# Patient Record
Sex: FEMALE | Race: WHITE | Employment: UNEMPLOYED | URBAN - METROPOLITAN AREA
[De-identification: names, ages, dates, MRNs, and addresses within clinical notes are randomized per-mention and may not be internally consistent; named-entity substitution may affect disease eponyms.]

---

## 2017-05-04 ENCOUNTER — OFFICE VISIT (OUTPATIENT)
Dept: OBGYN CLINIC | Age: 32
End: 2017-05-04

## 2017-05-04 VITALS
BODY MASS INDEX: 18.61 KG/M2 | HEIGHT: 63 IN | WEIGHT: 105 LBS | SYSTOLIC BLOOD PRESSURE: 118 MMHG | DIASTOLIC BLOOD PRESSURE: 60 MMHG

## 2017-05-04 DIAGNOSIS — N92.6 IRREGULAR PERIODS: Primary | ICD-10-CM

## 2017-05-04 DIAGNOSIS — N94.6 DYSMENORRHEA: ICD-10-CM

## 2017-05-04 RX ORDER — NITROFURANTOIN 25; 75 MG/1; MG/1
CAPSULE ORAL
Refills: 0 | COMMUNITY
Start: 2017-04-18 | End: 2017-06-22
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
## 2017-05-04 NOTE — MR AVS SNAPSHOT
Visit Information Date & Time Provider Department Dept. Phone Encounter #  
 5/4/2017  8:40 AM Monica Veras MD [facility] Da[facility] [phone] [id number] Upcoming Health Maintenance Date Due INFLUENZA AGE 9 TO ADULT 8/1/2017 PAP AKA CERVICAL CYTOLOGY 11/1/2019 Allergies as of 5/4/2017  Review Complete On: 5/4/2017 By: Марина Aguirre Severity Noted Reaction Type Reactions Latex  07/25/2013    Rash Wheezing Beef Containing Products High 07/25/2013    Anaphylaxis Codeine  07/25/2013    Other (comments) Hyperactivity Milk  07/25/2013    Hives Nuts [Tree Nut]  01/16/2014    Swelling Pt stated that her mouth and throat feels funny when she eats nuts Pepto-bismol [Bismuth Subsalicylate]  56/80/3039    Hives Phenergan [Promethazine]  07/25/2013    Other (comments) Increased sedation Reglan [Metoclopramide]  07/25/2013    Anxiety Soy  08/09/2014    Hives Current Immunizations  Never Reviewed Name Date RHOGAM INJECTION 7/25/2013  3:23 PM  
 Rho(D) 10/25/2013 Rho(D) Immune Globulin - IM 1/18/2014 10:22 AM  
  
 Not reviewed this visit Vitals BP Height(growth percentile) Weight(growth percentile) LMP Breastfeeding? BMI  
 118/60 5' 2.5\" (1.588 m) 105 lb (47.6 kg) 04/03/2017 No 18.9 kg/m2 OB Status Smoking Status Having regular periods Never Smoker BMI and BSA Data Body Mass Index Body Surface Area 18.9 kg/m 2 1.45 m 2 Preferred Pharmacy Pharmacy Name Phone [id number] [phone] Your Updated Medication List  
  
   
This list is accurate as of: 5/4/17  9:03 AM.  Always use your most recent med list.  
  
  
  
  
 albuterol 90 mcg/actuation inhaler Commonly known as:  PROVENTIL HFA, VENTOLIN HFA, PROAIR HFA Take 1 Puff by inhalation every four (4) hours as needed for Wheezing. multivitamin, stress formula tablet Commonly known as:  STRESS TAB Take 1 Tab by mouth daily. nitrofurantoin (macrocrystal-monohydrate) 100 mg capsule Commonly known as:  MACROBID  
1 CAPSULE WITH FOOD TWICE A DAY ORALLY 05 DAYS Patient Instructions Pelvic Exam: Care Instructions Your Care Instructions When your doctor examines all of your pelvic organs, it's called a pelvic exam. Two good reasons to have this kind of exam are to check for sexually transmitted infections (STIs) and to get a Pap test. A Pap test is also called a Pap smear. It checks for early changes that can lead to cancer of the cervix. Sometimes a pelvic exam is part of a regular checkup. In this case, you can do some things to make your test results as accurate as possible. · Try to schedule the exam when you don't have your period. · Don't use douches, tampons, or vaginal medicines, sprays, or powders for 24 hours before your exam. 
· Don't have sex for 24 hours before your exam. 
Other times, women have this kind of exam at any time of the month. This is because they have pelvic pain, bleeding, or discharge. Or they may have another pelvic problem. Before your exam, it's important to share some information with your doctor. For example, if you are a survivor of rape or sexual abuse, you can talk about any concerns you may have. Your doctor will also want to know if you are pregnant or use birth control. And he or she will want to hear about any problems, surgeries, or procedures you have had in your pelvic area. You will also need to tell your doctor when your last period was. Follow-up care is a key part of your treatment and safety. Be sure to make and go to all appointments, and call your doctor if you are having problems. It's also a good idea to know your test results and keep a list of the medicines you take. How is a pelvic exam done? · During a pelvic exam, you will: ¨ Take off your clothes below the waist. You will get a paper or cloth cover to put over the lower half of your body. Israel Lipschutz on your back on an exam table. Your feet will be raised above you. Stirrups will support your feet. · The doctor will: ¨ Ask you to relax your knees. Your knees need to lean out, toward the walls. ¨ Check the opening of your vagina for sores or swelling. ¨ Gently put a tool called a speculum into your vagina. It opens the vagina a little bit. You will feel some pressure. But if you are relaxed, it will not hurt. It lets your doctor see inside the vagina. ¨ Use a small brush, spatula, or swab to get a sample of cells, if you are having a Pap test or culture. The doctor then removes the speculum. ¨ Put on gloves and put one or two fingers of one hand into your vagina. The other hand goes on your lower belly. This lets your doctor feel your pelvic organs. You will probably feel some pressure. Try to stay relaxed. ¨ Put one gloved finger into your rectum and one into your vagina, if needed. This can also help check your pelvic organs. This exam takes about 10 minutes. At the end, you will get a washcloth or tissue to clean your vaginal area. It's normal to have some discharge after this exam. You can then get dressed. Some test results may be ready right away. But results from a culture or a Pap test may take several days or a few weeks. Why should you have a pelvic exam? 
· You want to have recommended screening tests. This includes a Pap test. 
· You think you have a vaginal infection. Signs include itching, burning, or unusual discharge. · You might have been exposed to a sexually transmitted infection (STI), such as chlamydia or herpes. · You have vaginal bleeding that is not part of your normal menstrual period. · You have pain in your belly or pelvis. · You have been sexually assaulted. A pelvic exam lets your doctor collect evidence and check for STIs. · You are pregnant. · You are having trouble getting pregnant. What are the risks of a pelvic exam? 
There are no risks from a pelvic exam. 
When should you call for help? Watch closely for changes in your health, and be sure to contact your doctor if: 
· You have heavy bleeding or discharge from your vagina after the exam. 
Where can you learn more? Go to http://ailyn-fadi.info/. Enter U511 in the search box to learn more about \"Pelvic Exam: Care Instructions. \" Current as of: October 13, 2016 Content Version: 11.2 © 0336-2535 "[employer]. Care instructions adapted under license by "[employer] (which disclaims liability or warranty for this information). If you have questions about a medical condition or this instruction, always ask your healthcare professional. [address] any warranty or liability for your use of this information. Please provide this summary of care documentation to your next provider. Your primary care clinician is listed as [place]. If you have any questions after today's visit, please call [phone].

## 2017-06-13 DIAGNOSIS — N64.59 INVERTED NIPPLE: Primary | ICD-10-CM

## 2017-06-22 ENCOUNTER — HOSPITAL ENCOUNTER (OUTPATIENT)
Dept: MAMMOGRAPHY | Age: 32
Discharge: HOME | End: 2017-06-22
Attending: SURGERY
Payer: MEDICAID

## 2017-06-22 ENCOUNTER — OFFICE VISIT (OUTPATIENT)
Dept: SURGERY | Age: 32
End: 2017-06-22

## 2017-06-22 VITALS
HEIGHT: 63 IN | BODY MASS INDEX: 18.61 KG/M2 | DIASTOLIC BLOOD PRESSURE: 59 MMHG | WEIGHT: 105 LBS | HEART RATE: 74 BPM | SYSTOLIC BLOOD PRESSURE: 100 MMHG

## 2017-06-22 DIAGNOSIS — N64.59 INVERTED NIPPLE: ICD-10-CM

## 2017-06-22 DIAGNOSIS — N63.10 BREAST MASS, RIGHT: Primary | ICD-10-CM

## 2017-06-22 PROCEDURE — 76642 ULTRASOUND BREAST LIMITED: CPT

## 2017-06-22 PROCEDURE — 77066 DX MAMMO INCL CAD BI: CPT

## 2017-06-22 NOTE — MR AVS SNAPSHOT
Visit Information Date & Time Provider Department Dept. Phone Encounter #  
 6/22/2017  3:30 PM VIKTORIA Landin [address] [facility] [phone] [id number] Upcoming Health Maintenance Date Due DTaP/Tdap/Td series (1 - Tdap) 3/12/2008 INFLUENZA AGE 9 TO ADULT 8/1/2017 PAP AKA CERVICAL CYTOLOGY 11/1/2019 Allergies as of 6/22/2017  Review Complete On: 5/4/2017 By: Ebenezer Dacosta MD  
  
 Severity Noted Reaction Type Reactions Latex  07/25/2013    Rash Wheezing Beef Containing Products High 07/25/2013    Anaphylaxis Codeine  07/25/2013    Other (comments) Hyperactivity Milk  07/25/2013    Hives Nuts [Tree Nut]  01/16/2014    Swelling Pt stated that her mouth and throat feels funny when she eats nuts Pepto-bismol [Bismuth Subsalicylate]  62/27/6239    Hives Phenergan [Promethazine]  07/25/2013    Other (comments) Increased sedation Reglan [Metoclopramide]  07/25/2013    Anxiety Soy  08/09/2014    Hives Current Immunizations  Never Reviewed Name Date RHOGAM INJECTION 7/25/2013  3:23 PM  
 Rho(D) 10/25/2013 Rho(D) Immune Globulin - IM 1/18/2014 10:22 AM  
  
 Not reviewed this visit Vitals OB Status Smoking Status Having regular periods Never Smoker Preferred Pharmacy Pharmacy Name Phone [id number] [address] [place] [phone] Your Updated Medication List  
  
   
This list is accurate as of: 6/22/17  3:57 PM.  Always use your most recent med list.  
  
  
  
  
 nitrofurantoin (macrocrystal-monohydrate) 100 mg capsule Commonly known as:  MACROBID  
1 CAPSULE WITH FOOD TWICE A DAY ORALLY 05 DAYS Introducing \[facility] [place] [facility]\"" & HEALTH SERVICES! Dear Giuliano Boyle: 
Thank you for requesting a [employer] account. Our records indicate that you already have an active [employer] account.   You can access your account anytime at [email or web address] Did you know that you can access your hospital and ER discharge instructions at any time in onlinetours? You can also review all of your test results from your hospital stay or ER visit. Additional Information If you have questions, please visit the Frequently Asked Questions section of the [employer]etours website at [email or web address] Remember, onlinetours is NOT to be used for urgent needs. For medical emergencies, dial 911. Now available from your iPhone and Android! Please provide this summary of care documentation to your next provider. Your primary care clinician is listed as Tavo Phillips. If you have any questions after today's visit, please call [phone].

## 2017-06-22 NOTE — COMMUNICATION BODY
HISTORY OF PRESENT ILLNESS  Remi Cortés is a 29 y.o. female. HPI ESTABLISHED patient here today for follow up for RIGHT breast cysts and LEFT chronic inverted nipple. She had a bilateral diagnostic mammogram and LEFT ultrasound today. The patient is able to palpate several lumps in her RIGHT breast that she states has known cysts. Family history - Maternal Great grandmother had breast cancer diagnosed in her 63's  Great Aunt Maternal- brain tumor  First screening mammogram - 6/22/2017- Bilateral diagnostic mammogram BI RADS 2. LEFT breast ultrasound BI RADS 2    Last visit 11/2015  BREAST ULTRASOUND  Indication: right breast mass 7:00 1/3. Tender/dense upper outer quadrant. 2 small lumps lateral to the breast  Technique: The area was scanned using a high-frequency linear-array near-field transducer  Findings: 7:00 1/3 1.7 cm fibroadenoma. UOQ 1/3 dense breast tissue with 2 very small fibroadenomas (3 mm each). 2 lymph nodes along the mid-axillary line 7 mm and 9 mm  Impression: 1. 7:00 fibrodenoma. 2.  UOQ normal dense breast tissue. 3. Normal lymph nodes  Disposition: no intervention  ROS    Physical Exam   Pulmonary/Chest: Right breast exhibits mass (1.5 cm oval mobile mass 7:00 1/3). Right breast exhibits no inverted nipple, no nipple discharge, no skin change and no tenderness. Left breast exhibits inverted nipple (partially inverted LEFT nipple (chronic)). Left breast exhibits no mass, no nipple discharge, no skin change and no tenderness. Breasts are symmetrical.       Lymphadenopathy:     She has no cervical adenopathy. She has no axillary adenopathy. Right: No supraclavicular adenopathy present. Left: No supraclavicular adenopathy present. BREAST ULTRASOUND  Indication: Right  breast palpable mass 7:00 1/3. Technique:   The area was scanned using a high-frequency linear-array near-field transducer  Findings: RIGHT 7:00 1/3 1.7 cm and RIGHT 2:00 2/3 7 mm and 6:00 1/3 7 mm oval masses. Through transmission  Impression: fibroadenomas   Disposition: No worrisome finding on ultrasound  ASSESSMENT and PLAN    ICD-10-CM ICD-9-CM    1. Breast mass, right N63 611.72      1. RIGHT breast mass x 3 c/w fibroadenoma. The largest mass is at 7:00 and has not changed since 2014. 2. Pt had a mammogram today.   BIRADS 2    Plan  Continue SBE  Follow up if she feels a new mass  Start yearly mammograms age 38

## 2017-06-22 NOTE — PROGRESS NOTES
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 HPI ESTABLISHED patient here today for follow up for RIGHT breast cysts and LEFT chronic inverted nipple. She had a bilateral diagnostic mammogram and LEFT ultrasound today. The patient is able to palpate several lumps in her RIGHT breast that she states has known cysts. Family history - Maternal Great grandmother had breast cancer diagnosed in her 63's  Great Aunt Maternal- brain tumor  First screening mammogram - 6/22/2017- Bilateral diagnostic mammogram BI RADS 2. LEFT breast ultrasound BI RADS 2    Last visit 11/2015  BREAST ULTRASOUND  Indication: right breast mass 7:00 1/3. Tender/dense upper outer quadrant. 2 small lumps lateral to the breast  Technique: The area was scanned using a high-frequency linear-array near-field transducer  Findings: 7:00 1/3 1.7 cm fibroadenoma. UOQ 1/3 dense breast tissue with 2 very small fibroadenomas (3 mm each). 2 lymph nodes along the mid-axillary line 7 mm and 9 mm  Impression: 1. 7:00 fibrodenoma. 2.  UOQ normal dense breast tissue. 3. Normal lymph nodes  Disposition: no intervention  ROS    Physical Exam   Pulmonary/Chest: Right breast exhibits mass (1.5 cm oval mobile mass 7:00 1/3). Right breast exhibits no inverted nipple, no nipple discharge, no skin change and no tenderness. Left breast exhibits inverted nipple (partially inverted LEFT nipple (chronic)). Left breast exhibits no mass, no nipple discharge, no skin change and no tenderness. Breasts are symmetrical.       Lymphadenopathy:     She has no cervical adenopathy. She has no axillary adenopathy. Right: No supraclavicular adenopathy present. Left: No supraclavicular adenopathy present. BREAST ULTRASOUND  Indication: Right  breast palpable mass 7:00 1/3. Technique:   The area was scanned using a high-frequency linear-array near-field transducer  Findings: RIGHT 7:00 1/3 1.7 cm and RIGHT 2:00 2/3 7 mm and 6:00 1/3 7 mm oval masses. Through transmission  Impression: fibroadenomas   Disposition: No worrisome finding on ultrasound  ASSESSMENT and PLAN    ICD-10-CM ICD-9-CM    1. Breast mass, right N63 611.72      1. RIGHT breast mass x 3 c/w fibroadenoma. The largest mass is at 7:00 and has not changed since 2014. 2. Pt had a mammogram today.   BIRADS 2    Plan  Continue SBE  Follow up if she feels a new mass  Start yearly mammograms age 38

## 2017-09-20 ENCOUNTER — TELEPHONE (OUTPATIENT)
Dept: CARDIOLOGY CLINIC | Age: 32
End: 2017-09-20

## 2017-09-20 ENCOUNTER — OFFICE VISIT (OUTPATIENT)
Dept: CARDIOLOGY CLINIC | Age: 32
End: 2017-09-20

## 2017-09-20 VITALS
OXYGEN SATURATION: 98 % | RESPIRATION RATE: 16 BRPM | DIASTOLIC BLOOD PRESSURE: 60 MMHG | HEIGHT: 63 IN | SYSTOLIC BLOOD PRESSURE: 110 MMHG | HEART RATE: 80 BPM | BODY MASS INDEX: 18.78 KG/M2 | WEIGHT: 106 LBS

## 2017-09-20 DIAGNOSIS — I49.9 IRREGULAR HEART BEAT: ICD-10-CM

## 2017-09-20 DIAGNOSIS — R07.89 CHEST TIGHTNESS: ICD-10-CM

## 2017-09-20 DIAGNOSIS — R53.83 FATIGUE, UNSPECIFIED TYPE: ICD-10-CM

## 2017-09-20 DIAGNOSIS — R00.2 HEART PALPITATIONS: Primary | ICD-10-CM

## 2017-09-20 NOTE — TELEPHONE ENCOUNTER
Called [facility] for MR inquiry from PCP Dr. Jag Espana. Requested recent labs, last office note, and any cardiac testing be sent to [facility] at [phone] Attn: Dianna/Dr. Mo Knott. Informed that will be sending requested records ASAP.
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
## 2017-09-20 NOTE — PATIENT INSTRUCTIONS
Dr. Zena Gupta would like to schedule the following cardiac testing:       Echocardiogram to be done in [facility] office. 30-Day event monitor will be sent to your home by [facility].

## 2017-09-20 NOTE — TELEPHONE ENCOUNTER
Please schedule for Loop per VO of Dr. Paulino Hinojosa. Dx: Irregular HB and CP. Thanks for all you do!

## 2017-09-20 NOTE — MR AVS SNAPSHOT
Visit Information Date & Time Provider Department Dept. Phone Encounter #  
 9/20/2017 10:00 AM Beba Soria MD [facility] [phone] [id number] Upcoming Health Maintenance Date Due DTaP/Tdap/Td series (1 - Tdap) 3/12/2008 INFLUENZA AGE 9 TO ADULT 8/1/2017 PAP AKA CERVICAL CYTOLOGY 11/1/2019 Allergies as of 9/20/2017  Review Complete On: 9/20/2017 By: aLyo Lima Severity Noted Reaction Type Reactions Latex  07/25/2013    Rash Wheezing Beef Containing Products High 07/25/2013    Anaphylaxis Codeine  07/25/2013    Other (comments) Hyperactivity Milk  07/25/2013    Hives Nuts [Tree Nut]  01/16/2014    Swelling Pt stated that her mouth and throat feels funny when she eats nuts Pepto-bismol [Bismuth Subsalicylate]  30/78/1318    Hives Phenergan [Promethazine]  07/25/2013    Other (comments) Increased sedation Reglan [Metoclopramide]  07/25/2013    Anxiety Soy  08/09/2014    Hives Current Immunizations  Never Reviewed Name Date RHOGAM INJECTION 7/25/2013  3:23 PM  
 Rho(D) 10/25/2013 Rho(D) Immune Globulin - IM 1/18/2014 10:22 AM  
  
 Not reviewed this visit You Were Diagnosed With   
  
 Codes Comments Chest pain, unspecified type    -  Primary ICD-10-CM: R07.9 ICD-9-CM: 786.50 Irregular heart beat     ICD-10-CM: I49.9 ICD-9-CM: 427.9 Vitals BP Pulse Resp Height(growth percentile) Weight(growth percentile) SpO2  
 110/60 (BP 1 Location: Left arm, BP Patient Position: Sitting) 80 16 5' 2.5\" (1.588 m) 106 lb (48.1 kg) 98% BMI OB Status Smoking Status 19.08 kg/m2 Having regular periods Never Smoker BMI and BSA Data Body Mass Index Body Surface Area 19.08 kg/m 2 1.46 m 2 Preferred Pharmacy Pharmacy Name Phone [id number], [address] [place] [phone] Your Updated Medication List  
  
 Notice  As of 9/20/2017 10:56 AM  
 You have not been prescribed any medications. To-Do List   
 09/20/2017 ECHO:  2D ECHO COMPLETE ADULT (TTE) W OR WO CONTR   
  
 09/20/2017 Cardiac Services:  LOOP MONITOR Patient Instructions Dr. Manuela Page would like to schedule the following cardiac testing:  
 
 
Echocardiogram to be done in [facility] office. 30-Day event monitor will be sent to your home by [employer]. Introducing [facility]! Dear Ellyn Medina: 
Thank you for requesting a [employer] account. Our records indicate that you already have an active [employer] account. You can access your account anytime at [email or web address]. [email or web address] Did you know that you can access your hospital and ER discharge instructions at any time in [employer]? You can also review all of your test results from your hospital stay or ER visit. Additional Information If you have questions, please visit the Frequently Asked Questions section of the [employer] website at [email or web address] Remember, [employer] is NOT to be used for urgent needs. For medical emergencies, dial 911. Now available from your iPhone and Android! Please provide this summary of care documentation to your next provider. Your primary care clinician is listed as Mounika Amaral. If you have any questions after today's visit, please call [phone].

## 2017-10-05 ENCOUNTER — CLINICAL SUPPORT (OUTPATIENT)
Dept: CARDIOLOGY CLINIC | Age: 32
End: 2017-10-05

## 2017-10-05 DIAGNOSIS — I49.9 IRREGULAR HEART BEAT: ICD-10-CM

## 2017-10-05 DIAGNOSIS — Z34.90 PREGNANCY, UNSPECIFIED GESTATIONAL AGE: ICD-10-CM

## 2017-10-05 DIAGNOSIS — R07.89 CHEST TIGHTNESS: ICD-10-CM

## 2017-10-31 ENCOUNTER — DOCUMENTATION ONLY (OUTPATIENT)
Dept: CARDIOLOGY CLINIC | Age: 32
End: 2017-10-31

## 2017-10-31 NOTE — PROGRESS NOTES
Loop is WNL  Unremarkable  Let her know  If symptoms gone then no further work up  If still VASQUEZ then get planned echo

## 2017-11-01 NOTE — PROGRESS NOTES
Verified patient with two types of identifiers. Notified patient of results. Patient states that she has not experienced any more chest tightness of SOB just the occasional fluttering in her chest. Notified patient to call back if symptoms return. Patient verbalized understanding and will call with any other questions.

## 2018-02-22 ENCOUNTER — HOSPITAL ENCOUNTER (EMERGENCY)
Age: 33
Discharge: HOME | End: 2018-02-23
Attending: EMERGENCY MEDICINE
Payer: MEDICAID

## 2018-02-22 DIAGNOSIS — R07.9 ACUTE CHEST PAIN: ICD-10-CM

## 2018-02-22 DIAGNOSIS — R00.2 PALPITATIONS: Primary | ICD-10-CM

## 2018-02-22 LAB
ALBUMIN SERPL-MCNC: 4 G/DL (ref 3.5–5)
ALBUMIN/GLOB SERPL: 1.1 {RATIO} (ref 1.1–2.2)
ALP SERPL-CCNC: 48 U/L (ref 45–117)
ALT SERPL-CCNC: 24 U/L (ref 12–78)
ANION GAP SERPL CALC-SCNC: 9 MMOL/L (ref 5–15)
AST SERPL-CCNC: 16 U/L (ref 15–37)
BASOPHILS # BLD: 0.1 K/UL (ref 0–0.1)
BASOPHILS NFR BLD: 1 % (ref 0–1)
BILIRUB SERPL-MCNC: 0.4 MG/DL (ref 0.2–1)
BUN SERPL-MCNC: 15 MG/DL (ref 6–20)
BUN/CREAT SERPL: 21 (ref 12–20)
CALCIUM SERPL-MCNC: 9.3 MG/DL (ref 8.5–10.1)
CHLORIDE SERPL-SCNC: 105 MMOL/L (ref 97–108)
CO2 SERPL-SCNC: 26 MMOL/L (ref 21–32)
CREAT SERPL-MCNC: 0.72 MG/DL (ref 0.55–1.02)
D DIMER PPP FEU-MCNC: 0.18 MG/L FEU (ref 0–0.65)
DIFFERENTIAL METHOD BLD: NORMAL
EOSINOPHIL # BLD: 0.2 K/UL (ref 0–0.4)
EOSINOPHIL NFR BLD: 3 % (ref 0–7)
ERYTHROCYTE [DISTWIDTH] IN BLOOD BY AUTOMATED COUNT: 12.6 % (ref 11.5–14.5)
GLOBULIN SER CALC-MCNC: 3.6 G/DL (ref 2–4)
GLUCOSE SERPL-MCNC: 102 MG/DL (ref 65–100)
HCG SERPL QL: NEGATIVE
HCT VFR BLD AUTO: 41.7 % (ref 35–47)
HGB BLD-MCNC: 14.5 G/DL (ref 11.5–16)
LYMPHOCYTES # BLD: 3.1 K/UL (ref 0.8–3.5)
LYMPHOCYTES NFR BLD: 42 % (ref 12–49)
MAGNESIUM SERPL-MCNC: 1.8 MG/DL (ref 1.6–2.4)
MCH RBC QN AUTO: 30.6 PG (ref 26–34)
MCHC RBC AUTO-ENTMCNC: 34.8 G/DL (ref 30–36.5)
MCV RBC AUTO: 88 FL (ref 80–99)
MONOCYTES # BLD: 0.9 K/UL (ref 0–1)
MONOCYTES NFR BLD: 12 % (ref 5–13)
NEUTS SEG # BLD: 3.2 K/UL (ref 1.8–8)
NEUTS SEG NFR BLD: 42 % (ref 32–75)
PLATELET # BLD AUTO: 259 K/UL (ref 150–400)
PMV BLD AUTO: 10.9 FL (ref 8.9–12.9)
POTASSIUM SERPL-SCNC: 3.7 MMOL/L (ref 3.5–5.1)
PROT SERPL-MCNC: 7.6 G/DL (ref 6.4–8.2)
RBC # BLD AUTO: 4.74 M/UL (ref 3.8–5.2)
SODIUM SERPL-SCNC: 140 MMOL/L (ref 136–145)
TROPONIN I BLD-MCNC: <0.04 NG/ML (ref 0–0.08)
WBC # BLD AUTO: 7.5 K/UL (ref 3.6–11)
XXWBCSUS: 0

## 2018-02-22 PROCEDURE — 83735 ASSAY OF MAGNESIUM: CPT | Performed by: EMERGENCY MEDICINE

## 2018-02-22 PROCEDURE — 96360 HYDRATION IV INFUSION INIT: CPT

## 2018-02-22 PROCEDURE — 99285 EMERGENCY DEPT VISIT HI MDM: CPT

## 2018-02-22 PROCEDURE — 85379 FIBRIN DEGRADATION QUANT: CPT | Performed by: EMERGENCY MEDICINE

## 2018-02-22 PROCEDURE — 85025 COMPLETE CBC W/AUTO DIFF WBC: CPT | Performed by: EMERGENCY MEDICINE

## 2018-02-22 PROCEDURE — 36415 COLL VENOUS BLD VENIPUNCTURE: CPT | Performed by: EMERGENCY MEDICINE

## 2018-02-22 PROCEDURE — 80053 COMPREHEN METABOLIC PANEL: CPT | Performed by: EMERGENCY MEDICINE

## 2018-02-22 PROCEDURE — 93005 ELECTROCARDIOGRAM TRACING: CPT

## 2018-02-22 PROCEDURE — 84703 CHORIONIC GONADOTROPIN ASSAY: CPT | Performed by: EMERGENCY MEDICINE

## 2018-02-22 PROCEDURE — 84484 ASSAY OF TROPONIN QUANT: CPT

## 2018-02-22 PROCEDURE — 74011250636 HC RX REV CODE- 250/636: Performed by: EMERGENCY MEDICINE

## 2018-02-22 RX ADMIN — SODIUM CHLORIDE 500 ML: 900 INJECTION, SOLUTION INTRAVENOUS at 20:00

## 2018-02-23 VITALS
TEMPERATURE: 99 F | HEART RATE: 76 BPM | WEIGHT: 106 LBS | SYSTOLIC BLOOD PRESSURE: 106 MMHG | BODY MASS INDEX: 19.51 KG/M2 | DIASTOLIC BLOOD PRESSURE: 65 MMHG | RESPIRATION RATE: 17 BRPM | OXYGEN SATURATION: 98 % | HEIGHT: 62 IN

## 2018-02-23 LAB
ATRIAL RATE: 79 BPM
CALCULATED P AXIS, ECG09: 31 DEGREES
CALCULATED R AXIS, ECG10: 45 DEGREES
CALCULATED T AXIS, ECG11: 57 DEGREES
DIAGNOSIS, 93000: NORMAL
P-R INTERVAL, ECG05: 126 MS
Q-T INTERVAL, ECG07: 334 MS
QRS DURATION, ECG06: 68 MS
QTC CALCULATION (BEZET), ECG08: 382 MS
TROPONIN I BLD-MCNC: <0.04 NG/ML (ref 0–0.08)
VENTRICULAR RATE, ECG03: 79 BPM

## 2018-02-23 PROCEDURE — 84484 ASSAY OF TROPONIN QUANT: CPT

## 2018-02-23 NOTE — ED NOTES
Pt continues to rest on stretcher and monitor. Pt denies any needs at this time. Call bell within reach.

## 2018-02-23 NOTE — ED NOTES
The patient was discharged home by Dr Tawny De La Paz  in stable condition. The patient is alert and oriented, in no respiratory distress and discharge vital signs obtained. The patient's diagnosis, condition and treatment were explained. The patient expressed understanding. No prescriptions given. No work/school note given. A discharge plan has been developed. A  was not involved in the process. Aftercare instructions were given. Pt ambulatory out of the ED.
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
## 2018-02-23 NOTE — ED TRIAGE NOTES
Pt reports chest pain that started around 1900. Pt was en route to ED via family/friend, but when reports that when her arm started hurting she wanted to call the rescue squad. Pt reports nausea with no vomiting.

## 2018-02-23 NOTE — DISCHARGE INSTRUCTIONS
Chest Pain: Care Instructions  Your Care Instructions    There are many things that can cause chest pain. Some are not serious and will get better on their own in a few days. But some kinds of chest pain need more testing and treatment. Your doctor may have recommended a follow-up visit in the next 8 to 12 hours. If you are not getting better, you may need more tests or treatment. Even though your doctor has released you, you still need to watch for any problems. The doctor carefully checked you, but sometimes problems can develop later. If you have new symptoms or if your symptoms do not get better, get medical care right away. If you have worse or different chest pain or pressure that lasts more than 5 minutes or you passed out (lost consciousness), call 911 or seek other emergency help right away. A medical visit is only one step in your treatment. Even if you feel better, you still need to do what your doctor recommends, such as going to all suggested follow-up appointments and taking medicines exactly as directed. This will help you recover and help prevent future problems. How can you care for yourself at home? · Rest until you feel better. · Take your medicine exactly as prescribed. Call your doctor if you think you are having a problem with your medicine. · Do not drive after taking a prescription pain medicine. When should you call for help? Call 911 if:  ? · You passed out (lost consciousness). ? · You have severe difficulty breathing. ? · You have symptoms of a heart attack. These may include:  ¨ Chest pain or pressure, or a strange feeling in your chest.  ¨ Sweating. ¨ Shortness of breath. ¨ Nausea or vomiting. ¨ Pain, pressure, or a strange feeling in your back, neck, jaw, or upper belly or in one or both shoulders or arms. ¨ Lightheadedness or sudden weakness. ¨ A fast or irregular heartbeat.   After you call 911, the  may tell you to chew 1 adult-strength or 2 to 4 low-dose aspirin. Wait for an ambulance. Do not try to drive yourself. ?Call your doctor today if:  ? · You have any trouble breathing. ? · Your chest pain gets worse. ? · You are dizzy or lightheaded, or you feel like you may faint. ? · You are not getting better as expected. ? · You are having new or different chest pain. Where can you learn more? Go to http://ailyn-fadi.info/. Enter A120 in the search box to learn more about \"Chest Pain: Care Instructions. \"  Current as of: March 20, 2017  Content Version: 11.4  © 5595-6310 [employer]. Care instructions adapted under license by [employer] (which disclaims liability or warranty for this information). If you have questions about a medical condition or this instruction, always ask your healthcare professional. [place] [address] any warranty or liability for your use of this information. Palpitations: Care Instructions  Your Care Instructions    Heart palpitations are the uncomfortable sensation that your heart is beating fast or irregularly. You might feel pounding or fluttering in your chest. It might feel like your heart is skipping a beat. Although palpitations may be caused by a heart problem, they also occur because of stress, fatigue, or use of alcohol, caffeine, or nicotine. Many medicines, including diet pills, antihistamines, decongestants, and some herbal products, can cause heart palpitations. Nearly everyone has palpitations from time to time. Depending on your symptoms, your doctor may need to do more tests to try to find the cause of your palpitations. Follow-up care is a key part of your treatment and safety. Be sure to make and go to all appointments, and call your doctor if you are having problems. It's also a good idea to know your test results and keep a list of the medicines you take. How can you care for yourself at home?   · Avoid caffeine, nicotine, and excess alcohol. · Do not take illegal drugs, such as methamphetamines and cocaine. · Do not take weight loss or diet medicines unless you talk with your doctor first.  · Get plenty of sleep. · Do not overeat. · If you have palpitations again, take deep breaths and try to relax. This may slow a racing heart. · If you start to feel lightheaded, lie down to avoid injuries that might result if you pass out and fall down. · Keep a record of your palpitations and bring it to your next doctor's appointment. Write down:  ¨ The date and time. ¨ Your pulse. (If your heart is beating fast, it may be hard to count your pulse.)  ¨ What you were doing when the palpitations started. ¨ How long the palpitations lasted. ¨ Any other symptoms. · If an activity causes palpitations, slow down or stop. Talk to your doctor before you do that activity again. · Take your medicines exactly as prescribed. Call your doctor if you think you are having a problem with your medicine. When should you call for help? Call 911 anytime you think you may need emergency care. For example, call if:  ? · You passed out (lost consciousness). ? · You have symptoms of a heart attack. These may include:  ¨ Chest pain or pressure, or a strange feeling in the chest.  ¨ Sweating. ¨ Shortness of breath. ¨ Pain, pressure, or a strange feeling in the back, neck, jaw, or upper belly or in one or both shoulders or arms. ¨ Lightheadedness or sudden weakness. ¨ A fast or irregular heartbeat. After you call 911, the  may tell you to chew 1 adult-strength or 2 to 4 low-dose aspirin. Wait for an ambulance. Do not try to drive yourself. ? · You have symptoms of a stroke. These may include:  ¨ Sudden numbness, tingling, weakness, or loss of movement in your face, arm, or leg, especially on only one side of your body. ¨ Sudden vision changes. ¨ Sudden trouble speaking. ¨ Sudden confusion or trouble understanding simple statements.   ¨ Sudden problems with walking or balance. ¨ A sudden, severe headache that is different from past headaches. ?Call your doctor now or seek immediate medical care if:  ? · You have heart palpitations and:  ¨ Are dizzy or lightheaded, or you feel like you may faint. ¨ Have new or increased shortness of breath. ? Watch closely for changes in your health, and be sure to contact your doctor if:  ? · You continue to have heart palpitations. Where can you learn more? Go to http://ailyn-fadi.info/. Enter R508 in the search box to learn more about \"Palpitations: Care Instructions. \"  Current as of: September 21, 2016  Content Version: 11.4  © 5231-2702 [employer]. Care instructions adapted under license by [employer] (which disclaims liability or warranty for this information). If you have questions about a medical condition or this instruction, always ask your healthcare professional. [place] [address] any warranty or liability for your use of this information.

## 2018-02-23 NOTE — ED NOTES
Pt resting comfortably on stretcher. Unable to obtain urine specimen at this time d/t not having any latex free self-caths; will obtain urine specimen when  comes with the pt's self-cath. Fluids infusing via gravity. Ongoing plan of care and time constraints w/lab results discussed w/pt. Call bell within reach; will continue to monitor.

## 2018-05-02 ENCOUNTER — HOSPITAL ENCOUNTER (OUTPATIENT)
Dept: ULTRASOUND IMAGING | Age: 33
Discharge: HOME | End: 2018-05-02
Attending: INTERNAL MEDICINE
Payer: MEDICAID

## 2018-05-02 DIAGNOSIS — R10.9 ABDOMINAL PAIN: ICD-10-CM

## 2018-05-02 DIAGNOSIS — R19.8 ALTERED BOWEL FUNCTION: ICD-10-CM

## 2018-05-02 PROCEDURE — 76700 US EXAM ABDOM COMPLETE: CPT

## 2018-12-21 ENCOUNTER — HOSPITAL ENCOUNTER (EMERGENCY)
Age: 33
Discharge: HOME | End: 2018-12-21
Attending: EMERGENCY MEDICINE
Payer: MEDICAID

## 2018-12-21 ENCOUNTER — APPOINTMENT (OUTPATIENT)
Dept: GENERAL RADIOLOGY | Age: 33
End: 2018-12-21
Attending: PHYSICIAN ASSISTANT
Payer: MEDICAID

## 2018-12-21 VITALS
RESPIRATION RATE: 16 BRPM | SYSTOLIC BLOOD PRESSURE: 102 MMHG | DIASTOLIC BLOOD PRESSURE: 55 MMHG | TEMPERATURE: 100.7 F | BODY MASS INDEX: 16.88 KG/M2 | HEIGHT: 63 IN | HEART RATE: 109 BPM | WEIGHT: 95.24 LBS | OXYGEN SATURATION: 98 %

## 2018-12-21 DIAGNOSIS — E87.6 HYPOKALEMIA: Primary | ICD-10-CM

## 2018-12-21 DIAGNOSIS — A08.4 VIRAL GASTROENTERITIS: ICD-10-CM

## 2018-12-21 DIAGNOSIS — R19.7 DIARRHEA, UNSPECIFIED TYPE: ICD-10-CM

## 2018-12-21 LAB
ALBUMIN SERPL-MCNC: 3.8 G/DL (ref 3.5–5)
ALBUMIN/GLOB SERPL: 1.2 {RATIO} (ref 1.1–2.2)
ALP SERPL-CCNC: 44 U/L (ref 45–117)
ALT SERPL-CCNC: 19 U/L (ref 12–78)
ANION GAP SERPL CALC-SCNC: 11 MMOL/L (ref 5–15)
APPEARANCE UR: ABNORMAL
AST SERPL-CCNC: 12 U/L (ref 15–37)
BACTERIA URNS QL MICRO: ABNORMAL /HPF
BASOPHILS # BLD: 0 K/UL (ref 0–0.1)
BASOPHILS NFR BLD: 0 % (ref 0–1)
BILIRUB SERPL-MCNC: 0.8 MG/DL (ref 0.2–1)
BILIRUB UR QL CFM: NEGATIVE
BUN SERPL-MCNC: 9 MG/DL (ref 6–20)
BUN/CREAT SERPL: 13 (ref 12–20)
CALCIUM SERPL-MCNC: 8.9 MG/DL (ref 8.5–10.1)
CHLORIDE SERPL-SCNC: 103 MMOL/L (ref 97–108)
CO2 SERPL-SCNC: 27 MMOL/L (ref 21–32)
COLOR UR: ABNORMAL
COMMENT, HOLDF: NORMAL
CREAT SERPL-MCNC: 0.71 MG/DL (ref 0.55–1.02)
DIFFERENTIAL METHOD BLD: ABNORMAL
EOSINOPHIL # BLD: 0 K/UL (ref 0–0.4)
EOSINOPHIL NFR BLD: 0 % (ref 0–7)
EPITH CASTS URNS QL MICRO: ABNORMAL /LPF
ERYTHROCYTE [DISTWIDTH] IN BLOOD BY AUTOMATED COUNT: 12.4 % (ref 11.5–14.5)
GLOBULIN SER CALC-MCNC: 3.1 G/DL (ref 2–4)
GLUCOSE SERPL-MCNC: 93 MG/DL (ref 65–100)
GLUCOSE UR STRIP.AUTO-MCNC: NEGATIVE MG/DL
HCG UR QL: NEGATIVE
HCT VFR BLD AUTO: 45.8 % (ref 35–47)
HGB BLD-MCNC: 16.3 G/DL (ref 11.5–16)
HGB UR QL STRIP: ABNORMAL
KETONES UR QL STRIP.AUTO: 40 MG/DL
LEUKOCYTE ESTERASE UR QL STRIP.AUTO: NEGATIVE
LIPASE SERPL-CCNC: 144 U/L (ref 73–393)
LYMPHOCYTES # BLD: 1 K/UL (ref 0.8–3.5)
LYMPHOCYTES NFR BLD: 11 % (ref 12–49)
MCH RBC QN AUTO: 31.1 PG (ref 26–34)
MCHC RBC AUTO-ENTMCNC: 35.6 G/DL (ref 30–36.5)
MCV RBC AUTO: 87.4 FL (ref 80–99)
MONOCYTES # BLD: 0.5 K/UL (ref 0–1)
MONOCYTES NFR BLD: 5 % (ref 5–13)
MUCOUS THREADS URNS QL MICRO: ABNORMAL /LPF
NEUTS SEG # BLD: 7.2 K/UL (ref 1.8–8)
NEUTS SEG NFR BLD: 84 % (ref 32–75)
NITRITE UR QL STRIP.AUTO: NEGATIVE
PH UR STRIP: 6.5 [PH] (ref 5–8)
PLATELET # BLD AUTO: 228 K/UL (ref 150–400)
PMV BLD AUTO: 11.4 FL (ref 8.9–12.9)
POTASSIUM SERPL-SCNC: 2.8 MMOL/L (ref 3.5–5.1)
PROT SERPL-MCNC: 6.9 G/DL (ref 6.4–8.2)
PROT UR STRIP-MCNC: NEGATIVE MG/DL
RBC # BLD AUTO: 5.24 M/UL (ref 3.8–5.2)
RBC #/AREA URNS HPF: ABNORMAL /HPF (ref 0–5)
SAMPLES BEING HELD,HOLD: NORMAL
SODIUM SERPL-SCNC: 141 MMOL/L (ref 136–145)
SP GR UR REFRACTOMETRY: 1.02 (ref 1–1.03)
UR CULT HOLD, URHOLD: NORMAL
UROBILINOGEN UR QL STRIP.AUTO: 4 EU/DL (ref 0.2–1)
WBC # BLD AUTO: 8.7 K/UL (ref 3.6–11)
WBC URNS QL MICRO: ABNORMAL /HPF (ref 0–4)
XXWBCSUS: 0

## 2018-12-21 PROCEDURE — 85025 COMPLETE CBC W/AUTO DIFF WBC: CPT

## 2018-12-21 PROCEDURE — 74011250637 HC RX REV CODE- 250/637: Performed by: PHYSICIAN ASSISTANT

## 2018-12-21 PROCEDURE — 36415 COLL VENOUS BLD VENIPUNCTURE: CPT

## 2018-12-21 PROCEDURE — 80053 COMPREHEN METABOLIC PANEL: CPT

## 2018-12-21 PROCEDURE — 96365 THER/PROPH/DIAG IV INF INIT: CPT

## 2018-12-21 PROCEDURE — 96361 HYDRATE IV INFUSION ADD-ON: CPT

## 2018-12-21 PROCEDURE — 74011000250 HC RX REV CODE- 250: Performed by: PHYSICIAN ASSISTANT

## 2018-12-21 PROCEDURE — 99285 EMERGENCY DEPT VISIT HI MDM: CPT

## 2018-12-21 PROCEDURE — 83690 ASSAY OF LIPASE: CPT

## 2018-12-21 PROCEDURE — 71046 X-RAY EXAM CHEST 2 VIEWS: CPT

## 2018-12-21 PROCEDURE — 81025 URINE PREGNANCY TEST: CPT

## 2018-12-21 PROCEDURE — 74011250636 HC RX REV CODE- 250/636: Performed by: PHYSICIAN ASSISTANT

## 2018-12-21 PROCEDURE — 81001 URINALYSIS AUTO W/SCOPE: CPT

## 2018-12-21 PROCEDURE — 96375 TX/PRO/DX INJ NEW DRUG ADDON: CPT

## 2018-12-21 RX ORDER — LOPERAMIDE HCL 2 MG
2 TABLET ORAL
COMMUNITY
End: 2019-09-25

## 2018-12-21 RX ORDER — ONDANSETRON 4 MG/1
4 TABLET, ORALLY DISINTEGRATING ORAL
Qty: 12 TAB | Refills: 0 | Status: SHIPPED | OUTPATIENT
Start: 2018-12-21 | End: 2019-03-26

## 2018-12-21 RX ORDER — ONDANSETRON 4 MG/1
4 TABLET, ORALLY DISINTEGRATING ORAL
COMMUNITY
End: 2018-12-21

## 2018-12-21 RX ORDER — ACETAMINOPHEN 500 MG
1000 TABLET ORAL
Status: COMPLETED | OUTPATIENT
Start: 2018-12-21 | End: 2018-12-21

## 2018-12-21 RX ORDER — POTASSIUM CHLORIDE 14.9 MG/ML
10 INJECTION INTRAVENOUS ONCE
Status: COMPLETED | OUTPATIENT
Start: 2018-12-21 | End: 2018-12-21

## 2018-12-21 RX ORDER — POTASSIUM CHLORIDE 20 MEQ/1
20 TABLET, EXTENDED RELEASE ORAL DAILY
Qty: 3 TAB | Refills: 0 | Status: SHIPPED | OUTPATIENT
Start: 2018-12-22 | End: 2018-12-25

## 2018-12-21 RX ORDER — DICYCLOMINE HYDROCHLORIDE 10 MG/1
10 CAPSULE ORAL
Qty: 20 CAP | Refills: 0 | Status: SHIPPED | OUTPATIENT
Start: 2018-12-21 | End: 2018-12-26

## 2018-12-21 RX ORDER — ONDANSETRON 2 MG/ML
4 INJECTION INTRAMUSCULAR; INTRAVENOUS
Status: COMPLETED | OUTPATIENT
Start: 2018-12-21 | End: 2018-12-21

## 2018-12-21 RX ORDER — POTASSIUM CHLORIDE 750 MG/1
40 TABLET, FILM COATED, EXTENDED RELEASE ORAL
Status: COMPLETED | OUTPATIENT
Start: 2018-12-21 | End: 2018-12-21

## 2018-12-21 RX ADMIN — FAMOTIDINE 20 MG: 10 INJECTION, SOLUTION INTRAVENOUS at 18:23

## 2018-12-21 RX ADMIN — ACETAMINOPHEN 1000 MG: 500 TABLET ORAL at 20:46

## 2018-12-21 RX ADMIN — SODIUM CHLORIDE 1000 ML: 900 INJECTION, SOLUTION INTRAVENOUS at 19:20

## 2018-12-21 RX ADMIN — POTASSIUM CHLORIDE 40 MEQ: 750 TABLET, EXTENDED RELEASE ORAL at 19:17

## 2018-12-21 RX ADMIN — ONDANSETRON 4 MG: 2 INJECTION INTRAMUSCULAR; INTRAVENOUS at 18:23

## 2018-12-21 RX ADMIN — LIDOCAINE HYDROCHLORIDE 40 ML: 20 SOLUTION ORAL; TOPICAL at 19:17

## 2018-12-21 RX ADMIN — POTASSIUM CHLORIDE 10 MEQ: 200 INJECTION, SOLUTION INTRAVENOUS at 19:25

## 2018-12-21 RX ADMIN — SODIUM CHLORIDE 1000 ML: 900 INJECTION, SOLUTION INTRAVENOUS at 18:23
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
## 2018-12-21 NOTE — ED TRIAGE NOTES
Pt ambulates to treatment area she states that on Sunday she took a morning after pill and since then she has had nausea and watery diarrhea. She also has had generalized body aches with chills. She was seen by her PCP on Wednesday and given Zofran 4 mg and is taking OTC Imodium for the diarrhea. In the past 24 hrs she has developed a dry cough and her abdomen feels bloated. She notes that in the past 24 hrs she has had 3 episodes of diarrhea. She also was told that her right ovarian cyst may have ruptured by the [facility].

## 2018-12-22 NOTE — ED NOTES
Plan of care and all test/meds ordered explained to pt. Good understanding and agreement with plan was verbalized. Pt on monitor x 2. Call bell within reach.

## 2018-12-22 NOTE — DISCHARGE INSTRUCTIONS
We hope that we have addressed all of your medical concerns. The examination and treatment you received in the Emergency Department were for an emergent problem and were not intended as complete care. It is important that you follow up with your healthcare provider(s) for ongoing care. If your symptoms worsen or do not improve as expected, and you are unable to reach your usual health care provider(s), you should return to the Emergency Department. Today's healthcare is undergoing tremendous change, and patient satisfaction surveys are one of the many tools to assess the quality of medical care. You may receive a survey from the [employer] regarding your experience in the Emergency Department. I hope that your experience has been completely positive, particularly the medical care that I provided. As such, please participate in the survey; anything less than excellent does not meet my expectations or intentions. [facility] and 8 [facility] participate in nationally recognized quality of care measures. If your blood pressure is greater than 120/80, as reported below, we urge that you seek medical care to address the potential of high blood pressure, commonly known as hypertension. Hypertension can be hereditary or can be caused by certain medical conditions, pain, stress, or \"white coat syndrome. \"       Please make an appointment with your health care provider(s) for follow up of your Emergency Department visit. VITALS:   Patient Vitals for the past 8 hrs:   Temp Pulse Resp BP SpO2   12/21/18 1945 -- -- -- 105/63 99 %   12/21/18 1915 -- -- -- 113/61 97 %   12/21/18 1802 99.2 °F (37.3 °C) (!) 111 16 105/59 100 %          Thank you for allowing us to provide you with medical care today. We realize that you have many choices for your emergency care needs. Please choose us in the future for any continued health care needs.       Regards, Oriana Spears, [address]   Office: [phone]            Recent Results (from the past 24 hour(s))   SAMPLES BEING HELD    Collection Time: 12/21/18  6:20 PM   Result Value Ref Range    SAMPLES BEING HELD 1GOLD 1BLUE     COMMENT        Add-on orders for these samples will be processed based on acceptable specimen integrity and analyte stability, which may vary by analyte. CBC WITH AUTOMATED DIFF    Collection Time: 12/21/18  6:20 PM   Result Value Ref Range    WBC 8.7 3.6 - 11.0 K/uL    RBC 5.24 (H) 3.80 - 5.20 M/uL    HGB 16.3 (H) 11.5 - 16.0 g/dL    HCT 45.8 35.0 - 47.0 %    MCV 87.4 80.0 - 99.0 FL    MCH 31.1 26.0 - 34.0 PG    MCHC 35.6 30.0 - 36.5 g/dL    RDW 12.4 11.5 - 14.5 %    PLATELET 337 890 - 095 K/uL    MPV 11.4 8.9 - 12.9 FL    NEUTROPHILS 84 (H) 32 - 75 %    LYMPHOCYTES 11 (L) 12 - 49 %    MONOCYTES 5 5 - 13 %    EOSINOPHILS 0 0 - 7 %    BASOPHILS 0 0 - 1 %    ABS. NEUTROPHILS 7.2 1.8 - 8.0 K/UL    ABS. LYMPHOCYTES 1.0 0.8 - 3.5 K/UL    ABS. MONOCYTES 0.5 0.0 - 1.0 K/UL    ABS. EOSINOPHILS 0.0 0.0 - 0.4 K/UL    ABS. BASOPHILS 0.0 0.0 - 0.1 K/UL    DF AUTOMATED      XXWBCSUS 0     METABOLIC PANEL, COMPREHENSIVE    Collection Time: 12/21/18  6:20 PM   Result Value Ref Range    Sodium 141 136 - 145 mmol/L    Potassium 2.8 (L) 3.5 - 5.1 mmol/L    Chloride 103 97 - 108 mmol/L    CO2 27 21 - 32 mmol/L    Anion gap 11 5 - 15 mmol/L    Glucose 93 65 - 100 mg/dL    BUN 9 6 - 20 MG/DL    Creatinine 0.71 0.55 - 1.02 MG/DL    BUN/Creatinine ratio 13 12 - 20      GFR est AA >60 >60 ml/min/1.73m2    GFR est non-AA >60 >60 ml/min/1.73m2    Calcium 8.9 8.5 - 10.1 MG/DL    Bilirubin, total 0.8 0.2 - 1.0 MG/DL    ALT (SGPT) 19 12 - 78 U/L    AST (SGOT) 12 (L) 15 - 37 U/L    Alk.  phosphatase 44 (L) 45 - 117 U/L    Protein, total 6.9 6.4 - 8.2 g/dL    Albumin 3.8 3.5 - 5.0 g/dL    Globulin 3.1 2.0 - 4.0 g/dL    A-G Ratio 1.2 1.1 - 2.2     LIPASE    Collection Time: 12/21/18  6:20 PM   Result Value Ref Range    Lipase 144 73 - 393 U/L   URINALYSIS W/ RFLX MICROSCOPIC    Collection Time: 12/21/18  6:20 PM   Result Value Ref Range    Color YELLOW/STRAW      Appearance HAZY (A) CLEAR      Specific gravity 1.025 1.003 - 1.030      pH (UA) 6.5 5.0 - 8.0      Protein NEGATIVE  NEG mg/dL    Glucose NEGATIVE  NEG mg/dL    Ketone 40 (A) NEG mg/dL    Blood SMALL (A) NEG      Urobilinogen 4.0 (H) 0.2 - 1.0 EU/dL    Nitrites NEGATIVE  NEG      Leukocyte Esterase NEGATIVE  NEG     URINE CULTURE HOLD SAMPLE    Collection Time: 12/21/18  6:20 PM   Result Value Ref Range    Urine culture hold        URINE ON HOLD IN MICROBIOLOGY DEPT FOR 3 DAYS. IF UNPRESERVED URINE IS SUBMITTED, IT CANNOT BE USED FOR ADDITIONAL TESTING AFTER 24 HRS, RECOLLECTION WILL BE REQUIRED. BILIRUBIN, CONFIRM    Collection Time: 12/21/18  6:20 PM   Result Value Ref Range    Bilirubin UA, confirm NEGATIVE  NEG     URINE MICROSCOPIC ONLY    Collection Time: 12/21/18  6:20 PM   Result Value Ref Range    WBC 0-4 0 - 4 /hpf    RBC 5-10 0 - 5 /hpf    Epithelial cells MODERATE (A) FEW /lpf    Bacteria 1+ (A) NEG /hpf    Mucus 2+ (A) NEG /lpf   HCG URINE, QL. - POC    Collection Time: 12/21/18  6:30 PM   Result Value Ref Range    Pregnancy test,urine (POC) NEGATIVE  NEG         Xr Chest Pa Lat    Result Date: 12/21/2018  EXAM: XR CHEST PA LAT INDICATION: cough COMPARISON: 4/28/2016. FINDINGS: PA and lateral radiographs of the chest demonstrate clear lungs and pleural margins. Cardiac, mediastinal and hilar contours are normal. A moderate S-shaped thoracolumbar curve is again shown. IMPRESSION: Scoliosis. No acute pulmonary findings. Hypokalemia: Care Instructions  Your Care Instructions    Hypokalemia (say \"av-vw-ojd-SHERRY-lexii-uh\") is a low level of potassium. The heart, muscles, kidneys, and nervous system all need potassium to work well. This problem has many different causes.  Kidney problems, diet, and medicines like diuretics and laxatives can cause it. So can vomiting or diarrhea. In some cases, cancer is the cause. Your doctor may do tests to find the cause of your low potassium levels. You may need medicines to bring your potassium levels back to normal. You may also need regular blood tests to check your potassium. If you have very low potassium, you may need intravenous (IV) medicines. You also may need tests to check the electrical activity of your heart. Heart problems caused by low potassium levels can be very serious. Follow-up care is a key part of your treatment and safety. Be sure to make and go to all appointments, and call your doctor if you are having problems. It's also a good idea to know your test results and keep a list of the medicines you take. How can you care for yourself at home? · If your doctor recommends it, eat foods that have a lot of potassium. These include fresh fruits, juices, and vegetables. They also include nuts, beans, and milk. · Be safe with medicines. If your doctor prescribes medicines or potassium supplements, take them exactly as directed. Call your doctor if you have any problems with your medicines. · Get your potassium levels tested as often as your doctor tells you. When should you call for help? Call 911 anytime you think you may need emergency care. For example, call if:    · You feel like your heart is missing beats.  Heart problems caused by low potassium can cause death.     · You passed out (lost consciousness).     · You have a seizure.    Call your doctor now or seek immediate medical care if:    · You feel weak or unusually tired.     · You have severe arm or leg cramps.     · You have tingling or numbness.     · You feel sick to your stomach, or you vomit.     · You have belly cramps.     · You feel bloated or constipated.     · You have to urinate a lot.     · You feel very thirsty most of the time.     · You are dizzy or lightheaded, or you feel like you may faint.     · You feel depressed, or you lose touch with reality.    Watch closely for changes in your health, and be sure to contact your doctor if:    · You do not get better as expected. Where can you learn more? Go to http://ailyn-fadi.info/. Enter G358 in the search box to learn more about \"Hypokalemia: Care Instructions. \"  Current as of: March 15, 2018  Content Version: 11.8  © 0927-7495 [employer]. Care instructions adapted under license by [employer] (which disclaims liability or warranty for this information). If you have questions about a medical condition or this instruction, always ask your healthcare professional. [place] [address] any warranty or liability for your use of this information. Gastroenteritis: Care Instructions  Your Care Instructions    Gastroenteritis is an illness that may cause nausea, vomiting, and diarrhea. It is sometimes called \"stomach flu. \" It can be caused by bacteria or a virus. You will probably begin to feel better in 1 to 2 days. In the meantime, get plenty of rest and make sure you do not become dehydrated. Dehydration occurs when your body loses too much fluid. Follow-up care is a key part of your treatment and safety. Be sure to make and go to all appointments, and call your doctor if you are having problems. It's also a good idea to know your test results and keep a list of the medicines you take. How can you care for yourself at home? · If your doctor prescribed antibiotics, take them as directed. Do not stop taking them just because you feel better. You need to take the full course of antibiotics. · Drink plenty of fluids to prevent dehydration, enough so that your urine is light yellow or clear like water. Choose water and other caffeine-free clear liquids until you feel better.  If you have kidney, heart, or liver disease and have to limit fluids, talk with your doctor before you increase your fluid intake. · Drink fluids slowly, in frequent, small amounts, because drinking too much too fast can cause vomiting. · Begin eating mild foods, such as dry toast, yogurt, applesauce, bananas, and rice. Avoid spicy, hot, or high-fat foods, and do not drink alcohol or caffeine for a day or two. Do not drink milk or eat ice cream until you are feeling better. How to prevent gastroenteritis  · Keep hot foods hot and cold foods cold. · Do not eat meats, dressings, salads, or other foods that have been kept at room temperature for more than 2 hours. · Use a thermometer to check your refrigerator. It should be between 34°F and 40°F.  · Defrost meats in the refrigerator or microwave, not on the kitchen counter. · Keep your hands and your kitchen clean. Wash your hands, cutting boards, and countertops with hot soapy water frequently. · Cook meat until it is well done. · Do not eat raw eggs or uncooked sauces made with raw eggs. · Do not take chances. If food looks or tastes spoiled, throw it out. When should you call for help? Call 911 anytime you think you may need emergency care. For example, call if:    · You vomit blood or what looks like coffee grounds.     · You passed out (lost consciousness).     · You pass maroon or very bloody stools.    Call your doctor now or seek immediate medical care if:    · You have severe belly pain.     · You have signs of needing more fluids.  You have sunken eyes, a dry mouth, and pass only a little dark urine.     · You feel like you are going to faint.     · You have increased belly pain that does not go away in 1 to 2 days.     · You have new or increased nausea, or you are vomiting.     · You have a new or higher fever.     · Your stools are black and tarlike or have streaks of blood.    Watch closely for changes in your health, and be sure to contact your doctor if:    · You are dizzy or lightheaded.     · You urinate less than usual, or your urine is dark yellow or brown.     · You do not feel better with each day that goes by. Where can you learn more? Go to http://ailyn-fadi.info/. Enter N142 in the search box to learn more about \"Gastroenteritis: Care Instructions. \"  Current as of: November 18, 2017  Content Version: 11.8  © 4222-1402 [employer]. Care instructions adapted under license by [email or web address] (which disclaims liability or warranty for this information). If you have questions about a medical condition or this instruction, always ask your healthcare professional. [place] [address] any warranty or liability for your use of this information.

## 2018-12-22 NOTE — ED NOTES
Pt called out, reports her arm is hurting. Attempted to slow potassium infusion, pt reports arm continues to hurt. Infusion stopped. Awaiting imaging results. Call bell in reach.

## 2018-12-22 NOTE — ED NOTES
The patient was discharged home by  Abbe Boyer  in stable condition. The patient is alert and oriented, in no respiratory distress and discharge vital signs obtained. The patient's diagnosis, condition and treatment were explained. The patient expressed understanding. Two prescriptions given. No work/school note given. A discharge plan has been developed. A  was not involved in the process. Aftercare instructions were given. Pt ambulatory out of the ED.

## 2019-02-01 ENCOUNTER — HOSPITAL ENCOUNTER (EMERGENCY)
Age: 34
Discharge: HOME | End: 2019-02-01
Attending: EMERGENCY MEDICINE
Payer: MEDICAID

## 2019-02-01 ENCOUNTER — APPOINTMENT (OUTPATIENT)
Dept: ULTRASOUND IMAGING | Age: 34
End: 2019-02-01
Attending: EMERGENCY MEDICINE
Payer: MEDICAID

## 2019-02-01 VITALS
BODY MASS INDEX: 17.85 KG/M2 | HEIGHT: 62 IN | RESPIRATION RATE: 14 BRPM | HEART RATE: 84 BPM | WEIGHT: 97 LBS | SYSTOLIC BLOOD PRESSURE: 105 MMHG | DIASTOLIC BLOOD PRESSURE: 64 MMHG | TEMPERATURE: 98.3 F | OXYGEN SATURATION: 100 %

## 2019-02-01 DIAGNOSIS — Z3A.01 4 WEEKS GESTATION OF PREGNANCY: ICD-10-CM

## 2019-02-01 DIAGNOSIS — R10.31 RLQ ABDOMINAL PAIN: Primary | ICD-10-CM

## 2019-02-01 DIAGNOSIS — N39.0 URINARY TRACT INFECTION WITHOUT HEMATURIA, SITE UNSPECIFIED: ICD-10-CM

## 2019-02-01 LAB
ANION GAP SERPL CALC-SCNC: 8 MMOL/L (ref 5–15)
APPEARANCE UR: CLEAR
BACTERIA URNS QL MICRO: ABNORMAL /HPF
BASOPHILS # BLD: 0.1 K/UL (ref 0–0.1)
BASOPHILS NFR BLD: 1 % (ref 0–1)
BILIRUB UR QL: NEGATIVE
BUN SERPL-MCNC: 18 MG/DL (ref 6–20)
BUN/CREAT SERPL: 27 (ref 12–20)
CALCIUM SERPL-MCNC: 9 MG/DL (ref 8.5–10.1)
CHLORIDE SERPL-SCNC: 105 MMOL/L (ref 97–108)
CO2 SERPL-SCNC: 29 MMOL/L (ref 21–32)
COLOR UR: ABNORMAL
COMMENT, HOLDF: NORMAL
CREAT SERPL-MCNC: 0.66 MG/DL (ref 0.55–1.02)
DIFFERENTIAL METHOD BLD: ABNORMAL
EOSINOPHIL # BLD: 0.1 K/UL (ref 0–0.4)
EOSINOPHIL NFR BLD: 1 % (ref 0–7)
EPITH CASTS URNS QL MICRO: ABNORMAL /LPF
ERYTHROCYTE [DISTWIDTH] IN BLOOD BY AUTOMATED COUNT: 12.5 % (ref 11.5–14.5)
GLUCOSE SERPL-MCNC: 89 MG/DL (ref 65–100)
GLUCOSE UR STRIP.AUTO-MCNC: NEGATIVE MG/DL
HCG SERPL-ACNC: 90 MIU/ML (ref 0–6)
HCG UR QL: POSITIVE
HCT VFR BLD AUTO: 42.2 % (ref 35–47)
HGB BLD-MCNC: 14.4 G/DL (ref 11.5–16)
HGB UR QL STRIP: NEGATIVE
IMM GRANULOCYTES # BLD AUTO: 0 K/UL (ref 0–0.04)
IMM GRANULOCYTES NFR BLD AUTO: 0 % (ref 0–0.5)
KETONES UR QL STRIP.AUTO: NEGATIVE MG/DL
LEUKOCYTE ESTERASE UR QL STRIP.AUTO: ABNORMAL
LYMPHOCYTES # BLD: 3.5 K/UL (ref 0.8–3.5)
LYMPHOCYTES NFR BLD: 28 % (ref 12–49)
MCH RBC QN AUTO: 30.6 PG (ref 26–34)
MCHC RBC AUTO-ENTMCNC: 34.1 G/DL (ref 30–36.5)
MCV RBC AUTO: 89.8 FL (ref 80–99)
MONOCYTES # BLD: 0.8 K/UL (ref 0–1)
MONOCYTES NFR BLD: 7 % (ref 5–13)
NEUTS SEG # BLD: 7.9 K/UL (ref 1.8–8)
NEUTS SEG NFR BLD: 63 % (ref 32–75)
NITRITE UR QL STRIP.AUTO: NEGATIVE
NRBC # BLD: 0 K/UL (ref 0–0.01)
NRBC BLD-RTO: 0 PER 100 WBC
PH UR STRIP: 7 [PH] (ref 5–8)
PLATELET # BLD AUTO: 323 K/UL (ref 150–400)
PMV BLD AUTO: 10.4 FL (ref 8.9–12.9)
POTASSIUM SERPL-SCNC: 3.8 MMOL/L (ref 3.5–5.1)
PROT UR STRIP-MCNC: NEGATIVE MG/DL
RBC # BLD AUTO: 4.7 M/UL (ref 3.8–5.2)
RBC #/AREA URNS HPF: ABNORMAL /HPF (ref 0–5)
SAMPLES BEING HELD,HOLD: NORMAL
SODIUM SERPL-SCNC: 142 MMOL/L (ref 136–145)
SP GR UR REFRACTOMETRY: 1.03 (ref 1–1.03)
UR CULT HOLD, URHOLD: NORMAL
UROBILINOGEN UR QL STRIP.AUTO: 1 EU/DL (ref 0.2–1)
WBC # BLD AUTO: 12.4 K/UL (ref 3.6–11)
WBC URNS QL MICRO: ABNORMAL /HPF (ref 0–4)

## 2019-02-01 PROCEDURE — 87186 SC STD MICRODIL/AGAR DIL: CPT

## 2019-02-01 PROCEDURE — 87086 URINE CULTURE/COLONY COUNT: CPT

## 2019-02-01 PROCEDURE — 87077 CULTURE AEROBIC IDENTIFY: CPT

## 2019-02-01 PROCEDURE — 99283 EMERGENCY DEPT VISIT LOW MDM: CPT

## 2019-02-01 PROCEDURE — 74011250637 HC RX REV CODE- 250/637

## 2019-02-01 PROCEDURE — 80048 BASIC METABOLIC PNL TOTAL CA: CPT

## 2019-02-01 PROCEDURE — 76856 US EXAM PELVIC COMPLETE: CPT

## 2019-02-01 PROCEDURE — 85025 COMPLETE CBC W/AUTO DIFF WBC: CPT

## 2019-02-01 PROCEDURE — 84702 CHORIONIC GONADOTROPIN TEST: CPT

## 2019-02-01 PROCEDURE — 36415 COLL VENOUS BLD VENIPUNCTURE: CPT

## 2019-02-01 PROCEDURE — 81025 URINE PREGNANCY TEST: CPT

## 2019-02-01 PROCEDURE — 81001 URINALYSIS AUTO W/SCOPE: CPT

## 2019-02-01 RX ORDER — ACETAMINOPHEN 325 MG/1
TABLET ORAL
Status: COMPLETED
Start: 2019-02-01 | End: 2019-02-01

## 2019-02-01 RX ORDER — SODIUM CHLORIDE 0.9 % (FLUSH) 0.9 %
5-40 SYRINGE (ML) INJECTION EVERY 8 HOURS
Status: DISCONTINUED | OUTPATIENT
Start: 2019-02-01 | End: 2019-02-02 | Stop reason: HOSPADM

## 2019-02-01 RX ORDER — SODIUM CHLORIDE 0.9 % (FLUSH) 0.9 %
5-40 SYRINGE (ML) INJECTION AS NEEDED
Status: DISCONTINUED | OUTPATIENT
Start: 2019-02-01 | End: 2019-02-02 | Stop reason: HOSPADM

## 2019-02-01 RX ORDER — ACETAMINOPHEN 325 MG/1
650 TABLET ORAL
Status: COMPLETED | OUTPATIENT
Start: 2019-02-01 | End: 2019-02-01

## 2019-02-01 RX ORDER — NITROFURANTOIN 25; 75 MG/1; MG/1
100 CAPSULE ORAL 2 TIMES DAILY
Qty: 10 CAP | Refills: 0 | Status: SHIPPED | OUTPATIENT
Start: 2019-02-01 | End: 2019-02-06

## 2019-02-01 RX ADMIN — ACETAMINOPHEN 650 MG: 325 TABLET, FILM COATED ORAL at 22:45

## 2019-02-01 RX ADMIN — ACETAMINOPHEN 650 MG: 325 TABLET ORAL at 22:45
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
## 2019-02-02 NOTE — DISCHARGE INSTRUCTIONS
Patient Education     Belly Pain in Pregnancy: Care Instructions  Your Care Instructions  When you're pregnant, any belly pain can be a worry. You may not want to call your doctor about every pain you have. But you don't want to miss something that is dangerous for you or your baby. Even if it feels familiar, belly pain can mean something new when you're pregnant. It's important to know when to call your doctor. It will also help to know how to care for yourself at home when your pain is not caused by anything harmful. · When belly pain is more severe or constant, see a doctor right away. · If you're sure your belly pain is a sign of labor, call your doctor. · When belly pain is brief, it's usually a normal part of pregnancy. It might be related to changes in the growing uterus. Or it could be the stretching of ligaments called round ligaments. These ligaments help support the uterus. Round ligament pain can be on either side of your belly. It can also be felt in your hips or groin. Follow-up care is a key part of your treatment and safety. Be sure to make and go to all appointments, and call your doctor if you are having problems. It's also a good idea to know your test results and keep a list of the medicines you take. How can you tell if belly pain is a sign of labor? When belly pain is caused by labor, it can feel like mild or menstrual-like cramps in your lower belly. These cramps are probably contractions. They can happen in your second or third trimester. You may also have:  · A steady, dull ache in your lower back, pelvis, or thighs. · A feeling of pressure in your pelvis or lower belly. · Changes in your vaginal discharge or a sudden release of fluid from the vagina. If you think you are in labor, call your doctor. How can you care for yourself at home? When belly pain is mild and is not a symptom of labor:  · Rest until you feel better. · Take a warm bath.   · Think about what you drink and eat:  ¨ Drink plenty of fluids. Choose water and other caffeine-free clear liquids until you feel better. ¨ Try eating small, frequent meals. If your stomach is upset, try bland, low-fat foods like plain rice, broiled chicken, toast, and yogurt. · Think about how you move if you are having brief pains from stretching of the round ligaments. ¨ Try gentle stretching. ¨ Move a little more slowly when turning in bed or getting up from a chair, so those ligaments don't stretch quickly. ¨ Lean forward a bit if you think you are going to cough or sneeze. When should you call for help? Call 911 anytime you think you may need emergency care. For example, call if:  · You have sudden, severe pain in your belly. · You have severe vaginal bleeding. Call your doctor now or seek immediate medical care if:  · You have new or worse belly pain or cramping. · You have any vaginal bleeding. · You have a fever. · You have symptoms of preeclampsia, such as:  ¨ Sudden swelling of your face, hands, or feet. ¨ New vision problems (such as dimness or blurring). ¨ A severe headache. · You think that you may be in labor. This means that you've had at least 8 contractions within 1 hour or at least 4 contractions within 20 minutes, even after you change your position and drink fluids. · You have symptoms of a urinary tract infection. These may include:  ¨ Pain or burning when you urinate. ¨ A frequent need to urinate without being able to pass much urine. ¨ Pain in the flank, which is just below the rib cage and above the waist on either side of the back. ¨ Blood in your urine. Watch closely for changes in your health, and be sure to contact your doctor if you are worried about your or your baby's health. Where can you learn more? Go to Synker.be  Enter B275 in the search box to learn more about \"Belly Pain in Pregnancy: Care Instructions. \"   © 6734-9102 Healthwise, Incorporated.  Care instructions adapted under license by [employer] (which disclaims liability or warranty for this information). This care instruction is for use with your licensed healthcare professional. If you have questions about a medical condition or this instruction, always ask your healthcare professional. [address]  any warranty or liability for your use of this information. Content Version: 64.2.762437; Current as of: November 12, 2015           Patient Education        Urinary Tract Infection in Women: Care Instructions  Your Care Instructions    A urinary tract infection, or UTI, is a general term for an infection anywhere between the kidneys and the urethra (where urine comes out). Most UTIs are bladder infections. They often cause pain or burning when you urinate. UTIs are caused by bacteria and can be cured with antibiotics. Be sure to complete your treatment so that the infection goes away. Follow-up care is a key part of your treatment and safety. Be sure to make and go to all appointments, and call your doctor if you are having problems. It's also a good idea to know your test results and keep a list of the medicines you take. How can you care for yourself at home? · Take your antibiotics as directed. Do not stop taking them just because you feel better. You need to take the full course of antibiotics. · Drink extra water and other fluids for the next day or two. This may help wash out the bacteria that are causing the infection. (If you have kidney, heart, or liver disease and have to limit fluids, talk with your doctor before you increase your fluid intake.)  · Avoid drinks that are carbonated or have caffeine. They can irritate the bladder. · Urinate often. Try to empty your bladder each time. · To relieve pain, take a hot bath or lay a heating pad set on low over your lower belly or genital area. Never go to sleep with a heating pad in place.   To prevent UTIs  · Drink plenty of water each day. This helps you urinate often, which clears bacteria from your system. (If you have kidney, heart, or liver disease and have to limit fluids, talk with your doctor before you increase your fluid intake.)  · Urinate when you need to. · Urinate right after you have sex. · Change sanitary pads often. · Avoid douches, bubble baths, feminine hygiene sprays, and other feminine hygiene products that have deodorants. · After going to the bathroom, wipe from front to back. When should you call for help? Call your doctor now or seek immediate medical care if:    · Symptoms such as fever, chills, nausea, or vomiting get worse or appear for the first time.     · You have new pain in your back just below your rib cage. This is called flank pain.     · There is new blood or pus in your urine.     · You have any problems with your antibiotic medicine.    Watch closely for changes in your health, and be sure to contact your doctor if:    · You are not getting better after taking an antibiotic for 2 days.     · Your symptoms go away but then come back. Where can you learn more? Go to http://ailyn-fadi.info/. Enter K832 in the search box to learn more about \"Urinary Tract Infection in Women: Care Instructions. \"  Current as of: March 20, 2018  Content Version: 11.9  © 6807-7640 Healthwise, Incorporated. Care instructions adapted under license by [employer] (which disclaims liability or warranty for this information). If you have questions about a medical condition or this instruction, always ask your healthcare professional. [place] [address] any warranty or liability for your use of this information.

## 2019-02-02 NOTE — ED TRIAGE NOTES
Pt c/o of RLQ pain starting this a.m. Pt approx 4 weeks pregnant. Denies any vaginal bleeding or discharge.

## 2019-02-04 ENCOUNTER — TELEPHONE (OUTPATIENT)
Dept: OBGYN CLINIC | Age: 34
End: 2019-02-04

## 2019-02-04 LAB
BACTERIA SPEC CULT: ABNORMAL
CC UR VC: ABNORMAL
SERVICE CMNT-IMP: ABNORMAL

## 2019-02-04 NOTE — TELEPHONE ENCOUNTER
Patient advised of MD recommendations and is going to her PCP and will ask to have the labs hcg and progesterone done at her PCP office and then a repeat HCG on Wednesday. Patient verbalized understanding and will call back if she needs to have the orders faxed to [facility].    Patient advised of need to sign release for the labs to be sent to our office. Patient verbalized understanding.

## 2019-02-04 NOTE — TELEPHONE ENCOUNTER
Check HCG/progesterone level today and HCG on Wednesday. Since pain is better she is likely just fine.    Take macrobid

## 2019-02-04 NOTE — TELEPHONE ENCOUNTER
Call received at 9:14am        34year old patient last seen in the office on 5/4/17. Patient calling to say that her LMP she thinks was 1/1/2019( 4w6d) pregnant and has first ultrasound and ob visit on 2/26/19. Patient has done 3 positive upt tests    Patient calling to say that over the weekend she had intense lower right side abdominal pain. Patient reports the pain was a 10 out of 10 on the pain scale. Patient denies vaginal bleeding. Patient reports dull pain at this time. Patient reports she has a UTI and was given Macrobid from her PCP. Patient denies burning or urgency. Patient advised to start the [address] and keep follow up at PCP for UTI. ? Beta hcg levels? Ultrasound      Please advise    Patient reports she is high risk.

## 2019-02-12 NOTE — TELEPHONE ENCOUNTER
02/12/19- patient is aware that you are off at 3 pm today and we may not get response from you until your return.

## 2019-02-12 NOTE — TELEPHONE ENCOUNTER
Patient of [address] Approximately 6 weeks gestation. She is calling because she lives in [place], [place] and sees a PCP out there. She felt that she had a UTI so she went to them for evaluation. She said that she was put on Macrobid for the UTI and had negative side effects. She is pregnant and started having back abdominal cramping with the first dose of Macrobid. She continued the Macrobid until she woke in the middle of the night with spot bleeding. She stopped the Macrobid on Monday of last week. She was waiting for the urine culture results. The Macrobid would not treat her UTI. They changed her to Augmentin (she does not recall the mg strength) but she said she has to take it BID x 3 days. Is Augmentin safe during pregnancy? She wanted [address] opinion. She said this weekend she called the doctor on call but was \"brushed off the phone too quick\" and did not feel comfortable with his response. Patient said she can come in at your request to give a urine, need be. [facility] [place]

## 2019-02-13 NOTE — TELEPHONE ENCOUNTER
02/13/19  8:41 am-  LM on identifiable vm per Dr. Jem Agustin, she is okay for her to take rx, call prn

## 2019-02-13 NOTE — TELEPHONE ENCOUNTER
MD Justyn Johnson LPN   Caller: Unspecified Marny Donna,  3:38 PM)             Yes.  Take Augmentin    Previous Messages

## 2019-02-26 ENCOUNTER — OFFICE VISIT (OUTPATIENT)
Dept: OBGYN CLINIC | Age: 34
End: 2019-02-26

## 2019-02-26 VITALS — BODY MASS INDEX: 18.88 KG/M2 | WEIGHT: 102.6 LBS | HEIGHT: 62 IN

## 2019-02-26 DIAGNOSIS — I49.9 IRREGULAR HEART BEAT: ICD-10-CM

## 2019-02-26 DIAGNOSIS — O09.90 SUPERVISION OF HIGH RISK PREGNANCY, ANTEPARTUM: Primary | ICD-10-CM

## 2019-02-26 LAB
ANTIBODY SCREEN, EXTERNAL: NORMAL
BILIRUB UR QL STRIP: NEGATIVE
CHLAMYDIA, EXTERNAL: NORMAL
GLUCOSE UR-MCNC: NEGATIVE MG/DL
HBSAG, EXTERNAL: NORMAL
HCT, EXTERNAL: 41
HGB, EXTERNAL: 14.2
HIV, EXTERNAL: NORMAL
KETONES P FAST UR STRIP-MCNC: NEGATIVE MG/DL
N. GONORRHEA, EXTERNAL: NORMAL
PAP SMEAR, EXTERNAL: NORMAL
PH UR STRIP: NORMAL [PH] (ref 4.6–8)
PLATELET CNT,   EXTERNAL: 313
PROT UR QL STRIP: NEGATIVE
RUBELLA, EXTERNAL: NORMAL
SP GR UR STRIP: NORMAL (ref 1–1.03)
T. PALLIDUM, EXTERNAL: NORMAL
TYPE, ABO & RH, EXTERNAL: NORMAL
UA UROBILINOGEN AMB POC: NORMAL (ref 0.2–1)
URINALYSIS CLARITY POC: CLEAR
URINALYSIS COLOR POC: YELLOW
URINALYSIS, EXTERNAL: NORMAL
URINE BLOOD POC: NEGATIVE
URINE LEUKOCYTES POC: NORMAL
URINE NITRITES POC: NEGATIVE

## 2019-02-26 RX ORDER — ONDANSETRON 8 MG/1
8 TABLET, ORALLY DISINTEGRATING ORAL
Qty: 30 TAB | Refills: 10 | Status: SHIPPED | OUTPATIENT
Start: 2019-02-26 | End: 2019-04-12
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
## 2019-02-28 LAB
ABO GROUP BLD: NORMAL
B19V IGG SER IA-ACNC: 4.7 INDEX (ref 0–0.8)
B19V IGM SER IA-ACNC: 0.3 INDEX (ref 0–0.8)
BACTERIA UR CULT: NO GROWTH
BLD GP AB SCN SERPL QL: NEGATIVE
ERYTHROCYTE [DISTWIDTH] IN BLOOD BY AUTOMATED COUNT: 12.7 % (ref 12.3–15.4)
HBV SURFACE AG SERPL QL IA: NEGATIVE
HCT VFR BLD AUTO: 41 % (ref 34–46.6)
HGB BLD-MCNC: 14.2 G/DL (ref 11.1–15.9)
HIV 1+2 AB+HIV1 P24 AG SERPL QL IA: NON REACTIVE
MCH RBC QN AUTO: 29.9 PG (ref 26.6–33)
MCHC RBC AUTO-ENTMCNC: 34.6 G/DL (ref 31.5–35.7)
MCV RBC AUTO: 86 FL (ref 79–97)
PLATELET # BLD AUTO: 313 X10E3/UL (ref 150–379)
RBC # BLD AUTO: 4.75 X10E6/UL (ref 3.77–5.28)
RH BLD: NEGATIVE
RUBV IGG SERPL IA-ACNC: 5.75 INDEX
T PALLIDUM AB SER QL IA: NEGATIVE
WBC # BLD AUTO: 12.3 X10E3/UL (ref 3.4–10.8)

## 2019-03-01 DIAGNOSIS — Z34.80 SUPERVISION OF OTHER NORMAL PREGNANCY, ANTEPARTUM: ICD-10-CM

## 2019-03-01 LAB
C TRACH RRNA CVX QL NAA+PROBE: NEGATIVE
CYTOLOGIST CVX/VAG CYTO: NORMAL
CYTOLOGY CVX/VAG DOC THIN PREP: NORMAL
CYTOLOGY HISTORY:: NORMAL
DX ICD CODE: NORMAL
HPV I/H RISK 1 DNA CVX QL PROBE+SIG AMP: NEGATIVE
Lab: NORMAL
N GONORRHOEA RRNA CVX QL NAA+PROBE: NEGATIVE
OTHER STN SPEC: NORMAL
PATH REPORT.FINAL DX SPEC: NORMAL
STAT OF ADQ CVX/VAG CYTO-IMP: NORMAL
T VAGINALIS RRNA SPEC QL NAA+PROBE: NEGATIVE

## 2019-03-08 ENCOUNTER — TELEPHONE (OUTPATIENT)
Dept: OBGYN CLINIC | Age: 34
End: 2019-03-08

## 2019-03-08 ENCOUNTER — HOSPITAL ENCOUNTER (EMERGENCY)
Age: 34
Discharge: HOME | End: 2019-03-09
Attending: EMERGENCY MEDICINE
Payer: MEDICAID

## 2019-03-08 ENCOUNTER — APPOINTMENT (OUTPATIENT)
Dept: ULTRASOUND IMAGING | Age: 34
End: 2019-03-08
Attending: EMERGENCY MEDICINE
Payer: MEDICAID

## 2019-03-08 DIAGNOSIS — R07.89 ATYPICAL CHEST PAIN: ICD-10-CM

## 2019-03-08 DIAGNOSIS — O20.0 THREATENED MISCARRIAGE: ICD-10-CM

## 2019-03-08 DIAGNOSIS — R00.2 PALPITATIONS: Primary | ICD-10-CM

## 2019-03-08 LAB
ALBUMIN SERPL-MCNC: 3.7 G/DL (ref 3.5–5)
ALBUMIN/GLOB SERPL: 1 {RATIO} (ref 1.1–2.2)
ALP SERPL-CCNC: 48 U/L (ref 45–117)
ALT SERPL-CCNC: 22 U/L (ref 12–78)
ANION GAP SERPL CALC-SCNC: 9 MMOL/L (ref 5–15)
APPEARANCE UR: ABNORMAL
AST SERPL-CCNC: 14 U/L (ref 15–37)
BACTERIA URNS QL MICRO: NEGATIVE /HPF
BASOPHILS # BLD: 0 K/UL (ref 0–0.1)
BASOPHILS NFR BLD: 0 % (ref 0–1)
BILIRUB SERPL-MCNC: 0.3 MG/DL (ref 0.2–1)
BILIRUB UR QL: NEGATIVE
BUN SERPL-MCNC: 7 MG/DL (ref 6–20)
BUN/CREAT SERPL: 15 (ref 12–20)
CALCIUM SERPL-MCNC: 8.9 MG/DL (ref 8.5–10.1)
CHLORIDE SERPL-SCNC: 101 MMOL/L (ref 97–108)
CO2 SERPL-SCNC: 27 MMOL/L (ref 21–32)
COLOR UR: ABNORMAL
COMMENT, HOLDF: NORMAL
CREAT SERPL-MCNC: 0.48 MG/DL (ref 0.55–1.02)
D DIMER PPP FEU-MCNC: 0.37 MG/L FEU (ref 0–0.65)
DIFFERENTIAL METHOD BLD: ABNORMAL
EOSINOPHIL # BLD: 0.1 K/UL (ref 0–0.4)
EOSINOPHIL NFR BLD: 1 % (ref 0–7)
EPITH CASTS URNS QL MICRO: ABNORMAL /LPF
ERYTHROCYTE [DISTWIDTH] IN BLOOD BY AUTOMATED COUNT: 12.3 % (ref 11.5–14.5)
GLOBULIN SER CALC-MCNC: 3.7 G/DL (ref 2–4)
GLUCOSE SERPL-MCNC: 98 MG/DL (ref 65–100)
GLUCOSE UR STRIP.AUTO-MCNC: NEGATIVE MG/DL
HCG SERPL-ACNC: ABNORMAL MIU/ML (ref 0–6)
HCT VFR BLD AUTO: 42 % (ref 35–47)
HGB BLD-MCNC: 14.5 G/DL (ref 11.5–16)
HGB UR QL STRIP: NEGATIVE
KETONES UR QL STRIP.AUTO: NEGATIVE MG/DL
LEUKOCYTE ESTERASE UR QL STRIP.AUTO: NEGATIVE
LYMPHOCYTES # BLD: 0.9 K/UL (ref 0.8–3.5)
LYMPHOCYTES NFR BLD: 6 % (ref 12–49)
MAGNESIUM SERPL-MCNC: 1.7 MG/DL (ref 1.6–2.4)
MCH RBC QN AUTO: 30.5 PG (ref 26–34)
MCHC RBC AUTO-ENTMCNC: 34.5 G/DL (ref 30–36.5)
MCV RBC AUTO: 88.2 FL (ref 80–99)
MONOCYTES # BLD: 0.9 K/UL (ref 0–1)
MONOCYTES NFR BLD: 7 % (ref 5–13)
NEUTS SEG # BLD: 11.5 K/UL (ref 1.8–8)
NEUTS SEG NFR BLD: 86 % (ref 32–75)
NITRITE UR QL STRIP.AUTO: NEGATIVE
PH UR STRIP: 7 [PH] (ref 5–8)
PLATELET # BLD AUTO: 277 K/UL (ref 150–400)
PMV BLD AUTO: 10.9 FL (ref 8.9–12.9)
POTASSIUM SERPL-SCNC: 3.8 MMOL/L (ref 3.5–5.1)
PROT SERPL-MCNC: 7.4 G/DL (ref 6.4–8.2)
PROT UR STRIP-MCNC: NEGATIVE MG/DL
RBC # BLD AUTO: 4.76 M/UL (ref 3.8–5.2)
RBC #/AREA URNS HPF: ABNORMAL /HPF (ref 0–5)
SAMPLES BEING HELD,HOLD: NORMAL
SODIUM SERPL-SCNC: 137 MMOL/L (ref 136–145)
SP GR UR REFRACTOMETRY: 1.01 (ref 1–1.03)
TROPONIN I SERPL-MCNC: <0.05 NG/ML
TSH SERPL DL<=0.05 MIU/L-ACNC: 1.07 UIU/ML (ref 0.36–3.74)
UR CULT HOLD, URHOLD: NORMAL
UROBILINOGEN UR QL STRIP.AUTO: 0.2 EU/DL (ref 0.2–1)
WBC # BLD AUTO: 13.3 K/UL (ref 3.6–11)
WBC URNS QL MICRO: ABNORMAL /HPF (ref 0–4)
YEAST URNS QL MICRO: PRESENT

## 2019-03-08 PROCEDURE — 74011250637 HC RX REV CODE- 250/637: Performed by: EMERGENCY MEDICINE

## 2019-03-08 PROCEDURE — 76801 OB US < 14 WKS SINGLE FETUS: CPT

## 2019-03-08 PROCEDURE — 93005 ELECTROCARDIOGRAM TRACING: CPT

## 2019-03-08 PROCEDURE — 81001 URINALYSIS AUTO W/SCOPE: CPT

## 2019-03-08 PROCEDURE — 96360 HYDRATION IV INFUSION INIT: CPT

## 2019-03-08 PROCEDURE — 99285 EMERGENCY DEPT VISIT HI MDM: CPT

## 2019-03-08 PROCEDURE — 74011250636 HC RX REV CODE- 250/636: Performed by: EMERGENCY MEDICINE

## 2019-03-08 PROCEDURE — 80053 COMPREHEN METABOLIC PANEL: CPT

## 2019-03-08 PROCEDURE — 96372 THER/PROPH/DIAG INJ SC/IM: CPT

## 2019-03-08 PROCEDURE — 84484 ASSAY OF TROPONIN QUANT: CPT

## 2019-03-08 PROCEDURE — 83605 ASSAY OF LACTIC ACID: CPT

## 2019-03-08 PROCEDURE — 85379 FIBRIN DEGRADATION QUANT: CPT

## 2019-03-08 PROCEDURE — 83735 ASSAY OF MAGNESIUM: CPT

## 2019-03-08 PROCEDURE — 84702 CHORIONIC GONADOTROPIN TEST: CPT

## 2019-03-08 PROCEDURE — 85025 COMPLETE CBC W/AUTO DIFF WBC: CPT

## 2019-03-08 PROCEDURE — 96361 HYDRATE IV INFUSION ADD-ON: CPT

## 2019-03-08 PROCEDURE — 84443 ASSAY THYROID STIM HORMONE: CPT

## 2019-03-08 PROCEDURE — 36415 COLL VENOUS BLD VENIPUNCTURE: CPT

## 2019-03-08 RX ORDER — ACETAMINOPHEN 500 MG
1000 TABLET ORAL
Status: COMPLETED | OUTPATIENT
Start: 2019-03-08 | End: 2019-03-08

## 2019-03-08 RX ADMIN — SODIUM CHLORIDE 1000 ML: 900 INJECTION, SOLUTION INTRAVENOUS at 20:55

## 2019-03-08 RX ADMIN — ACETAMINOPHEN 1000 MG: 500 TABLET ORAL at 21:26

## 2019-03-08 RX ADMIN — HUMAN RHO(D) IMMUNE GLOBULIN 0.3 MG: 300 INJECTION, SOLUTION INTRAMUSCULAR at 22:30

## 2019-03-08 NOTE — TELEPHONE ENCOUNTER
TH patient   Call received at 4:00apm    34year old  8w3d with identical twins. Patient last seen in the office on 19. Patient calling to say that she is going to go the ER in [place] since it is the closest.    Patient states she has really bad coughing and due to the coughing she has some chest discomfort. Patient reports that her heart is fluttering and she does not feel well.  Patient denies vaginal bleeding      ENMANUEL

## 2019-03-09 VITALS
RESPIRATION RATE: 18 BRPM | HEART RATE: 98 BPM | SYSTOLIC BLOOD PRESSURE: 118 MMHG | TEMPERATURE: 98.6 F | DIASTOLIC BLOOD PRESSURE: 75 MMHG | OXYGEN SATURATION: 99 %

## 2019-03-09 LAB — LACTATE BLD-SCNC: 1.04 MMOL/L (ref 0.4–2)

## 2019-03-09 NOTE — ED TRIAGE NOTES
Pt arrived via walk in for complaint of \"vibration\" in chest. Onset 11 this morning. Was scene at [facility] in [place] for same but left due to long wait time. Diagnosed with flu about 4 weeks ago but continues to have a dry non productive cough. Has been seen by pcp and OB for same. Pt is 8 weeks pregnant at this time. Alert and oriented. Airway patent. Breathing normally. Skin warm and dry and appropriate for ethnicity.

## 2019-03-09 NOTE — ED NOTES
Pt back from ultrasound. Placed in position of comfort. Labs drawn and sent. Significant other at bedside.

## 2019-03-09 NOTE — ED NOTES
The patient was discharged home by  in stable condition. The patient is alert and oriented, in no respiratory distress and discharge vital signs obtained. The patient's diagnosis, condition and treatment were explained. The patient expressed understanding. No prescriptions given. No work/school note given. A discharge plan has been developed. A  was not involved in the process. Aftercare instructions were given. Pt ambulatory out of the ED. All discharge papers and personal belongings in hand upon departure from ED. Significant other with patient and will be driving home.
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
## 2019-03-09 NOTE — DISCHARGE INSTRUCTIONS
Patient Education        Palpitations: Care Instructions  Your Care Instructions    Heart palpitations are the uncomfortable sensation that your heart is beating fast or irregularly. You might feel pounding or fluttering in your chest. It might feel like your heart is skipping a beat. Although palpitations may be caused by a heart problem, they also occur because of stress, fatigue, or use of alcohol, caffeine, or nicotine. Many medicines, including diet pills, antihistamines, decongestants, and some herbal products, can cause heart palpitations. Nearly everyone has palpitations from time to time. Depending on your symptoms, your doctor may need to do more tests to try to find the cause of your palpitations. Follow-up care is a key part of your treatment and safety. Be sure to make and go to all appointments, and call your doctor if you are having problems. It's also a good idea to know your test results and keep a list of the medicines you take. How can you care for yourself at home? · Avoid caffeine, nicotine, and excess alcohol. · Do not take illegal drugs, such as methamphetamines and cocaine. · Do not take weight loss or diet medicines unless you talk with your doctor first.  · Get plenty of sleep. · Do not overeat. · If you have palpitations again, take deep breaths and try to relax. This may slow a racing heart. · If you start to feel lightheaded, lie down to avoid injuries that might result if you pass out and fall down. · Keep a record of your palpitations and bring it to your next doctor's appointment. Write down:  ? The date and time. ? Your pulse. (If your heart is beating fast, it may be hard to count your pulse.)  ? What you were doing when the palpitations started. ? How long the palpitations lasted. ? Any other symptoms. · If an activity causes palpitations, slow down or stop. Talk to your doctor before you do that activity again. · Take your medicines exactly as prescribed.  Call your doctor if you think you are having a problem with your medicine. When should you call for help? Call 911 anytime you think you may need emergency care. For example, call if:    · You passed out (lost consciousness).     · You have symptoms of a heart attack. These may include:  ? Chest pain or pressure, or a strange feeling in the chest.  ? Sweating. ? Shortness of breath. ? Pain, pressure, or a strange feeling in the back, neck, jaw, or upper belly or in one or both shoulders or arms. ? Lightheadedness or sudden weakness. ? A fast or irregular heartbeat. After you call 911, the  may tell you to chew 1 adult-strength or 2 to 4 low-dose aspirin. Wait for an ambulance. Do not try to drive yourself.     · You have symptoms of a stroke. These may include:  ? Sudden numbness, tingling, weakness, or loss of movement in your face, arm, or leg, especially on only one side of your body. ? Sudden vision changes. ? Sudden trouble speaking. ? Sudden confusion or trouble understanding simple statements. ? Sudden problems with walking or balance. ? A sudden, severe headache that is different from past headaches.    Call your doctor now or seek immediate medical care if:    · You have heart palpitations and:  ? Are dizzy or lightheaded, or you feel like you may faint. ? Have new or increased shortness of breath.    Watch closely for changes in your health, and be sure to contact your doctor if:    · You continue to have heart palpitations. Where can you learn more? Go to http://ailyn-afdi.info/. Enter R508 in the search box to learn more about \"Palpitations: Care Instructions. \"  Current as of: July 22, 2018  Content Version: 11.9  © 7076-1658 [employer]. Care instructions adapted under license by [employer] (which disclaims liability or warranty for this information).  If you have questions about a medical condition or this instruction, always ask your healthcare professional. [place] [address] any warranty or liability for your use of this information. Patient Education        Threatened Miscarriage: Care Instructions  Your Care Instructions    Some women have light spotting or bleeding during the first 12 weeks of pregnancy. In some cases this is normal. Light spotting or bleeding can also be a sign of a possible loss of the pregnancy. This is called a threatened miscarriage. At this point, the doctor may not be able to tell if your vaginal bleeding is normal or is a sign of a miscarriage. In early pregnancy, things such as stress, exercise, and sex do not cause miscarriage. You may be worried or upset about the possibility of losing your pregnancy. But do not blame yourself. There is no treatment to stop a threatened miscarriage. If you do have a miscarriage, there was nothing you could have done to prevent it. A miscarriage usually means that the pregnancy is not developing normally. The doctor has checked you carefully, but problems can develop later. If you notice any problems or new symptoms, get medical treatment right away. Follow-up care is a key part of your treatment and safety. Be sure to make and go to all appointments, and call your doctor if you are having problems. It's also a good idea to know your test results and keep a list of the medicines you take. How can you care for yourself at home? · If you do have a miscarriage, you will probably have some vaginal bleeding for 1 to 2 weeks. Use pads instead of tampons. · Take acetaminophen (Tylenol) for cramps. Read and follow all instructions on the label. · Do not take two or more pain medicines at the same time unless the doctor told you to. Many pain medicines have acetaminophen, which is Tylenol. Too much acetaminophen (Tylenol) can be harmful. · Do not have sex until your doctor says it is okay. · Get lots of rest over the next several days.   · You may do your normal activities if you feel well enough to do them. But do not do any heavy exercise until your doctor says it is okay. · Eat a balanced diet that is high in iron and vitamin C. Foods rich in iron include red meat, shellfish, eggs, beans, and leafy green vegetables. Foods high in vitamin C include citrus fruits, tomatoes, and broccoli. Talk to your doctor about whether you need to take iron pills or a multivitamin. · Do not drink alcohol or use tobacco or illegal drugs. · Do not smoke. If you need help quitting, talk to your doctor about stop-smoking programs and medicines. These can increase your chances of quitting for good. When should you call for help? Call 911 anytime you think you may need emergency care. For example, call if:    · You passed out (lost consciousness).    Call your doctor now or seek immediate medical care if:    · You have severe vaginal bleeding.     · You are dizzy or lightheaded, or you feel like you may faint.     · You have new or worse pain in your belly or pelvis.     · You have a fever.     · You have vaginal discharge that smells bad.    Watch closely for changes in your health, and be sure to contact your doctor if:    · You do not get better as expected. Where can you learn more? Go to http://ailyn-fadi.info/. Enter K169 in the search box to learn more about \"Threatened Miscarriage: Care Instructions. \"  Current as of: September 5, 2018  Content Version: 11.9  © 4817-8239 [employer]. Care instructions adapted under license by [employer] (which disclaims liability or warranty for this information). If you have questions about a medical condition or this instruction, always ask your healthcare professional. [place] [address] any warranty or liability for your use of this information.

## 2019-03-10 LAB
ATRIAL RATE: 102 BPM
BLD PROD TYP BPU: NORMAL
BPU ID: NORMAL
CALCULATED P AXIS, ECG09: 56 DEGREES
CALCULATED R AXIS, ECG10: 34 DEGREES
CALCULATED T AXIS, ECG11: 6 DEGREES
DIAGNOSIS, 93000: NORMAL
GA (WEEKS): <26 WK
P-R INTERVAL, ECG05: 124 MS
Q-T INTERVAL, ECG07: 308 MS
QRS DURATION, ECG06: 62 MS
QTC CALCULATION (BEZET), ECG08: 401 MS
STATUS OF UNIT,%ST: NORMAL
UNIT DIVISION, %UDIV: 0
VENTRICULAR RATE, ECG03: 102 BPM

## 2019-03-19 ENCOUNTER — TELEPHONE (OUTPATIENT)
Dept: CARDIOLOGY CLINIC | Age: 34
End: 2019-03-19

## 2019-03-19 ENCOUNTER — OFFICE VISIT (OUTPATIENT)
Dept: CARDIOLOGY CLINIC | Age: 34
End: 2019-03-19

## 2019-03-19 VITALS
SYSTOLIC BLOOD PRESSURE: 102 MMHG | DIASTOLIC BLOOD PRESSURE: 64 MMHG | WEIGHT: 99.8 LBS | BODY MASS INDEX: 18.37 KG/M2 | RESPIRATION RATE: 16 BRPM | HEART RATE: 92 BPM | HEIGHT: 62 IN | OXYGEN SATURATION: 97 %

## 2019-03-19 DIAGNOSIS — I48.91 ATRIAL FIBRILLATION, UNSPECIFIED TYPE (HCC): Primary | ICD-10-CM

## 2019-03-19 DIAGNOSIS — I49.9 IRREGULAR HEART BEAT: ICD-10-CM

## 2019-03-19 NOTE — PROGRESS NOTES
Katherine Devine is a 30 y.o. female    Chief Complaint   Patient presents with    Irregular Heart Beat       1. Have you been to the ER, urgent care clinic since your last visit? Hospitalized since your last visit? YES, [place] to [facility] for irregular heart beat on 3/8/19.    2. Have you seen or consulted any other health care providers outside of the [address] [place] [address] since your last visit? Include any pap smears or colon screening.   NO    Visit Vitals  BP 98/62 (BP 1 Location: Left arm, BP Patient Position: Sitting)   Pulse 92   Resp 16   Ht 5' 2\" (1.575 m)   Wt 99 lb 12.8 oz (45.3 kg)   LMP 02/01/2019 (Within Weeks) Comment: 1/1/19   SpO2 97%   BMI 18.25 kg/m²
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
## 2019-03-19 NOTE — TELEPHONE ENCOUNTER
Patient needs a E Cardio Event Monitor per Dr Neva Godfrey office notes on 3/19/19 for Palpitations.

## 2019-03-26 ENCOUNTER — ROUTINE PRENATAL (OUTPATIENT)
Dept: OBGYN CLINIC | Age: 34
End: 2019-03-26

## 2019-03-26 VITALS — SYSTOLIC BLOOD PRESSURE: 100 MMHG | DIASTOLIC BLOOD PRESSURE: 60 MMHG | WEIGHT: 100 LBS | BODY MASS INDEX: 18.29 KG/M2

## 2019-03-26 DIAGNOSIS — Z34.80 SUPERVISION OF OTHER NORMAL PREGNANCY, ANTEPARTUM: ICD-10-CM

## 2019-03-26 DIAGNOSIS — O09.90 SUPERVISION OF HIGH RISK PREGNANCY, ANTEPARTUM: Primary | ICD-10-CM

## 2019-03-26 DIAGNOSIS — O30.031 MONOCHORIONIC DIAMNIOTIC TWIN GESTATION IN FIRST TRIMESTER: ICD-10-CM

## 2019-03-26 DIAGNOSIS — R10.2 SUPRAPUBIC CRAMPING: ICD-10-CM

## 2019-03-26 DIAGNOSIS — Z3A.11 11 WEEKS GESTATION OF PREGNANCY: ICD-10-CM

## 2019-03-26 RX ORDER — NITROFURANTOIN 25; 75 MG/1; MG/1
100 CAPSULE ORAL 2 TIMES DAILY
Qty: 14 CAP | Refills: 0 | Status: SHIPPED | OUTPATIENT
Start: 2019-03-26 | End: 2019-04-12

## 2019-03-26 NOTE — PROGRESS NOTES
Complains of cramping  Nitrates pos - pt does self cath - cannot void on her own at all  Urine cx  NIPTS today  Keflex x 7d    Had flu last week  Saw cardiology - normal echo, has event monitor

## 2019-03-26 NOTE — PATIENT INSTRUCTIONS
Weeks 10 to 14 of Your Pregnancy: Care Instructions  Your Care Instructions    By weeks 10 to 14 of your pregnancy, the placenta has formed inside your uterus. It is possible to hear your baby's heartbeat with a special ultrasound device. Your baby's eyes can and do move. The arms and legs can bend. This is a good time to think about testing for birth defects. There are two types of tests: screening and diagnostic. Screening tests show the chance that a baby has a certain birth defect. They can't tell you for sure that your baby has a problem. Diagnostic tests show if a baby has a certain birth defect. It's your choice whether to have these tests. You and your partner can talk to your doctor or midwife about birth defects tests. Follow-up care is a key part of your treatment and safety. Be sure to make and go to all appointments, and call your doctor if you are having problems. It's also a good idea to know your test results and keep a list of the medicines you take. How can you care for yourself at home? Decide about tests  · You can have screening tests and diagnostic tests to check for birth defects. The decision to have a test for birth defects is personal. Think about your age, your chance of passing on a family disease, your need to know about any problems, and what you might do after you have the test results. ? Triple or quadruple (quad) blood tests. These screening tests can be done between 15 and 20 weeks of pregnancy. They check the amounts of three or four substances in your blood. The doctor looks at these test results, along with your age and other factors, to find out the chance that your baby may have certain problems. ? Amniocentesis. This diagnostic test is used to look for chromosomal problems in the baby's cells.  It can be done between 15 and 20 weeks of pregnancy, usually around week 16.  ? Nuchal translucency test. This test uses ultrasound to measure the thickness of the area at the back of the baby's neck. An increase in the thickness can be an early sign of Down syndrome. ? Chorionic villus sampling (CVS). This is a test that looks for certain genetic problems with your baby. The same genes that are in your baby are in the placenta. A small piece of the placenta is taken out and tested. This test is done when you are 10 to 13 weeks pregnant. Ease discomfort  · Slow down and take naps when you feel tired. · If your emotions swing, talk to someone. Crying, anxiety, and concentration problems are common. · If your gums bleed, try a softer toothbrush. If your gums are puffy and bleed a lot, see your dentist.  · If you feel dizzy:  ? Get up slowly after sitting or lying down. ? Drink plenty of fluids. ? Eat small snacks to keep your blood sugar stable. ? Put your head between your legs as though you were tying your shoelaces. ? Lie down with your legs higher than your head. Use pillows to prop up your feet. · If you have a headache:  ? Lie down. ? Ask your partner or a good friend for a neck massage. ? Try cool cloths over your forehead or across the back of your neck. ? Use acetaminophen (Tylenol) for pain relief. Do not use nonsteroidal anti-inflammatory drugs (NSAIDs), such as ibuprofen (Advil, Motrin) or naproxen (Aleve), unless your doctor says it is okay. · If you have a nosebleed, pinch your nose gently, and hold it for a short while. To prevent nosebleeds, try massaging a small dab of petroleum jelly, such as Vaseline, in your nostrils. · If your nose is stuffed up, try saline (saltwater) nose sprays. Do not use decongestant sprays. Care for your breasts  · Wear a bra that gives you good support. · Know that changes in your breasts are normal.  ? Your breasts may get larger and more tender. Tenderness usually gets better by 12 weeks. ? Your nipples may get darker and larger, and small bumps around your nipples may show more. ?  The veins in your chest and breasts may show more. · Don't worry about \"toughening'\" your nipples. Breastfeeding will naturally do this. Where can you learn more? Go to http://ailyn-fadi.info/. Enter N246 in the search box to learn more about \"Weeks 10 to 14 of Your Pregnancy: Care Instructions. \"  Current as of: September 5, 2018  Content Version: 11.9  © 5126-8405 [employer]. Care instructions adapted under license by [employer] (which disclaims liability or warranty for this information). If you have questions about a medical condition or this instruction, always ask your healthcare professional. [address] any warranty or liability for your use of this information.

## 2019-03-26 NOTE — PROGRESS NOTES
Problem List  Date Reviewed: 2017          Codes Class Noted    Arrhythmia ICD-10-CM: I49.9  ICD-9-CM: 427.9  Unknown    Overview Signed 3/19/2019  1:29 PM by Camella Bosworth, MD     Afib at times             Supervision of other normal pregnancy, antepartum ICD-10-CM: Z34.80  ICD-9-CM: V22.1  3/1/2019    Overview Signed 3/1/2019  1:55 PM by Timothy April     Intrauterine pregnancy with the following problems identified:   [facility] 10/14/2019 by [address]  [place]/di twin gestation  Hx of tethered cord, multiple spine surgeries - not candidate for regional anesthesia  Needs anesthesia consult  Declines flu vaccine  Baby ASA at 12 weeks  Irregular heart beat - cardiology consult  MFM/genetics - plan Horizon/NIPTS next visit  Rh neg  Parvo immune             Migraine ICD-10-CM: O37.168  ICD-9-CM: 346.90  Unknown        Strain ICD-10-CM: [id number]  ICD-9-CM: Prasanth Zapata  2014         contractions ICD-10-CM: O47.9  ICD-9-CM: 644.00  10/25/2013

## 2019-03-27 LAB
CYSTIC FIBROSIS, EXTERNAL: NORMAL
NIPT, EXTERNAL: NORMAL

## 2019-03-29 LAB
BACTERIA UR CULT: ABNORMAL
BACTERIA UR CULT: ABNORMAL

## 2019-04-03 NOTE — PROGRESS NOTES
Pt states she will not take the Macrobid. She is specifically asking for Keflex. Pt states she does not have an allergy to it.

## 2019-04-07 RX ORDER — CEPHALEXIN 500 MG/1
500 CAPSULE ORAL 3 TIMES DAILY
Qty: 21 CAP | Refills: 0 | Status: SHIPPED | OUTPATIENT
Start: 2019-04-07 | End: 2019-04-12

## 2019-04-08 NOTE — PROGRESS NOTES
Advise patient Panorama identical girls - add to problem list  UTI, repeat culture next visit  Horizon normal

## 2019-04-09 ENCOUNTER — ROUTINE PRENATAL (OUTPATIENT)
Dept: OBGYN CLINIC | Age: 34
End: 2019-04-09

## 2019-04-09 VITALS — BODY MASS INDEX: 19.06 KG/M2 | SYSTOLIC BLOOD PRESSURE: 98 MMHG | WEIGHT: 104.2 LBS | DIASTOLIC BLOOD PRESSURE: 60 MMHG

## 2019-04-09 DIAGNOSIS — O09.90 SUPERVISION OF HIGH RISK PREGNANCY, ANTEPARTUM: Primary | ICD-10-CM

## 2019-04-09 DIAGNOSIS — Z87.440 HISTORY OF UTI: ICD-10-CM

## 2019-04-09 DIAGNOSIS — Z3A.13 13 WEEKS GESTATION OF PREGNANCY: ICD-10-CM

## 2019-04-09 DIAGNOSIS — O30.009 MONOZYGOTIC TWINS, ANTEPARTUM: ICD-10-CM

## 2019-04-09 NOTE — PROGRESS NOTES
Problem List  Date Reviewed: 3/26/2019          Codes Class Noted    Arrhythmia ICD-10-CM: I49.9  ICD-9-CM: 427.9  Unknown    Overview Signed 3/19/2019  1:29 PM by Mack Jimenes MD     Afib at times             Supervision of other normal pregnancy, antepartum ICD-10-CM: Z34.80  ICD-9-CM: V22.1  3/1/2019    Overview Addendum 3/26/2019 10:15 AM by Florence Russell MD     Intrauterine pregnancy with the following problems identified:   [address]14/2019 [address]  [place]/di twin gestation  Hx of tethered cord, multiple spine surgeries - not candidate for regional anesthesia  Self caths  Needs anesthesia consult  Declines flu vaccine  Baby ASA at 12 weeks  Irregular heart beat - cardiology consult - event monitor, nl echo  MFM/genetics - plan Horizon/NIPTS next visit  Rh neg - received Rhogam in ER 3/9/2019  Parvo immune             Migraine ICD-10-CM: N83.232  ICD-9-CM: 346.90  Unknown        Strain ICD-10-CM: James Sanchez  ICD-9-CM: James Sanchez  2014         contractions ICD-10-CM: O47.9  ICD-9-CM: 644.00  10/25/2013

## 2019-04-10 ENCOUNTER — HOSPITAL ENCOUNTER (OUTPATIENT)
Dept: PERINATAL CARE | Age: 34
Discharge: HOME | End: 2019-04-10
Attending: OBSTETRICS & GYNECOLOGY
Payer: MEDICAID

## 2019-04-10 PROCEDURE — 76801 OB US < 14 WKS SINGLE FETUS: CPT | Performed by: OBSTETRICS & GYNECOLOGY

## 2019-04-10 PROCEDURE — 99204 OFFICE O/P NEW MOD 45 MIN: CPT | Performed by: OBSTETRICS & GYNECOLOGY

## 2019-04-10 PROCEDURE — 76802 OB US < 14 WKS ADDL FETUS: CPT | Performed by: OBSTETRICS & GYNECOLOGY

## 2019-04-11 DIAGNOSIS — Z34.80 SUPERVISION OF OTHER NORMAL PREGNANCY, ANTEPARTUM: ICD-10-CM

## 2019-04-12 ENCOUNTER — APPOINTMENT (OUTPATIENT)
Dept: ULTRASOUND IMAGING | Age: 34
End: 2019-04-12
Attending: EMERGENCY MEDICINE
Payer: MEDICAID

## 2019-04-12 ENCOUNTER — HOSPITAL ENCOUNTER (EMERGENCY)
Age: 34
Discharge: HOME | End: 2019-04-12
Attending: EMERGENCY MEDICINE
Payer: MEDICAID

## 2019-04-12 VITALS
WEIGHT: 105 LBS | TEMPERATURE: 98.3 F | OXYGEN SATURATION: 99 % | RESPIRATION RATE: 16 BRPM | SYSTOLIC BLOOD PRESSURE: 96 MMHG | BODY MASS INDEX: 19.2 KG/M2 | HEART RATE: 88 BPM | DIASTOLIC BLOOD PRESSURE: 53 MMHG

## 2019-04-12 DIAGNOSIS — R10.9 ABDOMINAL PAIN AFFECTING PREGNANCY: Primary | ICD-10-CM

## 2019-04-12 DIAGNOSIS — O26.899 ABDOMINAL PAIN AFFECTING PREGNANCY: Primary | ICD-10-CM

## 2019-04-12 PROCEDURE — 76801 OB US < 14 WKS SINGLE FETUS: CPT

## 2019-04-12 PROCEDURE — 99283 EMERGENCY DEPT VISIT LOW MDM: CPT

## 2019-04-13 LAB — BACTERIA UR CULT: ABNORMAL
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
## 2019-04-13 NOTE — ED TRIAGE NOTES
Pt pregnant with twins reports that at approx. 4pm today she was agitated and strained. Pt states she felt pop in her vaginal area and states that she has not felt her babies move since the incident. Pt has no spotting or bleeding.

## 2019-04-13 NOTE — ED NOTES
The patient was discharged home by Dr. Eron Jimenez  in stable condition. The patient is alert and oriented, in no respiratory distress and discharge vital signs obtained. The patient's diagnosis, condition and treatment were explained. The patient expressed understanding. A discharge plan has been developed. Aftercare instructions were given. Pt ambulatory out of the ED.

## 2019-04-13 NOTE — DISCHARGE INSTRUCTIONS
Patient Education        Belly Pain in Pregnancy: Care Instructions  Your Care Instructions    When you're pregnant, any belly pain can be a worry. You may not want to call your doctor about every pain you have. But you don't want to miss something that is dangerous for you or your baby. Even if it feels familiar, belly pain can mean something new when you're pregnant. It's important to know when to call your doctor. It will also help to know how to care for yourself at home when your pain is not caused by anything harmful. · When belly pain is more severe or constant, see a doctor right away. · If you're sure your belly pain is a sign of labor, call your doctor. · When belly pain is brief, it's usually a normal part of pregnancy. It might be related to changes in the growing uterus. Or it could be the stretching of ligaments called round ligaments. These ligaments help support the uterus. Round ligament pain can be on either side of your belly. It can also be felt in your hips or groin. Follow-up care is a key part of your treatment and safety. Be sure to make and go to all appointments, and call your doctor if you are having problems. It's also a good idea to know your test results and keep a list of the medicines you take. How can you tell if belly pain is a sign of labor? When belly pain is caused by labor, it can feel like mild or menstrual-like cramps in your lower belly. These cramps are probably contractions. They can happen in your second or third trimester. You may also have:  · A steady, dull ache in your lower back, pelvis, or thighs. · A feeling of pressure in your pelvis or lower belly. · Changes in your vaginal discharge or a sudden release of fluid from the vagina. If you think you are in labor, call your doctor. How can you care for yourself at home? When belly pain is mild and is not a symptom of labor:  · Rest until you feel better. · Take a warm bath.   · Think about what you drink and eat:  ? Drink plenty of fluids. Choose water and other caffeine-free clear liquids until you feel better. ? Try eating small, frequent meals. If your stomach is upset, try bland, low-fat foods like plain rice, broiled chicken, toast, and yogurt. · Think about how you move if you are having brief pains from stretching of the round ligaments. ? Try gentle stretching. ? Move a little more slowly when turning in bed or getting up from a chair, so those ligaments don't stretch quickly. ? Lean forward a bit if you think you are going to cough or sneeze. When should you call for help? Call 911 anytime you think you may need emergency care. For example, call if:    · You have sudden, severe pain in your belly.     · You have severe vaginal bleeding.    Call your doctor now or seek immediate medical care if:    · You have new or worse belly pain or cramping.     · You have any vaginal bleeding.     · You have a fever.     · You have symptoms of preeclampsia, such as:  ? Sudden swelling of your face, hands, or feet. ? New vision problems (such as dimness or blurring). ? A severe headache.     · You think that you may be in labor. This means that you've had at least 8 contractions within 1 hour or at least 4 contractions within 20 minutes, even after you change your position and drink fluids.     · You have symptoms of a urinary tract infection. These may include:  ? Pain or burning when you urinate. ? A frequent need to urinate without being able to pass much urine. ? Pain in the flank, which is just below the rib cage and above the waist on either side of the back. ? Blood in your urine.    Watch closely for changes in your health, and be sure to contact your doctor if you are worried about your or your baby's health. Where can you learn more? Go to http://ailyn-fadi.info/. Enter 325 [id number] 641 in the search box to learn more about \"Belly Pain in Pregnancy: Care Instructions. \"  Current as of: September 5, 2018  Content Version: 11.9  © 3897-8737 [employer]. Care instructions adapted under license by [employer] (which disclaims liability or warranty for this information). If you have questions about a medical condition or this instruction, always ask your healthcare professional. [address] any warranty or liability for your use of this information.

## 2019-04-13 NOTE — ED NOTES
Patient reports being pregnant with twins, 14 weeks gestation. Patient reports being angry earlier and bared down while squatting which resulted in a popping sensation in her lower abdomen. Since this event the patient reports decreased fetal movement. Patient complains of lower abdominal discomfort. No vaginal discharge or bleeding.

## 2019-04-18 ENCOUNTER — TELEPHONE (OUTPATIENT)
Dept: OBGYN CLINIC | Age: 34
End: 2019-04-18

## 2019-04-18 NOTE — TELEPHONE ENCOUNTER
Patient of [address] [place] [address] Rd. 14 week 3 day twin gestation. She said that she has been on 3 different meds for a UTI and still feels terrible. She had stopped the Macrobid and did not take as directed. She started herself back on the Macrobid 2-3 days ago. Unknown fever. Sore throat, urinary burning with bladder emptying, inner ear pain, run down. Self caths since age 13 yrs.

## 2019-04-18 NOTE — TELEPHONE ENCOUNTER
Per Dr. Candido Acevedo patient had a lapse of time of not taking her antibiotics with this UTI. She needs to continue to take the [address] and go to PCP to be checked on for the other symptoms. Patient states that her PCP is not available, she will go to ER. She is undecided as to what ER she will go.

## 2019-04-23 ENCOUNTER — TELEPHONE (OUTPATIENT)
Dept: OBGYN CLINIC | Age: 34
End: 2019-04-23

## 2019-04-23 ENCOUNTER — ROUTINE PRENATAL (OUTPATIENT)
Dept: OBGYN CLINIC | Age: 34
End: 2019-04-23

## 2019-04-23 VITALS — WEIGHT: 108.4 LBS | BODY MASS INDEX: 19.83 KG/M2 | DIASTOLIC BLOOD PRESSURE: 60 MMHG | SYSTOLIC BLOOD PRESSURE: 100 MMHG

## 2019-04-23 DIAGNOSIS — Z3A.15 15 WEEKS GESTATION OF PREGNANCY: ICD-10-CM

## 2019-04-23 DIAGNOSIS — O09.90 SUPERVISION OF HIGH RISK PREGNANCY, ANTEPARTUM: Primary | ICD-10-CM

## 2019-04-23 LAB — AFP, MATERNAL, EXTERNAL: NORMAL

## 2019-04-23 RX ORDER — SULFAMETHOXAZOLE AND TRIMETHOPRIM 800; 160 MG/1; MG/1
1 TABLET ORAL 2 TIMES DAILY
Qty: 14 TAB | Refills: 0 | Status: SHIPPED | OUTPATIENT
Start: 2019-04-23 | End: 2019-04-30
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
## 2019-04-23 NOTE — PATIENT INSTRUCTIONS
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
## 2019-04-23 NOTE — PROGRESS NOTES
Grabbed her daughter and pulled a muscle today  Cervix L/Cl/ firm  Still did not take appropriate abx for UTI - will give Bactrim

## 2019-04-25 DIAGNOSIS — Z34.80 SUPERVISION OF OTHER NORMAL PREGNANCY, ANTEPARTUM: ICD-10-CM

## 2019-04-25 LAB
AFP ADJ MOM SERPL: 1.59
AFP INTERP SERPL-IMP: NORMAL
AFP INTERP SERPL-IMP: NORMAL
AFP SERPL-MCNC: 51.4 NG/ML
AGE AT DELIVERY: 32.5 YR
COMMENT, 018013: NORMAL
GA METHOD: NORMAL
GA: 15 WEEKS
IDDM PATIENT QL: NO
MULTIPLE PREGNANCY: NORMAL
NEURAL TUBE DEFECT RISK FETUS: 4288 %
RESULTS, 017004: NORMAL

## 2019-05-08 ENCOUNTER — HOSPITAL ENCOUNTER (OUTPATIENT)
Dept: PERINATAL CARE | Age: 34
Discharge: HOME | End: 2019-05-08
Attending: OBSTETRICS & GYNECOLOGY
Payer: MEDICAID

## 2019-05-08 PROCEDURE — 76816 OB US FOLLOW-UP PER FETUS: CPT | Performed by: OBSTETRICS & GYNECOLOGY

## 2019-05-17 ENCOUNTER — TELEPHONE (OUTPATIENT)
Dept: OBGYN CLINIC | Age: 34
End: 2019-05-17

## 2019-05-17 NOTE — TELEPHONE ENCOUNTER
Call received at 137PM    30year old  23w3d pregnant with twins last seen in the office on 19./    Patient calling to say that she had a fair amount of spotting last night and pain in her right lower abdomen. Patient rates the discomfort at a 5-6 on the pain scale of 1-10. Patient was not able to describe in further detail the bleeding since she was in the car with her children. Patient states that she is not spotting now but is still feeling uncomfortable. Patient states she has been resting and drinking fluids. Patient advised of MD recommendations to rest, increase po fluids and was provided with pain and bleeding precautions. Scheduled to be seen on 19 at 8:40am and then this nurse left a detailed message that the appointment needs to be changed to 2:00PM due to Dr. Stefano Dye is in surgery in the am.    This nurse advised the patient of need to call the office to confirm the appointment time change.

## 2019-05-20 ENCOUNTER — ROUTINE PRENATAL (OUTPATIENT)
Dept: OBGYN CLINIC | Age: 34
End: 2019-05-20

## 2019-05-20 VITALS — SYSTOLIC BLOOD PRESSURE: 100 MMHG | WEIGHT: 109.8 LBS | BODY MASS INDEX: 20.08 KG/M2 | DIASTOLIC BLOOD PRESSURE: 62 MMHG

## 2019-05-20 DIAGNOSIS — Z87.440 HISTORY OF UTI: ICD-10-CM

## 2019-05-20 DIAGNOSIS — Z3A.19 19 WEEKS GESTATION OF PREGNANCY: ICD-10-CM

## 2019-05-20 DIAGNOSIS — O30.009 MONOZYGOTIC TWINS, ANTEPARTUM: ICD-10-CM

## 2019-05-20 DIAGNOSIS — O09.90 SUPERVISION OF HIGH RISK PREGNANCY, ANTEPARTUM: Primary | ICD-10-CM

## 2019-05-20 NOTE — TELEPHONE ENCOUNTER
This nurse attempted to reach the patient and left a message for the patient to call the office back to confirm the appointment now scheduled for 2:00PM

## 2019-05-20 NOTE — PROGRESS NOTES
Had spotting post intercourse. Never took Bactrim for UTI. Saw PCP who sent a culture but we cannot get result today. Babies moving - disc importance of weight gain - she has several sig food allergies  Having severe low back pain - [place] refer to [place]  Was supposed to fu with cards tomorrow but can't because school event - results of holter monitor pending  Disc importance of taking meds for UTI - has macrobid at pharmacy and will start today.  Culture sent

## 2019-05-20 NOTE — PROGRESS NOTES
Problem List  Date Reviewed: 2019          Codes Class Noted    Arrhythmia ICD-10-CM: I49.9  ICD-9-CM: 427.9  Unknown    Overview Signed 3/19/2019  1:29 PM by Christi Wilks MD     Afib at times             Supervision of other normal pregnancy, antepartum ICD-10-CM: Z34.80  ICD-9-CM: V22.1  3/1/2019    Overview Addendum 2019  9:46 AM by Ankita Flynn, April     Intrauterine pregnancy with the following problems identified:   [facility] 10/14/2019 by [address]  [place]/di twin gestation  Hx of tethered cord, multiple spine surgeries - not candidate for regional anesthesia  Self caths  Needs anesthesia consult  Declines flu vaccine  Baby ASA at 12 weeks  Irregular heart beat - cardiology consult - event monitor, nl echo  MFM/genetics - plan Horizon/NIPTS next visit;  Pano and Horizon WNL 3/29/19  Rh neg - received Rhogam in ER 3/9/2019  Parvo immune             Migraine ICD-10-CM: L09.195  ICD-9-CM: 346.90  Unknown        Strain ICD-10-CM: [id number]  ICD-9-CM: XBL7855  2014         contractions ICD-10-CM: O47.9  ICD-9-CM: 644.00  10/25/2013

## 2019-05-22 ENCOUNTER — HOSPITAL ENCOUNTER (OUTPATIENT)
Dept: PERINATAL CARE | Age: 34
Discharge: HOME | End: 2019-05-22
Attending: OBSTETRICS & GYNECOLOGY
Payer: MEDICAID

## 2019-05-22 PROCEDURE — 76815 OB US LIMITED FETUS(S): CPT | Performed by: OBSTETRICS & GYNECOLOGY

## 2019-05-23 ENCOUNTER — DOCUMENTATION ONLY (OUTPATIENT)
Dept: CARDIOLOGY CLINIC | Age: 34
End: 2019-05-23

## 2019-05-23 ENCOUNTER — TELEPHONE (OUTPATIENT)
Dept: CARDIOLOGY CLINIC | Age: 34
End: 2019-05-23

## 2019-05-23 NOTE — TELEPHONE ENCOUNTER
Called patient reviewed below results per Dr Giuliano Townsend. Patient verbalized understanding.      ----- Message from Puma Haile MD sent at 5/23/2019  5:00 PM EDT -----  Event monitor-no significant arrhythmias, occasional extra beats. Will discuss further on follow-up appointment.

## 2019-05-23 NOTE — PROGRESS NOTES
E cardio event monitor 4/4/2019 to 5/3/2019-sinus rhythm, 0 critical, 0 serious, for stable events. Sinus rhythm/sinus tachycardia. /PAC associated with flutter or skipped beats/fatigue    Has appointment to see me in 6/70/19

## 2019-05-24 LAB — BACTERIA UR CULT: ABNORMAL

## 2019-05-29 ENCOUNTER — HOSPITAL ENCOUNTER (OUTPATIENT)
Dept: NUTRITION | Age: 34
Discharge: HOME | End: 2019-05-29
Attending: OBSTETRICS & GYNECOLOGY

## 2019-05-29 DIAGNOSIS — Z76.89 ENCOUNTER FOR WEIGHT MANAGEMENT: ICD-10-CM

## 2019-05-29 NOTE — PROGRESS NOTES
Nutrition [facility] Center Assessment - Initial Nutrition Evaluation   DATE: 2019      REFERRING PHYSICIAN: Kelvin Shelley  NAME: Kenny Hassan : 1985 AGE: 30 y.o. GENDER: female  REASON FOR VISIT: Weight gain    ASSESSMENT:  Past Medical Hx: Heart condition, unable to be sedated, spinal surgery. Food allergies: tree nuts, beef, soy, milk (but does tolerate fair amount of yogurt). Pt pregnant with twins, currently ~20 weeks. Pt states she is trying to eat but it limited by GI symptoms. Pt has gained ~12 lbs. Pt started pregnancy underweight and therefore ideally would gain more weight, especially with twins. Pt states she eats brownies, [place], home made soups, [employer], greens with pork, green beans, sunbutter sandwiches. Pt states she eats every 1.5-2 hrs. She feels hungry but sometimes will get GI symptoms (bloating, vacilates between constipation and \"need to go right now\" BMs) Eats large bowls of pasta with chicken/turkey in meat sauce. Eats oat meal with sugar for breakfast. Gave other ideas to include healthy high kcal diet but not limiting fat. Pt states she can't eat any fish per MD. Is eating deli meat without heating. Education provided on importance of healthy kcal while staying in limitations of allergies and preferences. Pt doesn't like avocado. Pt doesn't have  or crockpot/pressure cooker. Has  to blend foods. Doesn't tolerate white potatoes. LABS:   No results found for: HBA1C, HGBE8, HNZ9GTCJ, EQS3HGZW    MEDICATIONS/SUPPLEMENTS:   [unfilled]  Prior to Admission medications    Medication Sig Start Date End Date Taking? Authorizing Provider   acetaminophen (TYLENOL 8 HOUR PO) Take 500 mg by mouth as needed. Provider, Historical   multivitamin (MULTIPLE VITAMIN PO) Take 1 Cap by mouth daily. Provider, Historical   multivitamin with iron (FLINTSTONES) chewable tablet Take 1 Tab by mouth daily.     Other, MD Ivis   loperamide (IMODIUM A-D) 2 mg tablet Take 2 mg by mouth four (4) times daily as needed for Diarrhea. Other, MD Ivis       FOOD ALLERGIES/INTOLERANCES: Beef, soy, tree nuts    ANTHROPOMETRICS:    Ht Readings from Last 1 Encounters:   03/19/19 5' 2\" (1.575 m)      Wt Readings from Last 1 Encounters:   05/20/19 49.8 kg (109 lb 12.8 oz)      IBW: 120 lbs # +/- 10%  %IBW:  % +/- 10%    BMI:  Category: underweight for gestation     Reported Wt Hx: underweight prior to pregnancy    Estimated Nutritional Needs: 2327 kcal (BMR(1218x1.5)+500), 63-73g Pro (1.2-1.4 g/day)    Reported Diet Hx:      24 Hour Diet Recall  Breakfast  Oatmeal w sugar   Lunch  [employer] chicken nuggets   Dinner  [employer], pasta   Snacks  Granola bars, brownies   Beverages  gatorade     Exercise/Physical Actvity: near bed rest    Environmental/Social: living in [facility], , 4 children        NUTRITION DIAGNOSIS: underweight      NUTRITION INTERVENTION: increase protein/kcal intake     PATIENT GOALS: Gain 10 lbs by end of next month, gain 30 lbs in 3 months. Specific tips and techniques to facilitate compliance with above recommendations were provided and discussed. Pt was strongly encourage to begin making necessary changes now, and re-visit the dietitian prn. If further details are desired please feel free to contact me at [phone]. This phone number was also provided to the patient for any further questions or concerns.             Nadine Villafuerte RD

## 2019-05-30 ENCOUNTER — TELEPHONE (OUTPATIENT)
Dept: OBGYN CLINIC | Age: 34
End: 2019-05-30

## 2019-05-30 NOTE — TELEPHONE ENCOUNTER
Call received at 314PM    30year old patient  20w3d with twin gestation last seen in the office on 19. Patient calling to say that she has not felt baby B move for the last 38 hours. Patient advised to come to the office now as per Dr. Yolanda Bloom. Patient placed on the schedule to be seen by scheduling at 3:00PM( patient is one hour away. )    MD aware that patient is one hour away. This nurse left a detailed message for the patient to call the office back to confirm that she is coming to the office to be seen.

## 2019-05-30 NOTE — TELEPHONE ENCOUNTER
Call received at 2:55PM    Patient now on the phone. Patient has not left for the office. Dr. Davidson Lorenzana advised that patient has not left for the office. ( on hour away    Dr. Davidson Lorenzana advised to have the patient to come to the office at 10:00am tomorrow. Patient advised to increase her po fluids. Patient appointment changed and patient placed on the schedule to be seen at 10:00am on 5/31/19. Patient verbalized understanding.

## 2019-05-31 ENCOUNTER — ROUTINE PRENATAL (OUTPATIENT)
Dept: OBGYN CLINIC | Age: 34
End: 2019-05-31

## 2019-05-31 VITALS — SYSTOLIC BLOOD PRESSURE: 94 MMHG | BODY MASS INDEX: 20.67 KG/M2 | DIASTOLIC BLOOD PRESSURE: 55 MMHG | WEIGHT: 113 LBS

## 2019-05-31 DIAGNOSIS — Z87.440 HISTORY OF UTI: ICD-10-CM

## 2019-05-31 DIAGNOSIS — Z3A.20 20 WEEKS GESTATION OF PREGNANCY: ICD-10-CM

## 2019-05-31 DIAGNOSIS — O09.90 SUPERVISION OF HIGH RISK PREGNANCY, ANTEPARTUM: Primary | ICD-10-CM

## 2019-05-31 RX ORDER — NITROFURANTOIN 25; 75 MG/1; MG/1
100 CAPSULE ORAL 2 TIMES DAILY
Qty: 20 CAP | Refills: 0 | Status: SHIPPED | OUTPATIENT
Start: 2019-05-31 | End: 2019-07-25

## 2019-05-31 NOTE — PROGRESS NOTES
Had not felt baby B move.  Both FHTS found easily  Gain 3+ pounds - saw dietician  Urine pos for nitrates - afraid to take bactrim  Macrobid x 10 days - start qhs next week when I see her

## 2019-05-31 NOTE — PROGRESS NOTES
Problem List  Date Reviewed: 2019          Codes Class Noted    Arrhythmia ICD-10-CM: I49.9  ICD-9-CM: 427.9  Unknown    Overview Signed 3/19/2019  1:29 PM by Karyle Nordmann, MD     Afib at times             Supervision of other normal pregnancy, antepartum ICD-10-CM: Z34.80  ICD-9-CM: V22.1  3/1/2019    Overview Addendum 2019  9:46 AM by Evita Gee April, LPN     Intrauterine pregnancy with the following problems identified:   [facility] 10/14/2019 by [address]  [place]/di twin gestation  Hx of tethered cord, multiple spine surgeries - not candidate for regional anesthesia  Self caths  Needs anesthesia consult  Declines flu vaccine  Baby ASA at 12 weeks  Irregular heart beat - cardiology consult - event monitor, nl echo  MFM/genetics - plan Horizon/NIPTS next visit;  Pano and Horizon WNL 3/29/19  Rh neg - received Rhogam in ER 3/9/2019  Parvo immune             Migraine ICD-10-CM: Z03.465  ICD-9-CM: 346.90  Unknown        Strain ICD-10-CM: HZG0113  ICD-9-CM: ZAS0618  2014         contractions ICD-10-CM: O47.9  ICD-9-CM: 644.00  10/25/2013

## 2019-06-07 ENCOUNTER — HOSPITAL ENCOUNTER (OUTPATIENT)
Dept: PERINATAL CARE | Age: 34
Discharge: HOME | End: 2019-06-07
Attending: OBSTETRICS & GYNECOLOGY
Payer: MEDICAID

## 2019-06-07 ENCOUNTER — ROUTINE PRENATAL (OUTPATIENT)
Dept: OBGYN CLINIC | Age: 34
End: 2019-06-07

## 2019-06-07 VITALS — WEIGHT: 114.8 LBS | DIASTOLIC BLOOD PRESSURE: 53 MMHG | SYSTOLIC BLOOD PRESSURE: 104 MMHG | BODY MASS INDEX: 21 KG/M2

## 2019-06-07 DIAGNOSIS — Z3A.21 21 WEEKS GESTATION OF PREGNANCY: ICD-10-CM

## 2019-06-07 DIAGNOSIS — O30.009 MONOZYGOTIC TWINS, ANTEPARTUM: ICD-10-CM

## 2019-06-07 DIAGNOSIS — O09.90 SUPERVISION OF HIGH RISK PREGNANCY, ANTEPARTUM: Primary | ICD-10-CM

## 2019-06-07 LAB
BACTERIA UR CULT: ABNORMAL
BACTERIA UR CULT: ABNORMAL

## 2019-06-07 PROCEDURE — 76811 OB US DETAILED SNGL FETUS: CPT | Performed by: OBSTETRICS & GYNECOLOGY

## 2019-06-07 PROCEDURE — 76812 OB US DETAILED ADDL FETUS: CPT | Performed by: OBSTETRICS & GYNECOLOGY

## 2019-06-07 RX ORDER — SULFAMETHOXAZOLE AND TRIMETHOPRIM 800; 160 MG/1; MG/1
1 TABLET ORAL 2 TIMES DAILY
Qty: 14 TAB | Refills: 0 | Status: SHIPPED | OUTPATIENT
Start: 2019-06-07 | End: 2019-06-14

## 2019-06-07 NOTE — PROGRESS NOTES
Saw MFM today.    Urine shows Kelbsiella resistant to macrobid  Bactrim x 7d - repeat urine cx next visit 2 weeks  Keep increasing calories

## 2019-06-07 NOTE — PATIENT INSTRUCTIONS

## 2019-06-07 NOTE — PROGRESS NOTES
Problem List  Date Reviewed: 2019          Codes Class Noted    Arrhythmia ICD-10-CM: I49.9  ICD-9-CM: 427.9  Unknown    Overview Signed 3/19/2019  1:29 PM by Natalio Edouard MD     Afib at times             Supervision of other normal pregnancy, antepartum ICD-10-CM: Z34.80  ICD-9-CM: V22.1  3/1/2019    Overview Addendum 2019  9:46 AM by Марина Tellez, LPN     Intrauterine pregnancy with the following problems identified:   [facility] 10/14/2019 by [address]  [place]/di twin gestation  Hx of tethered cord, multiple spine surgeries - not candidate for regional anesthesia  Self caths  Needs anesthesia consult  Declines flu vaccine  Baby ASA at 12 weeks  Irregular heart beat - cardiology consult - event monitor, nl echo  MFM/genetics - plan Horizon/NIPTS next visit;  Pano and Horizon WNL 3/29/19  Rh neg - received Rhogam in ER 3/9/2019  Parvo immune             Migraine ICD-10-CM: J35.260  ICD-9-CM: 346.90  Unknown        Strain ICD-10-CM: [id number]  ICD-9-CM: AXM5803  2014         contractions ICD-10-CM: O47.9  ICD-9-CM: 644.00  10/25/2013

## 2019-06-10 ENCOUNTER — OFFICE VISIT (OUTPATIENT)
Dept: CARDIOLOGY CLINIC | Age: 34
End: 2019-06-10

## 2019-06-10 VITALS
HEART RATE: 88 BPM | RESPIRATION RATE: 16 BRPM | HEIGHT: 62 IN | BODY MASS INDEX: 21.02 KG/M2 | SYSTOLIC BLOOD PRESSURE: 102 MMHG | DIASTOLIC BLOOD PRESSURE: 56 MMHG | WEIGHT: 114.2 LBS

## 2019-06-10 DIAGNOSIS — R53.83 FATIGUE, UNSPECIFIED TYPE: ICD-10-CM

## 2019-06-10 DIAGNOSIS — Z34.90 PREGNANCY, UNSPECIFIED GESTATIONAL AGE: ICD-10-CM

## 2019-06-10 DIAGNOSIS — I48.91 ATRIAL FIBRILLATION, UNSPECIFIED TYPE (HCC): Primary | ICD-10-CM

## 2019-06-10 NOTE — PROGRESS NOTES
Thanh Malik MD    [id number] [facility], [address] [facility]    Office [phone],[facility] [phone]  Pager [phone] Diana Sahu is a 30 y.o. female is here for f/u visit. Primary care physician:  Beatriz Ryan NP    Patient Active Problem List   Diagnosis Code     contractions O47.9    Migraine G43.909    Strain [id number]    Supervision of other normal pregnancy, antepartum Z34.80    Arrhythmia I49.9       Dear Dr Shila Capellan,     I had the pleasure of seeing Ms. Peri Erwin in the office today.        Assessment:     Palpitations - improved  Pregnancy - G6 - Twins - PETR 10/2019  Tethered spinal cord -(multiple surgeries from age 13 to age 26) some residual deficit with walking and weakness in her feet. Patient has been told  \"Her heart rate to be restarted\" during 1 of the surgeries when she was age 8 . Records not available. ( Self cath's herself - has UTI's)  Hx of Orthostatic Hypotension        Plan:      Echocardiogram   Advised to keep hydrated. Compression stockings. Follow-up in about 2019     Patient understands the plan. All questions were answered to the patient's satisfaction.     Medication Side Effects and Warnings were discussed with patient: yes  Patient Labs were reviewed and or requested:  yes  Patient Past Records were reviewed and or requested: yes     I appreciate the opportunity to be involved in [address] [place] [address]. Please see note below for details. Please do not hesitate to contact us with questions or concerns.     Thanh Malik MD     Cardiac Testing/ Procedures:     A. Cardiac Cath/PCI:     B. ECHO/IRA: 10/5/19 - EF 55-60%; Redundant non prolapsing ant leaflet chords     C. StressNuclear/Stress ECHO/Stress test:     D. Vascular:     E. EP: E cardio event monitor 2019 to 5/3/2019-sinus rhythm, 0 critical, 0 serious, for stable events. Sinus rhythm/sinus tachycardia. /PAC associated with flutter or skipped beats/fatigue        9/27/17-event monitor-sinus tachycardia     F. Miscellaneous:     History of present illness:     Patient states that her symptoms of palpitations have improved to a significant extent. She tries to keep herself hydrated. She tries to wear the compression stockings when she can. No chest pain. Complains of fatigue. No dyspnea. Occasional dizziness. No syncope. ( Initial visit - 3/2019- 30 yr old CF who is G6 A2 (twins first pregnancy, L2 1 (boy and girl) who recently found out that she is pregnant 2/2019 and has twins. Since her diagnosis of pregnancy, she has been having palpitations. Also has been suffering from allergy/sinus infection. No dizziness, syncope, chest pain, dyspnea, swelling lower extremities. Palpitations can occur at any time.       History of tethered spinal cord. Has had multiple surgeries since age 13 to age 26. Apparently her heart had stopped during 1 of the surgeries when she was 10 or 11 and it had to be restarted.)        ROS:  (bold if positive, if negative)             Medications before admission:    Current Outpatient Medications   Medication Sig Dispense    trimethoprim-sulfamethoxazole (BACTRIM DS) 160-800 mg per tablet Take 1 Tab by mouth two (2) times a day for 7 days. 14 Tab    acetaminophen (TYLENOL 8 HOUR PO) Take 500 mg by mouth as needed.  multivitamin with iron (FLINTSTONES) chewable tablet Take 1 Tab by mouth daily.  nitrofurantoin, macrocrystal-monohydrate, (MACROBID) 100 mg capsule Take 1 Cap by mouth two (2) times a day. 20 Cap    loperamide (IMODIUM A-D) 2 mg tablet Take 2 mg by mouth four (4) times daily as needed for Diarrhea. No current facility-administered medications for this visit.         Family History of CAD:    No    Social History:  Current  Smoker  No    Physical Exam:  Visit Vitals  /56 (BP 1 Location: Left arm)   Pulse 88   Resp 16   Ht 5' 2\" (1.575 m)   Wt 114 lb 3.2 oz (51.8 kg)   LMP 02/01/2019 (Within Weeks) Comment: 1/1/19   BMI 20.89 kg/m²          Gen: Well-developed, well-nourished, in no acute distress  Neck: Supple,No JVD, No Carotid Bruit,   Resp: No accessory muscle use, Clear breath sounds, No rales or rhonchi  Card: Regular Rate,Rythm,Normal S1, S2, No murmurs, rubs or gallop. No thrills.    Abd:  Gravid uterus  MSK: No cyanosis  Skin: No rashes    Neuro: moving all four extremities , follows commands appropriately  Psych:  Good insight, oriented to person, place , alert, Nml Affect  LE: No edema    EKG:       LABS:        Lab Results   Component Value Date/Time    WBC 13.3 (H) 03/08/2019 07:27 PM    HGB 14.5 03/08/2019 07:27 PM    HCT 42.0 03/08/2019 07:27 PM    PLATELET 423 37/48/6369 07:27 PM    Hgb, External 14.2 02/26/2019    Hct, External 41.0 02/26/2019    Platelet cnt., External 313 02/26/2019     Lab Results   Component Value Date/Time    Sodium 137 03/08/2019 07:27 PM    Potassium 3.8 03/08/2019 07:27 PM    Chloride 101 03/08/2019 07:27 PM    CO2 27 03/08/2019 07:27 PM    Anion gap 9 03/08/2019 07:27 PM    Glucose 98 03/08/2019 07:27 PM    BUN 7 03/08/2019 07:27 PM    Creatinine 0.48 (L) 03/08/2019 07:27 PM    BUN/Creatinine ratio 15 03/08/2019 07:27 PM    GFR est AA >60 03/08/2019 07:27 PM    GFR est non-AA >60 03/08/2019 07:27 PM    Calcium 8.9 03/08/2019 07:27 PM       No results found for: APTT  No results found for: INR, PTMR, PTP, PT1, PT2  No components found for: Stephanie Fitzgerald MD

## 2019-06-21 ENCOUNTER — HOSPITAL ENCOUNTER (OUTPATIENT)
Dept: PERINATAL CARE | Age: 34
Discharge: HOME | End: 2019-06-21
Attending: OBSTETRICS & GYNECOLOGY
Payer: MEDICAID

## 2019-06-21 ENCOUNTER — ROUTINE PRENATAL (OUTPATIENT)
Dept: OBGYN CLINIC | Age: 34
End: 2019-06-21

## 2019-06-21 VITALS — SYSTOLIC BLOOD PRESSURE: 97 MMHG | BODY MASS INDEX: 21.95 KG/M2 | WEIGHT: 120 LBS | DIASTOLIC BLOOD PRESSURE: 52 MMHG

## 2019-06-21 DIAGNOSIS — O09.90 SUPERVISION OF HIGH RISK PREGNANCY, ANTEPARTUM: Primary | ICD-10-CM

## 2019-06-21 DIAGNOSIS — O30.009 MONOZYGOTIC TWINS, ANTEPARTUM: ICD-10-CM

## 2019-06-21 DIAGNOSIS — Z3A.23 23 WEEKS GESTATION OF PREGNANCY: ICD-10-CM

## 2019-06-21 PROCEDURE — 76815 OB US LIMITED FETUS(S): CPT | Performed by: OBSTETRICS & GYNECOLOGY

## 2019-06-21 NOTE — PROGRESS NOTES
Problem List  Date Reviewed: 2019          Codes Class Noted    Arrhythmia ICD-10-CM: I49.9  ICD-9-CM: 427.9  Unknown    Overview Signed 3/19/2019  1:29 PM by Mireille Rodriguez MD     Afib at times             Supervision of other normal pregnancy, antepartum ICD-10-CM: Z34.80  ICD-9-CM: V22.1  3/1/2019    Overview Addendum 2019  9:46 AM by Medina Figueroa, April, LPN     Intrauterine pregnancy with the following problems identified:   [facility] 10/14/2019 by [address]  [place]/di twin gestation  Hx of tethered cord, multiple spine surgeries - not candidate for regional anesthesia  Self caths  Needs anesthesia consult  Declines flu vaccine  Baby ASA at 12 weeks  Irregular heart beat - cardiology consult - event monitor, nl echo  MFM/genetics - plan Horizon/NIPTS next visit;  Pano and Horizon WNL 3/29/19  Rh neg - received Rhogam in ER 3/9/2019  Parvo immune             Migraine ICD-10-CM: R30.676  ICD-9-CM: 346.90  Unknown        Strain ICD-10-CM: GHC5277  ICD-9-CM: [id number]  2014         contractions ICD-10-CM: O47.9  ICD-9-CM: 644.00  10/25/2013

## 2019-06-21 NOTE — PATIENT INSTRUCTIONS
Weeks 22 to 26 of Your Pregnancy: Care Instructions  Your Care Instructions    As you enter your 7th month of pregnancy at week 26, your baby's lungs are growing stronger and getting ready to breathe. You may notice that your baby responds to the sound of your or your partner's voice. You may also notice that your baby does less turning and twisting and more squirming or jerking. Jerking often means that your baby has the hiccups. Hiccups are perfectly normal and are only temporary. You may want to think about attending a childbirth preparation class. This is also a good time to start thinking about whether you want to have pain medicine during labor. Most pregnant women are tested for gestational diabetes between weeks 25 and 28. Gestational diabetes occurs when your blood sugar level gets too high when you're pregnant. The test is important, because you can have gestational diabetes and not know it. But the condition can cause problems for your baby. Follow-up care is a key part of your treatment and safety. Be sure to make and go to all appointments, and call your doctor if you are having problems. It's also a good idea to know your test results and keep a list of the medicines you take. How can you care for yourself at home? Ease discomfort from your baby's kicking  · Change your position. Sometimes this will cause your baby to change position too. · Take a deep breath while you raise your arm over your head. Then breathe out while you drop your arm. Do Kegel exercises to prevent urine from leaking  · You can do Kegel exercises while you stand or sit. ? Squeeze the same muscles you would use to stop your urine. Your belly and thighs should not move. ? Hold the squeeze for 3 seconds, and then relax for 3 seconds. ? Start with 3 seconds. Then add 1 second each week until you are able to squeeze for 10 seconds. ? Repeat the exercise 10 to 15 times for each session.  Do three or more sessions each day.  Ease or reduce swelling in your feet, ankles, hands, and fingers  · If your fingers are puffy, take off your rings. · Do not eat high-salt foods, such as potato chips. · Prop up your feet on a stool or couch as much as possible. Sleep with pillows under your feet. · Do not stand for long periods of time or wear tight shoes. · Wear support stockings. Where can you learn more? Go to http://ailyn-fadi.info/. Enter G264 in the search box to learn more about \"Weeks 22 to 26 of Your Pregnancy: Care Instructions. \"  Current as of: September 5, 2018  Content Version: 11.9  © 2995-0733 [employer]. Care instructions adapted under license by [employer] (which disclaims liability or warranty for this information). If you have questions about a medical condition or this instruction, always ask your healthcare professional. [place] [address] any warranty or liability for your use of this information.

## 2019-06-24 ENCOUNTER — HOSPITAL ENCOUNTER (EMERGENCY)
Age: 34
Discharge: HOME | End: 2019-06-24
Attending: OBSTETRICS & GYNECOLOGY | Admitting: OBSTETRICS & GYNECOLOGY
Payer: MEDICAID

## 2019-06-24 VITALS
HEART RATE: 87 BPM | RESPIRATION RATE: 16 BRPM | DIASTOLIC BLOOD PRESSURE: 62 MMHG | SYSTOLIC BLOOD PRESSURE: 127 MMHG | TEMPERATURE: 98.6 F | OXYGEN SATURATION: 96 %

## 2019-06-24 LAB
APPEARANCE UR: CLEAR
BACTERIA URNS QL MICRO: NEGATIVE /HPF
BILIRUB UR QL: NEGATIVE
COLOR UR: NORMAL
EPITH CASTS URNS QL MICRO: NORMAL /LPF
FIBRONECTIN FETAL VAG QL: NEGATIVE
GLUCOSE UR STRIP.AUTO-MCNC: NEGATIVE MG/DL
HGB UR QL STRIP: NEGATIVE
HYALINE CASTS URNS QL MICRO: NORMAL /LPF (ref 0–5)
KETONES UR QL STRIP.AUTO: NEGATIVE MG/DL
LEUKOCYTE ESTERASE UR QL STRIP.AUTO: NEGATIVE
NITRITE UR QL STRIP.AUTO: NEGATIVE
PH UR STRIP: 8 [PH] (ref 5–8)
PROT UR STRIP-MCNC: NEGATIVE MG/DL
RBC #/AREA URNS HPF: NORMAL /HPF (ref 0–5)
SP GR UR REFRACTOMETRY: 1.01 (ref 1–1.03)
UA: UC IF INDICATED,UAUC: NORMAL
UROBILINOGEN UR QL STRIP.AUTO: 1 EU/DL (ref 0.2–1)
WBC URNS QL MICRO: NORMAL /HPF (ref 0–4)

## 2019-06-24 PROCEDURE — 81001 URINALYSIS AUTO W/SCOPE: CPT

## 2019-06-24 PROCEDURE — 76815 OB US LIMITED FETUS(S): CPT

## 2019-06-24 PROCEDURE — 96361 HYDRATE IV INFUSION ADD-ON: CPT

## 2019-06-24 PROCEDURE — 82731 ASSAY OF FETAL FIBRONECTIN: CPT

## 2019-06-24 PROCEDURE — 74011250636 HC RX REV CODE- 250/636: Performed by: OBSTETRICS & GYNECOLOGY

## 2019-06-24 PROCEDURE — 96360 HYDRATION IV INFUSION INIT: CPT

## 2019-06-24 PROCEDURE — 99285 EMERGENCY DEPT VISIT HI MDM: CPT

## 2019-06-24 RX ADMIN — SODIUM CHLORIDE, SODIUM LACTATE, POTASSIUM CHLORIDE, AND CALCIUM CHLORIDE 1000 ML: 600; 310; 30; 20 INJECTION, SOLUTION INTRAVENOUS at 20:30

## 2019-06-25 NOTE — DISCHARGE INSTRUCTIONS
Follow up at your next regularly scheduled visit. Patient Education         Birth: Care Instructions  Your Care Instructions  Many things can cause a baby to be born early. Some early births are planned, such as with twins or triplets. But in most cases, a birth that happens weeks before the due date is a surprise. Whatever the reason, your doctor and medical team will work hard for your baby's health. If you know you will give birth early, then you, your partner, and your doctor can prepare for a  birth. Your baby may be delivered through a cut, called an incision, in your belly. This surgery is called a  delivery, or a . The surgery will make it hard for you to move around for a while. A childbirth (obstetric) team and a new baby () team will be there for your baby's birth. The  team will bring special equipment with them, including a bed with an overhead heater. The obstetric team will take care of you while the  team takes care of your baby. Your baby may need special care for some time. The doctors and nurses in the hospital nursery or the  intensive care unit (NICU) can help a  baby get stronger. Your baby may not be able to suck from a breast or bottle yet. The hospital staff can show you how to get your milk to your baby. Follow-up care is a key part of your treatment and safety. Be sure to make and go to all appointments, and call your doctor if you are having problems. It's also a good idea to know your test results and keep a list of the medicines you take. How can you care for yourself at home? Before your baby comes home  · Some early babies stay in the hospital for a few days to weeks. Talk with your doctor about this. · It is normal to worry about your premature baby's health. Talk with your baby's doctor about your baby's care. Ask how your baby is responding to treatment.  Good nutrition, hospital and home care, and lots of love will help your baby grow. · Your breast milk will come in 3 or 4 days after the birth. So before the birth you will need to decide if you will breastfeed. If you decide to breastfeed:  ? You may need to pump milk for feedings. Rosemary Councilman do this until your infant is mature enough to feed by mouth. ? You may want to spend the night with your infant. You'll be able to see if he or she is strong enough to nurse around the clock. · Your baby may sleep most of the time. It may seem like it takes a long time for your baby to respond to you. · Machines may help your baby stay warm, breathe, and eat. Ask the NICU staff to explain how the equipment works. With their help, you can quickly learn about the treatment, your baby's needs, and what you can do for your baby. The more you learn while your baby is in the NICU, the better you will be able to take care of your baby at home. · As your baby grows stronger, you will be able to take on more of the caregiving. You will be able to change diapers and hold, feed, and bathe your baby. · If your baby will need special equipment, you will get written instructions for its use. To take care of yourself  · Follow any instructions your doctor has given you for your own care. This will guide your activities and tell you what to watch for in the next few weeks. · Get as much rest as possible. · Do not have sex unless your doctor says it is okay. · Do not smoke or allow others to smoke around you. If you need help quitting, talk to your doctor about stop-smoking programs and medicines. These can increase your chances of quitting for good. · Your doctor can refer you to support services, such as support groups. They can help you learn what to expect and how to care for your premature baby. Talking with other parents who are dealing with the same things you are will help you with both the challenges and the joys ahead. ·  birth is very stressful and tiring.  It is important that you and your partner take good care of yourselves and each other. Where can you learn more? Go to http://ailyn-fadi.info/. Enter L473 in the search box to learn more about \" Birth: Care Instructions. \"  Current as of: 2018  Content Version: 11.9  © 9118-3945 [employer]. Care instructions adapted under license by [employer] (which disclaims liability or warranty for this information). If you have questions about a medical condition or this instruction, always ask your healthcare professional. [place] [address] any warranty or liability for your use of this information. Patient Education        Weeks 22 to 26 of Your Pregnancy: Care Instructions  Your Care Instructions    As you enter your 7th month of pregnancy at week 26, your baby's lungs are growing stronger and getting ready to breathe. You may notice that your baby responds to the sound of your or your partner's voice. You may also notice that your baby does less turning and twisting and more squirming or jerking. Jerking often means that your baby has the hiccups. Hiccups are perfectly normal and are only temporary. You may want to think about attending a childbirth preparation class. This is also a good time to start thinking about whether you want to have pain medicine during labor. Most pregnant women are tested for gestational diabetes between weeks 25 and 28. Gestational diabetes occurs when your blood sugar level gets too high when you're pregnant. The test is important, because you can have gestational diabetes and not know it. But the condition can cause problems for your baby. Follow-up care is a key part of your treatment and safety. Be sure to make and go to all appointments, and call your doctor if you are having problems. It's also a good idea to know your test results and keep a list of the medicines you take.   How can you care for yourself at home?  Ease discomfort from your baby's kicking  · Change your position. Sometimes this will cause your baby to change position too. · Take a deep breath while you raise your arm over your head. Then breathe out while you drop your arm. Do Kegel exercises to prevent urine from leaking  · You can do Kegel exercises while you stand or sit. ? Squeeze the same muscles you would use to stop your urine. Your belly and thighs should not move. ? Hold the squeeze for 3 seconds, and then relax for 3 seconds. ? Start with 3 seconds. Then add 1 second each week until you are able to squeeze for 10 seconds. ? Repeat the exercise 10 to 15 times for each session. Do three or more sessions each day. Ease or reduce swelling in your feet, ankles, hands, and fingers  · If your fingers are puffy, take off your rings. · Do not eat high-salt foods, such as potato chips. · Prop up your feet on a stool or couch as much as possible. Sleep with pillows under your feet. · Do not stand for long periods of time or wear tight shoes. · Wear support stockings. Where can you learn more? Go to http://ailyn-fadi.info/. Enter G264 in the search box to learn more about \"Weeks 22 to 26 of Your Pregnancy: Care Instructions. \"  Current as of: September 5, 2018  Content Version: 11.9  © 2058-5556 [email or web address]. Care instructions adapted under license by [employer] (which disclaims liability or warranty for this information). If you have questions about a medical condition or this instruction, always ask your healthcare professional. [place] [address] any warranty or liability for your use of this information. Patient Education     Learning About Twin Pregnancy  What is different about a twin pregnancy? In many ways, a twin pregnancy is like a single-baby pregnancy. Healthy twins develop like a single baby does until the last trimester, when their growth slows down.  Twins are usually born before the usual 40-week due date. For the mother, carrying twins can be more difficult than carrying a single baby. And her risks are higher for pregnancy problems. That's why keeping up with prenatal checks and tests is especially important. How do twins grow? Twins develop from embryos to babies like a single baby does. · By weeks 10 to 14 of your pregnancy, one or two placentas have formed inside your uterus. It is possible to hear your babies' heartbeats with a special ultrasound device. Your babies' eyes can move. Their arms and legs can bend. · Around weeks 14 to 18, hair is beginning to grow on your babies' heads. · By 18 to 22 weeks, your babies can now suck their thumbs and  firmly with their hands. They can open and close their eyelids. Around this time, you may start to feel your babies move. At first, this might feel like fluttering or \"butterflies. \"  · Around week 26, you may notice that your babies respond to the sound of your or your partner's voice. You may also notice that they do less turning and twisting and more squirming. · Up to 32 weeks, twins grow rapidly. By this point, they really start to look like babies, with hair and plump skin. · Around 32 to 34 weeks, twins' pace of growth slows down. Their lungs become more ready for breathing. This marks a stage when babies born early are less likely to have breathing problems after birth. What can you expect during a twin pregnancy? With a twin pregnancy, your body makes high levels of pregnancy hormones. So morning sickness may come on earlier and stronger than if you were carrying a single baby. You may also have earlier and more intense symptoms from pregnancy, like swelling, heartburn, leg cramps, bladder discomfort, and sleep problems. Your belly may grow bigger, and you may gain more weight, sooner. Talk to your doctor about how much you might expect to gain.   When you're carrying twins, you and your babies may be tested and checked more than you would for a single-baby pregnancy. · At about 10 weeks, an ultrasound can show if the babies are growing in the same amniotic sac. If they each have their own sac and their own placenta, twins have the best chances of both growing well. · Between weeks 18 and 20, an ultrasound may be able to show the sex of your babies. · Later in your pregnancy, you will start to have checkups more often. This will be sooner than for a single-baby pregnancy. Twins tend to be born early, but 37 weeks is a goal when mother and babies are doing well. As you get closer to delivery time, your medical team will help you know what to expect and what to do. As questions come to mind, keep a list so you can remember to ask your doctor. Follow-up care is a key part of your treatment and safety. Be sure to make and go to all appointments, and call your doctor if you are having problems. It's also a good idea to know your test results and keep a list of the medicines you take. Where can you learn more? Go to http://ailyn-fadi.info/. Enter D030 in the search box to learn more about \"Learning About Twin Pregnancy. \"  Current as of: September 5, 2018  Content Version: 11.9  © 7995-7028 [employer]. Care instructions adapted under license by [employer] (which disclaims liability or warranty for this information). If you have questions about a medical condition or this instruction, always ask your healthcare professional. [place] [address] any warranty or liability for your use of this information. Patient Education      Counting Your Baby's Kicks: Care Instructions  Your Care Instructions  Counting your baby's kicks is one way your doctor can tell that your baby is healthy. Most women--especially in a first pregnancy--feel their baby move for the first time between 16 and 22 weeks. The movement may feel like flutters rather than kicks.  Your baby may move more at certain times of the day. When you are active, you may notice less kicking than when you are resting. At your prenatal visits, your doctor will ask whether the baby is active. In your last trimester, your doctor may ask you to count the number of times you feel your baby move. Follow-up care is a key part of your treatment and safety. Be sure to make and go to all appointments, and call your doctor if you are having problems. It's also a good idea to know your test results and keep a list of the medicines you take. How do you count fetal kicks? · A common method of checking your baby's movement is to count the number of kicks or moves you feel in 1 hour. Ten movements (such as kicks, flutters, or rolls) in 1 hour are normal. Some doctors suggest that you count in the morning until you get to 10 movements. Then you can quit for that day and start again the next day. · Pick your baby's most active time of day to count. This may be any time from morning to evening. · If you do not feel 10 movements in an hour, your baby may be sleeping. Wait for the next hour and count again. When should you call for help? Call your doctor now or seek immediate medical care if:    · You noticed that your baby has stopped moving or is moving much less than normal.    Watch closely for changes in your health, and be sure to contact your doctor if you have any problems. Where can you learn more? Go to http://ailyn-fadi.info/. Enter E415 in the search box to learn more about \"Counting Your Baby's Kicks: Care Instructions. \"  Current as of: September 5, 2018  Content Version: 11.9  © 6849-4364 [employer]. Care instructions adapted under license by [employer] (which disclaims liability or warranty for this information).  If you have questions about a medical condition or this instruction, always ask your healthcare professional. [address] any warranty or liability for your use of this information.

## 2019-06-25 NOTE — PROGRESS NOTES
2020:  Patient presents to unit with complaints of contractions since 1600. She states in the last hour they have become stronger and about  2 minutes apart. Reports good fetal movement and denies LOF or VB.    :  Mallika Morales MD at bedside assessing patient and discussing plan of care. FFN performed with sterile speculum and SVE noted to be closed/thick. Orders for PIV and fluid bolus at this time. Bedside ultrasound performed to confirm presentation of fetus a and b.    :  Straight cath performed by patient on self for collection of UA specimen. :  RN in room checking on patient. She states she needs to empty her bladder at this time but is feeling much better overall. What she is experiencing is her normal lopez villagran. She declines to take additional medication and would like to go home. :  Telephone order received by Mallika Morales MD for patient to be discharge home. 0:  Discharge instructions reviewed with patient including weeks 22-26 gestation, fetal kick counts,  labor precautions and when to call the doctor. She verbalizes understanding. Opportunity for questions provided. At this time she reports feeling tightening of her belly but it is not painful. 231:  Patient and SO ambulating off unit after discharge.
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
## 2019-07-05 ENCOUNTER — ROUTINE PRENATAL (OUTPATIENT)
Dept: OBGYN CLINIC | Age: 34
End: 2019-07-05

## 2019-07-05 ENCOUNTER — HOSPITAL ENCOUNTER (OUTPATIENT)
Dept: PERINATAL CARE | Age: 34
Discharge: HOME | End: 2019-07-05
Attending: OBSTETRICS & GYNECOLOGY
Payer: MEDICAID

## 2019-07-05 VITALS — SYSTOLIC BLOOD PRESSURE: 99 MMHG | BODY MASS INDEX: 22.24 KG/M2 | DIASTOLIC BLOOD PRESSURE: 54 MMHG | WEIGHT: 121.6 LBS

## 2019-07-05 DIAGNOSIS — Z3A.25 25 WEEKS GESTATION OF PREGNANCY: ICD-10-CM

## 2019-07-05 DIAGNOSIS — O09.90 SUPERVISION OF HIGH RISK PREGNANCY, ANTEPARTUM: Primary | ICD-10-CM

## 2019-07-05 DIAGNOSIS — O30.009 MONOZYGOTIC TWINS, ANTEPARTUM: ICD-10-CM

## 2019-07-05 PROCEDURE — 76816 OB US FOLLOW-UP PER FETUS: CPT | Performed by: OBSTETRICS & GYNECOLOGY

## 2019-07-05 NOTE — PROGRESS NOTES
Problem List  Date Reviewed: 2019          Codes Class Noted    Arrhythmia ICD-10-CM: I49.9  ICD-9-CM: 427.9  Unknown    Overview Signed 3/19/2019  1:29 PM by Sayra Bach MD     Afib at times             Supervision of other normal pregnancy, antepartum ICD-10-CM: Z34.80  ICD-9-CM: V22.1  3/1/2019    Overview Addendum 2019  9:46 AM by Rex Degroot, April, LPN     Intrauterine pregnancy with the following problems identified:   [facility] 10/14/2019 by [address]  Perry/di twin gestation  Hx of tethered cord, multiple spine surgeries - not candidate for regional anesthesia  Self caths  Needs anesthesia consult  Declines flu vaccine  Baby ASA at 12 weeks  Irregular heart beat - cardiology consult - event monitor, nl echo  MFM/genetics - plan Horizon/NIPTS next visit;  [facility] and [facility] WNL 3/29/19  Rh neg - received Rhogam in ER 3/9/2019  Parvo immune             Migraine ICD-10-CM: F35.458  ICD-9-CM: 346.90  Unknown        Strain ICD-10-CM: [id number]  ICD-9-CM: TCN4241  2014         contractions ICD-10-CM: O47.9  ICD-9-CM: 644.00  10/25/2013

## 2019-07-05 NOTE — PATIENT INSTRUCTIONS
Weeks 22 to 26 of Your Pregnancy: Care Instructions  Your Care Instructions    As you enter your 7th month of pregnancy at week 26, your baby's lungs are growing stronger and getting ready to breathe. You may notice that your baby responds to the sound of your or your partner's voice. You may also notice that your baby does less turning and twisting and more squirming or jerking. Jerking often means that your baby has the hiccups. Hiccups are perfectly normal and are only temporary. You may want to think about attending a childbirth preparation class. This is also a good time to start thinking about whether you want to have pain medicine during labor. Most pregnant women are tested for gestational diabetes between weeks 25 and 28. Gestational diabetes occurs when your blood sugar level gets too high when you're pregnant. The test is important, because you can have gestational diabetes and not know it. But the condition can cause problems for your baby. Follow-up care is a key part of your treatment and safety. Be sure to make and go to all appointments, and call your doctor if you are having problems. It's also a good idea to know your test results and keep a list of the medicines you take. How can you care for yourself at home? Ease discomfort from your baby's kicking  · Change your position. Sometimes this will cause your baby to change position too. · Take a deep breath while you raise your arm over your head. Then breathe out while you drop your arm. Do Kegel exercises to prevent urine from leaking  · You can do Kegel exercises while you stand or sit. ? Squeeze the same muscles you would use to stop your urine. Your belly and thighs should not move. ? Hold the squeeze for 3 seconds, and then relax for 3 seconds. ? Start with 3 seconds. Then add 1 second each week until you are able to squeeze for 10 seconds. ? Repeat the exercise 10 to 15 times for each session.  Do three or more sessions each day.  Ease or reduce swelling in your feet, ankles, hands, and fingers  · If your fingers are puffy, take off your rings. · Do not eat high-salt foods, such as potato chips. · Prop up your feet on a stool or couch as much as possible. Sleep with pillows under your feet. · Do not stand for long periods of time or wear tight shoes. · Wear support stockings. Where can you learn more? Go to http://ailyn-fadi.info/. Enter G264 in the search box to learn more about \"Weeks 22 to 26 of Your Pregnancy: Care Instructions. \"  Current as of: September 5, 2018  Content Version: 11.9  © 4073-8685 [employer]. Care instructions adapted under license by [employer] (which disclaims liability or warranty for this information). If you have questions about a medical condition or this instruction, always ask your healthcare professional. [place] [address] any warranty or liability for your use of this information.

## 2019-07-05 NOTE — PROGRESS NOTES
Babies moving. Was seen on L&D with BHJOCE - fFN neg.    Cervix long/cl  glucola next visit with tdap and rhogam

## 2019-07-19 ENCOUNTER — HOSPITAL ENCOUNTER (OUTPATIENT)
Dept: PERINATAL CARE | Age: 34
Discharge: HOME | End: 2019-07-19
Attending: OBSTETRICS & GYNECOLOGY
Payer: MEDICAID

## 2019-07-19 PROCEDURE — 76815 OB US LIMITED FETUS(S): CPT | Performed by: OBSTETRICS & GYNECOLOGY

## 2019-07-23 ENCOUNTER — HOSPITAL ENCOUNTER (EMERGENCY)
Age: 34
Discharge: HOME | End: 2019-07-23
Attending: OBSTETRICS & GYNECOLOGY | Admitting: OBSTETRICS & GYNECOLOGY
Payer: MEDICAID

## 2019-07-23 VITALS
HEART RATE: 110 BPM | DIASTOLIC BLOOD PRESSURE: 54 MMHG | HEIGHT: 62 IN | BODY MASS INDEX: 22.45 KG/M2 | SYSTOLIC BLOOD PRESSURE: 104 MMHG | WEIGHT: 122 LBS | RESPIRATION RATE: 16 BRPM | OXYGEN SATURATION: 98 % | TEMPERATURE: 99.3 F

## 2019-07-23 LAB
ALBUMIN SERPL-MCNC: 2.8 G/DL (ref 3.5–5)
ALBUMIN/GLOB SERPL: 0.8 {RATIO} (ref 1.1–2.2)
ALP SERPL-CCNC: 110 U/L (ref 45–117)
ALT SERPL-CCNC: 32 U/L (ref 12–78)
ANION GAP SERPL CALC-SCNC: 9 MMOL/L (ref 5–15)
APPEARANCE UR: ABNORMAL
AST SERPL-CCNC: 35 U/L (ref 15–37)
BACTERIA URNS QL MICRO: ABNORMAL /HPF
BASOPHILS # BLD: 0 K/UL (ref 0–0.1)
BASOPHILS NFR BLD: 0 % (ref 0–1)
BILIRUB SERPL-MCNC: 0.7 MG/DL (ref 0.2–1)
BILIRUB UR QL: NEGATIVE
BUN SERPL-MCNC: 3 MG/DL (ref 6–20)
BUN/CREAT SERPL: 6 (ref 12–20)
CALCIUM SERPL-MCNC: 8.3 MG/DL (ref 8.5–10.1)
CHLORIDE SERPL-SCNC: 108 MMOL/L (ref 97–108)
CO2 SERPL-SCNC: 22 MMOL/L (ref 21–32)
COLOR UR: ABNORMAL
CREAT SERPL-MCNC: 0.54 MG/DL (ref 0.55–1.02)
DIFFERENTIAL METHOD BLD: ABNORMAL
EOSINOPHIL # BLD: 0 K/UL (ref 0–0.4)
EOSINOPHIL NFR BLD: 0 % (ref 0–7)
EPITH CASTS URNS QL MICRO: ABNORMAL /LPF
ERYTHROCYTE [DISTWIDTH] IN BLOOD BY AUTOMATED COUNT: 12.6 % (ref 11.5–14.5)
FLUAV AG NPH QL IA: NEGATIVE
FLUBV AG NOSE QL IA: NEGATIVE
GLOBULIN SER CALC-MCNC: 3.4 G/DL (ref 2–4)
GLUCOSE SERPL-MCNC: 105 MG/DL (ref 65–100)
GLUCOSE UR STRIP.AUTO-MCNC: 100 MG/DL
HCT VFR BLD AUTO: 31.7 % (ref 35–47)
HGB BLD-MCNC: 10 G/DL (ref 11.5–16)
HGB UR QL STRIP: NEGATIVE
HYALINE CASTS URNS QL MICRO: ABNORMAL /LPF (ref 0–5)
IMM GRANULOCYTES # BLD AUTO: 0.1 K/UL (ref 0–0.04)
IMM GRANULOCYTES NFR BLD AUTO: 1 % (ref 0–0.5)
KETONES UR QL STRIP.AUTO: 40 MG/DL
LEUKOCYTE ESTERASE UR QL STRIP.AUTO: ABNORMAL
LYMPHOCYTES # BLD: 1.1 K/UL (ref 0.8–3.5)
LYMPHOCYTES NFR BLD: 7 % (ref 12–49)
MCH RBC QN AUTO: 27 PG (ref 26–34)
MCHC RBC AUTO-ENTMCNC: 31.5 G/DL (ref 30–36.5)
MCV RBC AUTO: 85.4 FL (ref 80–99)
MONOCYTES # BLD: 1.3 K/UL (ref 0–1)
MONOCYTES NFR BLD: 8 % (ref 5–13)
NEUTS SEG # BLD: 12.5 K/UL (ref 1.8–8)
NEUTS SEG NFR BLD: 84 % (ref 32–75)
NITRITE UR QL STRIP.AUTO: POSITIVE
NRBC # BLD: 0 K/UL (ref 0–0.01)
NRBC BLD-RTO: 0 PER 100 WBC
PH UR STRIP: 8 [PH] (ref 5–8)
PLATELET # BLD AUTO: 172 K/UL (ref 150–400)
PMV BLD AUTO: 11.5 FL (ref 8.9–12.9)
POTASSIUM SERPL-SCNC: 3.1 MMOL/L (ref 3.5–5.1)
PROT SERPL-MCNC: 6.2 G/DL (ref 6.4–8.2)
PROT UR STRIP-MCNC: ABNORMAL MG/DL
RBC # BLD AUTO: 3.71 M/UL (ref 3.8–5.2)
RBC #/AREA URNS HPF: ABNORMAL /HPF (ref 0–5)
SODIUM SERPL-SCNC: 139 MMOL/L (ref 136–145)
SP GR UR REFRACTOMETRY: 1.01 (ref 1–1.03)
UROBILINOGEN UR QL STRIP.AUTO: 1 EU/DL (ref 0.2–1)
WBC # BLD AUTO: 15 K/UL (ref 3.6–11)
WBC URNS QL MICRO: ABNORMAL /HPF (ref 0–4)

## 2019-07-23 PROCEDURE — 96360 HYDRATION IV INFUSION INIT: CPT

## 2019-07-23 PROCEDURE — 99285 EMERGENCY DEPT VISIT HI MDM: CPT

## 2019-07-23 PROCEDURE — 80053 COMPREHEN METABOLIC PANEL: CPT

## 2019-07-23 PROCEDURE — 36415 COLL VENOUS BLD VENIPUNCTURE: CPT

## 2019-07-23 PROCEDURE — 59025 FETAL NON-STRESS TEST: CPT

## 2019-07-23 PROCEDURE — 81001 URINALYSIS AUTO W/SCOPE: CPT

## 2019-07-23 PROCEDURE — 96361 HYDRATE IV INFUSION ADD-ON: CPT

## 2019-07-23 PROCEDURE — 87804 INFLUENZA ASSAY W/OPTIC: CPT

## 2019-07-23 PROCEDURE — 74011250636 HC RX REV CODE- 250/636: Performed by: ADVANCED PRACTICE MIDWIFE

## 2019-07-23 PROCEDURE — 96365 THER/PROPH/DIAG IV INF INIT: CPT

## 2019-07-23 PROCEDURE — 85025 COMPLETE CBC W/AUTO DIFF WBC: CPT

## 2019-07-23 PROCEDURE — 74011250637 HC RX REV CODE- 250/637: Performed by: ADVANCED PRACTICE MIDWIFE

## 2019-07-23 RX ORDER — ACETAMINOPHEN 10 MG/ML
1000 INJECTION, SOLUTION INTRAVENOUS ONCE
Status: COMPLETED | OUTPATIENT
Start: 2019-07-23 | End: 2019-07-23

## 2019-07-23 RX ORDER — SODIUM CHLORIDE, SODIUM LACTATE, POTASSIUM CHLORIDE, CALCIUM CHLORIDE 600; 310; 30; 20 MG/100ML; MG/100ML; MG/100ML; MG/100ML
150 INJECTION, SOLUTION INTRAVENOUS CONTINUOUS
Status: DISCONTINUED | OUTPATIENT
Start: 2019-07-23 | End: 2019-07-24 | Stop reason: HOSPADM

## 2019-07-23 RX ORDER — POTASSIUM CHLORIDE 750 MG/1
40 TABLET, FILM COATED, EXTENDED RELEASE ORAL
Status: COMPLETED | OUTPATIENT
Start: 2019-07-24 | End: 2019-07-23

## 2019-07-23 RX ORDER — NITROFURANTOIN 25; 75 MG/1; MG/1
100 CAPSULE ORAL
Status: COMPLETED | OUTPATIENT
Start: 2019-07-23 | End: 2019-07-23

## 2019-07-23 RX ORDER — POTASSIUM CHLORIDE 750 MG/1
TABLET, FILM COATED, EXTENDED RELEASE ORAL
Status: CANCELLED | OUTPATIENT
Start: 2019-07-23

## 2019-07-23 RX ADMIN — POTASSIUM CHLORIDE 40 MEQ: 750 TABLET, EXTENDED RELEASE ORAL at 23:44

## 2019-07-23 RX ADMIN — ACETAMINOPHEN 1000 MG: 10 INJECTION, SOLUTION INTRAVENOUS at 21:47

## 2019-07-23 RX ADMIN — SODIUM CHLORIDE, SODIUM LACTATE, POTASSIUM CHLORIDE, AND CALCIUM CHLORIDE 150 ML/HR: 600; 310; 30; 20 INJECTION, SOLUTION INTRAVENOUS at 21:40

## 2019-07-23 RX ADMIN — SODIUM CHLORIDE, SODIUM LACTATE, POTASSIUM CHLORIDE, AND CALCIUM CHLORIDE 150 ML/HR: 600; 310; 30; 20 INJECTION, SOLUTION INTRAVENOUS at 23:25

## 2019-07-23 RX ADMIN — NITROFURANTOIN MONOHYDRATE/MACROCRYSTALLINE 100 MG: 25; 75 CAPSULE ORAL at 23:00

## 2019-07-24 ENCOUNTER — TELEPHONE (OUTPATIENT)
Dept: OBGYN CLINIC | Age: 34
End: 2019-07-24

## 2019-07-24 ENCOUNTER — HOSPITAL ENCOUNTER (OUTPATIENT)
Age: 34
Setting detail: OBSERVATION
Discharge: HOME | DRG: 566 | End: 2019-07-25
Attending: OBSTETRICS & GYNECOLOGY | Admitting: OBSTETRICS & GYNECOLOGY
Payer: MEDICAID

## 2019-07-24 PROBLEM — Z34.90 PREGNANCY: Status: ACTIVE | Noted: 2019-07-24

## 2019-07-24 LAB
APPEARANCE UR: CLEAR
BACTERIA URNS QL MICRO: NEGATIVE /HPF
BASOPHILS # BLD: 0 K/UL (ref 0–0.1)
BASOPHILS NFR BLD: 0 % (ref 0–1)
BILIRUB UR QL: NEGATIVE
COLOR UR: ABNORMAL
DIFFERENTIAL METHOD BLD: ABNORMAL
EOSINOPHIL # BLD: 0 K/UL (ref 0–0.4)
EOSINOPHIL NFR BLD: 0 % (ref 0–7)
EPITH CASTS URNS QL MICRO: ABNORMAL /LPF
ERYTHROCYTE [DISTWIDTH] IN BLOOD BY AUTOMATED COUNT: 12.8 % (ref 11.5–14.5)
FIBRONECTIN FETAL VAG QL: NEGATIVE
GLUCOSE UR STRIP.AUTO-MCNC: NEGATIVE MG/DL
HCT VFR BLD AUTO: 30.2 % (ref 35–47)
HGB BLD-MCNC: 9.9 G/DL (ref 11.5–16)
HGB UR QL STRIP: NEGATIVE
HYALINE CASTS URNS QL MICRO: ABNORMAL /LPF (ref 0–5)
IMM GRANULOCYTES # BLD AUTO: 0.2 K/UL (ref 0–0.04)
IMM GRANULOCYTES NFR BLD AUTO: 1 % (ref 0–0.5)
KETONES UR QL STRIP.AUTO: 40 MG/DL
LACTATE SERPL-SCNC: 1 MMOL/L (ref 0.4–2)
LEUKOCYTE ESTERASE UR QL STRIP.AUTO: ABNORMAL
LYMPHOCYTES # BLD: 1.5 K/UL (ref 0.8–3.5)
LYMPHOCYTES NFR BLD: 7 % (ref 12–49)
MCH RBC QN AUTO: 27.8 PG (ref 26–34)
MCHC RBC AUTO-ENTMCNC: 32.8 G/DL (ref 30–36.5)
MCV RBC AUTO: 84.8 FL (ref 80–99)
MONOCYTES # BLD: 2.1 K/UL (ref 0–1)
MONOCYTES NFR BLD: 10 % (ref 5–13)
NEUTS SEG # BLD: 17.1 K/UL (ref 1.8–8)
NEUTS SEG NFR BLD: 82 % (ref 32–75)
NITRITE UR QL STRIP.AUTO: NEGATIVE
NRBC # BLD: 0 K/UL (ref 0–0.01)
NRBC BLD-RTO: 0 PER 100 WBC
PH UR STRIP: 7 [PH] (ref 5–8)
PLATELET # BLD AUTO: 184 K/UL (ref 150–400)
PMV BLD AUTO: 11.2 FL (ref 8.9–12.9)
PROT UR STRIP-MCNC: 30 MG/DL
RBC # BLD AUTO: 3.56 M/UL (ref 3.8–5.2)
RBC #/AREA URNS HPF: ABNORMAL /HPF (ref 0–5)
RBC MORPH BLD: ABNORMAL
SP GR UR REFRACTOMETRY: 1.01 (ref 1–1.03)
UA: UC IF INDICATED,UAUC: ABNORMAL
UROBILINOGEN UR QL STRIP.AUTO: 2 EU/DL (ref 0.2–1)
WBC # BLD AUTO: 20.9 K/UL (ref 3.6–11)
WBC URNS QL MICRO: ABNORMAL /HPF (ref 0–4)

## 2019-07-24 PROCEDURE — 36415 COLL VENOUS BLD VENIPUNCTURE: CPT

## 2019-07-24 PROCEDURE — 4A1HXCZ MONITORING OF PRODUCTS OF CONCEPTION, CARDIAC RATE, EXTERNAL APPROACH: ICD-10-PCS | Performed by: OBSTETRICS & GYNECOLOGY

## 2019-07-24 PROCEDURE — 82731 ASSAY OF FETAL FIBRONECTIN: CPT

## 2019-07-24 PROCEDURE — 85025 COMPLETE CBC W/AUTO DIFF WBC: CPT

## 2019-07-24 PROCEDURE — 74011250637 HC RX REV CODE- 250/637: Performed by: OBSTETRICS & GYNECOLOGY

## 2019-07-24 PROCEDURE — 81001 URINALYSIS AUTO W/SCOPE: CPT

## 2019-07-24 PROCEDURE — 59025 FETAL NON-STRESS TEST: CPT

## 2019-07-24 PROCEDURE — 87186 SC STD MICRODIL/AGAR DIL: CPT

## 2019-07-24 PROCEDURE — 77030034696 HC CATH URETH FOL 2W BARD -A

## 2019-07-24 PROCEDURE — 87077 CULTURE AEROBIC IDENTIFY: CPT

## 2019-07-24 PROCEDURE — 83605 ASSAY OF LACTIC ACID: CPT

## 2019-07-24 PROCEDURE — 99218 HC RM OBSERVATION: CPT

## 2019-07-24 PROCEDURE — 65270000029 HC RM PRIVATE

## 2019-07-24 PROCEDURE — 87086 URINE CULTURE/COLONY COUNT: CPT

## 2019-07-24 RX ORDER — AMOXICILLIN AND CLAVULANATE POTASSIUM 875; 125 MG/1; MG/1
1 TABLET, FILM COATED ORAL EVERY 12 HOURS
Status: DISCONTINUED | OUTPATIENT
Start: 2019-07-24 | End: 2019-07-25 | Stop reason: HOSPADM

## 2019-07-24 RX ORDER — ACETAMINOPHEN 325 MG/1
650 TABLET ORAL EVERY 6 HOURS
Status: DISCONTINUED | OUTPATIENT
Start: 2019-07-24 | End: 2019-07-25 | Stop reason: HOSPADM

## 2019-07-24 RX ORDER — SODIUM CHLORIDE, SODIUM LACTATE, POTASSIUM CHLORIDE, CALCIUM CHLORIDE 600; 310; 30; 20 MG/100ML; MG/100ML; MG/100ML; MG/100ML
1000 INJECTION, SOLUTION INTRAVENOUS CONTINUOUS
Status: DISCONTINUED | OUTPATIENT
Start: 2019-07-24 | End: 2019-07-25 | Stop reason: HOSPADM

## 2019-07-24 RX ORDER — SODIUM CHLORIDE, SODIUM LACTATE, POTASSIUM CHLORIDE, CALCIUM CHLORIDE 600; 310; 30; 20 MG/100ML; MG/100ML; MG/100ML; MG/100ML
125 INJECTION, SOLUTION INTRAVENOUS CONTINUOUS
Status: DISCONTINUED | OUTPATIENT
Start: 2019-07-24 | End: 2019-07-25 | Stop reason: HOSPADM

## 2019-07-24 RX ADMIN — ACETAMINOPHEN 650 MG: 325 TABLET ORAL at 20:56

## 2019-07-24 RX ADMIN — AMOXICILLIN AND CLAVULANATE POTASSIUM 1 TABLET: 875; 125 TABLET, FILM COATED ORAL at 14:58

## 2019-07-24 RX ADMIN — ACETAMINOPHEN 650 MG: 325 TABLET ORAL at 14:58

## 2019-07-24 NOTE — PROGRESS NOTES
1400 attempted x 1 for iv start without success. Called ER who would come and attempt iv placement by ultrasound. Pt refuses and states \"I'm done, you are not going to stick me anymore. I will just drink fluids. \"  Explained to pt that the er would come up and look for a vein by ultrasound guide. Pt again refuses. Son Duvall rn aware of above.

## 2019-07-24 NOTE — ROUTINE PROCESS
1900  Verbal shift change report given to JAMES Kimble RN (oncoming nurse) by Bee Miller RN (offgoing nurse). Report included the following information SBAR, Kardex, Procedure Summary, Intake/Output, MAR, Accordion, Recent Results and Med Rec Status. Patient is asleep at this time. Complete assessment once patient wakes up, or within one hour. 1930  Dr Toussaint at nurses' station. This RN discussing course of treatment plan for this patient, and the fact that patient does not have IV access. Dr Toussaint aware. PO medications will be administered, and NST completed this evening. [facility] schedule, Q 12 hours. First dose has been administered [phone], pharmacy will be correcting medication schedule reflecting order. 1942  Patient round. Patient is still resting comfortably. Assessment will be completed once patient ready. 2140  Reactive NST recorded, checked by A Giuseppe Charge RN    EFM and TOCO removed at this time. Schedule of care has been discussed with the patient. Patient verbalized understanding. 2300  Patient is resting comfortably in bed with FOB at bedside. Fresh ice water and apple juice provided, and patient encouraged to drink. Patient verbalized understanding. 0400  AM blood draw. A Giuseppe Charge RN at bedside drawing am labs. Patent anxious, but cooperative. 0710  Bedside and Verbal shift change report given to [address] [place] [address] 6 (oncoming nurse) by Finesse Cotto RN (offgoing nurse). Report included the following information SBAR, Kardex, Procedure Summary, Intake/Output, MAR, Accordion, Recent Results and Med Rec Status.
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
## 2019-07-24 NOTE — PROGRESS NOTES
1030 received a 31 y/o  at 28 weeks with twin, was at [facility] last night with UTI, did not take med because it made her sick, feel dehydrated . Pt st cathes herself. Oriented to room. 1100 spoke with Dr Traci Serna. Aware of pts arrival. As pt was sent by Dr Traci Serna. Aware of VS. Aware of MEWS score 3. Per Dr Traci Serna aware of pt low BP and pulse 112. Orders receivced for lactic acid, CBC,ua culture, 1000 LR bolus follow by 125/hr  1120 Dr Traci Serna in room, aware trying to record twin  1130  Attempted IV times two, pt yelled take it out , take it out, yelling was stuck four times yesterday and does not want IV, reassurance given to pt. Offered to have iv placed with US guidance. Pt agrees and thankful. States I will try to let them. Pt calm at this point  1134 Dr Monica Mcpherson aware IV needs started using US. 1155 Dr Stacy Lyles into see pt ,pt states, are you going to start IV, Dr Stacy Lyles said if you let me look, pt states Dont be so snippy, Dr Stacy Lyles states, I will if you are willing and  not refusing to have  IV sarted ,well you can try. reassurrance given to pt.   unable to start IV, pt cooperative   1200 Dr Traci Serna into see pt , reviewed EFM, aware pt derick. Also informed unable to start IV. Pt drinking water. pt told Dr Traci Serna  Pt feels occassional contraction. [facility] Dr Traci Serna into to see pt , aware no IV site at this time, pt states must do oral meds. Dr Traci Serna discussing PICC line and IV antibiotics with pt and FOB. Pt states \"I want to eat\" per Dr Traci Serna pt is NPO. Except clear liquids    1235 pt refuses PIC line and states\" im going to eat\" pt aware I can not give her food, does agree to drink water. States\" I understand you cant but Im going to eat\"  1240 call placed to Dr Traic Serna, message left to update her on refusal of PIC line, going to eat, drinking cl liquids. Awaiting return call from Dr Unique Serna aware pt refuses PIC line, no antibiotics given, continues to ctx q 2-3.5 min.  Is tolerating PO fluids  1415 Dr Serenity العراقي on unit aware, Pt refusing anything but PO antibiotics. [address] Serenity العراقي into see pt. Speculum exam done, feto fibronectin collected by Dr Serenity العراقي. SVE per Dr Serenity العراقي, closed, thick , high. , per dr Serenity العراقي take pt off EFM and do NST BID  1430 ICU nurse came to attempt to find vein via [address], pt refused them to look at this time. 1500 cooperative, calling dietary for food  [phone] pt reports that dietary will not send the food she wants because it contains items she allergic to. States\" I can have a little of the food I orders\" explained to pt the they can send food item you are allergic to. dietician will be up to see you. [phone] into see pt    1630 pt more cooperative, tolerating PO fluids . 1740 Eating dinner, tolerating PO meds   1830 resting with eyes closed, arouses easily went spoken to    1853 Bedside and Verbal shift change report given to JAMES Donald RN (oncoming nurse) by Briseyda Mckeon RNC (offgoing nurse). Report included the following information SBAR, Kardex, Intake/Output, MAR and Recent Results.

## 2019-07-24 NOTE — PROGRESS NOTES
Pt in room yelling and throwing items- IV blew due to constant movement and pt inability to regain control. Pt main complaints are the monitor straps are \"driving her crazy\" these are itching her and she can not control  Her reaction. Asked pt to take deep breaths and attempt to calm down- offered to switch straps to alternate brand on unit. Pt threatening to leave AMA- reviewed with pt that we are monitoring her twins at this time due to tachycardia- 250 ml fluid in prior to IV blowing. Awaiting lab results. Pt is willing to have IV restarted and hydration, does not want to PO hydrate. Pt calmer after straps off, belly cleaned off and lotion provided. IV to be restarted prior to monitoring FHR.      FHR - moderate variability with acels - baseline 160's    Ryland Conley CNM

## 2019-07-24 NOTE — PROGRESS NOTES
Reviewed labs with YOVANI Cabrera OB    Recommendations for PO antibiotics, IV hydrate and evaluate for discharge when hydrated and FHR at baseline per earlier admission.      Pt states her appetite is returning and cramping present upon admission is minimal.    Wil Lopez CNM

## 2019-07-24 NOTE — PROGRESS NOTES
Nutrition:    Consult received for pt with multiple allergies. Spoke with pt, she is having a difficult time ordering foods that are compliant with her allergies. States that her milk allergy is only for plain milk and she can tolerate it cooked into things. RD unable to change allergies listed in EMR so dietary unable to send any items that contain milk at this time. Pt is trying to eat 6,000kcals/day. Ordered for pt to have snacks and extra portions with her meals. All other questions pt had were answered.       Qamar Mejía, [address] [place] [address]  Pager: [phone]

## 2019-07-24 NOTE — PROGRESS NOTES
: , 28.1wks, [facility] - being evaluated by Misbah Dubose. YJOANA Ayala) arrived ambulatory to LDR 7 c/o UTI symptoms x5 days, states she has had a fever off and on today that was relieved briefly by tylenol. Pt also complaining of irregular contractions and states she can tell she is very dehydrated due to not feeling up to drinking or eating today. Mono/di twin pregnancy, denies LOF or VB. Confirms +FM.     : Tavo Holliday at bedside to assess pt. Pt and babies are both tachycardiac. Orders received for cbc, cmp, UA, flu swap, and IV fluid bolus at this time. : UA shows positive UTI. Tavo Holliday made aware of lab results. : Pt HR and FHTs returned to normal baseline after IV fluid and tylenol. Pt given PO dose of Macrobid and PO potassium and sent home with prescriptions for both. 0: Written d/c instructions verbally reviewed with pt and FOB. Opportunity for questions given, all questions answered.

## 2019-07-24 NOTE — DISCHARGE INSTRUCTIONS

## 2019-07-24 NOTE — PROGRESS NOTES
07/24/19 3:44 PM  CM met with patient and her /FOB Paul Alvares ([phone]) to complete initial assessment and to begin discharge planning. Patient is 28w3d with twin girls and here for UTI. She was seen at [address] [place] [address] last night for the same and noted that she felt worse once leaving [address] [place] [address] last night. Today, patient understands needing treatment for UTI but has declined any IV access to treat via IV; therefore she is hydrating and taking abx PO. She will at least remain tonight. Obs status discussed and explained; letter provided. CM answered all questions related to this. Demographics were reviewed and confirmed. Patient and FOB recently relocated to [place], [place]. Patient has two other children, ages 8 and 11. FOB has two other children, ages 11 and 3. Patient works as a  at [employer] and does not plan to work through this pregnancy and will return to work after their holiday break. Family supports available. Patient uses [address] [place] [address] in [place] for pediatric care. Does not plan to switch to a provider closer to her home in [place] because she has a good rapport at [facility]. Patient and FOB are working on getting carseats for both babies. They have bassinets and a pack and play. She does not have [facility] services. CM provided contact information for [facility] office and she will call to set up enrollment. Patient has Medicaid and understands that the babies will be added to this coverage. Patient plans to breastfeed. CM discussed getting pump to use at home; recommended Spectra S2. Patient provided pump resources to order pump. Patient attributes her UTI to reusing some of her cath supplies at home. She explained that often her delivery is delayed; she uses [facility].  She is supposed to receive a new delivery tomorrow at her home. She uses 10 fr catheters. Care Management Interventions  PCP Verified by CM:  Yes  Last Visit to PCP: 07/08/19  Mode of Transport at Discharge: Self  Transition of Care Consult (CM Consult): Discharge Planning  Current Support Network: Lives with Spouse  Confirm Follow Up Transport: Family  Plan discussed with Pt/Family/Caregiver: Yes  Freedom of Choice Offered: Yes  Discharge Location  Discharge Placement: Home with outpatient services  [facility], [place]

## 2019-07-24 NOTE — PROGRESS NOTES
Zee [place] checking in for night- reviewed case with her. In agreement with abx for UTI  No evidence of PTL- pt denies contractions, LOF, spotting, VB, or concern for PTL  Potassium level 3.1- pt states she has an increase in leg cramps- plan 40mEq now and 20 mEq dq times 7 days. Follow up in office Thursday with Dr. Colt Carranza      Pt feeling better with IV hydration. Eating and tolerating well.      D/C home after PO KCL    Scripts for macrobid and KCL    Patricia Mckee CNM
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
## 2019-07-24 NOTE — TELEPHONE ENCOUNTER
Patient calling N2Y0, 28w2d pregnant with twins, C/O pain with contractions, low back pain, fever 101-102 despite tylenol and overall does not feel well. She states she was seen at [facility] ER/L&D yesterday, +UTI, given macrobid and po potassium. Patient still in a lot of pain and concerned. She knows she should be seen. Spoke with Dr. Maria Luisa Patel - send to [facility] L&D. Patient aware and on the way. She is 1 hour away. 8:56am - report called in Render Brittni with L&D.

## 2019-07-25 ENCOUNTER — ROUTINE PRENATAL (OUTPATIENT)
Dept: OBGYN CLINIC | Age: 34
End: 2019-07-25

## 2019-07-25 VITALS
TEMPERATURE: 97.6 F | SYSTOLIC BLOOD PRESSURE: 88 MMHG | RESPIRATION RATE: 16 BRPM | OXYGEN SATURATION: 97 % | DIASTOLIC BLOOD PRESSURE: 54 MMHG | HEART RATE: 100 BPM

## 2019-07-25 VITALS — WEIGHT: 125.8 LBS | SYSTOLIC BLOOD PRESSURE: 108 MMHG | DIASTOLIC BLOOD PRESSURE: 55 MMHG | BODY MASS INDEX: 23.01 KG/M2

## 2019-07-25 DIAGNOSIS — O09.90 SUPERVISION OF HIGH RISK PREGNANCY, ANTEPARTUM: Primary | ICD-10-CM

## 2019-07-25 DIAGNOSIS — Z34.80 SUPERVISION OF OTHER NORMAL PREGNANCY, ANTEPARTUM: ICD-10-CM

## 2019-07-25 DIAGNOSIS — Z29.13 NEED FOR RHOGAM DUE TO RH NEGATIVE MOTHER: ICD-10-CM

## 2019-07-25 DIAGNOSIS — Z3A.28 28 WEEKS GESTATION OF PREGNANCY: ICD-10-CM

## 2019-07-25 DIAGNOSIS — O30.009 MONOZYGOTIC TWINS, ANTEPARTUM: ICD-10-CM

## 2019-07-25 LAB
ALBUMIN SERPL-MCNC: 2.3 G/DL (ref 3.5–5)
ALBUMIN/GLOB SERPL: 0.7 {RATIO} (ref 1.1–2.2)
ALP SERPL-CCNC: 99 U/L (ref 45–117)
ALT SERPL-CCNC: 26 U/L (ref 12–78)
ANION GAP SERPL CALC-SCNC: 8 MMOL/L (ref 5–15)
ANTIBODY SCREEN, EXTERNAL: NEGATIVE
AST SERPL-CCNC: 21 U/L (ref 15–37)
BILIRUB SERPL-MCNC: 0.6 MG/DL (ref 0.2–1)
BUN SERPL-MCNC: 4 MG/DL (ref 6–20)
BUN/CREAT SERPL: 9 (ref 12–20)
CALCIUM SERPL-MCNC: 7.9 MG/DL (ref 8.5–10.1)
CHLORIDE SERPL-SCNC: 109 MMOL/L (ref 97–108)
CO2 SERPL-SCNC: 22 MMOL/L (ref 21–32)
CREAT SERPL-MCNC: 0.46 MG/DL (ref 0.55–1.02)
ERYTHROCYTE [DISTWIDTH] IN BLOOD BY AUTOMATED COUNT: 12.7 % (ref 11.5–14.5)
GLOBULIN SER CALC-MCNC: 3.1 G/DL (ref 2–4)
GLUCOSE SERPL-MCNC: 116 MG/DL (ref 65–100)
GTT, 1 HR, GLUCOLA, EXTERNAL: 108
HCT VFR BLD AUTO: 27.3 % (ref 35–47)
HCT, EXTERNAL: 27.3
HGB BLD-MCNC: 8.9 G/DL (ref 11.5–16)
HGB, EXTERNAL: 8.9
MCH RBC QN AUTO: 27.2 PG (ref 26–34)
MCHC RBC AUTO-ENTMCNC: 32.6 G/DL (ref 30–36.5)
MCV RBC AUTO: 83.5 FL (ref 80–99)
NRBC # BLD: 0 K/UL (ref 0–0.01)
NRBC BLD-RTO: 0 PER 100 WBC
PLATELET # BLD AUTO: 179 K/UL (ref 150–400)
PLATELET CNT,   EXTERNAL: 179
PMV BLD AUTO: 10.8 FL (ref 8.9–12.9)
POTASSIUM SERPL-SCNC: 3.7 MMOL/L (ref 3.5–5.1)
PROT SERPL-MCNC: 5.4 G/DL (ref 6.4–8.2)
RBC # BLD AUTO: 3.27 M/UL (ref 3.8–5.2)
SODIUM SERPL-SCNC: 139 MMOL/L (ref 136–145)
WBC # BLD AUTO: 16.5 K/UL (ref 3.6–11)

## 2019-07-25 PROCEDURE — 36415 COLL VENOUS BLD VENIPUNCTURE: CPT

## 2019-07-25 PROCEDURE — 99218 HC RM OBSERVATION: CPT

## 2019-07-25 PROCEDURE — 77030018846 HC SOL IRR STRL H20 ICUM -A

## 2019-07-25 PROCEDURE — 85027 COMPLETE CBC AUTOMATED: CPT

## 2019-07-25 PROCEDURE — 80053 COMPREHEN METABOLIC PANEL: CPT

## 2019-07-25 PROCEDURE — 74011250637 HC RX REV CODE- 250/637: Performed by: OBSTETRICS & GYNECOLOGY

## 2019-07-25 RX ORDER — AMOXICILLIN AND CLAVULANATE POTASSIUM 875; 125 MG/1; MG/1
1 TABLET, FILM COATED ORAL 2 TIMES DAILY
Qty: 20 TAB | Refills: 0 | Status: SHIPPED | OUTPATIENT
Start: 2019-07-25 | End: 2019-08-04

## 2019-07-25 RX ADMIN — AMOXICILLIN AND CLAVULANATE POTASSIUM 1 TABLET: 875; 125 TABLET, FILM COATED ORAL at 02:43

## 2019-07-25 RX ADMIN — ACETAMINOPHEN 650 MG: 325 TABLET ORAL at 02:43

## 2019-07-25 RX ADMIN — ACETAMINOPHEN 650 MG: 325 TABLET ORAL at 08:42

## 2019-07-25 RX ADMIN — AMOXICILLIN AND CLAVULANATE POTASSIUM 1 TABLET: 875; 125 TABLET, FILM COATED ORAL at 12:59

## 2019-07-25 NOTE — PROGRESS NOTES
Was in hospital and discharged today  Needs rhogam today   refuses tdap because stuck too many times in hospital  Babies moving  Neg fFN in hospital and cervix closed  Currently on Augmentin x 10days  Needs anesthesia consult!! luis miguel knox

## 2019-07-25 NOTE — PROGRESS NOTES
Pt c/o itching and itching causing anxiety while attempting EFMX3 placement. FHR x2 heard in 100s. Pt instructed to leave monitors in place for fetal HRs. Pt started sitting up and adjusting monitors upon this nurse leaving room. . Primary nurse informed.

## 2019-07-25 NOTE — PROGRESS NOTES
0715 Bedside and Verbal shift change report given to this RN (oncoming nurse) by GIAN Shook, ANDREAS (offgoing nurse). Report included the following information SBAR, Kardex, Intake/Output, MAR and Recent Results. Patient in bed, resting. SO at bedside. No needs expressed at this time. 5503 Morning assessment completed. Patient denies h/a, vision changes, vaginal bleeding/discharge, LOF, contractions. Reports positive fetal movement x2. C/o 2/10 R groin pain. GIven Tylenol. Patient oriented to room and unit including call bell and emergency cord. Instructed to call this RN for NST after breakfast. No needs expressed at this time. 0930 Patient resting in bed. Family at bedside. Reports positive fetal movement. No needs expressed at this time. 9518 Patient eating breakfast, instructed to call this RN for NST when finished. Reports positive fetal movement. 1034 This RN at bedside. Patient has removed TOCO and US2, reporting that they are itchy and giving patient anxiety. 1042 This RN able to get both baby A and baby B on EFM. Patient educated that both babies must have reassuring EFM tracings prior to discontinuing EFM. Patient verbalizing understanding. 1104 Dr. Serenity العارقي called with update on patient. MD will review EFM tracing and plan to come down to discharge patient. 1134 This RN at bedside readjusting [address] monitors. 1142 This RN continuing to readjust monitors. Both baby A and B on EFM. No needs expressed at this time. 1200 Patient taken off EFM after reactive tracing for both baby A and baby B, verified with Sola Ambriz, [address] [place] [address] from Dr. Serenity العراقي to discharge patient and have patient go upstairs for OB visit/ glucola testing. Antibiotic prescription sent to patient's pharmacy. Per MD, give afternoon dose now. 1228 Patient provided with hygiene supplies, wishing to take shower prior to discharge. [id number] Discharge papers reviewed and signed with patient.  Patient verbalizing understanding of medications, instructions, and follow-up. No needs expressed at this time.

## 2019-07-25 NOTE — PROGRESS NOTES
Problem List  Date Reviewed: 2019          Codes Class Noted    Pregnancy ICD-10-CM: Z34.90  ICD-9-CM: V22.2  2019        Arrhythmia ICD-10-CM: I49.9  ICD-9-CM: 427.9  Unknown    Overview Signed 3/19/2019  1:29 PM by Jeffrey Whitaker MD     Afib at times             Supervision of other normal pregnancy, antepartum ICD-10-CM: Z34.80  ICD-9-CM: V22.1  3/1/2019    Overview Addendum 2019  9:46 AM by Марина Meza, LPN     Intrauterine pregnancy with the following problems identified:   [facility] 10/14/2019 by [address]  Gilmer/di twin gestation  Hx of tethered cord, multiple spine surgeries - not candidate for regional anesthesia  Self caths  Needs anesthesia consult  Declines flu vaccine  Baby ASA at 12 weeks  Irregular heart beat - cardiology consult - event monitor, nl echo  MFM/genetics - plan Horizon/NIPTS next visit;  [facility] and [facility] WNL 3/29/19  Rh neg - received Rhogam in ER 3/9/2019  Parvo immune             Migraine ICD-10-CM: V10.887  ICD-9-CM: 346.90  Unknown        Strain ICD-10-CM: [id number]  ICD-9-CM: MTL5244  2014         contractions ICD-10-CM: O47.9  ICD-9-CM: 644.00  10/25/2013

## 2019-07-25 NOTE — DISCHARGE INSTRUCTIONS
Patient Education        Weeks 26 to 30 of Your Pregnancy: Care Instructions  Your Care Instructions    You are now in your last trimester of pregnancy. Your baby is growing rapidly. And you'll probably feel your baby moving around more often. Your doctor may ask you to count your baby's kicks. Your back may ache as your body gets used to your baby's size and length. If you haven't already had the Tdap shot during this pregnancy, talk to your doctor about getting it. It will help protect your  against pertussis infection. During this time, it's important to take care of yourself and pay attention to what your body needs. If you feel sexual, explore ways to be close with your partner that match your comfort and desire. Use the tips provided in this care sheet to find ways to be sexual in your own way. Follow-up care is a key part of your treatment and safety. Be sure to make and go to all appointments, and call your doctor if you are having problems. It's also a good idea to know your test results and keep a list of the medicines you take. How can you care for yourself at home? Take it easy at work  · Take frequent breaks. If possible, stop working when you are tired, and rest during your lunch hour. · Take bathroom breaks every 2 hours. · Change positions often. If you sit for long periods, stand up and walk around. · When you stand for a long time, keep one foot on a low stool with your knee bent. After standing a lot, sit with your feet up. · Avoid fumes, chemicals, and tobacco smoke. Be sexual in your own way  · Having sex during pregnancy is okay, unless your doctor tells you not to. · You may be very interested in sex, or you may have no interest at all. · Your growing belly can make it hard to find a good position during intercourse. [place] and explore. · You may get cramps in your uterus when your partner touches your breasts.   · A back rub may relieve the backache or cramps that sometimes follow orgasm. Learn about  labor  · Watch for signs of  labor. You may be going into labor if:  ? You have menstrual-like cramps, with or without nausea. ? You have about 6 or more contractions in 1 hour, even after you have had a glass of water and are resting. ? You have a low, dull backache that does not go away when you change your position. ? You have pain or pressure in your pelvis that comes and goes in a pattern. ? You have intestinal cramping or flu-like symptoms, with or without diarrhea.  ? You notice an increase or change in your vaginal discharge. Discharge may be heavy, mucus-like, watery, or streaked with blood. ? Your water breaks. · If you think you have  labor:  ? Drink 2 or 3 glasses of water or juice. Not drinking enough fluids can cause contractions. ? Stop what you are doing, and empty your bladder. Then lie down on your left side for at least 1 hour. ? While lying on your side, find your breast bone. Put your fingers in the soft spot just below it. Move your fingers down toward your belly button to find the top of your uterus. Check to see if it is tight. ? Contractions can be weak or strong. Record your contractions for an hour. Time a contraction from the start of one contraction to the start of the next one.  ? Single or several strong contractions without a pattern are called [place] contractions. They are practice contractions but not the start of labor. They often stop if you change what you are doing. ? Call your doctor if you have regular contractions. Where can you learn more? Go to http://ailyn-fdai.info/. Enter M622 in the search box to learn more about \"Weeks 26 to 30 of Your Pregnancy: Care Instructions. \"  Current as of: 2018  Content Version: 12.1  © 1261-4289 [employer].  Care instructions adapted under license by [employer] (which disclaims liability or warranty for this information). If you have questions about a medical condition or this instruction, always ask your healthcare professional. [address] any warranty or liability for your use of this information. Patient Education        Urinary Tract Infection in Pregnancy: Care Instructions  Your Care Instructions    A urinary tract infection, or UTI, is an infection of the bladder and other urinary structures. Most UTIs occur in the bladder. They often cause pain or burning when you urinate. UTI is the most common bacterial infection in pregnancy. If untreated, a UTI could lead to problems such as a kidney infection or  labor. Most UTIs can be cured with antibiotics. Your doctor will prescribe an antibiotic that is safe during pregnancy. Be sure to finish your medicine so that the infection does not spread to your kidneys. Follow-up care is a key part of your treatment and safety. Be sure to make and go to all appointments, and call your doctor if you are having problems. It's also a good idea to know your test results and keep a list of the medicines you take. How can you care for yourself at home? · Take your antibiotics as directed. Do not stop taking them just because you feel better. You need to take the full course of antibiotics. · Drink extra water and other fluids for the next day or two. This will help wash out the bacteria causing the infection. If you have kidney, heart, or liver disease and have to limit fluids, talk with your doctor before you increase the amount of fluids you drink. · Do not drink alcohol. · Urinate often. Try to empty your bladder each time. Preventing UTIs  · Drink plenty of fluids, enough so that your urine is light yellow or clear like water. This helps you urinate often, which clears bacteria from your system.  If you have kidney, heart, or liver disease and have to limit fluids, talk with your doctor before you increase the amount of fluids you drink.  · Urinate when you first have the urge. · Urinate right after you have sex. This is the best way for women to avoid UTIs. · When going to the bathroom, wipe from front to back to keep bacteria from entering the vagina or urethra. When should you call for help? Call your doctor now or seek immediate medical care if:    · You have symptoms of a worse urinary tract infection. These may include:  ? Pain or burning when you urinate. ? A frequent need to urinate without being able to pass much urine. ? Pain in the flank, which is just below the rib cage and above the waist on either side of the back. ? Blood in your urine. ? A fever.    Watch closely for changes in your health, and be sure to contact your doctor if:    · You do not get better as expected. Where can you learn more? Go to http://ailyn-fadi.info/. Enter M982 in the search box to learn more about \"Urinary Tract Infection in Pregnancy: Care Instructions. \"  Current as of: 2018  Content Version: 12.1  © 4505-8354 [employer]. Care instructions adapted under license by [employer] (which disclaims liability or warranty for this information). If you have questions about a medical condition or this instruction, always ask your healthcare professional. [address] any warranty or liability for your use of this information. Patient Education        Pregnancy Precautions: Care Instructions  Your Care Instructions    There is no sure way to prevent labor before your due date ( labor) or to prevent most other pregnancy problems. But there are things you can do to increase your chances of a healthy pregnancy. Go to your appointments, follow your doctor's advice, and take good care of yourself. Eat well, and exercise (if your doctor agrees). And make sure to drink plenty of water. Follow-up care is a key part of your treatment and safety.  Be sure to make and go to all appointments, and call your doctor if you are having problems. It's also a good idea to know your test results and keep a list of the medicines you take. How can you care for yourself at home? · Make sure you go to your prenatal appointments. At each visit, your doctor will check your blood pressure. Your doctor will also check to see if you have protein in your urine. High blood pressure and protein in urine are signs of preeclampsia. This condition can be dangerous for you and your baby. · Drink plenty of fluids, enough so that your urine is light yellow or clear like water. Dehydration can cause contractions. If you have kidney, heart, or liver disease and have to limit fluids, talk with your doctor before you increase the amount of fluids you drink. · Tell your doctor right away if you notice any symptoms of an infection, such as:  ? Burning when you urinate. ? A foul-smelling discharge from your vagina. ? Vaginal itching. ? Unexplained fever. ? Unusual pain or soreness in your uterus or lower belly. · Eat a balanced diet. Include plenty of foods that are high in calcium and iron. ? Foods high in calcium include milk, cheese, yogurt, almonds, and broccoli. ? Foods high in iron include red meat, shellfish, poultry, eggs, beans, raisins, whole-grain bread, and leafy green vegetables. · Do not smoke. If you need help quitting, talk to your doctor about stop-smoking programs and medicines. These can increase your chances of quitting for good. · Do not drink alcohol or use illegal drugs. · Follow your doctor's directions about activity. Your doctor will let you know how much, if any, exercise you can do. · Ask your doctor if you can have sex. If you are at risk for early labor, your doctor may ask you to not have sex. · Take care to prevent falls. During pregnancy, your joints are loose, and your balance is off.  Sports such as bicycling, skiing, or in-line skating can increase your risk of falling. And don't ride horses or motorcycles, dive, water ski, scuba dive, or parachute jump while you are pregnant. · Avoid getting very hot. Do not use saunas or hot tubs. Avoid staying out in the sun in hot weather for long periods. Take acetaminophen (Tylenol) to lower a high fever. · Do not take any over-the-counter or herbal medicines or supplements without talking to your doctor or pharmacist first.  When should you call for help? [id number] anytime you think you may need emergency care. For example, call if:    · You passed out (lost consciousness).     · You have a seizure.     · You have severe vaginal bleeding.     · You have severe pain in your belly or pelvis.     · You have had fluid gushing or leaking from your vagina and you know or think the umbilical cord is bulging into your vagina. If this happens, immediately get down on your knees so your rear end (buttocks) is higher than your head. This will decrease the pressure on the cord until help arrives.   [facility] your doctor now or seek immediate medical care if:    · You have signs of preeclampsia, such as:  ? Sudden swelling of your face, hands, or feet. ? New vision problems (such as dimness, blurring, or seeing spots). ? A severe headache.     · You have any vaginal bleeding.     · You have belly pain or cramping.     · You have a fever.     · You have had regular contractions (with or without pain) for an hour. This means that you have 8 or more within 1 hour or 4 or more in 20 minutes after you change your position and drink fluids.     · You have a sudden release of fluid from your vagina.     · You have low back pain or pelvic pressure that does not go away.     · You notice that your baby has stopped moving or is moving much less than normal.    Watch closely for changes in your health, and be sure to contact your doctor if you have any problems. Where can you learn more? Go to http://ailyn-fadi.info/.   Enter [id number] in the search box to learn more about \"Pregnancy Precautions: Care Instructions. \"  Current as of: September 5, 2018  Content Version: 12.1  © 6772-1106 [employer]. Care instructions adapted under license by [employer] (which disclaims liability or warranty for this information). If you have questions about a medical condition or this instruction, always ask your healthcare professional. [address] any warranty or liability for your use of this information.

## 2019-07-26 LAB
BACTERIA SPEC CULT: ABNORMAL
BLD GP AB SCN SERPL QL: NEGATIVE
CC UR VC: ABNORMAL
GLUCOSE 1H P 50 G GLC PO SERPL-MCNC: 108 MG/DL (ref 65–139)
SERVICE CMNT-IMP: ABNORMAL

## 2019-07-26 NOTE — PROGRESS NOTES
Administered 1500IU of RhoGAM per MD order. Verbal consent given by patient. Injection given IM in the right deltoid, per patient request. Patient states she can not receive shots in her gluteus. Patient tolerated well, no complications, no side effects.

## 2019-08-01 DIAGNOSIS — Z34.80 SUPERVISION OF OTHER NORMAL PREGNANCY, ANTEPARTUM: ICD-10-CM

## 2019-08-02 ENCOUNTER — ROUTINE PRENATAL (OUTPATIENT)
Dept: OBGYN CLINIC | Age: 34
End: 2019-08-02

## 2019-08-02 ENCOUNTER — HOSPITAL ENCOUNTER (OUTPATIENT)
Dept: PERINATAL CARE | Age: 34
Discharge: HOME | End: 2019-08-02
Attending: OBSTETRICS & GYNECOLOGY
Payer: MEDICAID

## 2019-08-02 VITALS — BODY MASS INDEX: 23.41 KG/M2 | SYSTOLIC BLOOD PRESSURE: 115 MMHG | WEIGHT: 128 LBS | DIASTOLIC BLOOD PRESSURE: 71 MMHG

## 2019-08-02 DIAGNOSIS — O30.009 MONOZYGOTIC TWINS, ANTEPARTUM: ICD-10-CM

## 2019-08-02 DIAGNOSIS — O09.90 SUPERVISION OF HIGH RISK PREGNANCY, ANTEPARTUM: Primary | ICD-10-CM

## 2019-08-02 DIAGNOSIS — Z3A.29 29 WEEKS GESTATION OF PREGNANCY: ICD-10-CM

## 2019-08-02 PROCEDURE — 76816 OB US FOLLOW-UP PER FETUS: CPT | Performed by: OBSTETRICS & GYNECOLOGY

## 2019-08-02 NOTE — PROGRESS NOTES
US today V/Tr  Babies moving  Declines tdap until next visit  Doing well with augmentin  Needs anesthesia consult - they will see today hopefuly

## 2019-08-05 ENCOUNTER — TELEPHONE (OUTPATIENT)
Dept: OBGYN CLINIC | Age: 34
End: 2019-08-05

## 2019-08-05 DIAGNOSIS — Z3A.30 30 WEEKS GESTATION OF PREGNANCY: Primary | ICD-10-CM

## 2019-08-05 NOTE — DISCHARGE SUMMARY
Obstetrical Discharge Summary     Name: Tariq Kruger MRN: [id number]  SSN: [id number]    YOB: 1985  Age: 30 y.o. Sex: female      Admit Date: 7/24/2019    Discharge Date: 8/5/2019     Admitting Physician: Veleta Mcardle, MD     Attending Physician:  Veleta Mcardle, MD    Admission Diagnoses: Pregnancy [Z34.90] twins, pyelo    Discharge Diagnoses: This patient has no babies on file. Additional Diagnoses:   Hospital Problems  Date Reviewed: 8/2/2019          Codes Class Noted POA    Pregnancy ICD-10-CM: Z34.90  ICD-9-CM: V22.2  7/24/2019 Unknown             Lab Results   Component Value Date/Time    Rubella, External immune 02/26/2019    GrBStrep, External Positive 12/13/2013       Hospital Course: Pt presented with fever. Straight caths due to tethered umbilical cord. Hx of multiple UTI. No fever in hospital. Refused IV after multiple attempts. Given Augmentin. WBC decreased 24 hours later and she was discharged home with office fu. Patient Instructions:   Cannot display discharge medications since this patient is not currently admitted. Condition: good  Reference my discharge instructions. Follow-up Appointments   Procedures    FOLLOW UP VISIT Appointment in: Other (Specify)     Standing Status:   Standing     Number of Occurrences:   1     Order Specific Question:   Appointment in     Answer:    Other (Specify)        Signed By:  Veleta Mcardle, MD     August 5, 2019

## 2019-08-05 NOTE — TELEPHONE ENCOUNTER
Message received this am that order received for the PT but that the order needs to be signed. This nurse signed the order as per MD order and re faxed with confirmation received.

## 2019-08-05 NOTE — TELEPHONE ENCOUNTER
Call received at 805am    30year old patient  30w0d pregnant patient with twin gestation. Patient last seen in the office on 19 . Patient denies vaginal bleeding, ROM, contractions and reports positive fetal movement. Patient reports some abdominal tight ness this am and and swelling in her feet. Patient states she is drinking lots of water. Patient calling to ask for updated referral for PT for her back. This this nurse received verbal order from Dr. Gladys Tarango to place order for Physical therapy for her back . Ladonna Vaca Order faxed to patient provided fax number of  attention Ami. Patient had appointment today. Patient verbalized understanding. Fax confirmation received.     ENMANUEL

## 2019-08-12 ENCOUNTER — ROUTINE PRENATAL (OUTPATIENT)
Dept: OBGYN CLINIC | Age: 34
End: 2019-08-12

## 2019-08-12 VITALS — DIASTOLIC BLOOD PRESSURE: 57 MMHG | WEIGHT: 127.6 LBS | SYSTOLIC BLOOD PRESSURE: 103 MMHG | BODY MASS INDEX: 23.34 KG/M2

## 2019-08-12 DIAGNOSIS — O30.009 MONOZYGOTIC TWINS, ANTEPARTUM: ICD-10-CM

## 2019-08-12 DIAGNOSIS — Z3A.31 31 WEEKS GESTATION OF PREGNANCY: ICD-10-CM

## 2019-08-12 DIAGNOSIS — O09.90 SUPERVISION OF HIGH RISK PREGNANCY, ANTEPARTUM: Primary | ICD-10-CM

## 2019-08-12 LAB
BILIRUB UR QL STRIP: NORMAL
GLUCOSE UR-MCNC: NEGATIVE MG/DL
KETONES P FAST UR STRIP-MCNC: NORMAL MG/DL
PH UR STRIP: 6 [PH] (ref 4.6–8)
PROT UR QL STRIP: NORMAL
SP GR UR STRIP: 1.02 (ref 1–1.03)
UA UROBILINOGEN AMB POC: NORMAL (ref 0.2–1)
URINALYSIS CLARITY POC: CLEAR
URINALYSIS COLOR POC: NORMAL
URINE BLOOD POC: NORMAL
URINE LEUKOCYTES POC: NORMAL
URINE NITRITES POC: NEGATIVE

## 2019-08-12 RX ORDER — AMOXICILLIN AND CLAVULANATE POTASSIUM 875; 125 MG/1; MG/1
1 TABLET, FILM COATED ORAL 2 TIMES DAILY
Qty: 20 TAB | Refills: 0 | Status: SHIPPED | OUTPATIENT
Start: 2019-08-12 | End: 2019-08-22

## 2019-08-12 RX ORDER — NITROFURANTOIN (MACROCRYSTALS) 100 MG/1
100 CAPSULE ORAL
Qty: 90 CAP | Refills: 2 | Status: SHIPPED | OUTPATIENT
Start: 2019-08-12 | End: 2019-11-04

## 2019-08-12 NOTE — TELEPHONE ENCOUNTER
Call received Juan [email or web address]    30year old patient  31 wod pregnant with twins. Patient seen in the office to day. [facility] pharmacy calling to say that the patient has been prescribed macrobid and the pharmacy states she is allergic to the medication. [facility] reports they have called the patient and she does not know what the reaction to the medication is. Patient was previously prescribed this medication on 19 and then it was changed to bactrim on 19. It looks like the patient was prescribed the medication on 19      This nurse updated allergies, Please review.     Please advise

## 2019-08-12 NOTE — TELEPHONE ENCOUNTER
This nurse called the pharmacy and advised of MD verbal orders noted below. Pharmacy verbalized understanding.

## 2019-08-12 NOTE — TELEPHONE ENCOUNTER
This nurse called the patient back to advise of MD recommendations and to follow the directions she was given by Dr. Oj Marcelo regarding taking the two medications prescribed. Patient verbalized understanding.

## 2019-08-12 NOTE — TELEPHONE ENCOUNTER
This nurse called the patient and patient states she does not remember taking the medication before and that when if it was prescribed in the first trimester it caused her to have spotting. This nurse is unclear.     ? Yaz Mena to have pharmacy fill the medication      Please advise    This nurse added the medication as allergy and added notes       Please review

## 2019-08-12 NOTE — PROGRESS NOTES
Presents with chills and ?fever at home - thinks has UTI again  See urine results below  Will repeat augmentin - took 2 at home so suspect urine will be sterile  Then start daily macrodantin - tried before but patient confused and did not take  Babies moving  Sees MFM on Friday and will start weekly testing  cvx closed - presenting part not palpable

## 2019-08-12 NOTE — PROGRESS NOTES
Problem List  Date Reviewed: 2019          Codes Class Noted    Pregnancy ICD-10-CM: Z34.90  ICD-9-CM: V22.2  2019        Arrhythmia ICD-10-CM: I49.9  ICD-9-CM: 427.9  Unknown    Overview Signed 3/19/2019  1:29 PM by Elmer Pino MD     Afib at times             Supervision of other normal pregnancy, antepartum ICD-10-CM: Z34.80  ICD-9-CM: V22.1  3/1/2019    Overview Addendum 2019 10:33 AM by Adriana Templeton LPN     Intrauterine pregnancy with the following problems identified:   [facility] 10/14/2019 by [address]  [place]/di twin gestation  Hx of tethered cord, multiple spine surgeries - not candidate for regional anesthesia  Self caths  Needs anesthesia consult  Declines flu vaccine  Baby ASA at 12 weeks  Irregular heart beat - cardiology consult - event monitor, nl echo  MFM/genetics - plan Horizon/NIPTS next visit;  Pano and Horizon WNL 3/29/19  Rh neg - received Rhogam in ER 3/9/2019 & in office 2019  Parvo immune  TDAP @ next visit  +ECOLI on urine culture 19  Normal Glucola/Antibody screen neg             Migraine ICD-10-CM: X70.737  ICD-9-CM: 346.90  Unknown        Strain ICD-10-CM: Boyd Emerson  ICD-9-CM: Boyd Emerson  2014         contractions ICD-10-CM: O47.9  ICD-9-CM: 644.00  10/25/2013            Results for orders placed or performed in visit on 19   AMB POC URINALYSIS DIP STICK MANUAL W/O MICRO   Result Value Ref Range    Color (UA POC) Dark Zayra     Clarity (UA POC) Clear     Glucose (UA POC) Negative Negative    Bilirubin (UA POC) 1+ Negative    Ketones (UA POC) 4+ Negative    Specific gravity (UA POC) 1.020 1.001 - 1.035    Blood (UA POC) 2+ Negative    pH (UA POC) 6.0 4.6 - 8.0    Protein (UA POC) 1+ Negative    Urobilinogen (UA POC) 1 mg/dL 0.2 - 1    Nitrites (UA POC) Negative Negative    Leukocyte esterase (UA POC) 2+ Negative

## 2019-08-13 LAB
ERYTHROCYTE [DISTWIDTH] IN BLOOD BY AUTOMATED COUNT: 14.3 % (ref 12.3–15.4)
HCT VFR BLD AUTO: 28.9 % (ref 34–46.6)
HGB BLD-MCNC: 9 G/DL (ref 11.1–15.9)
MCH RBC QN AUTO: 25.5 PG (ref 26.6–33)
MCHC RBC AUTO-ENTMCNC: 31.1 G/DL (ref 31.5–35.7)
MCV RBC AUTO: 82 FL (ref 79–97)
PLATELET # BLD AUTO: 237 X10E3/UL (ref 150–450)
RBC # BLD AUTO: 3.53 X10E6/UL (ref 3.77–5.28)
WBC # BLD AUTO: 23.7 X10E3/UL (ref 3.4–10.8)

## 2019-08-14 LAB — BACTERIA UR CULT: NORMAL

## 2019-08-14 NOTE — PROGRESS NOTES
Recorded labs. Added to problem list. Notified pt of results and Drs recommendations. She verbalized understanding and will start taking iron.  Patient states she is feeling much better since starting abx

## 2019-08-16 ENCOUNTER — HOSPITAL ENCOUNTER (OUTPATIENT)
Dept: PERINATAL CARE | Age: 34
Discharge: HOME | End: 2019-08-16
Attending: OBSTETRICS & GYNECOLOGY
Payer: MEDICAID

## 2019-08-16 PROCEDURE — 76815 OB US LIMITED FETUS(S): CPT | Performed by: OBSTETRICS & GYNECOLOGY

## 2019-08-22 ENCOUNTER — TELEPHONE (OUTPATIENT)
Dept: OBGYN CLINIC | Age: 34
End: 2019-08-22

## 2019-08-22 NOTE — TELEPHONE ENCOUNTER
Patient calling Mari Michelle pregnant with twins, C/O ankle swelling, red ring around her ankle with streaking, pain and hot to the touch that is located on the ankle and not up the leg. Patient states she has not worn socks since last night and concerned. Patient denies any problems with the pregnancy. Spoke with Dr. Traci Serna - advised to prop feet up, increase hydration and come to her scheduled appt tomorrow that the sx are only located in the ankle and not a sign of a DVT. Patient given MD recommendations, but also advised if the streaking, hot to the touch, pain starts to go up the leg, to contact our office back immediately.

## 2019-08-23 ENCOUNTER — HOSPITAL ENCOUNTER (OUTPATIENT)
Dept: PERINATAL CARE | Age: 34
Discharge: HOME | End: 2019-08-23
Attending: OBSTETRICS & GYNECOLOGY
Payer: MEDICAID

## 2019-08-23 ENCOUNTER — ROUTINE PRENATAL (OUTPATIENT)
Dept: OBGYN CLINIC | Age: 34
End: 2019-08-23

## 2019-08-23 VITALS — BODY MASS INDEX: 24.51 KG/M2 | SYSTOLIC BLOOD PRESSURE: 114 MMHG | DIASTOLIC BLOOD PRESSURE: 69 MMHG | WEIGHT: 134 LBS

## 2019-08-23 DIAGNOSIS — Z87.440 HISTORY OF RECURRENT UTIS: ICD-10-CM

## 2019-08-23 DIAGNOSIS — O30.009 MONOZYGOTIC TWINS, ANTEPARTUM: ICD-10-CM

## 2019-08-23 DIAGNOSIS — Z3A.32 32 WEEKS GESTATION OF PREGNANCY: ICD-10-CM

## 2019-08-23 DIAGNOSIS — O09.90 SUPERVISION OF HIGH RISK PREGNANCY, ANTEPARTUM: Primary | ICD-10-CM

## 2019-08-23 PROCEDURE — 76819 FETAL BIOPHYS PROFIL W/O NST: CPT | Performed by: OBSTETRICS & GYNECOLOGY

## 2019-08-23 NOTE — PROGRESS NOTES
7# weight gain - 34 total!! Doing great with increasing calories  Taking iron bid  Babies moving well  Declines Tdap again  - promises next visit when she brings people with her  Seeing MFM today  Post CS/IOL 9/23 at 37 weeks

## 2019-08-23 NOTE — PROGRESS NOTES
Problem List  Date Reviewed: 2019          Codes Class Noted    Pregnancy ICD-10-CM: Z34.90  ICD-9-CM: V22.2  2019        Arrhythmia ICD-10-CM: I49.9  ICD-9-CM: 427.9  Unknown    Overview Signed 3/19/2019  1:29 PM by Paul Chery MD     Afib at times             Supervision of other normal pregnancy, antepartum ICD-10-CM: Z34.80  ICD-9-CM: V22.1  3/1/2019    Overview Addendum 2019 12:38 PM by Tonio Armijo LPN     Intrauterine pregnancy with the following problems identified:   [facility] 10/14/2019 by [address]  [place]/di twin gestation  Hx of tethered cord, multiple spine surgeries - not candidate for regional anesthesia  Self caths  Needs anesthesia consult  Declines flu vaccine  Baby ASA at 12 weeks  Irregular heart beat - cardiology consult - event monitor, nl echo  MFM/genetics - plan Horizon/NIPTS next visit;  Pano and Horizon WNL 3/29/19  Rh neg - received Rhogam in ER 3/9/2019 & in office 2019  Parvo immune  TDAP @ next visit  +ECOLI on urine culture 19  Normal Glucola/Antibody screen neg  Anemia 9.0 @30 weeks- Iron studies @ next visit             Migraine ICD-10-CM: L76.051  ICD-9-CM: 346.90  Unknown        Strain ICD-10-CM: Troy Prudent  ICD-9-CM: Troy Prudent  2014         contractions ICD-10-CM: O47.9  ICD-9-CM: 644.00  10/25/2013

## 2019-08-24 LAB
ERYTHROCYTE [DISTWIDTH] IN BLOOD BY AUTOMATED COUNT: 14.9 % (ref 12.3–15.4)
FERRITIN SERPL-MCNC: 9 NG/ML (ref 15–150)
HCT VFR BLD AUTO: 27 % (ref 34–46.6)
HGB BLD-MCNC: 8.4 G/DL (ref 11.1–15.9)
IRON SATN MFR SERPL: 4 % (ref 15–55)
IRON SERPL-MCNC: 20 UG/DL (ref 27–159)
MCH RBC QN AUTO: 23.7 PG (ref 26.6–33)
MCHC RBC AUTO-ENTMCNC: 31.1 G/DL (ref 31.5–35.7)
MCV RBC AUTO: 76 FL (ref 79–97)
PLATELET # BLD AUTO: 229 X10E3/UL (ref 150–450)
RBC # BLD AUTO: 3.54 X10E6/UL (ref 3.77–5.28)
TIBC SERPL-MCNC: 482 UG/DL (ref 250–450)
UIBC SERPL-MCNC: 462 UG/DL (ref 131–425)
WBC # BLD AUTO: 9.8 X10E3/UL (ref 3.4–10.8)

## 2019-08-25 LAB — BACTERIA UR CULT: NO GROWTH

## 2019-08-26 DIAGNOSIS — D64.9 ANEMIA, UNSPECIFIED TYPE: Primary | ICD-10-CM

## 2019-08-26 NOTE — PROGRESS NOTES
Recorded labs. Referred to Hematology. Patient notified of lab results and Drs recommendations. Appointment scheduled with Dr. Mahmood Last 8/27- Pt aware.

## 2019-08-26 NOTE — PROGRESS NOTES
See heme once ASAP for iron infusions  High risk for transfusion at delivery - 8.4  Make sure iron twice a day and prenatal

## 2019-08-27 ENCOUNTER — ROUTINE PRENATAL (OUTPATIENT)
Dept: OBGYN CLINIC | Age: 34
End: 2019-08-27

## 2019-08-27 ENCOUNTER — HOSPITAL ENCOUNTER (OUTPATIENT)
Dept: PERINATAL CARE | Age: 34
Discharge: HOME | End: 2019-08-27
Attending: OBSTETRICS & GYNECOLOGY
Payer: MEDICAID

## 2019-08-27 ENCOUNTER — OFFICE VISIT (OUTPATIENT)
Dept: ONCOLOGY | Age: 34
End: 2019-08-27

## 2019-08-27 VITALS
DIASTOLIC BLOOD PRESSURE: 63 MMHG | WEIGHT: 135 LBS | HEIGHT: 62 IN | SYSTOLIC BLOOD PRESSURE: 106 MMHG | BODY MASS INDEX: 24.84 KG/M2

## 2019-08-27 VITALS
WEIGHT: 135 LBS | SYSTOLIC BLOOD PRESSURE: 107 MMHG | TEMPERATURE: 98.3 F | DIASTOLIC BLOOD PRESSURE: 64 MMHG | BODY MASS INDEX: 24.84 KG/M2 | HEART RATE: 94 BPM | RESPIRATION RATE: 16 BRPM | OXYGEN SATURATION: 99 % | HEIGHT: 62 IN

## 2019-08-27 DIAGNOSIS — D50.8 OTHER IRON DEFICIENCY ANEMIA: ICD-10-CM

## 2019-08-27 DIAGNOSIS — Z3A.33 33 WEEKS GESTATION OF PREGNANCY: ICD-10-CM

## 2019-08-27 DIAGNOSIS — O09.90 SUPERVISION OF HIGH RISK PREGNANCY, ANTEPARTUM: Primary | ICD-10-CM

## 2019-08-27 DIAGNOSIS — Z3A.35 35 WEEKS GESTATION OF PREGNANCY: ICD-10-CM

## 2019-08-27 DIAGNOSIS — D50.8 IRON DEFICIENCY ANEMIA SECONDARY TO INADEQUATE DIETARY IRON INTAKE: Primary | ICD-10-CM

## 2019-08-27 DIAGNOSIS — O30.009 MONOZYGOTIC TWINS, ANTEPARTUM: ICD-10-CM

## 2019-08-27 PROBLEM — O99.019 IRON DEFICIENCY ANEMIA DURING PREGNANCY: Status: ACTIVE | Noted: 2019-08-27

## 2019-08-27 PROBLEM — D50.9 IRON DEFICIENCY ANEMIA DURING PREGNANCY: Status: ACTIVE | Noted: 2019-08-27

## 2019-08-27 PROCEDURE — 76816 OB US FOLLOW-UP PER FETUS: CPT | Performed by: OBSTETRICS & GYNECOLOGY

## 2019-08-27 PROCEDURE — 76819 FETAL BIOPHYS PROFIL W/O NST: CPT | Performed by: OBSTETRICS & GYNECOLOGY

## 2019-08-27 RX ORDER — HEPARIN 100 UNIT/ML
300-500 SYRINGE INTRAVENOUS AS NEEDED
Status: CANCELLED
Start: 2019-09-18

## 2019-08-27 RX ORDER — DIPHENHYDRAMINE HYDROCHLORIDE 50 MG/ML
50 INJECTION, SOLUTION INTRAMUSCULAR; INTRAVENOUS AS NEEDED
Status: CANCELLED
Start: 2019-09-13

## 2019-08-27 RX ORDER — ACETAMINOPHEN 325 MG/1
650 TABLET ORAL AS NEEDED
Status: CANCELLED
Start: 2019-09-18

## 2019-08-27 RX ORDER — DIPHENHYDRAMINE HYDROCHLORIDE 50 MG/ML
50 INJECTION, SOLUTION INTRAMUSCULAR; INTRAVENOUS AS NEEDED
Status: CANCELLED
Start: 2019-09-20

## 2019-08-27 RX ORDER — ACETAMINOPHEN 325 MG/1
650 TABLET ORAL AS NEEDED
Status: CANCELLED
Start: 2019-09-24

## 2019-08-27 RX ORDER — ALBUTEROL SULFATE 0.83 MG/ML
2.5 SOLUTION RESPIRATORY (INHALATION) AS NEEDED
Status: CANCELLED
Start: 2019-09-15

## 2019-08-27 RX ORDER — LANOLIN ALCOHOL/MO/W.PET/CERES
CREAM (GRAM) TOPICAL
COMMUNITY
End: 2019-09-25

## 2019-08-27 RX ORDER — ONDANSETRON 2 MG/ML
8 INJECTION INTRAMUSCULAR; INTRAVENOUS AS NEEDED
Status: CANCELLED | OUTPATIENT
Start: 2019-09-13

## 2019-08-27 RX ORDER — SODIUM CHLORIDE 9 MG/ML
10 INJECTION INTRAMUSCULAR; INTRAVENOUS; SUBCUTANEOUS AS NEEDED
Status: CANCELLED | OUTPATIENT
Start: 2019-09-20

## 2019-08-27 RX ORDER — HEPARIN 100 UNIT/ML
300-500 SYRINGE INTRAVENOUS AS NEEDED
Status: CANCELLED
Start: 2019-09-15

## 2019-08-27 RX ORDER — SODIUM CHLORIDE 0.9 % (FLUSH) 0.9 %
10 SYRINGE (ML) INJECTION AS NEEDED
Status: CANCELLED
Start: 2019-09-13

## 2019-08-27 RX ORDER — DIPHENHYDRAMINE HYDROCHLORIDE 50 MG/ML
50 INJECTION, SOLUTION INTRAMUSCULAR; INTRAVENOUS AS NEEDED
Status: CANCELLED
Start: 2019-09-15

## 2019-08-27 RX ORDER — SODIUM CHLORIDE 9 MG/ML
10 INJECTION INTRAMUSCULAR; INTRAVENOUS; SUBCUTANEOUS AS NEEDED
Status: CANCELLED | OUTPATIENT
Start: 2019-09-15

## 2019-08-27 RX ORDER — DIPHENHYDRAMINE HYDROCHLORIDE 50 MG/ML
50 INJECTION, SOLUTION INTRAMUSCULAR; INTRAVENOUS AS NEEDED
Status: CANCELLED
Start: 2019-09-18

## 2019-08-27 RX ORDER — ONDANSETRON 2 MG/ML
8 INJECTION INTRAMUSCULAR; INTRAVENOUS AS NEEDED
Status: CANCELLED | OUTPATIENT
Start: 2019-09-18

## 2019-08-27 RX ORDER — EPINEPHRINE 1 MG/ML
0.3 INJECTION, SOLUTION, CONCENTRATE INTRAVENOUS AS NEEDED
Status: CANCELLED | OUTPATIENT
Start: 2019-09-13

## 2019-08-27 RX ORDER — ALBUTEROL SULFATE 0.83 MG/ML
2.5 SOLUTION RESPIRATORY (INHALATION) AS NEEDED
Status: CANCELLED
Start: 2019-09-13

## 2019-08-27 RX ORDER — SODIUM CHLORIDE 0.9 % (FLUSH) 0.9 %
10 SYRINGE (ML) INJECTION AS NEEDED
Status: CANCELLED
Start: 2019-09-15

## 2019-08-27 RX ORDER — SODIUM CHLORIDE 0.9 % (FLUSH) 0.9 %
10 SYRINGE (ML) INJECTION AS NEEDED
Status: CANCELLED
Start: 2019-09-20

## 2019-08-27 RX ORDER — ONDANSETRON 2 MG/ML
8 INJECTION INTRAMUSCULAR; INTRAVENOUS AS NEEDED
Status: CANCELLED | OUTPATIENT
Start: 2019-09-20

## 2019-08-27 RX ORDER — HYDROCORTISONE SODIUM SUCCINATE 100 MG/2ML
100 INJECTION, POWDER, FOR SOLUTION INTRAMUSCULAR; INTRAVENOUS AS NEEDED
Status: CANCELLED | OUTPATIENT
Start: 2019-09-20

## 2019-08-27 RX ORDER — EPINEPHRINE 1 MG/ML
0.3 INJECTION, SOLUTION, CONCENTRATE INTRAVENOUS AS NEEDED
Status: CANCELLED | OUTPATIENT
Start: 2019-09-24

## 2019-08-27 RX ORDER — EPINEPHRINE 1 MG/ML
0.3 INJECTION, SOLUTION, CONCENTRATE INTRAVENOUS AS NEEDED
Status: CANCELLED | OUTPATIENT
Start: 2019-09-18

## 2019-08-27 RX ORDER — ACETAMINOPHEN 325 MG/1
650 TABLET ORAL AS NEEDED
Status: CANCELLED
Start: 2019-09-13

## 2019-08-27 RX ORDER — ONDANSETRON 2 MG/ML
8 INJECTION INTRAMUSCULAR; INTRAVENOUS AS NEEDED
Status: CANCELLED | OUTPATIENT
Start: 2019-09-15

## 2019-08-27 RX ORDER — HYDROCORTISONE SODIUM SUCCINATE 100 MG/2ML
100 INJECTION, POWDER, FOR SOLUTION INTRAMUSCULAR; INTRAVENOUS AS NEEDED
Status: CANCELLED | OUTPATIENT
Start: 2019-09-24

## 2019-08-27 RX ORDER — ONDANSETRON 2 MG/ML
8 INJECTION INTRAMUSCULAR; INTRAVENOUS AS NEEDED
Status: CANCELLED | OUTPATIENT
Start: 2019-09-24

## 2019-08-27 RX ORDER — SODIUM CHLORIDE 9 MG/ML
10 INJECTION INTRAMUSCULAR; INTRAVENOUS; SUBCUTANEOUS AS NEEDED
Status: CANCELLED | OUTPATIENT
Start: 2019-09-18

## 2019-08-27 RX ORDER — SODIUM CHLORIDE 9 MG/ML
10 INJECTION INTRAMUSCULAR; INTRAVENOUS; SUBCUTANEOUS AS NEEDED
Status: CANCELLED | OUTPATIENT
Start: 2019-09-13

## 2019-08-27 RX ORDER — EPINEPHRINE 1 MG/ML
0.3 INJECTION, SOLUTION, CONCENTRATE INTRAVENOUS AS NEEDED
Status: CANCELLED | OUTPATIENT
Start: 2019-09-15

## 2019-08-27 RX ORDER — DIPHENHYDRAMINE HYDROCHLORIDE 50 MG/ML
50 INJECTION, SOLUTION INTRAMUSCULAR; INTRAVENOUS AS NEEDED
Status: CANCELLED
Start: 2019-09-24

## 2019-08-27 RX ORDER — ACETAMINOPHEN 325 MG/1
650 TABLET ORAL AS NEEDED
Status: CANCELLED
Start: 2019-09-15

## 2019-08-27 RX ORDER — SODIUM CHLORIDE 9 MG/ML
10 INJECTION INTRAMUSCULAR; INTRAVENOUS; SUBCUTANEOUS AS NEEDED
Status: CANCELLED | OUTPATIENT
Start: 2019-09-24

## 2019-08-27 RX ORDER — HYDROCORTISONE SODIUM SUCCINATE 100 MG/2ML
100 INJECTION, POWDER, FOR SOLUTION INTRAMUSCULAR; INTRAVENOUS AS NEEDED
Status: CANCELLED | OUTPATIENT
Start: 2019-09-13

## 2019-08-27 RX ORDER — ALBUTEROL SULFATE 0.83 MG/ML
2.5 SOLUTION RESPIRATORY (INHALATION) AS NEEDED
Status: CANCELLED
Start: 2019-09-20

## 2019-08-27 RX ORDER — ALBUTEROL SULFATE 0.83 MG/ML
2.5 SOLUTION RESPIRATORY (INHALATION) AS NEEDED
Status: CANCELLED
Start: 2019-09-24

## 2019-08-27 RX ORDER — EPINEPHRINE 1 MG/ML
0.3 INJECTION, SOLUTION, CONCENTRATE INTRAVENOUS AS NEEDED
Status: CANCELLED | OUTPATIENT
Start: 2019-09-20

## 2019-08-27 RX ORDER — HEPARIN 100 UNIT/ML
300-500 SYRINGE INTRAVENOUS AS NEEDED
Status: CANCELLED
Start: 2019-09-13

## 2019-08-27 RX ORDER — HEPARIN 100 UNIT/ML
300-500 SYRINGE INTRAVENOUS AS NEEDED
Status: CANCELLED
Start: 2019-09-24

## 2019-08-27 RX ORDER — HYDROCORTISONE SODIUM SUCCINATE 100 MG/2ML
100 INJECTION, POWDER, FOR SOLUTION INTRAMUSCULAR; INTRAVENOUS AS NEEDED
Status: CANCELLED | OUTPATIENT
Start: 2019-09-15

## 2019-08-27 RX ORDER — SODIUM CHLORIDE 0.9 % (FLUSH) 0.9 %
10 SYRINGE (ML) INJECTION AS NEEDED
Status: CANCELLED
Start: 2019-09-24

## 2019-08-27 RX ORDER — ACETAMINOPHEN 325 MG/1
650 TABLET ORAL AS NEEDED
Status: CANCELLED
Start: 2019-09-20

## 2019-08-27 RX ORDER — HYDROCORTISONE SODIUM SUCCINATE 100 MG/2ML
100 INJECTION, POWDER, FOR SOLUTION INTRAMUSCULAR; INTRAVENOUS AS NEEDED
Status: CANCELLED | OUTPATIENT
Start: 2019-09-18

## 2019-08-27 RX ORDER — SODIUM CHLORIDE 0.9 % (FLUSH) 0.9 %
10 SYRINGE (ML) INJECTION AS NEEDED
Status: CANCELLED
Start: 2019-09-18

## 2019-08-27 RX ORDER — ALBUTEROL SULFATE 0.83 MG/ML
2.5 SOLUTION RESPIRATORY (INHALATION) AS NEEDED
Status: CANCELLED
Start: 2019-09-18

## 2019-08-27 RX ORDER — HEPARIN 100 UNIT/ML
300-500 SYRINGE INTRAVENOUS AS NEEDED
Status: CANCELLED
Start: 2019-09-20

## 2019-08-27 NOTE — PATIENT INSTRUCTIONS
Make sure you are taking ferrous sulfate (iron pill) which is 325mg or 65mg elemental iron 2-3 times a day with meals. Can also take over the counter Docusate 1-2 times daily to avoid constipation. Please go to [facility] to recheck labs in 3 weeks.
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
## 2019-08-27 NOTE — PROGRESS NOTES
Babies moving. Saw MFM v/tr head right  Saw heme/onc today and plans iron infusions  Disc delivery again. If A is not vtx she wants to wait and see if baby will flip over  Advised that if gen anesthesia is needed  will not be in room  Disc various scenarios in prep for delivery.

## 2019-08-27 NOTE — PROGRESS NOTES
Problem List  Date Reviewed: 2019          Codes Class Noted    Iron deficiency anemia during pregnancy ICD-10-CM: O99.019, D50.9  ICD-9-CM: 648.20, 280.9  2019        Pregnancy ICD-10-CM: Z34.90  ICD-9-CM: V22.2  2019    Overview Signed 2019  1:35 PM by Gabriella August     -C/S (19 @9:30AM)             Arrhythmia ICD-10-CM: I49.9  ICD-9-CM: 427.9  Unknown    Overview Signed 3/19/2019  1:29 PM by Inge Rosenberg MD     Afib at times             Supervision of other normal pregnancy, antepartum ICD-10-CM: Z34.80  ICD-9-CM: V22.1  3/1/2019    Overview Addendum 2019  3:28 PM by Jaime Lauren LPN     Intrauterine pregnancy with the following problems identified:   [address] 10/14/2019 by [address]  [place]/di twin gestation  Hx of tethered cord, multiple spine surgeries - not candidate for regional anesthesia  Self caths  Needs anesthesia consult  Declines flu vaccine  Baby ASA at 12 weeks  Irregular heart beat - cardiology consult - event monitor, nl echo  MFM/genetics - plan Horizon/NIPTS next visit; Pano and Horizon WNL 3/29/19  Rh neg - received Rhogam in ER 3/9/2019 & in office 2019  Parvo immune  TDAP @ next visit  +ECOLI on urine culture 19  Normal Glucola/Antibody screen neg  Anemia 9.0 @30 weeks- Iron studies @ next visit   [email or web address]. Poss IOL. Pt aware.   Anemia @33wks 8.4-Seeing Hematology              Migraine ICD-10-CM: D50.744  ICD-9-CM: 346.90  Unknown        Strain ICD-10-CM: [id number]  ICD-9-CM: [id number]  2014         contractions ICD-10-CM: O47.9  ICD-9-CM: 644.00  10/25/2013

## 2019-08-30 ENCOUNTER — TELEPHONE (OUTPATIENT)
Dept: ONCOLOGY | Age: 34
End: 2019-08-30

## 2019-08-30 NOTE — TELEPHONE ENCOUNTER
[employer] at [facility]  [phone]      08/30/19 10:50 AM Left message via home/cell number requesting that patient return call regarding upcoming appointment. 09/03 appointment scheduled in error and should be rescheduled to a 3 month follow up. Provided office phone number for patient to return call.

## 2019-09-03 ENCOUNTER — TELEPHONE (OUTPATIENT)
Dept: ONCOLOGY | Age: 34
End: 2019-09-03

## 2019-09-03 ENCOUNTER — HOSPITAL ENCOUNTER (OUTPATIENT)
Dept: INFUSION THERAPY | Age: 34
Discharge: HOME | End: 2019-09-03
Payer: MEDICAID

## 2019-09-03 ENCOUNTER — ROUTINE PRENATAL (OUTPATIENT)
Dept: OBGYN CLINIC | Age: 34
End: 2019-09-03

## 2019-09-03 ENCOUNTER — DOCUMENTATION ONLY (OUTPATIENT)
Dept: ONCOLOGY | Age: 34
End: 2019-09-03

## 2019-09-03 ENCOUNTER — HOSPITAL ENCOUNTER (OUTPATIENT)
Dept: PERINATAL CARE | Age: 34
Discharge: HOME | End: 2019-09-03
Attending: OBSTETRICS & GYNECOLOGY
Payer: MEDICAID

## 2019-09-03 VITALS
RESPIRATION RATE: 16 BRPM | HEART RATE: 88 BPM | OXYGEN SATURATION: 100 % | TEMPERATURE: 98.3 F | SYSTOLIC BLOOD PRESSURE: 106 MMHG | DIASTOLIC BLOOD PRESSURE: 59 MMHG

## 2019-09-03 VITALS — SYSTOLIC BLOOD PRESSURE: 114 MMHG | WEIGHT: 141.6 LBS | BODY MASS INDEX: 25.9 KG/M2 | DIASTOLIC BLOOD PRESSURE: 60 MMHG

## 2019-09-03 DIAGNOSIS — O09.90 SUPERVISION OF HIGH RISK PREGNANCY, ANTEPARTUM: Primary | ICD-10-CM

## 2019-09-03 DIAGNOSIS — Z3A.34 34 WEEKS GESTATION OF PREGNANCY: ICD-10-CM

## 2019-09-03 DIAGNOSIS — O30.009 MONOZYGOTIC TWINS, ANTEPARTUM: ICD-10-CM

## 2019-09-03 LAB
ERYTHROCYTE [DISTWIDTH] IN BLOOD BY AUTOMATED COUNT: 16.4 % (ref 11.5–14.5)
HCT VFR BLD AUTO: 30.2 % (ref 35–47)
HGB BLD-MCNC: 8.8 G/DL (ref 11.5–16)
MCH RBC QN AUTO: 23.9 PG (ref 26–34)
MCHC RBC AUTO-ENTMCNC: 29.1 G/DL (ref 30–36.5)
MCV RBC AUTO: 82.1 FL (ref 80–99)
NRBC # BLD: 0 K/UL (ref 0–0.01)
NRBC BLD-RTO: 0 PER 100 WBC
PLATELET # BLD AUTO: 183 K/UL (ref 150–400)
PMV BLD AUTO: 10.9 FL (ref 8.9–12.9)
RBC # BLD AUTO: 3.68 M/UL (ref 3.8–5.2)
WBC # BLD AUTO: 10 K/UL (ref 3.6–11)

## 2019-09-03 PROCEDURE — 76819 FETAL BIOPHYS PROFIL W/O NST: CPT | Performed by: OBSTETRICS & GYNECOLOGY

## 2019-09-03 PROCEDURE — 36415 COLL VENOUS BLD VENIPUNCTURE: CPT

## 2019-09-03 PROCEDURE — 85027 COMPLETE CBC AUTOMATED: CPT

## 2019-09-03 NOTE — PROGRESS NOTES
Problem List  Date Reviewed: 2019          Codes Class Noted    Iron deficiency anemia during pregnancy ICD-10-CM: O99.019, D50.9  ICD-9-CM: 648.20, 280.9  2019        Pregnancy ICD-10-CM: Z34.90  ICD-9-CM: V22.2  2019    Overview Signed 2019  1:35 PM by Ruchi KohlerC/S (19 @9:30AM)             Arrhythmia ICD-10-CM: I49.9  ICD-9-CM: 427.9  Unknown    Overview Signed 3/19/2019  1:29 PM by Paul Chery MD     Afib at times             Supervision of other normal pregnancy, antepartum ICD-10-CM: Z34.80  ICD-9-CM: V22.1  3/1/2019    Overview Addendum 2019  3:28 PM by Tonio Armijo LPN     Intrauterine pregnancy with the following problems identified:   [facility] 10/14/2019 by [address]  [place]/di twin gestation  Hx of tethered cord, multiple spine surgeries - not candidate for regional anesthesia  Self caths  Needs anesthesia consult  Declines flu vaccine  Baby ASA at 12 weeks  Irregular heart beat - cardiology consult - event monitor, nl echo  MFM/genetics - plan Horizon/NIPTS next visit; Pano and Horizon WNL 3/29/19  Rh neg - received Rhogam in ER 3/9/2019 & in office 2019  Parvo immune  TDAP @ next visit  +ECOLI on urine culture 19  Normal Glucola/Antibody screen neg  Anemia 9.0 @30 weeks- Iron studies @ next visit   [email or web address]. Poss IOL. Pt aware.   Anemia @33wks 8.4-Seeing Hematology              Migraine ICD-10-CM: H53.902  ICD-9-CM: 346.90  Unknown        Strain ICD-10-CM: [id number]  ICD-9-CM: [id number]  2014         contractions ICD-10-CM: O47.9  ICD-9-CM: 644.00  10/25/2013

## 2019-09-03 NOTE — PROGRESS NOTES
[facility] Lab Visit:    3123  Pt arrived ambulatory and in no distress, labs drawn in left AC 1 stick per EJW, . Departed [facility] ambulatory and in no distress. Visit Vitals  /59   Pulse 88   Temp 98.3 °F (36.8 °C)   Resp 16   LMP 02/01/2019 (Within Weeks) Comment: 1/1/19   SpO2 100%       Labs available in CC once resulted.

## 2019-09-03 NOTE — PROGRESS NOTES
[address] at [facility]  [phone]      09/03/19. 9:47 AM Patient arrived to clinic inquiring if iron labs could be redrawn prior to first iron infusion today. Last checked 08/23. Patient stated she had been taking oral iron three times daily per Dr. Morris Hannon and is feeling \"much better\" with increased energy levels. Patient also expressed that she has a history of reactions to medications. Per Phoebe Benitez and Dr. Morris Hannon, advised that it is too soon to recheck patient's iron studies. Offered that Dr. Morris Hannon could order a CBC which would measure patient's hemoglobin level. Also encouraged patient to receive at least a couple infusions so that her iron levels could increase more quickly in preparation for delivery. Patient became tearful stating that she is far along in her pregnancy and high-risk. Explained that Dr. Morris Hannon is aware of patient's allergies and that iron sucrose is commonly administered during pregnancy. However, also discussed that it is ultimately the patient's decision as to whether or not to proceed with infusions. Dr. Morris Hannon has made medical recommendations and encourages patient to receive at least a couple iron infusions. Patient verbalized understanding. She would like to proceed with having CBC drawn so that she can make a decision regarding whether or not to proceed with infusions. \[facility]\"" notified and able to draw labs this morning. Patient notified. No further questions or concerns at this time.

## 2019-09-03 NOTE — PATIENT INSTRUCTIONS
Weeks 34 to 36 of Your Pregnancy: Care Instructions  Your Care Instructions    By now, your baby and your belly have grown quite large. It is almost time to give birth. Your baby's lungs are almost ready to breathe air. The bones in your baby's head are now firm enough to protect it, but soft enough to move down through the birth canal.  You may feel excited, happy, anxious, or scared. You may wonder how you will know if you are in labor or what to expect during labor. Try to be flexible in your expectations of the birth. Because each birth is different, there is no way to know exactly what childbirth will be like for you. This care sheet will help you know what to expect and how to prepare. This may make your childbirth easier. If you haven't already had the Tdap shot during this pregnancy, talk to your doctor about getting it. It will help protect your  against pertussis infection. In the 36th week, most women have a test for group B streptococcus (GBS). GBS is a common bacteria that can live in the vagina and rectum. It can make your baby sick after birth. If you test positive, you will get antibiotics during labor. The medicine will keep your baby from getting the bacteria. Follow-up care is a key part of your treatment and safety. Be sure to make and go to all appointments, and call your doctor if you are having problems. It's also a good idea to know your test results and keep a list of the medicines you take. How can you care for yourself at home? Learn about pain relief choices  · Pain is different for every woman. Talk with your doctor about your feelings about pain. · You can choose from several types of pain relief. These include medicine or breathing techniques, as well as comfort measures. You can use more than one option. · If you choose to have pain medicine during labor, talk to your doctor about your options. Some medicines lower anxiety and help with some of the pain.  Others make your lower body numb so that you won't feel pain. · Be sure to tell your doctor about your pain medicine choice before you start labor or very early in your labor. You may be able to change your mind as labor progresses. · Rarely, a woman is put to sleep by medicine given through a mask or an IV. Labor and delivery  · The first stage of labor has three parts: early, active, and transition. ? Most women have early labor at home. You can stay busy or rest, eat light snacks, drink clear fluids, and start counting contractions. ? When talking during a contraction gets hard, you may be moving to active labor. During active labor, you should head for the hospital if you are not there already. ? You are in active labor when contractions come every 3 to 4 minutes and last about 60 seconds. Your cervix is opening more rapidly. ? If your water breaks, contractions will come faster and stronger. ? During transition, your cervix is stretching, and contractions are coming more rapidly. ? You may want to push, but your cervix might not be ready. Your doctor will tell you when to push. · The second stage starts when your cervix is completely opened and you are ready to push. ? Contractions are very strong to push the baby down the birth canal.  ? You will feel the urge to push. You may feel like you need to have a bowel movement. ? You may be coached to push with contractions. These contractions will be very strong, but you will not have them as often. You can get a little rest between contractions. ? You may be emotional and irritable. You may not be aware of what is going on around you.  ? One last push, and your baby is born. · The third stage is when a few more contractions push out the placenta. This may take 30 minutes or less. · The fourth stage is the welcome recovery. You may feel overwhelmed with emotions and exhausted but alert. This is a good time to start breastfeeding. Where can you learn more?   Go to http://ailyn-fadi.info/. Enter Z388 in the search box to learn more about \"Weeks 34 to 36 of Your Pregnancy: Care Instructions. \"  Current as of: September 5, 2018  Content Version: 12.1  © 0255-7142 "[employer]". Care instructions adapted under license by [employer] (which disclaims liability or warranty for this information). If you have questions about a medical condition or this instruction, always ask your healthcare professional. [address] any warranty or liability for your use of this information. Belly Pain in Pregnancy: Care Instructions  Your Care Instructions    When you're pregnant, any belly pain can be a worry. You may not want to call your doctor about every pain you have. But you don't want to miss something that is dangerous for you or your baby. Even if it feels familiar, belly pain can mean something new when you're pregnant. It's important to know when to call your doctor. It will also help to know how to care for yourself at home when your pain is not caused by anything harmful. · When belly pain is more severe or constant, see a doctor right away. · If you're sure your belly pain is a sign of labor, call your doctor. · When belly pain is brief, it's usually a normal part of pregnancy. It might be related to changes in the growing uterus. Or it could be the stretching of ligaments called round ligaments. These ligaments help support the uterus. Round ligament pain can be on either side of your belly. It can also be felt in your hips or groin. Follow-up care is a key part of your treatment and safety. Be sure to make and go to all appointments, and call your doctor if you are having problems. It's also a good idea to know your test results and keep a list of the medicines you take. How can you tell if belly pain is a sign of labor?   When belly pain is caused by labor, it can feel like mild or menstrual-like cramps in your lower belly. These cramps are probably contractions. They can happen in your second or third trimester. You may also have:  · A steady, dull ache in your lower back, pelvis, or thighs. · A feeling of pressure in your pelvis or lower belly. · Changes in your vaginal discharge or a sudden release of fluid from the vagina. If you think you are in labor, call your doctor. How can you care for yourself at home? When belly pain is mild and is not a symptom of labor:  · Rest until you feel better. · Take a warm bath. · Think about what you drink and eat:  ? Drink plenty of fluids. Choose water and other caffeine-free clear liquids until you feel better. ? Try eating small, frequent meals. If your stomach is upset, try bland, low-fat foods like plain rice, broiled chicken, toast, and yogurt. · Think about how you move if you are having brief pains from stretching of the round ligaments. ? Try gentle stretching. ? Move a little more slowly when turning in bed or getting up from a chair, so those ligaments don't stretch quickly. ? Lean forward a bit if you think you are going to cough or sneeze. When should you call for help? [id number] anytime you think you may need emergency care. For example, call if:    · You have sudden, severe pain in your belly.     · You have severe vaginal bleeding.     · You passed out (lost consciousness).     · You have a seizure.    Call your doctor now or seek immediate medical care if:    · You have new or worse belly pain or cramping.     · You have any vaginal bleeding.     · You have a fever.     · You have symptoms of preeclampsia, such as:  ? Sudden swelling of your face, hands, or feet. ? New vision problems (such as dimness, blurring, or seeing spots). ? A severe headache.     · You think that you may be in labor.  This means that you've had at least 8 contractions within 1 hour or at least 4 contractions within 20 minutes, even after you change your position and drink fluids.     · You have symptoms of a urinary tract infection. These may include:  ? Pain or burning when you urinate. ? A frequent need to urinate without being able to pass much urine. ? Pain in the flank, which is just below the rib cage and above the waist on either side of the back. ? Blood in your urine.    Watch closely for changes in your health, and be sure to contact your doctor if you are worried about your or your baby's health. Where can you learn more? Go to http://ailyn-fadi.info/. Enter [id number] [id number] in the search box to learn more about \"Belly Pain in Pregnancy: Care Instructions. \"  Current as of: September 5, 2018  Content Version: 12.1  © 1357-1097 [employer]. Care instructions adapted under license by [employer] (which disclaims liability or warranty for this information). If you have questions about a medical condition or this instruction, always ask your healthcare professional. [place] [address] any warranty or liability for your use of this information.

## 2019-09-03 NOTE — TELEPHONE ENCOUNTER
[employer] at [facility]  [phone]      09/03/19 12:32 PM Received call from Rodger Ryder in [facility] that patient left prior to receiving her lab results. Infusion appointment was scheduled for 11:30 AM today. Left message for patient via home/cell number listed requesting that she return call. Need to clarify if patient would like to proceed with iron infusions. Provided office phone number for her to return call. 09/04/19 2:20 PM Left additional message via cell phone number listed to check on patient and inquire about infusion appointments. Provided office phone number for return call.

## 2019-09-09 ENCOUNTER — TELEPHONE (OUTPATIENT)
Dept: OBGYN CLINIC | Age: 34
End: 2019-09-09

## 2019-09-09 ENCOUNTER — HOSPITAL ENCOUNTER (OUTPATIENT)
Dept: INFUSION THERAPY | Age: 34
End: 2019-09-09
Payer: MEDICAID

## 2019-09-11 ENCOUNTER — HOSPITAL ENCOUNTER (OUTPATIENT)
Dept: INFUSION THERAPY | Age: 34
End: 2019-09-11
Payer: MEDICAID

## 2019-09-12 ENCOUNTER — ROUTINE PRENATAL (OUTPATIENT)
Dept: OBGYN CLINIC | Age: 34
End: 2019-09-12

## 2019-09-12 ENCOUNTER — TELEPHONE (OUTPATIENT)
Dept: OBGYN CLINIC | Age: 34
End: 2019-09-12

## 2019-09-12 VITALS — DIASTOLIC BLOOD PRESSURE: 66 MMHG | BODY MASS INDEX: 25.72 KG/M2 | SYSTOLIC BLOOD PRESSURE: 111 MMHG | WEIGHT: 140.6 LBS

## 2019-09-12 DIAGNOSIS — Z3A.35 35 WEEKS GESTATION OF PREGNANCY: ICD-10-CM

## 2019-09-12 DIAGNOSIS — O09.90 SUPERVISION OF HIGH RISK PREGNANCY, ANTEPARTUM: Primary | ICD-10-CM

## 2019-09-12 DIAGNOSIS — O30.009 MONOZYGOTIC TWINS, ANTEPARTUM: ICD-10-CM

## 2019-09-12 LAB — GRBS, EXTERNAL: NEGATIVE

## 2019-09-12 NOTE — TELEPHONE ENCOUNTER
Call received at 323PM    30year old patient  35w3d pregnant patient with twins last seen in the office on 9/3/19. Patient calling to say that she has swelling in both feet and ankles with the right side being more swollen then the left. Patient reports redness of her ankles and tops of her feet. Patient reports pain of her right shin . Patient reports the pain to be at a 6 on the pain scale of 1-10. Patient denies redness or streaks of her right leg . Patient reports the pain in her right leg is new. Patient denies vaginal bleeding and reports changing a pad frequently do do clear vaginal discharge with a hint of pink. Patient reports random contractions , no consistency. Patient reports positive fetal movement. Patient advised after MFM appointments to come to be seen by Dr Compa Mejia. Please advise regarding the right shin pain. Patient lives one hour away.

## 2019-09-12 NOTE — TELEPHONE ENCOUNTER
Patient advised of MD recommendations and patient stated she is 15 minutes from the office. Patient advised to come on to the office and was placed on the schedule to be seen at 2:50PM( ok per GS)    Patient verbalized understanding.

## 2019-09-12 NOTE — PATIENT INSTRUCTIONS

## 2019-09-12 NOTE — PROGRESS NOTES
Problem List  Date Reviewed: 9/3/2019          Codes Class Noted    Iron deficiency anemia during pregnancy ICD-10-CM: O99.019, D50.9  ICD-9-CM: 648.20, 280.9  2019        Pregnancy ICD-10-CM: Z34.90  ICD-9-CM: V22.2  2019    Overview Addendum 2019  9:00 AM by Wu Shook D     -C/S (19 @7:30AM)             Arrhythmia ICD-10-CM: I49.9  ICD-9-CM: 427.9  Unknown    Overview Signed 3/19/2019  1:29 PM by Jeffrey Whitaker MD     Afib at times             Supervision of other normal pregnancy, antepartum ICD-10-CM: Z34.80  ICD-9-CM: V22.1  3/1/2019    Overview Addendum 2019  3:28 PM by Swati Rivera LPN     Intrauterine pregnancy with the following problems identified:   [facility] 10/14/2019 by [address]  [place]/di twin gestation  Hx of tethered cord, multiple spine surgeries - not candidate for regional anesthesia  Self caths  Needs anesthesia consult  Declines flu vaccine  Baby ASA at 12 weeks  Irregular heart beat - cardiology consult - event monitor, nl echo  MFM/genetics - plan Horizon/NIPTS next visit; Pano and Horizon WNL 3/29/19  Rh neg - received Rhogam in ER 3/9/2019 & in office 2019  Parvo immune  TDAP @ next visit  +ECOLI on urine culture 19  Normal Glucola/Antibody screen neg  Anemia 9.0 @30 weeks- Iron studies @ next visit   [email or web address]. Poss IOL. Pt aware.   Anemia @33wks 8.4-Seeing Hematology              Migraine ICD-10-CM: S15.125  ICD-9-CM: 346.90  Unknown        Strain ICD-10-CM: [id number]  ICD-9-CM: [id number]  2014         contractions ICD-10-CM: O47.9  ICD-9-CM: 644.00  10/25/2013

## 2019-09-12 NOTE — PROGRESS NOTES
Present with bilateral lower ext edema  Babies moving  Feels br/vtx on leopolds today    Has not seen cardiology since 3/2019 - will try to get fu as looking more and more like CS for delivery  Has not had iron infusion again.  I see twice where they tried to call her and left message - Hgb 8.8 last week Pt advised very high risk for blood transfusion at delivery  Consider sending for type and cross next Friday

## 2019-09-13 ENCOUNTER — APPOINTMENT (OUTPATIENT)
Dept: INFUSION THERAPY | Age: 34
End: 2019-09-13
Payer: MEDICAID

## 2019-09-13 ENCOUNTER — HOSPITAL ENCOUNTER (OUTPATIENT)
Dept: PERINATAL CARE | Age: 34
Discharge: HOME | End: 2019-09-13
Attending: OBSTETRICS & GYNECOLOGY
Payer: MEDICAID

## 2019-09-13 ENCOUNTER — TELEPHONE (OUTPATIENT)
Dept: OBGYN CLINIC | Age: 34
End: 2019-09-13

## 2019-09-13 ENCOUNTER — TELEPHONE (OUTPATIENT)
Dept: CARDIOLOGY CLINIC | Age: 34
End: 2019-09-13

## 2019-09-13 ENCOUNTER — ROUTINE PRENATAL (OUTPATIENT)
Dept: OBGYN CLINIC | Age: 34
End: 2019-09-13

## 2019-09-13 ENCOUNTER — HOSPITAL ENCOUNTER (OUTPATIENT)
Dept: INFUSION THERAPY | Age: 34
Discharge: HOME | End: 2019-09-13
Payer: MEDICAID

## 2019-09-13 VITALS — BODY MASS INDEX: 25.97 KG/M2 | DIASTOLIC BLOOD PRESSURE: 66 MMHG | WEIGHT: 142 LBS | SYSTOLIC BLOOD PRESSURE: 111 MMHG

## 2019-09-13 VITALS
OXYGEN SATURATION: 100 % | RESPIRATION RATE: 18 BRPM | HEART RATE: 86 BPM | SYSTOLIC BLOOD PRESSURE: 112 MMHG | DIASTOLIC BLOOD PRESSURE: 71 MMHG | TEMPERATURE: 98.6 F

## 2019-09-13 DIAGNOSIS — O30.009 MONOZYGOTIC TWINS, ANTEPARTUM: ICD-10-CM

## 2019-09-13 DIAGNOSIS — O09.90 SUPERVISION OF HIGH RISK PREGNANCY, ANTEPARTUM: Primary | ICD-10-CM

## 2019-09-13 DIAGNOSIS — Z3A.35 35 WEEKS GESTATION OF PREGNANCY: ICD-10-CM

## 2019-09-13 DIAGNOSIS — O99.019 IRON DEFICIENCY ANEMIA DURING PREGNANCY: Primary | ICD-10-CM

## 2019-09-13 DIAGNOSIS — D50.9 IRON DEFICIENCY ANEMIA DURING PREGNANCY: Primary | ICD-10-CM

## 2019-09-13 PROCEDURE — 74011000258 HC RX REV CODE- 258: Performed by: INTERNAL MEDICINE

## 2019-09-13 PROCEDURE — 74011250636 HC RX REV CODE- 250/636: Performed by: INTERNAL MEDICINE

## 2019-09-13 PROCEDURE — 76819 FETAL BIOPHYS PROFIL W/O NST: CPT | Performed by: OBSTETRICS & GYNECOLOGY

## 2019-09-13 PROCEDURE — 96365 THER/PROPH/DIAG IV INF INIT: CPT

## 2019-09-13 RX ADMIN — IRON SUCROSE 25 MG: 20 INJECTION, SOLUTION INTRAVENOUS at 15:21

## 2019-09-13 RX ADMIN — IRON SUCROSE 175 MG: 20 INJECTION, SOLUTION INTRAVENOUS at 16:07

## 2019-09-13 NOTE — PROGRESS NOTES
Babies moving. Just was at [facility] V/V.  Baby on left is now presenting  Had some contractions - here to get cervical check - same still feels thick  Getting iron infusion today (first one) and seeing cards for fu

## 2019-09-13 NOTE — PROGRESS NOTES
Outpatient Infusion Center Short Visit Progress Note    7636 Patient admitted to [facility] for Venofer injection ambulatory in stable condition. Assessment completed. No new concerns voiced. Vital Signs:  Visit Vitals  /63   Pulse 88   Temp 98.8 °F (37.1 °C)   Resp 18   LMP 02/01/2019 (Within Weeks) Comment: 1/1/19         Peripheral IV 24 g left forearm inserted  with positive blood return. Patient had to have a test dose to see how she would do due to multiple medication allergy. Patient tolerated test dose fine and so proceeded with rest of dose given over 15 minutes. Medications:  Medications Administered     iron sucrose (VENOFER) 175 mg in 0.9% sodium chloride 100 mL IVPB     Admin Date  09/13/2019 Action  New Bag Dose  175 mg Rate  435 mL/hr Route  IntraVENous Administered By  Laya Graham RN          iron sucrose (VENOFER) 25 mg in 0.9% sodium chloride 100 mL IVPB     Admin Date  09/13/2019 Action  New Bag Dose  25 mg Rate  400 mL/hr Route  IntraVENous Administered By  Laya Graham RN                Patient tolerated treatment well. Patient discharged from [place] [address] ambulatory in no distress . Peripheral IV removed with no issues noted. Patient aware of next appointment on 9/15/19.     Gracy Lorenzo RN    Future Appointments   Date Time Provider Dhruv Biswas   9/19/2019  9:00 AM Christi Mcginnis MD [address] [place] [address]   9/19/2019 10:15 AM ULTRASOUND 1 [facility]   9/23/2019  7:30 AM Christi Mcgninis MD [address] [place] [address]   10/31/2019  4:20 PM Deangelo Hassan MD [address] [place] [address]

## 2019-09-13 NOTE — TELEPHONE ENCOUNTER
Patient calling [id number], 35w4d pregnant with twins, stating that she is at the infusion center and this appointment is running over and cannot make her cardiologist appointment this afternoon and they will not wait on her. She states that when she tried to R/S, they told her they cannot see her until after delivery. Please advise.

## 2019-09-15 ENCOUNTER — HOSPITAL ENCOUNTER (OUTPATIENT)
Dept: INFUSION THERAPY | Age: 34
Discharge: HOME | End: 2019-09-15
Payer: MEDICAID

## 2019-09-15 DIAGNOSIS — O99.019 IRON DEFICIENCY ANEMIA DURING PREGNANCY: Primary | ICD-10-CM

## 2019-09-15 DIAGNOSIS — D50.9 IRON DEFICIENCY ANEMIA DURING PREGNANCY: Primary | ICD-10-CM

## 2019-09-15 RX ORDER — SODIUM CHLORIDE 9 MG/ML
10 INJECTION INTRAMUSCULAR; INTRAVENOUS; SUBCUTANEOUS AS NEEDED
Status: ACTIVE | OUTPATIENT
Start: 2019-09-15 | End: 2019-09-15

## 2019-09-15 RX ORDER — HEPARIN 100 UNIT/ML
300-500 SYRINGE INTRAVENOUS AS NEEDED
Status: ACTIVE | OUTPATIENT
Start: 2019-09-15 | End: 2019-09-15

## 2019-09-15 RX ORDER — SODIUM CHLORIDE 0.9 % (FLUSH) 0.9 %
10 SYRINGE (ML) INJECTION AS NEEDED
Status: ACTIVE | OUTPATIENT
Start: 2019-09-15 | End: 2019-09-15

## 2019-09-15 NOTE — PROGRESS NOTES
8860:  Patient called to state that she was supposed to have an appointment today for her next dose of Venofer. Patient was not on the schedule, but was added via Manager because patient had an appropriate [place] plan for today. Told patient to get here as soon as possible since she lives an hour away. 1115:  Called patient X2 to check on her status, but there was no answer. 1155:  Spoke to Dr. Hortencia Tuttle, on call for Dr. Benjamin Julian, and let her know this patient did not show for her venofer. Asked if she wanted the patient to go the the ED or reschedule for a later date. Dr. Hortencia Tuttle stated she didn't think the ED was appropriate. Called and left another message on the patient's cell phone about rescheduling the appointment for 9/16/19.

## 2019-09-16 ENCOUNTER — HOSPITAL ENCOUNTER (OUTPATIENT)
Dept: INFUSION THERAPY | Age: 34
Discharge: HOME | End: 2019-09-16
Payer: MEDICAID

## 2019-09-16 ENCOUNTER — TELEPHONE (OUTPATIENT)
Dept: ONCOLOGY | Age: 34
End: 2019-09-16

## 2019-09-16 VITALS
RESPIRATION RATE: 18 BRPM | HEART RATE: 85 BPM | DIASTOLIC BLOOD PRESSURE: 57 MMHG | SYSTOLIC BLOOD PRESSURE: 106 MMHG | TEMPERATURE: 97.8 F

## 2019-09-16 DIAGNOSIS — O99.019 IRON DEFICIENCY ANEMIA DURING PREGNANCY: Primary | ICD-10-CM

## 2019-09-16 DIAGNOSIS — D50.9 IRON DEFICIENCY ANEMIA DURING PREGNANCY: Primary | ICD-10-CM

## 2019-09-16 LAB — B-HEM STREP SPEC QL CULT: NEGATIVE

## 2019-09-16 PROCEDURE — 96365 THER/PROPH/DIAG IV INF INIT: CPT

## 2019-09-16 PROCEDURE — 74011000258 HC RX REV CODE- 258: Performed by: INTERNAL MEDICINE

## 2019-09-16 PROCEDURE — 74011250636 HC RX REV CODE- 250/636: Performed by: INTERNAL MEDICINE

## 2019-09-16 RX ORDER — SODIUM CHLORIDE 0.9 % (FLUSH) 0.9 %
10 SYRINGE (ML) INJECTION AS NEEDED
Status: CANCELLED | OUTPATIENT
Start: 2019-09-16

## 2019-09-16 RX ORDER — SODIUM CHLORIDE 0.9 % (FLUSH) 0.9 %
10 SYRINGE (ML) INJECTION AS NEEDED
Status: DISCONTINUED | OUTPATIENT
Start: 2019-09-16 | End: 2019-09-17 | Stop reason: HOSPADM

## 2019-09-16 RX ORDER — HYDROCORTISONE SODIUM SUCCINATE 100 MG/2ML
100 INJECTION, POWDER, FOR SOLUTION INTRAMUSCULAR; INTRAVENOUS AS NEEDED
Status: CANCELLED | OUTPATIENT
Start: 2019-09-16

## 2019-09-16 RX ORDER — ONDANSETRON 2 MG/ML
8 INJECTION INTRAMUSCULAR; INTRAVENOUS AS NEEDED
Status: CANCELLED | OUTPATIENT
Start: 2019-09-16

## 2019-09-16 RX ORDER — DIPHENHYDRAMINE HYDROCHLORIDE 50 MG/ML
50 INJECTION, SOLUTION INTRAMUSCULAR; INTRAVENOUS AS NEEDED
Status: CANCELLED
Start: 2019-09-16

## 2019-09-16 RX ORDER — ALBUTEROL SULFATE 0.83 MG/ML
2.5 SOLUTION RESPIRATORY (INHALATION) AS NEEDED
Status: CANCELLED
Start: 2019-09-16

## 2019-09-16 RX ORDER — SODIUM CHLORIDE 9 MG/ML
10 INJECTION INTRAMUSCULAR; INTRAVENOUS; SUBCUTANEOUS AS NEEDED
Status: CANCELLED | OUTPATIENT
Start: 2019-09-16

## 2019-09-16 RX ORDER — EPINEPHRINE 1 MG/ML
0.3 INJECTION, SOLUTION, CONCENTRATE INTRAVENOUS AS NEEDED
Status: CANCELLED | OUTPATIENT
Start: 2019-09-16

## 2019-09-16 RX ORDER — HEPARIN 100 UNIT/ML
300-500 SYRINGE INTRAVENOUS AS NEEDED
Status: CANCELLED | OUTPATIENT
Start: 2019-09-16

## 2019-09-16 RX ORDER — ACETAMINOPHEN 325 MG/1
650 TABLET ORAL AS NEEDED
Status: CANCELLED
Start: 2019-09-16

## 2019-09-16 RX ADMIN — Medication 10 ML: at 15:38

## 2019-09-16 RX ADMIN — IRON SUCROSE 200 MG: 20 INJECTION, SOLUTION INTRAVENOUS at 14:31

## 2019-09-16 RX ADMIN — Medication 10 ML: at 14:15

## 2019-09-16 NOTE — TELEPHONE ENCOUNTER
[address] at [facility]  [phone]        09/16/19 9:48 AM Left message for patient via home/cell number listed requesting that she return call. Patient missed infusion appointment over weekend. She is currently scheduled for next infusions on 09/17 and 09/19. Per Dr. Lesley Simons, can offer infusion appointments for Monday, Wednesday, and Friday this week. Patient being induced Monday, 09/23, per [facility] nurse. Spoke with charge nurse in [facility], they have 1 and 1:30 PM appointments available today. Provided office phone number for patient to return call. Will continue to monitor. 10:58 AM [facility]  spoke with patient. She is now scheduled for iron infusions Monday, Wednesday, and Friday of this week. No further questions or concerns at this time.

## 2019-09-17 ENCOUNTER — HOSPITAL ENCOUNTER (OUTPATIENT)
Dept: INFUSION THERAPY | Age: 34
End: 2019-09-17
Payer: MEDICAID

## 2019-09-17 DIAGNOSIS — Z34.80 SUPERVISION OF OTHER NORMAL PREGNANCY, ANTEPARTUM: ICD-10-CM

## 2019-09-18 ENCOUNTER — ROUTINE PRENATAL (OUTPATIENT)
Dept: OBGYN CLINIC | Age: 34
End: 2019-09-18

## 2019-09-18 ENCOUNTER — HOSPITAL ENCOUNTER (OUTPATIENT)
Dept: PERINATAL CARE | Age: 34
Discharge: HOME | End: 2019-09-18
Attending: OBSTETRICS & GYNECOLOGY
Payer: MEDICAID

## 2019-09-18 VITALS — DIASTOLIC BLOOD PRESSURE: 64 MMHG | SYSTOLIC BLOOD PRESSURE: 106 MMHG | WEIGHT: 143 LBS | BODY MASS INDEX: 26.16 KG/M2

## 2019-09-18 DIAGNOSIS — O09.90 SUPERVISION OF HIGH RISK PREGNANCY, ANTEPARTUM: Primary | ICD-10-CM

## 2019-09-18 DIAGNOSIS — Z3A.36 36 WEEKS GESTATION OF PREGNANCY: ICD-10-CM

## 2019-09-18 DIAGNOSIS — O30.009 MONOZYGOTIC TWINS, ANTEPARTUM: ICD-10-CM

## 2019-09-18 DIAGNOSIS — Z34.80 SUPERVISION OF OTHER NORMAL PREGNANCY, ANTEPARTUM: ICD-10-CM

## 2019-09-18 PROCEDURE — 76816 OB US FOLLOW-UP PER FETUS: CPT | Performed by: OBSTETRICS & GYNECOLOGY

## 2019-09-18 NOTE — PROGRESS NOTES
Problem List  Date Reviewed: 2019          Codes Class Noted    Iron deficiency anemia during pregnancy ICD-10-CM: O99.019, D50.9  ICD-9-CM: 648.20, 280.9  2019        Pregnancy ICD-10-CM: Z34.90  ICD-9-CM: V22.2  2019    Overview Addendum 2019  9:00 AM by Teena Arzola D     -C/S (19 @7:30AM)             Arrhythmia ICD-10-CM: I49.9  ICD-9-CM: 427.9  Unknown    Overview Signed 3/19/2019  1:29 PM by Tanya Contreras MD     Afib at times             Supervision of other normal pregnancy, antepartum ICD-10-CM: Z34.80  ICD-9-CM: V22.1  3/1/2019    Overview Addendum 2019 11:56 AM by Brant Denney LPN     Intrauterine pregnancy with the following problems identified:   [facility] 10/14/2019 by [address]  [place]/di twin gestation  Hx of tethered cord, multiple spine surgeries - not candidate for regional anesthesia  Self caths  Needs anesthesia consult  Declines flu vaccine  Baby ASA at 12 weeks  Irregular heart beat - cardiology consult - event monitor, nl echo  MFM/genetics - plan Horizon/NIPTS next visit; Pano and Horizon WNL 3/29/19  Rh neg - received Rhogam in ER 3/9/2019 & in office 2019  Parvo immune  TDAP @ next visit  +ECOLI on urine culture 19  Normal Glucola/Antibody screen neg  Anemia 9.0 @30 weeks- Iron studies @ next visit   [email or web address]. Poss IOL. Pt aware.   Anemia @33wks 8.4-Seeing Hematology   GBS neg             Migraine ICD-10-CM: C80.532  ICD-9-CM: 346.90  Unknown        Strain ICD-10-CM: [id number]  ICD-9-CM: JCA9192  2014         contractions ICD-10-CM: O47.9  ICD-9-CM: 644.00  10/25/2013

## 2019-09-18 NOTE — PROGRESS NOTES
Babies moving  Saw M today V/V left presenting  Cervix 50/2  Does not want CS if A is breech - understands risk and will choose to wait on Monday  Will cancel CS and labor is V/V  Has had 2 iron infusions - will set up blood day of IOL

## 2019-09-20 ENCOUNTER — TELEPHONE (OUTPATIENT)
Dept: OBGYN CLINIC | Age: 34
End: 2019-09-20

## 2019-09-20 RX ORDER — AMOXICILLIN AND CLAVULANATE POTASSIUM 875; 125 MG/1; MG/1
1 TABLET, FILM COATED ORAL 2 TIMES DAILY
Qty: 20 TAB | Refills: 0 | Status: SHIPPED | OUTPATIENT
Start: 2019-09-20 | End: 2019-09-30

## 2019-09-20 NOTE — TELEPHONE ENCOUNTER
Call received at 3:22PM      30year old patient  37w2d pregnant patient with twins, last seen in the office on 19. Patient denies vaginal bleeding and reports feeling both babies move,(one more than another), inconsistent contractions that are getting stronger, and that she had a slight gush yesterday at the store, had urine odor and when changing pad it did not become saturated. Patient is currently taking macrodantin 100 mg daily on nightly for prevention. Patient calling to say that insurance with waiver will only give her 7 catheters per day and she is using 10-12 per day. Patient states she is having to reuse catheters and can not get new ones till 19. Patient reports urinary burning has gotten progressively worse and she now has a low grade fever this afternoon. Patient is wondering about getting a prescription for amoxicillin-clavulanate (AUGMENTIN) 875-125 mg per tablet    Due to the fever. Patient scheduled for induction on 19.       Please advise      Pharmacy confirmed

## 2019-09-20 NOTE — TELEPHONE ENCOUNTER
Patient advised of prescription sent and need to call if she has any change in her symptoms. Patient verbalized understanding.

## 2019-09-20 NOTE — TELEPHONE ENCOUNTER
This nurse attempted to reach the patient and left a detailed message to advised of MD recommendations and prescription sent in has per MD order for the Augmentin to her confirmed pharmacy.

## 2019-09-21 ENCOUNTER — APPOINTMENT (OUTPATIENT)
Dept: INFUSION THERAPY | Age: 34
End: 2019-09-21
Payer: MEDICAID

## 2019-09-23 ENCOUNTER — HOSPITAL ENCOUNTER (INPATIENT)
Age: 34
LOS: 2 days | Discharge: HOME | DRG: 560 | End: 2019-09-25
Attending: OBSTETRICS & GYNECOLOGY | Admitting: OBSTETRICS & GYNECOLOGY
Payer: MEDICAID

## 2019-09-23 ENCOUNTER — ANESTHESIA (OUTPATIENT)
Dept: ANESTHESIOLOGY | Age: 34
DRG: 560 | End: 2019-09-23
Payer: MEDICAID

## 2019-09-23 ENCOUNTER — ANESTHESIA EVENT (OUTPATIENT)
Dept: ANESTHESIOLOGY | Age: 34
DRG: 560 | End: 2019-09-23
Payer: MEDICAID

## 2019-09-23 LAB
APPEARANCE UR: CLEAR
BACTERIA URNS QL MICRO: NEGATIVE /HPF
BASOPHILS # BLD: 0.1 K/UL (ref 0–0.1)
BASOPHILS NFR BLD: 1 % (ref 0–1)
BILIRUB UR QL: NEGATIVE
COLOR UR: ABNORMAL
DIFFERENTIAL METHOD BLD: ABNORMAL
EOSINOPHIL # BLD: 0 K/UL (ref 0–0.4)
EOSINOPHIL NFR BLD: 0 % (ref 0–7)
EPITH CASTS URNS QL MICRO: ABNORMAL /LPF
ERYTHROCYTE [DISTWIDTH] IN BLOOD BY AUTOMATED COUNT: 23.9 % (ref 11.5–14.5)
GLUCOSE UR STRIP.AUTO-MCNC: NEGATIVE MG/DL
HCT VFR BLD AUTO: 42.1 % (ref 35–47)
HGB BLD-MCNC: 12.7 G/DL (ref 11.5–16)
HGB UR QL STRIP: NEGATIVE
HYALINE CASTS URNS QL MICRO: ABNORMAL /LPF (ref 0–5)
IMM GRANULOCYTES # BLD AUTO: 0.1 K/UL (ref 0–0.04)
IMM GRANULOCYTES NFR BLD AUTO: 1 % (ref 0–0.5)
KETONES UR QL STRIP.AUTO: NEGATIVE MG/DL
LEUKOCYTE ESTERASE UR QL STRIP.AUTO: ABNORMAL
LYMPHOCYTES # BLD: 1.8 K/UL (ref 0.8–3.5)
LYMPHOCYTES NFR BLD: 20 % (ref 12–49)
MCH RBC QN AUTO: 25.7 PG (ref 26–34)
MCHC RBC AUTO-ENTMCNC: 30.2 G/DL (ref 30–36.5)
MCV RBC AUTO: 85.1 FL (ref 80–99)
MONOCYTES # BLD: 0.6 K/UL (ref 0–1)
MONOCYTES NFR BLD: 7 % (ref 5–13)
NEUTS SEG # BLD: 6.6 K/UL (ref 1.8–8)
NEUTS SEG NFR BLD: 71 % (ref 32–75)
NITRITE UR QL STRIP.AUTO: NEGATIVE
NRBC # BLD: 0 K/UL (ref 0–0.01)
NRBC BLD-RTO: 0 PER 100 WBC
PH UR STRIP: 7 [PH] (ref 5–8)
PLATELET # BLD AUTO: 151 K/UL (ref 150–400)
PMV BLD AUTO: 11.7 FL (ref 8.9–12.9)
PROT UR STRIP-MCNC: NEGATIVE MG/DL
RBC # BLD AUTO: 4.95 M/UL (ref 3.8–5.2)
RBC #/AREA URNS HPF: ABNORMAL /HPF (ref 0–5)
RBC MORPH BLD: ABNORMAL
RBC MORPH BLD: ABNORMAL
SP GR UR REFRACTOMETRY: 1.01 (ref 1–1.03)
UA: UC IF INDICATED,UAUC: ABNORMAL
UROBILINOGEN UR QL STRIP.AUTO: 1 EU/DL (ref 0.2–1)
WBC # BLD AUTO: 9.2 K/UL (ref 3.6–11)
WBC URNS QL MICRO: ABNORMAL /HPF (ref 0–4)

## 2019-09-23 PROCEDURE — 87086 URINE CULTURE/COLONY COUNT: CPT

## 2019-09-23 PROCEDURE — 74011250636 HC RX REV CODE- 250/636: Performed by: OBSTETRICS & GYNECOLOGY

## 2019-09-23 PROCEDURE — 88307 TISSUE EXAM BY PATHOLOGIST: CPT

## 2019-09-23 PROCEDURE — 65270000029 HC RM PRIVATE

## 2019-09-23 PROCEDURE — 74011000250 HC RX REV CODE- 250: Performed by: OBSTETRICS & GYNECOLOGY

## 2019-09-23 PROCEDURE — 74011000250 HC RX REV CODE- 250

## 2019-09-23 PROCEDURE — 86900 BLOOD TYPING SEROLOGIC ABO: CPT

## 2019-09-23 PROCEDURE — 86922 COMPATIBILITY TEST ANTIGLOB: CPT

## 2019-09-23 PROCEDURE — 77030005513 HC CATH URETH FOL11 MDII -B

## 2019-09-23 PROCEDURE — 75410000001 HC DELIVERY VAGINAL/MULTIPLE: Performed by: OBSTETRICS & GYNECOLOGY

## 2019-09-23 PROCEDURE — 86644 CMV ANTIBODY: CPT

## 2019-09-23 PROCEDURE — 81001 URINALYSIS AUTO W/SCOPE: CPT

## 2019-09-23 PROCEDURE — 77030040830 HC CATH URETH FOL MDII -A

## 2019-09-23 PROCEDURE — 77030036554

## 2019-09-23 PROCEDURE — 75410000002 HC LABOR FEE PER 1 HR: Performed by: OBSTETRICS & GYNECOLOGY

## 2019-09-23 PROCEDURE — 74011250636 HC RX REV CODE- 250/636

## 2019-09-23 PROCEDURE — 86920 COMPATIBILITY TEST SPIN: CPT

## 2019-09-23 PROCEDURE — 74011250637 HC RX REV CODE- 250/637: Performed by: OBSTETRICS & GYNECOLOGY

## 2019-09-23 PROCEDURE — 86921 COMPATIBILITY TEST INCUBATE: CPT

## 2019-09-23 PROCEDURE — 36415 COLL VENOUS BLD VENIPUNCTURE: CPT

## 2019-09-23 PROCEDURE — 75410000003 HC RECOV DEL/VAG/CSECN EA 0.5 HR: Performed by: OBSTETRICS & GYNECOLOGY

## 2019-09-23 PROCEDURE — 77030021125

## 2019-09-23 PROCEDURE — 85025 COMPLETE CBC W/AUTO DIFF WBC: CPT

## 2019-09-23 PROCEDURE — 86870 RBC ANTIBODY IDENTIFICATION: CPT

## 2019-09-23 PROCEDURE — 77030018846 HC SOL IRR STRL H20 ICUM -A

## 2019-09-23 RX ORDER — ZOLPIDEM TARTRATE 5 MG/1
5 TABLET ORAL
Status: DISCONTINUED | OUTPATIENT
Start: 2019-09-23 | End: 2019-09-25 | Stop reason: HOSPADM

## 2019-09-23 RX ORDER — SIMETHICONE 80 MG
80 TABLET,CHEWABLE ORAL
Status: DISCONTINUED | OUTPATIENT
Start: 2019-09-23 | End: 2019-09-25 | Stop reason: HOSPADM

## 2019-09-23 RX ORDER — HYDROMORPHONE HYDROCHLORIDE 2 MG/ML
1 INJECTION, SOLUTION INTRAMUSCULAR; INTRAVENOUS; SUBCUTANEOUS ONCE
Status: COMPLETED | OUTPATIENT
Start: 2019-09-23 | End: 2019-09-23

## 2019-09-23 RX ORDER — TRANEXAMIC ACID 100 MG/ML
10 INJECTION, SOLUTION INTRAVENOUS ONCE
Status: DISCONTINUED | OUTPATIENT
Start: 2019-09-23 | End: 2019-09-23

## 2019-09-23 RX ORDER — NALOXONE HYDROCHLORIDE 0.4 MG/ML
0.4 INJECTION, SOLUTION INTRAMUSCULAR; INTRAVENOUS; SUBCUTANEOUS AS NEEDED
Status: DISCONTINUED | OUTPATIENT
Start: 2019-09-23 | End: 2019-09-23 | Stop reason: HOSPADM

## 2019-09-23 RX ORDER — OXYTOCIN/RINGER'S LACTATE 20/1000 ML
999 PLASTIC BAG, INJECTION (ML) INTRAVENOUS ONCE
Status: COMPLETED | OUTPATIENT
Start: 2019-09-23 | End: 2019-09-23

## 2019-09-23 RX ORDER — AMOXICILLIN AND CLAVULANATE POTASSIUM 875; 125 MG/1; MG/1
1 TABLET, FILM COATED ORAL EVERY 12 HOURS
Status: DISCONTINUED | OUTPATIENT
Start: 2019-09-23 | End: 2019-09-25 | Stop reason: HOSPADM

## 2019-09-23 RX ORDER — SODIUM CHLORIDE, SODIUM LACTATE, POTASSIUM CHLORIDE, CALCIUM CHLORIDE 600; 310; 30; 20 MG/100ML; MG/100ML; MG/100ML; MG/100ML
125 INJECTION, SOLUTION INTRAVENOUS CONTINUOUS
Status: CANCELLED | OUTPATIENT
Start: 2019-09-23

## 2019-09-23 RX ORDER — IBUPROFEN 800 MG/1
800 TABLET ORAL EVERY 8 HOURS
Status: DISCONTINUED | OUTPATIENT
Start: 2019-09-23 | End: 2019-09-25 | Stop reason: HOSPADM

## 2019-09-23 RX ORDER — SODIUM CHLORIDE 9 MG/ML
250 INJECTION, SOLUTION INTRAVENOUS AS NEEDED
Status: DISCONTINUED | OUTPATIENT
Start: 2019-09-23 | End: 2019-09-25 | Stop reason: HOSPADM

## 2019-09-23 RX ORDER — SODIUM CHLORIDE, SODIUM LACTATE, POTASSIUM CHLORIDE, CALCIUM CHLORIDE 600; 310; 30; 20 MG/100ML; MG/100ML; MG/100ML; MG/100ML
125 INJECTION, SOLUTION INTRAVENOUS CONTINUOUS
Status: DISCONTINUED | OUTPATIENT
Start: 2019-09-23 | End: 2019-09-25 | Stop reason: HOSPADM

## 2019-09-23 RX ORDER — HYDROCORTISONE ACETATE PRAMOXINE HCL 2.5; 1 G/100G; G/100G
CREAM TOPICAL AS NEEDED
Status: DISCONTINUED | OUTPATIENT
Start: 2019-09-23 | End: 2019-09-25 | Stop reason: HOSPADM

## 2019-09-23 RX ORDER — TRANEXAMIC ACID 100 MG/ML
1 INJECTION, SOLUTION INTRAVENOUS ONCE
Status: COMPLETED | OUTPATIENT
Start: 2019-09-23 | End: 2019-09-23

## 2019-09-23 RX ORDER — MISOPROSTOL 200 UG/1
800 TABLET ORAL ONCE
Status: COMPLETED | OUTPATIENT
Start: 2019-09-23 | End: 2019-09-23

## 2019-09-23 RX ORDER — ONDANSETRON 2 MG/ML
4 INJECTION INTRAMUSCULAR; INTRAVENOUS
Status: DISCONTINUED | OUTPATIENT
Start: 2019-09-23 | End: 2019-09-23 | Stop reason: HOSPADM

## 2019-09-23 RX ORDER — BUTORPHANOL TARTRATE 2 MG/ML
1 INJECTION INTRAMUSCULAR; INTRAVENOUS
Status: DISCONTINUED | OUTPATIENT
Start: 2019-09-23 | End: 2019-09-23

## 2019-09-23 RX ORDER — NALOXONE HYDROCHLORIDE 0.4 MG/ML
0.4 INJECTION, SOLUTION INTRAMUSCULAR; INTRAVENOUS; SUBCUTANEOUS AS NEEDED
Status: DISCONTINUED | OUTPATIENT
Start: 2019-09-23 | End: 2019-09-25 | Stop reason: HOSPADM

## 2019-09-23 RX ORDER — MAG HYDROX/ALUMINUM HYD/SIMETH 200-200-20
30 SUSPENSION, ORAL (FINAL DOSE FORM) ORAL
Status: DISCONTINUED | OUTPATIENT
Start: 2019-09-23 | End: 2019-09-23 | Stop reason: HOSPADM

## 2019-09-23 RX ORDER — DOCUSATE SODIUM 100 MG/1
100 CAPSULE, LIQUID FILLED ORAL 2 TIMES DAILY
Status: DISCONTINUED | OUTPATIENT
Start: 2019-09-23 | End: 2019-09-25 | Stop reason: HOSPADM

## 2019-09-23 RX ORDER — OXYTOCIN/RINGER'S LACTATE 20/1000 ML
125-500 PLASTIC BAG, INJECTION (ML) INTRAVENOUS ONCE
Status: COMPLETED | OUTPATIENT
Start: 2019-09-23 | End: 2019-09-23

## 2019-09-23 RX ORDER — SODIUM CHLORIDE 0.9 % (FLUSH) 0.9 %
5-40 SYRINGE (ML) INJECTION EVERY 8 HOURS
Status: CANCELLED | OUTPATIENT
Start: 2019-09-23

## 2019-09-23 RX ORDER — LIDOCAINE HYDROCHLORIDE 10 MG/ML
10 INJECTION INFILTRATION; PERINEURAL ONCE
Status: DISCONTINUED | OUTPATIENT
Start: 2019-09-23 | End: 2019-09-23 | Stop reason: HOSPADM

## 2019-09-23 RX ORDER — OXYTOCIN/0.9 % SODIUM CHLORIDE 30/500 ML
0-20 PLASTIC BAG, INJECTION (ML) INTRAVENOUS
Status: DISCONTINUED | OUTPATIENT
Start: 2019-09-23 | End: 2019-09-25 | Stop reason: HOSPADM

## 2019-09-23 RX ORDER — DIPHENHYDRAMINE HCL 25 MG
25 CAPSULE ORAL
Status: DISCONTINUED | OUTPATIENT
Start: 2019-09-23 | End: 2019-09-25 | Stop reason: HOSPADM

## 2019-09-23 RX ORDER — LIDOCAINE HYDROCHLORIDE 10 MG/ML
INJECTION INFILTRATION; PERINEURAL
Status: COMPLETED
Start: 2019-09-23 | End: 2019-09-23

## 2019-09-23 RX ORDER — SODIUM CHLORIDE 0.9 % (FLUSH) 0.9 %
5-40 SYRINGE (ML) INJECTION AS NEEDED
Status: CANCELLED | OUTPATIENT
Start: 2019-09-23

## 2019-09-23 RX ORDER — ACETAMINOPHEN 325 MG/1
650 TABLET ORAL
Status: DISCONTINUED | OUTPATIENT
Start: 2019-09-23 | End: 2019-09-25 | Stop reason: HOSPADM

## 2019-09-23 RX ORDER — ONDANSETRON 4 MG/1
4 TABLET, ORALLY DISINTEGRATING ORAL
Status: ACTIVE | OUTPATIENT
Start: 2019-09-23 | End: 2019-09-24

## 2019-09-23 RX ADMIN — TRANEXAMIC ACID 1000 MG: 100 INJECTION, SOLUTION INTRAVENOUS at 13:00

## 2019-09-23 RX ADMIN — OXYTOCIN-SODIUM CHLORIDE 0.9% IV SOLN 30 UNIT/500ML 2 MILLI-UNITS/MIN: 30-0.9/5 SOLUTION at 09:38

## 2019-09-23 RX ADMIN — MISOPROSTOL 800 MCG: 200 TABLET ORAL at 15:27

## 2019-09-23 RX ADMIN — Medication 2500 MILLI-UNITS/HR: at 17:06

## 2019-09-23 RX ADMIN — LIDOCAINE HYDROCHLORIDE: 10 INJECTION, SOLUTION INFILTRATION; PERINEURAL at 15:00

## 2019-09-23 RX ADMIN — HYDROMORPHONE HYDROCHLORIDE 1 MG: 2 INJECTION INTRAMUSCULAR; INTRAVENOUS; SUBCUTANEOUS at 16:41

## 2019-09-23 RX ADMIN — SODIUM CHLORIDE, SODIUM LACTATE, POTASSIUM CHLORIDE, AND CALCIUM CHLORIDE 125 ML/HR: 600; 310; 30; 20 INJECTION, SOLUTION INTRAVENOUS at 10:11

## 2019-09-23 RX ADMIN — Medication 19980 MILLI-UNITS/HR: at 15:50

## 2019-09-23 RX ADMIN — OXYTOCIN-SODIUM CHLORIDE 0.9% IV SOLN 30 UNIT/500ML 999 MILLI-UNITS/MIN: 30-0.9/5 SOLUTION at 15:00

## 2019-09-23 RX ADMIN — SODIUM CHLORIDE, SODIUM LACTATE, POTASSIUM CHLORIDE, AND CALCIUM CHLORIDE 500 ML: 600; 310; 30; 20 INJECTION, SOLUTION INTRAVENOUS at 09:38

## 2019-09-23 RX ADMIN — ACETAMINOPHEN 650 MG: 325 TABLET ORAL at 23:09

## 2019-09-23 RX ADMIN — AMOXICILLIN AND CLAVULANATE POTASSIUM 1 TABLET: 875; 125 TABLET, FILM COATED ORAL at 21:26

## 2019-09-23 NOTE — L&D DELIVERY NOTE
Delivery Summary    Patient: Jim Baron MRN: [id number]  SSN: [id number]    YOB: 1985  Age: 30 y.o. Sex: female       Information for the patient's :  Juanita Chol [[id number]]       Labor Events:    Labor: No    Steroids: None   Cervical Ripening Date/Time:       Cervical Ripening Type: None   Antibiotics During Labor:     Rupture Identifier:      Rupture Date/Time: 2019 9:30 AM   Rupture Type: AROM   Amniotic Fluid Volume:      Amniotic Fluid Description: Clear;Meconium    Amniotic Fluid Odor: None    Induction: Oxytocin;AROM       Induction Date/Time:        Indications for Induction: Multiple Gestation    Augmentation: None   Augmentation Date/Time:      Indications for Augmentation:     Labor complications: None       Additional complications:        Delivery Events:  Indications For Episiotomy:     Episiotomy: Midline   Perineal Laceration(s):     Repaired:     Periurethral Laceration Location:      Repaired:     Labial Laceration Location:     Repaired:     Sulcal Laceration Location:     Repaired:     Vaginal Laceration Location:     Repaired:     Cervical Laceration Location:     Repaired:     Repair Suture: Vicryl 3-0   Number of Repair Packets:     Estimated Blood Loss (ml):  ml     Delivery Date: 2019    Delivery Time: 2:31 PM  Delivery Type: Vaginal, Spontaneous  Sex:  Female    Gestational Age: 37w0d   Delivery Clinician:  Ashlyn Jacobs  Living Status: Living   Delivery Location: OR            APGARS  One minute Five minutes Ten minutes   Skin color: 1   1        Heart rate: 2   2        Grimace: 2   2        Muscle tone: 2   2        Breathin   2        Totals: 9   9            Presentation:      Position:        Resuscitation Method:  Suctioning-bulb; Tactile Stimulation     Meconium Stained: None      Cord Information:    Complications:    Cord around: right lower extremity  Delayed cord clamping?     Cord clamped date/time: Disposition of Cord Blood:      Blood Gases Sent?:      Placenta:  Date/Time:    Removal:        Appearance:       [place] Measurements:  Birth Weight: 5 lb 12.2 oz (2.615 kg)      Birth Length: 1' 6.25\" (0.464 m)      Head Circumference: 1' 0.4\" (0.315 m)      Chest Circumference: 11.81\" (0.3 m)     Abdominal Girth: 11.02\" (0.28 m)    Other Providers:   MIKE RAINEY;CRYSTAL CASTLE;ANNE OLVERA;KVNG CANALES;WILMA HARTMAN;EVIE MARINELLI, Obstetrician;Primary Nurse;Primary [place] Nurse; Anesthesiologist;Charge Nurse;Scrub Tech       Information for the patient's :  Willy Pan [[id number]]       Labor Events:    Labor: No    Steroids:     Cervical Ripening Date/Time:       Cervical Ripening Type: None   Antibiotics During Labor: No   Rupture Identifier:      Rupture Date/Time: 2019 9:30 AM   Rupture Type: AROM   Amniotic Fluid Volume:      Amniotic Fluid Description: Clear;Meconium    Amniotic Fluid Odor: None    Induction:         Induction Date/Time:        Indications for Induction:      Augmentation:     Augmentation Date/Time:      Indications for Augmentation:     Labor complications:          Additional complications:        Delivery Events:  Indications For Episiotomy:     Episiotomy: Midline   Perineal Laceration(s):     Repaired:     Periurethral Laceration Location:      Repaired:     Labial Laceration Location:     Repaired:     Sulcal Laceration Location:     Repaired:     Vaginal Laceration Location:     Repaired:     Cervical Laceration Location:     Repaired:     Repair Suture: Vicryl 3-0   Number of Repair Packets:     Estimated Blood Loss (ml):  ml     Delivery Date: 2019    Delivery Time: 2:42 PM  Delivery Type: Vaginal, Spontaneous  Sex:  Female    Gestational Age: 37w0d   Delivery Clinician:  Deepak Crespo  Living Status: Living   Delivery Location: OR            APGARS  One minute Five minutes Ten minutes   Skin color: 0   1 Heart rate: 2   2        Grimace: 2   2        Muscle tone: 2   2        Breathin   2        Totals: 8   9            Presentation: Vertex    Position:        Resuscitation Method:  Tactile Stimulation;Suctioning-bulb     Meconium Stained: None      Cord Information:    Complications: None  Cord around:    Delayed cord clamping? Cord clamped date/time:   Disposition of Cord Blood:      Blood Gases Sent?:      Placenta:  Date/Time: 2019  2:46 PM  Removal: Spontaneous      Appearance: Normal     [place] Measurements:  Birth Weight: 6 lb 5.8 oz (2.885 kg)      Birth Length: 1' 7\" (0.483 m)      Head Circumference: 1' 0.99\" (0.33 m)      Chest Circumference: 11.81\" (0.3 m)     Abdominal Girth: 11.42\" (0.29 m)    Other Providers:   MIKE RAINEY;CRYSTAL CASTLE;GAYATRI DAS;KVNG CANALES;WILMA HARTMAN;EVIE MARINELLI, Obstetrician;Primary Nurse;Primary  Nurse; Anesthesiologist;Charge Nurse;Scrub Tech           Group B Strep:   Lab Results   Component Value Date/Time    GrBStrep, External Negative 2019     Information for the patient's :  Kunal Brower [[id number]]   No results found for: Ova Balint, PCTDIG, BILI, ABORHEXT, ABORH  Information for the patient's :  Helen Nice [[id number]]     Lab Results   Component Value Date/Time    ABO/Rh(D) A POSITIVE 2019 03:39 PM    HEATHER IgG NEG 2019 03:39 PM    Bilirubin if HEATHER pos: IF DIRECT LILI POSITIVE, BILIRUBIN TO FOLLOW 2019 03:39 PM     No results for input(s): PCO2CB, PO2CB, HCO3I, SO2I, IBD, PTEMPI, SPECTI, PHICB, ISITE, IDEV, IALLEN in the last 72 hours.  A over ME. Dr. Deisy Pierre with US B - vertex. Entire arm in vagina at 9:00. Easily reduced. I realized that obviously she was already SROM B prior to this delivery. Loop of cord noted at 3:00 was though to be A but I realized was actually. No significant bradycardia seen - just variable. Pt was able to push and deliver B. Interdel time 11 minutes. Both babies crying and vigorous at birth.

## 2019-09-23 NOTE — PROGRESS NOTES
1630 Straight cath done for 400cc urine without difficulty. 1641 Dilaudid 1cc given. 5 Dr Susanne Jameson at bedside to manually express uterus to for clots. Small amount of clots removed.

## 2019-09-23 NOTE — PROGRESS NOTES
09/23/19 10:53 AM  CM met with patient and her /FOB Bishnu Osborn ([phone]) for follow up on discharge planning. Demographics were reviewed; patient noted that she and FOB and their children recently moved to [facility] from [facility], address has been updated. Patient has two other children, ages 8 and 11. FOB has two other children, ages 11 and 3. Patient works as a  at [employer] and will return to work in January. Family supports available. Due to the change in residence, patient now considering change in pediatrician. CM provided list of closest pediatricians to the [facility] area. Patient has car seat, crib, clothing, and other necessary supplies. Patient has Medicaid and understands that the babies will be added to this coverage. Patient plans to breastfeed. Breast pump ordered to be delivered to patient's home. Patient noted that they never set up [facility] services, CM provided contact information for their local [facility] office. Patient confirmed that she is \"good to go\" on her cath supplies. Care Management Interventions  PCP Verified by CM:  Yes  Mode of Transport at Discharge: Self  Transition of Care Consult (CM Consult): Discharge Planning  Current Support Network: Lives with Spouse, Family Lives Nearby  Confirm Follow Up Transport: Family  Plan discussed with Pt/Family/Caregiver: Yes  Freedom of Choice Offered: Yes  Discharge Location  Discharge Placement: Home with outpatient services  GONZALEZ Lim

## 2019-09-23 NOTE — PROGRESS NOTES
0700 pt arrived for induction of labor with twins  0730 attempted x 1 iv start without success  0745 pt st catherized self, urine obtained for culture  0800 Dr Oj Marcelo in, discussed plan of care with pt, vertex position of both twin a and twin b confirmed. Pt agreed with plan of care of induction  0820 Dr Silva Mirza in discussing plan of care  5508 iv started right arm by dr Jaxon Cardoza  1045 dr Reva Bautista aware lab stated it would take a few hours before crossmatched blood would be available. 1200 dr Reva Bautista in and performed sve  1323 pt 7 cm by sve of dr Reva Bautista, reviewed efm tracing, order received to keep pitocin at 2 mu    1418 dr Reva Bautista in  1419 pt taken to the or for delivery  1424 bed broken down, us placed on to monitor fhr. Baby a fhr 125 with variable noted, baby b fhr 125. Pt screaming with contractions, takes direction fairly well with pushing. Dr Becki Briggs in for delivery  [phone] baby a delivered  [phone] us placed to confirm baby b placement, vertex position noted, sve by dr Reva Bautista with arm presentation and cord noted, reduced by dr Reva Bautista, dr Becki Briggs placing us to assess fhr. Baby b fhr 90, pt pushing with contractions  1434 fhr baby b 90 and increasing to 115, dr Becki Briggs assessing fhr with us between pt pushing  1440 fse applied, fhr [phone] baby b delivered  1442 txa given in or as ordered  36 dr Reva Bautista in , assessing bleeding, st cath performed  1814 qbl at delivery not attained, qbl 147 for post partum  1812 perineum is swollen, right side of perineum appears more swollen then left. Tissue on right side appears firm but not hard.   1200 [place] sbar report given to shelli Benitez rn

## 2019-09-23 NOTE — PROGRESS NOTES
CTSP around 5pm for continued bleeding. Given IV dilaudid 1mg. Uterus explored. Small amount of clots, uterus clean. Had been given Cytotec and TXA. Keep bladder empty.

## 2019-09-23 NOTE — PROGRESS NOTES
Labor Progress Note  Patient seen, fetal heart rate and contraction pattern evaluated, patient examined.   Visit Vitals  /72   Pulse 82   Temp 98.3 °F (36.8 °C)   Resp 16   Ht 5' 2\" (1.575 m)   Wt 143 lb (64.9 kg)   LMP 02/01/2019 (Within Weeks) Comment: 1/1/19   SpO2 99%   BMI 26.16 kg/m²       Physical Exam:  Cervical Exam:  5/90 %/ /   Membranes:  A - thin mec  Uterine Activity: q 2-3 minutes  Fetal Heart Rate: Reactive x 2    Assessment/Plan:  Hgb 12.7  Cont plan for vaginal delivery

## 2019-09-23 NOTE — H&P
History & Physical    Name: Yamileth Jacob MRN: [id number]  SSN: [id number]    YOB: 1985  Age: 30 y.o. Sex: female        Subjective:     Estimated Date of Delivery: 10/14/19  OB History        6    Para   2    Term   2            AB   3    Living   2       SAB   1    TAB        Ectopic        Molar        Multiple        Live Births   2          Obstetric Comments   Menarche:  *18**. LMP: 3/15/14. # of Children:  2. Age at Delivery of First Child:  27.   Hysterectomy/oophorectomy:  NO/NO. Breast Bx:  no.  Hx of Breast Feeding:  yes. BCP:  yes. Hormone therapy:  no.             Ms. Nelsy Rubio is admitted with pregnancy at 37w0d for induction of labor. Prenatal course was complicated by twins and severe anemia, chronic UTI. Please see prenatal records for details. Past Medical History:   Diagnosis Date    Arrhythmia     Arthritis     left foot and lower back    Asthma     no inhaler    Burning with urination     Complication of anesthesia     bradycardia    Disease of blood and blood forming organ     Headache     Heart abnormality     mummer    Iron deficiency anemia during pregnancy 2019    Migraine     Nerve damage     Nerve damage in left foot.    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 in pregnancy     Self-catheterizes urinary bladder     Since age 13   [facility] Unspecified breast disorder     L invert nipple     Past Surgical History:   Procedure Laterality Date    HX BACK SURGERY      x2    HX GYN      episotomy    HX ORTHOPAEDIC      spine and left foot    HX OTHER SURGICAL       Social History     Occupational History    Not on file   Tobacco Use    Smoking status: Never Smoker    Smokeless tobacco: Never Used   Substance and Sexual Activity    Alcohol use: No     Frequency: Never    Drug use: No    Sexual activity: Not Currently     Partners: Male     Birth control/protection: None     Family History   Problem Relation Age of Onset    Arthritis-osteo Mother    Nolan Migraines Mother     Alcohol abuse Paternal Grandfather     Bleeding Prob Maternal Grandfather     Breast Cancer Other        Allergies   Allergen Reactions    Latex Rash     Wheezing    Beef Containing Products Anaphylaxis    Keflex [Cephalexin] Anaphylaxis    Codeine Other (comments)     Hyperactivity    Doxycycline Nausea Only    Macrobid [Nitrofurantoin Monohyd/M-Cryst] Other (comments)     Pharmacy had this allergy listed and called the patient and patient could not remember her reaction to the medication. This nurse called the patient . Patient states that when it was prescribed in early pregnancy it caused spotting.  Milk Hives    Nuts [Tree Nut] Swelling     Pt stated that her mouth and throat feels funny when she eats nuts      Omnicef [Cefdinir] Other (comments)     \"shakes\"    Pepto-Bismol [Bismuth Subsalicylate] Hives    Phenergan [Promethazine] Other (comments)     Increased sedation    Reglan [Metoclopramide] Anxiety    Soy Hives     Prior to Admission medications    Medication Sig Start Date End Date Taking? Authorizing Provider   amoxicillin-clavulanate (AUGMENTIN) 875-125 mg per tablet Take 1 Tab by mouth two (2) times a day for 10 days. 9/20/19 9/30/19 Yes Feng Last MD   ferrous sulfate (IRON) 325 mg (65 mg iron) tablet Take  by mouth Daily (before breakfast). Yes Provider, Historical   multivitamin with iron (FLINTSTONES) chewable tablet Take 1 Tab by mouth daily. Yes Other, MD Ivis   nitrofurantoin (MACRODANTIN) 100 mg capsule Take 1 Cap by mouth nightly. 8/12/19   Feng Lsat MD   acetaminophen (TYLENOL 8 HOUR PO) Take 500 mg by mouth as needed. Provider, Historical   loperamide (IMODIUM A-D) 2 mg tablet Take 2 mg by mouth four (4) times daily as needed for Diarrhea.     Other, Ivis MD        Review of Systems: A comprehensive review of systems was negative except for that written in the HPI. Objective:     Vitals:  Vitals:    09/23/19 0731   Weight: 143 lb (64.9 kg)   Height: 5' 2\" (1.575 m)        Physical Exam:  Patient without distress. Heart: Regular rate and rhythm  Lung: clear to auscultation throughout lung fields, no wheezes, no rales, no rhonchi and normal respiratory effort  Abdomen: soft, nontender  Fundus: soft and non tender  Perineum: blood absent, amniotic fluid absent  Cervical Exam: 4.5/70 %/ /   Membranes:  Artificial Rupture of Membranes; Amniotic Fluid: large amount of thin meconium fluid  Fetal Heart Rate: Reactive x 2    Bedside ultrasound V/V - baby on left presenting    Prenatal Labs:   Lab Results   Component Value Date/Time    Rubella, External immune 02/26/2019    GrBStrep, External Negative 09/12/2019    HBsAg, External neg 02/26/2019    HIV, External neg 02/26/2019    Gonorrhea, External neg 02/26/2019    Chlamydia, External neg 02/26/2019        Assessment/Plan:     Plan: Admit for Reassuring fetal status, Continue plan for vaginal delivery. Group B Strep was negative. Will plan to start second IV. Has received IV venofer x 2 for anemia. Set up 2 units PRBC and TXA at delivery.  Have discussed in great detail delivery planning with delivery in OR and GET for CS - pt cannot have epidural due to tethered spinal cord    Signed By:  Melissa Lepe MD     September 23, 2019

## 2019-09-23 NOTE — LACTATION NOTE
This note was copied from a baby's chart. Mother plans to breast/formula feed twins. Mother breast fed twins after delivery. She has a history of low breast milk supply. Dr. Nissa Fournier came into room to do a procedure. Discussed with mother her plan for feeding. Reviewed the benefits of exclusive breast milk feeding during the hospital stay. Informed her of the risks of using formula to supplement in the first few days of life as well as the benefits of successful breast milk feeding; referred her to the Breastfeeding booklet about this information. She acknowledges understanding of information reviewed and states that it is her plan to breast /bottle feed her infant. Will support her choice and offer additional information as needed. `Normal feeding behaviors of 37 week twins discussed. Twins/multiples breastfeeding materials provided. Assisted with comfortable positioning both as singletons and in tandem. Introduction of pumping to maximize milk production discussed as per Academy of Breastfeeding [employer] Protocol #'s 5, 7 + 10. with regimen determined by infant's age and feeding behaviors. Mother will successfully establish breastfeeding by feeding in response to early feeding cues   or wake every 3h, will obtain deep latch, and will keep log of feedings/output. Taught to BF at hunger cues and or q 2-3 hrs and to offer 10-20 drops of hand expressed colostrum at any non-feeds. Breast Assessment  Left Breast: Medium  Left Nipple: Inverted, Intact  Right Breast: Medium  Right Nipple: Everted, Intact  Breast- Feeding Assessment  Attends Breast-Feeding Classes: No  Breast-Feeding Experience: Yes(Breast fed 2 older children for up to 3 months. History of low breast milk supply. Mother given list of foods to help increase milk supply if needed)  Type/Quality: Good(Breast fed twins after delivery. Plans to breast/formula feed)         Mother given LC#/support group info.

## 2019-09-24 LAB
BACTERIA SPEC CULT: NORMAL
CC UR VC: NORMAL
ERYTHROCYTE [DISTWIDTH] IN BLOOD BY AUTOMATED COUNT: 23.4 % (ref 11.5–14.5)
HCT VFR BLD AUTO: 37.1 % (ref 35–47)
HGB BLD-MCNC: 11.5 G/DL (ref 11.5–16)
MCH RBC QN AUTO: 26.4 PG (ref 26–34)
MCHC RBC AUTO-ENTMCNC: 31 G/DL (ref 30–36.5)
MCV RBC AUTO: 85.1 FL (ref 80–99)
NRBC # BLD: 0 K/UL (ref 0–0.01)
NRBC BLD-RTO: 0 PER 100 WBC
PLATELET # BLD AUTO: 161 K/UL (ref 150–400)
RBC # BLD AUTO: 4.36 M/UL (ref 3.8–5.2)
SERVICE CMNT-IMP: NORMAL
WBC # BLD AUTO: 15.1 K/UL (ref 3.6–11)

## 2019-09-24 PROCEDURE — 85027 COMPLETE CBC AUTOMATED: CPT

## 2019-09-24 PROCEDURE — 74011250636 HC RX REV CODE- 250/636: Performed by: OBSTETRICS & GYNECOLOGY

## 2019-09-24 PROCEDURE — 86900 BLOOD TYPING SEROLOGIC ABO: CPT

## 2019-09-24 PROCEDURE — 77030036554

## 2019-09-24 PROCEDURE — 36415 COLL VENOUS BLD VENIPUNCTURE: CPT

## 2019-09-24 PROCEDURE — 74011250637 HC RX REV CODE- 250/637: Performed by: OBSTETRICS & GYNECOLOGY

## 2019-09-24 PROCEDURE — 85461 HEMOGLOBIN FETAL: CPT

## 2019-09-24 PROCEDURE — 65270000029 HC RM PRIVATE

## 2019-09-24 RX ORDER — IBUPROFEN 800 MG/1
800 TABLET ORAL
Qty: 60 TAB | Refills: 0 | Status: SHIPPED | OUTPATIENT
Start: 2019-09-24 | End: 2019-11-04

## 2019-09-24 RX ADMIN — HUMAN RHO(D) IMMUNE GLOBULIN 0.3 MG: 300 INJECTION, SOLUTION INTRAMUSCULAR at 17:53

## 2019-09-24 RX ADMIN — AMOXICILLIN AND CLAVULANATE POTASSIUM 1 TABLET: 875; 125 TABLET, FILM COATED ORAL at 21:18

## 2019-09-24 RX ADMIN — IBUPROFEN 400 MG: 800 TABLET ORAL at 17:44

## 2019-09-24 RX ADMIN — ACETAMINOPHEN 650 MG: 325 TABLET ORAL at 08:13

## 2019-09-24 RX ADMIN — ACETAMINOPHEN 650 MG: 325 TABLET ORAL at 13:28

## 2019-09-24 RX ADMIN — AMOXICILLIN AND CLAVULANATE POTASSIUM 1 TABLET: 875; 125 TABLET, FILM COATED ORAL at 08:13

## 2019-09-24 NOTE — PROGRESS NOTES
Post-Partum Day Number 1 Progress Note    Parthenia Petties       Information for the patient's :  Kunal Brower [[id number]]   Vaginal, Spontaneous  Information for the patient's :  Helen Nice [[id number]]   Vaginal, Spontaneous   Patient doing well without significant complaint. Voiding without difficulty, normal lochia. Pt is breastfeeding    Vitals:  Visit Vitals  /71 (BP 1 Location: Right arm, BP Patient Position: At rest)   Pulse 74   Temp 98.6 °F (37 °C)   Resp 14   Ht 5' 2\" (1.575 m)   Wt 143 lb (64.9 kg)   LMP 2019 (Within Weeks) Comment: 19   SpO2 98%   Breastfeeding? Unknown   BMI 26.16 kg/m²     Temp (24hrs), Av.3 °F (36.8 °C), Min:98 °F (36.7 °C), Max:98.7 °F (37.1 °C)        Exam:   Patient without distress. Abdomen soft, fundus firm, nontender                Perineum with normal lochia noted. Lower extremities are negative for swelling, cords or tenderness.     Labs:     Lab Results   Component Value Date/Time    WBC 15.1 (H) 2019 04:02 AM    WBC 9.2 2019 09:12 AM    WBC 10.0 2019 10:12 AM    WBC 9.8 2019 12:26 PM    WBC 23.7 (HH) 2019 02:11 PM    WBC 16.5 (H) 2019 04:20 AM    WBC 20.9 (H) 2019 11:15 AM    WBC 15.0 (H) 2019 09:27 PM    WBC 13.3 (H) 2019 07:27 PM    WBC 12.3 (H) 2019 01:30 PM    WBC 12.4 (H) 2019 09:57 PM    WBC 8.7 2018 06:20 PM    WBC 7.5 2018 07:56 PM    WBC 8.2 2014 04:30 PM    WBC 10.8 2014 04:00 PM    WBC 9.2 10/25/2013 12:15 PM    WBC 8.0 2013 01:00 PM    HGB 11.5 2019 04:02 AM    HGB 12.7 2019 09:12 AM    HGB 8.8 (L) 2019 10:12 AM    HGB 8.4 (L) 2019 12:26 PM    HGB 9.0 (L) 2019 02:11 PM    HGB 8.9 (L) 2019 04:20 AM    HGB 9.9 (L) 2019 11:15 AM    HGB 10.0 (L) 2019 09:27 PM    HGB 14.5 2019 07:27 PM    HGB 14.2 2019 01:30 PM    HGB 14.4 02/01/2019 09:57 PM    HGB 16.3 (H) 12/21/2018 06:20 PM    HGB 14.5 02/22/2018 07:56 PM    HGB 16.3 (H) 08/09/2014 04:30 PM    HGB 14.4 01/16/2014 04:00 PM    HGB 13.7 10/25/2013 12:15 PM    HGB 14.1 07/25/2013 01:00 PM    HCT 37.1 09/24/2019 04:02 AM    HCT 42.1 09/23/2019 09:12 AM    HCT 30.2 (L) 09/03/2019 10:12 AM    HCT 27.0 (L) 08/23/2019 12:26 PM    HCT 28.9 (L) 08/12/2019 02:11 PM    HCT 27.3 (L) 07/25/2019 04:20 AM    HCT 30.2 (L) 07/24/2019 11:15 AM    HCT 31.7 (L) 07/23/2019 09:27 PM    HCT 42.0 03/08/2019 07:27 PM    HCT 41.0 02/26/2019 01:30 PM    HCT 42.2 02/01/2019 09:57 PM    HCT 45.8 12/21/2018 06:20 PM    HCT 41.7 02/22/2018 07:56 PM    HCT 45.4 08/09/2014 04:30 PM    HCT 41.7 01/16/2014 04:00 PM    HCT 38.5 10/25/2013 12:15 PM    HCT 38.9 07/25/2013 01:00 PM    PLATELET 698 11/41/4985 04:02 AM    PLATELET 693 37/90/9905 09:12 AM    PLATELET 899 26/53/0660 10:12 AM    PLATELET 149 58/86/9364 12:26 PM    PLATELET 823 84/47/8348 02:11 PM    PLATELET 208 41/44/1326 04:20 AM    PLATELET 617 68/90/2711 11:15 AM    PLATELET 759 50/19/3719 09:27 PM    PLATELET 134 75/86/0589 07:27 PM    PLATELET 791 08/93/2771 01:30 PM    PLATELET 025 32/81/0782 09:57 PM    PLATELET 351 07/59/5183 06:20 PM    PLATELET 228 03/93/9673 07:56 PM    PLATELET 945 33/69/5932 04:30 PM    PLATELET 152 30/32/5271 04:00 PM    PLATELET 215 40/89/0056 12:15 PM    PLATELET 971 26/33/7389 01:00 PM    Hgb, External 8.9 07/25/2019    Hgb, External 14.2 02/26/2019    Hgb, External 13.3 10/11/2013    Hgb, External 15.2 06/12/2013    Hct, External 27.3 07/25/2019    Hct, External 41.0 02/26/2019    Hct, External 38.5 10/11/2013    Hct, External 44.6 06/12/2013    Platelet cnt., External 179 07/25/2019    Platelet cnt., External 313 02/26/2019    Platelet cnt., External 211 10/11/2013    Platelet cnt., External 302 06/12/2013       Recent Results (from the past 24 hour(s))   RH IMMUNE GLOBULIN EVAL-LAB ORDER    Collection Time: 09/24/19  4:02 AM   Result Value Ref Range    ABO/Rh(D) PENDING     Fetal screen PENDING     WEAK D PENDING    CBC W/O DIFF    Collection Time: 09/24/19  4:02 AM   Result Value Ref Range    WBC 15.1 (H) 3.6 - 11.0 K/uL    RBC 4.36 3.80 - 5.20 M/uL    HGB 11.5 11.5 - 16.0 g/dL    HCT 37.1 35.0 - 47.0 %    MCV 85.1 80.0 - 99.0 FL    MCH 26.4 26.0 - 34.0 PG    MCHC 31.0 30.0 - 36.5 g/dL    RDW 23.4 (H) 11.5 - 14.5 %    PLATELET 019 213 - 710 K/uL    NRBC 0.0 0  WBC    ABSOLUTE NRBC 0.00 0.00 - 0.01 K/uL       Assessment: Doing well, post partum day 1    Plan:  1. Continue routine postpartum and perineal care as well as maternal education.

## 2019-09-24 NOTE — PROGRESS NOTES
SBAR IN Report: Mother    Verbal report received from Nat Malhotra RN (full name & credentials) on this patient, who is now being transferred from L&D (unit) for routine progression of care. Report consisted of patient's Situation, Background, Assessment and Recommendations (SBAR). [place] ID bands were compared with the identification form, and verified with the patient and transferring nurse. Information from the SBAR, Kardex, Intake/Output and MAR and the [place] Report was reviewed with the transferring nurse; opportunity for questions and clarification provided.

## 2019-09-24 NOTE — PROGRESS NOTES
Bedside shift change report given to EDDIE Chavez (oncoming nurse) by Yasmin Jackson RN (offgoing nurse). Report included the following information SBAR, Kardex, MAR and [employer]ordion.

## 2019-09-24 NOTE — DISCHARGE SUMMARY
Obstetrical Discharge Summary     Name: Anderson Douglas MRN: [id number]  SSN: [id number]    YOB: 1985  Age: 30 y.o. Sex: female      Admit Date: 2019    Discharge Date: 2019     Admitting Physician: Brayan Cazares MD     Attending Physician:  Karyn Arora MD     Admission Diagnoses: Pregnancy [Z34.90]; Pregnancy [Z34.90]    Discharge Diagnoses:   Information for the patient's :  Kunal Brower [[id number]]   Delivery of a 5 lb 12.2 oz (2.615 kg) female infant via Vaginal, Spontaneous on 2019 at 2:31 PM  by Brayan Cazares. Apgars were 9  and 9 . Information for the patient's :  Helen Nice [[id number]]   Delivery of a 6 lb 5.8 oz (2.885 kg) female infant via Vaginal, Spontaneous on 2019 at 2:42 PM  by Brayan Cazares. Apgars were 8  and 9 . Additional Diagnoses:   Hospital Problems  Date Reviewed: 2019          Codes Class Noted POA    Pregnancy ICD-10-CM: Z34.90  ICD-9-CM: V22.2  2019 Unknown    Overview Addendum 2019  9:00 AM by James BARROSO/S (19 @7:30AM)                  Lab Results   Component Value Date/Time    Rubella, External immune 2019    GrBStrep, External Negative 2019       Hospital Course: Normal hospital course following the delivery. Disposition: Home  Condition: Good    Patient Instructions:   Current Discharge Medication List      START taking these medications    Details   ibuprofen (MOTRIN) 800 mg tablet Take 1 Tab by mouth every six (6) hours as needed for Pain. Qty: 60 Tab, Refills: 0         CONTINUE these medications which have NOT CHANGED    Details   amoxicillin-clavulanate (AUGMENTIN) 875-125 mg per tablet Take 1 Tab by mouth two (2) times a day for 10 days. Qty: 20 Tab, Refills: 0      multivitamin with iron (FLINTSTONES) chewable tablet Take 1 Tab by mouth daily. nitrofurantoin (MACRODANTIN) 100 mg capsule Take 1 Cap by mouth nightly.   Qty: 90 Cap, Refills: 2 acetaminophen (TYLENOL 8 HOUR PO) Take 500 mg by mouth as needed. STOP taking these medications       ferrous sulfate (IRON) 325 mg (65 mg iron) tablet Comments:   Reason for Stopping:         loperamide (IMODIUM A-D) 2 mg tablet Comments:   Reason for Stopping:               Reference my discharge instructions. Follow-up Appointments   Procedures    FOLLOW UP VISIT Appointment in: Two Weeks     Standing Status:   Standing     Number of Occurrences:   1     Order Specific Question:   Appointment in     Answer:    Two Weeks        Signed By:  Patricia Boles MD     September 24, 2019

## 2019-09-24 NOTE — LACTATION NOTE
This note was copied from a baby's chart. Mother was breastfeeding twin B when [facility] came to visit. Baby was latched on well to right breast and nursing vigorously. Twin A had just nursed for 15 minutes and then took 5 ml of formula. LC reviewed the following:    Reviewed effects/risks of late  birth on initiation of breastfeeding including infant's sleepiness, ineffective or missed breastfeedings, infant's decreased stamina to sustain prolonged latch and effective breastfeeding, decreased energy reserves related to low birth wt and inability to stimulate milk supply. Recommended interventions include skin to skin bonding at breast, hand expression of colostrum as infant rests at breast and initiation of breastfeeding as infant is able, initiation of pumping regimen to begin within 6 hours of birth as mom is able; complement/supplement feeding as guided by neonatologist.     Reviewed breastfeeding basics:  Supply and demand, breastfeed twins  8-12 times in 24 hr.,   stomach size, early  Feeding cues, skin to skin, positioning and baby led latch-on, assymetrical latch with signs of good, deep latch vs shallow, feeding frequency and duration, and log sheet for tracking infant feedings and output. Breastfeeding Booklet and Warm line information given. Discussed typical  weight loss and the importance of infant weight checks with pediatrician 1-2 post discharge. Mother will successfully establish breastfeeding by feeding in response to early feeding cues   or wake every 3h, will obtain deep latch, and will keep log of feedings/output. Taught to BF at hunger cues and or q 2-3 hrs and to offer 10-20 drops of hand expressed colostrum at any non-feeds. Breast Assessment  Left Breast: Medium  Left Nipple:  Inverted, Intact  Right Breast: Medium  Right Nipple: Everted, Intact  Breast- Feeding Assessment  Attends Breast-Feeding Classes: No  Breast-Feeding Experience: Yes  Breast Trauma/Surgery: No  Type/Quality: Good(Mother states both babies are breastfeeding well. She is giving baby formula after breastfeeding. )  Lactation Consultant Visits  Breast-Feedings: Good ([facility] observed twin baby B latching on and breastfeeding. Baby was latched on well to right breast and nursing vigorously. Twin A had just nursed for 15 min.  and took 5 ml of formula.)  Mother/Infant Observation  Mother Observation: Alignment, Recognizes feeding cues, Breast comfortable, Holds breast, Close hold, Cramps  Infant Observation: Audible swallows, Lips flanged, lower, Lips flanged, upper, Opens mouth, Latches nipple and aereolae, Rhythmic suck  LATCH Documentation  Latch: Grasps breast, tongue down, lips flanged, rhythmic sucking(Twin B observed)  Audible Swallowing: A few with stimulation  Type of Nipple: Everted (after stimulation)  Comfort (Breast/Nipple): Soft/non-tender  Hold (Positioning): No assist from staff, mother able to position/hold infant  LATCH Score: 9

## 2019-09-24 NOTE — PROGRESS NOTES
09/23/19   7:05 PM  SBAR report received from Hugo Garcia RN. Assumed care of the patient at this time. 7:24 PM  Fundal check performed. Patients labia extremely swollen on both sides and per the previous nurse, it is worse than it was at last check. Discussed with the patient that a dunn catheter would probably be a better option for her vs. Straight cathing her constantly. The patient agreed. 7:30 PM  Spoke to Dr. Gera Coulter about the patients need for a dunn. Order given. 7:45 PM  Dunn catheter placed and adali care performed. 8:12 PM  Patient without further complaints. Call bell in reach. 10:19 PM  Patient transferred to MIU room 302. Bedside SBAR report given to Mariam Brito RN. Care of the patient turned over at this time. Infant ID bands verified.

## 2019-09-25 VITALS
SYSTOLIC BLOOD PRESSURE: 118 MMHG | TEMPERATURE: 98.5 F | RESPIRATION RATE: 16 BRPM | BODY MASS INDEX: 26.31 KG/M2 | HEIGHT: 62 IN | HEART RATE: 78 BPM | DIASTOLIC BLOOD PRESSURE: 74 MMHG | WEIGHT: 143 LBS | OXYGEN SATURATION: 98 %

## 2019-09-25 LAB
ABO + RH BLD: NORMAL
BLD PROD TYP BPU: NORMAL
BPU ID: NORMAL
FETAL SCREEN,FMHS: NORMAL
STATUS OF UNIT,%ST: NORMAL
UNIT DIVISION, %UDIV: 0
WEAK D AG RBC QL: NORMAL

## 2019-09-25 PROCEDURE — 77030036554

## 2019-09-25 PROCEDURE — 74011250637 HC RX REV CODE- 250/637: Performed by: OBSTETRICS & GYNECOLOGY

## 2019-09-25 RX ADMIN — SIMETHICONE CHEW TAB 80 MG 80 MG: 80 TABLET ORAL at 08:28

## 2019-09-25 RX ADMIN — AMOXICILLIN AND CLAVULANATE POTASSIUM 1 TABLET: 875; 125 TABLET, FILM COATED ORAL at 09:41

## 2019-09-25 RX ADMIN — IBUPROFEN 400 MG: 800 TABLET ORAL at 00:08

## 2019-09-25 RX ADMIN — IBUPROFEN 400 MG: 800 TABLET ORAL at 08:25

## 2019-09-25 NOTE — DISCHARGE INSTRUCTIONS
POST DELIVERY DISCHARGE INSTRUCTIONS    Name: Keila Galvin  YOB: 1985  Primary Diagnosis: Active Problems:    Pregnancy (2019)      Overview: -C/S (19 @7:30AM)        General:     Diet/Diet Restrictions:  Eight 8-ounce glasses of fluid daily (water, juices); avoid excessive caffeine intake. Meals/snacks as desired which are high in fiber and carbohydrates and low in fat and cholesterol. Medications:   {Medication reconciliation information is now added to the patient's AVS automatically when it is printed. There is no need to use this SmartLink in discharge instructions. Highlight this text and delete it to clear this message}      Physical Activity / Restrictions / Safety:     Avoid heavy lifting, no more that 8 lbs. For 2-3 weeks; No driving while taking narcotic pain medication. Post  patients should not drive until pain free. No intercourse 4-6 weeks, no douching or tampon use. May resume exercise in 6 weeks. Discharge Instructions/Special Treatment/Home Care Needs:     Continue prenatal vitamins. Continue to use squirt bottle with warm water on your episiotomy after each bathroom use until bleeding stops. If steri-strips applied to your incision, remove in 7 days. Take stool softeners daily. Call your doctor for the following:     Fever over 101 degrees by mouth. Vaginal bleeding heavier than a normal menstrual period or lost larger than a golf ball. Red streaks or increased swelling of legs, painful red streaks on your breast.  Painful urination, or increased pain, redness or discharge with your incision. Pain Management:     Pain Management:   Take Acetaminophen (Tylenol) or Ibuprofen (Advil, Motrin), as directed for pain. Use a warm Sitz bath 3 times daily to relieve episiotomy or hemorrhoidal discomfort. Heating pad to  incision as needed. For hemorrhoidal discomfort, use Tucks and Anusol cream as needed and directed.     Follow-Up Care: Appointment with MD:   Follow-up Appointments   Procedures    FOLLOW UP VISIT Appointment in: Two Weeks     Standing Status:   Standing     Number of Occurrences:   1     Order Specific Question:   Appointment in     Answer: Two Weeks     Telephone number: [phone]    Signed By: Maxwell Ferguson MD                                                                                                   Date: 9/24/2019 Time: 9:51 AM    POSTPARTUM DISCHARGE INSTRUCTIONS       Name:  Denzel Alas  YOB: 1985  Admission Diagnosis:  Pregnancy [Z34.90]  Pregnancy [Z34.90]     Discharge Diagnosis:    Problem List as of 9/25/2019 Date Reviewed: 9/18/2019          Codes Class Noted - Resolved    Iron deficiency anemia during pregnancy ICD-10-CM: O99.019, D50.9  ICD-9-CM: 648.20, 280.9  8/27/2019 - Present        Pregnancy ICD-10-CM: Z34.90  ICD-9-CM: V22.2  7/24/2019 - Present    Overview Addendum 9/9/2019  9:00 AM by Santi BARROSO/S (9/23/19 @7:30AM)             Arrhythmia ICD-10-CM: I49.9  ICD-9-CM: 427.9  Unknown - Present    Overview Signed 3/19/2019  1:29 PM by Petty Covington MD     Afib at times             Supervision of other normal pregnancy, antepartum ICD-10-CM: Z34.80  ICD-9-CM: V22.1  3/1/2019 - Present    Overview Addendum 9/19/2019 12:38 PM by Tara Trammell LPN     Intrauterine pregnancy with the following problems identified:   [facility] 10/14/2019 by [address]  [place]/di twin gestation  Hx of tethered cord, multiple spine surgeries - not candidate for regional anesthesia  Self caths  Needs anesthesia consult  Declines flu vaccine  Baby ASA at 12 weeks  Irregular heart beat - cardiology consult - event monitor, nl echo  MFM/genetics - plan [facility]/NIPTS next visit;  [facility] and [facility] WNL 3/29/19  Rh neg - received Rhogam in ER 3/9/2019 & in office 7/26/2019  Parvo immune  TDAP @ next visit  +ECOLI on urine culture 7/24/19  Normal Glucola/Antibody screen neg  Anemia 9.0 @30 weeks- Iron studies @ next visit   Miguel [email or web address]. Poss IOL. Pt aware. Anemia @33wks 8.4-Seeing Hematology   GBS neg  Declined TDAP   changed to Induction 19. Migraine ICD-10-CM: P54.007  ICD-9-CM: 346.90  Unknown - Present        Strain ICD-10-CM: NGD4834  ICD-9-CM: DYJ2334  2014 - Present         contractions ICD-10-CM: O47.9  ICD-9-CM: 644.00  10/25/2013 - Present        RESOLVED: Breech presentation ICD-10-CM: O32. 1XX0  ICD-9-CM: 652.20  2013 - 2013        RESOLVED: Pregnancy ICD-10-CM: Z34.90  ICD-9-CM: V22.2  11/15/2013 - 3/1/2019    Overview Addendum 2014  2:53 PM by Janeth Horner MD     History (pt) of tethered cord--self cath urinary intermittently  Late transfer--28 weeks  GBS +  Had anesthesia consult                  RESOLVED: Suspected rupture of membranes not found for other normal pregnancy ICD-10-CM: Z03.71  ICD-9-CM: V89.01  10/25/2013 - 2013            Attending Physician:  Janeth Horner MD    Delivery Type:  Vaginal Childbirth with Episiotomy, Laceration or Tear: What To Expect At [address] [place] [address] body will slowly heal in the next few weeks. It is easy to get too tired and overwhelmed during the first weeks after your baby is born. Changes in your hormones can shift your mood without warning. You may find it hard to meet the extra demands on your energy and time. Take it easy on yourself. Follow-up care is a key part of your treatment and safety. Be sure to make and go to all appointments, and call your doctor if you are having problems. It's also a good idea to know your test results and keep a list of the medicines you take. How can you care for yourself at home? Vaginal Bleeding and Cramps  · After delivery, you will have a bloody discharge from the vagina. This will turn pink within a week and then white or yellow after about 10 days. It may last for 2 to 4 weeks or longer, until the uterus has healed.  Use pads instead of tampons until you stop bleeding. · Do not worry if you pass some blood clots, as long as they are smaller than a golf ball. If you have a tear or stitches in your vaginal area, change the pad at least every 4 hours to prevent soreness and infection. · You may have cramps for the first few days after childbirth. These are normal and occur as the uterus shrinks to normal size. Take an over-the-counter pain medicine, such as acetaminophen (Tylenol), ibuprofen (Advil, Motrin), or naproxen (Aleve), for cramps. Read and follow all instructions on the label. Do not take aspirin, because it can cause more bleeding. Do not take acetaminophen (Tylenol) and other acetaminophen containing medications (i.e. Percocet) at the same time. Episiotomy, Lacerations or Tears  · If you have stitches, they will dissolve on their own and do not need to be removed. · Put ice or a cold pack on your painful area for 10 to 20 minutes at a time, several times a day, for the first few days. Put a thin cloth between the ice and your skin. · Sit in a few inches of warm water (sitz bath) 3 times a day and after bowel movements. The warm water helps with pain and itching. If you do not have a tub, a warm shower might help. Breast fullness  · Your breasts may overfill (engorge) in the first few days after delivery. To help milk flow and to relieve pain, warm your breasts in the shower or by using warm, moist towels before nursing. · If you are not nursing, do not put warmth on your breasts or touch your breasts. Wear a tight bra or sports bra and use ice until the fullness goes away. This usually takes 2 to 3 days. · Put ice or a cold pack on your breast after nursing to reduce swelling and pain. Put a thin cloth between the ice and your skin. Activity  · Eat a balanced diet. Do not try to lose weight by cutting calories. Keep taking your prenatal vitamins, or take a multivitamin. · Get as much rest as you can.  Try to take naps when your baby sleeps during the day. · Get some exercise every day. But do not do any heavy exercise until your doctor says it is okay. · Wait until you are healed (about 4 to 6 weeks) before you have sexual intercourse. Your doctor will tell you when it is okay to have sex. · Talk to your doctor about birth control. You can get pregnant even before your period returns. Also, you can get pregnant while you are breast-feeding. Mental Health  · Many women get the \"baby blues\" during the first few days after childbirth. You may lose sleep, feel irritable, and cry easily. You may feel happy one minute and sad the next. Hormone changes are one cause of these emotional changes. Also, the demands of a new baby, along with visits from relatives or other family needs, add to a mother's stress. The \"baby blues\" often peak around the fourth day. Then they ease up in less than 2 weeks. · If your moodiness or anxiety lasts for more than 2 weeks, or if you feel like life is not worth living, you may have postpartum depression. This is different for each mother. Some mothers with serious depression may worry intensely about their infant's well-being. Others may feel distant from their child. Some mothers might even feel that they might harm their baby. A mother may have signs of paranoia, wondering if someone is watching her. · With all the changes in your life, you may not know if you are depressed. Pregnancy sometimes causes changes in how you feel that are similar to the symptoms of depression. · Symptoms of depression include:  · Feeling sad or hopeless and losing interest in daily activities. These are the most common symptoms of depression. · Sleeping too much or not enough. · Feeling tired. You may feel as if you have no energy. · Eating too much or too little. · [employer] ([employer]) offers a Warm line; Chat with the Expert phone sessions;  Information and Articles about Pregnancy and Postpartum Mood Disorders; Comprehensive List of Free Support Groups; Knowledgeable local coordinators who will offer support, information, and resources; Guide to Resources on [employer]; Calendar of events in the  mood disorders community; Latest News and Research; and [facility] [address] for [employer]. Remember - You are not alone; You are not to blame; With help, you will be well. 5-358-110-PPD(6021). WWW. POSTPARTUM. NET    · Writing or talking about death, such as writing suicide notes or talking about guns, knives, or pills. Keep the numbers for these national suicide hotlines: 7-125-989-TALK (6-104.410.4678) and 4-422-SQGRBAH (4-439.719.7059). If you or someone you know talks about suicide or feeling hopeless, get help right away. Constipation and Hemorrhoids  Drink plenty of fluids, enough so that your urine is light yellow or clear like water. If you have kidney, heart, or liver disease and have to limit fluids, talk with your doctor before you increase the amount of fluids you drink. · Eat plenty of fiber each day. Have a bran muffin or bran cereal for breakfast, and try eating a piece of fruit for a mid-afternoon snack. · For painful, itchy hemorrhoids, put ice or a cold pack on the area several times a day for 10 minutes at a time. Follow this by putting a warm compress on the area for another 10 to 20 minutes or by sitting in a shallow, warm bath. When should you call for help? Call 911 anytime you think you may need emergency care. For example, call if:  · You are thinking of hurting yourself, your baby, or anyone else. · You passed out (lost consciousness). · You have symptoms of a blood clot in your lung (called a pulmonary embolism). These may include:  · Sudden chest pain. · Trouble breathing. · Coughing up blood. Call your doctor now or seek immediate medical care if:  · You have severe vaginal bleeding.   · You are soaking through a pad each hour for 2 or more hours.  · Your vaginal bleeding seems to be getting heavier or is still bright red 4 days after delivery. · You are dizzy or lightheaded, or you feel like you may faint. · You are vomiting or cannot keep fluids down. · You have a fever. · You have new or more belly pain. · You pass tissue (not just blood). · Your vaginal discharge smells bad. · Your belly feels tender or full and hard. · Your breasts are continuously painful or red. · You feel sad, anxious, or hopeless for more than a few days. · You have sudden, severe pain in your belly. · You have symptoms of a blood clot in your leg (called a deep vein thrombosis), such as:  · Pain in your calf, back of the knee, thigh, or groin. · Redness and swelling in your leg or groin. · You have symptoms of preeclampsia, such as:  · Sudden swelling of your face, hands, or feet. · New vision problems (such as dimness or blurring). · A severe headache. · Your blood pressure is higher than it should be or rises suddenly. · You have new nausea or vomiting. Watch closely for changes in your health, and be sure to contact your doctor if you have any problems. Additional Information:  Preventing Infection at Home    We care about preventing infection and avoiding the spread of germs - not only when you are in the hospital but also when you return home. When you return home from the hospital, it's important to take the following steps to help prevent infection and avoid spreading germs that could infect you and others. Ask everyone in your home to follow these guidelines, too. Clean Your Hands  · Clean your hands whenever your hands are visibly dirty, before you eat, before or after touching your mouth, nose or eyes, and before preparing food. Clean them after contact with body fluids, using the restroom, touching animals or changing diapers. · When washing hands, wet them with warm water and work up a lather.  Rub hands for at least 15 seconds, then rinse them and pat them dry with a clean towel or paper towel. · When using hand sanitizers, it should take about 15 seconds to rub your hands dry. If not, you probably didn't apply enough . Cover Your Sneeze or Cough  Germs are released into the air whenever you sneeze or cough. To prevent the spread of infection:  · Turn away from other people before coughing or sneezing. · Cover your mouth or nose with a tissue when you cough or sneeze. Put the tissue in the trash. · If you don't have a tissue, cough or sneeze into your upper sleeve, not your hands. · Always clean your hands after coughing or sneezing. Care for Wounds  Your skin is your body's first line of defense against germs, but an open wound leaves an easy way for germs to enter your body. To prevent infection:  · Clean your hands before and after changing wound dressings, and wear gloves to change dressings if recommended by your doctor. · Take special care with IV lines or other devices inserted into the body. If you must touch them, clean your hands first.  · Follow any specific instructions from your doctor to care for your wounds. Contact your doctor if you experience any signs of infection, such as fever or increased redness at the surgical or wound site. Keep a Clean Home  · Clean or wipe commonly touched hard surfaces like door handles, sinks, tabletops, phones and TV remotes. · Use products labeled \"disinfectant\" to kill harmful bacteria and viruses. · Use a clean cloth or paper towel to clean and dry surfaces. Wiping surfaces with a dirty dishcloth, sponge or towel will only spread germs. · Never share toothbrushes, mccray, drinking glasses, utensils, razor blades, face cloths or bath towels to avoid spreading germs. · Be sure that the linens that you sleep on are clean. · Keep pets away from wounds and wash your hands after touching pets, their toys or bedding. We care about you and your health.  Remember, preventing infections is a   team effort between you, your family, friends and health care providers. These are general instructions for a healthy lifestyle:    No smoking/ No tobacco products/ Avoid exposure to second hand smoke    Surgeon General's Warning:  Quitting smoking now greatly reduces serious risk to your health. Obesity, smoking, and sedentary lifestyle greatly increases your risk for illness    A healthy diet, regular physical exercise & weight monitoring are important for maintaining a healthy lifestyle    Recognize signs and symptoms of STROKE:    F-face looks uneven    A-arms unable to move or move unevenly    S-speech slurred or non-existent    T-time-call 911 as soon as signs and symptoms begin - DO NOT go       back to bed or wait to see if you get better - TIME IS BRAIN. I have had the opportunity to make my options or choices for discharge. I have received and understand these instructions.

## 2019-09-25 NOTE — PROGRESS NOTES
Bedside and Verbal shift change report given to EDDIE Calzada RN (oncoming nurse) by Farhad Kilgore. Ankita Jarvis (offgoing nurse). Report included the following information SBAR, Kardex, Intake/Output, MAR and Recent Results.

## 2019-09-25 NOTE — PROGRESS NOTES
Post-Partum Day Number 2 Progress Note    Nikky Lang     Information for the patient's :  Renan Marcelo [[id number]]   Vaginal, Spontaneous  Information for the patient's :  Jonah Bradford [[id number]]   Vaginal, Spontaneous   Patient doing well without significant complaint. Voiding without difficulty, normal lochia. Vitals:  Visit Vitals  /74   Pulse 78   Temp 98.5 °F (36.9 °C)   Resp 16   Ht 5' 2\" (1.575 m)   Wt 143 lb (64.9 kg)   LMP 2019 (Within Weeks) Comment: 19   SpO2 98%   Breastfeeding? Unknown   BMI 26.16 kg/m²     Temp (24hrs), Av.4 °F (36.9 °C), Min:98.1 °F (36.7 °C), Max:98.6 °F (37 °C)      Exam:         Patient without distress. Abdomen soft, fundus firm, nontender                 ower extremities are negative for swelling, cords or tenderness.     Labs:     Lab Results   Component Value Date/Time    WBC 15.1 (H) 2019 04:02 AM    WBC 9.2 2019 09:12 AM    WBC 10.0 2019 10:12 AM    WBC 9.8 2019 12:26 PM    WBC 23.7 (HH) 2019 02:11 PM    WBC 16.5 (H) 2019 04:20 AM    WBC 20.9 (H) 2019 11:15 AM    WBC 15.0 (H) 2019 09:27 PM    WBC 13.3 (H) 2019 07:27 PM    WBC 12.3 (H) 2019 01:30 PM    WBC 12.4 (H) 2019 09:57 PM    WBC 8.7 2018 06:20 PM    WBC 7.5 2018 07:56 PM    WBC 8.2 2014 04:30 PM    WBC 10.8 2014 04:00 PM    WBC 9.2 10/25/2013 12:15 PM    WBC 8.0 2013 01:00 PM    HGB 11.5 2019 04:02 AM    HGB 12.7 2019 09:12 AM    HGB 8.8 (L) 2019 10:12 AM    HGB 8.4 (L) 2019 12:26 PM    HGB 9.0 (L) 2019 02:11 PM    HGB 8.9 (L) 2019 04:20 AM    HGB 9.9 (L) 2019 11:15 AM    HGB 10.0 (L) 2019 09:27 PM    HGB 14.5 2019 07:27 PM    HGB 14.2 2019 01:30 PM    HGB 14.4 2019 09:57 PM    HGB 16.3 (H) 2018 06:20 PM    HGB 14.5 2018 07:56 PM    HGB 16.3 (H) 2014 04:30 PM    HGB 14.4 01/16/2014 04:00 PM    HGB 13.7 10/25/2013 12:15 PM    HGB 14.1 07/25/2013 01:00 PM    HCT 37.1 09/24/2019 04:02 AM    HCT 42.1 09/23/2019 09:12 AM    HCT 30.2 (L) 09/03/2019 10:12 AM    HCT 27.0 (L) 08/23/2019 12:26 PM    HCT 28.9 (L) 08/12/2019 02:11 PM    HCT 27.3 (L) 07/25/2019 04:20 AM    HCT 30.2 (L) 07/24/2019 11:15 AM    HCT 31.7 (L) 07/23/2019 09:27 PM    HCT 42.0 03/08/2019 07:27 PM    HCT 41.0 02/26/2019 01:30 PM    HCT 42.2 02/01/2019 09:57 PM    HCT 45.8 12/21/2018 06:20 PM    HCT 41.7 02/22/2018 07:56 PM    HCT 45.4 08/09/2014 04:30 PM    HCT 41.7 01/16/2014 04:00 PM    HCT 38.5 10/25/2013 12:15 PM    HCT 38.9 07/25/2013 01:00 PM    PLATELET 182 07/76/6693 04:02 AM    PLATELET 671 45/86/9859 09:12 AM    PLATELET 649 58/02/3285 10:12 AM    PLATELET 821 71/56/4628 12:26 PM    PLATELET 724 00/89/3123 02:11 PM    PLATELET 560 14/06/7936 04:20 AM    PLATELET 952 61/35/1681 11:15 AM    PLATELET 372 93/81/3336 09:27 PM    PLATELET 661 53/17/6120 07:27 PM    PLATELET 484 36/55/3301 01:30 PM    PLATELET 738 01/49/9754 09:57 PM    PLATELET 061 11/97/1068 06:20 PM    PLATELET 153 83/93/8504 07:56 PM    PLATELET 549 23/08/2637 04:30 PM    PLATELET 605 89/54/1403 04:00 PM    PLATELET 045 01/93/4300 12:15 PM    PLATELET 264 36/00/2892 01:00 PM    Hgb, External 8.9 07/25/2019    Hgb, External 14.2 02/26/2019    Hgb, External 13.3 10/11/2013    Hgb, External 15.2 06/12/2013    Hct, External 27.3 07/25/2019    Hct, External 41.0 02/26/2019    Hct, External 38.5 10/11/2013    Hct, External 44.6 06/12/2013    Platelet cnt., External 179 07/25/2019    Platelet cnt., External 313 02/26/2019    Platelet cnt., External 211 10/11/2013    Platelet cnt., External 302 06/12/2013       No results found for this or any previous visit (from the past 24 hour(s)). Assessment: Doing well, post partum day 2    Plan:   1. Discharge home today  2. Follow up in office in 6 weeks with Deepak Crespo MD  3.  Post partum activity advised, diet as tolerated  4.  Discharge Medications: ibuprofen,and medications prior to admission

## 2019-09-25 NOTE — LACTATION NOTE
This note was copied from a baby's chart. Mother and twin baby girls for discharge today. Mother states babies were sleepy last night so she formula fed. Breast pump set up to  Help stimulate her breastmilk supply in the event twins do not breastfeed. Mother states Twins nursed well this morning and she formula fed after breastfeeding. Reviewed breastfeeding basics:  Supply and demand, breastfeed twins 8-12 times in 24 hr., feed on demand,   stomach size, early  Feeding cues, skin to skin, positioning and baby led latch-on, assymetrical latch with signs of good, deep latch vs shallow, feeding frequency and duration, and log sheet for tracking infant feedings and output. Breastfeeding Booklet and Warm line information given. Discussed typical  weight loss and the importance of infant weight checks with pediatrician 1-2 post discharge. Engorgement Care Guidelines:  Reviewed how milk is made and normal phases of milk production. Taught care of engorged breasts - frequent breastfeeding encouraged, cool packs and motrin as tolerated. Anticipatory guidance shared. Care for sore/tender nipples discussed:  ways to improve positioning and latch practiced and discussed, hand express colostrum after feedings and let air dry, light application of lanolin, hydrogel pads, seek comfortable laid back feeding position, start feedings on least sore side first.    Discussed eating a healthy diet. Instructed mother to eat a variety of foods in order to get a well balanced diet. She should consume an extra  calories per day (more than her non-pregnant requirement.) These extra calories will help provide energy needed for optimal breast milk production. Mother also encouraged to \"drink to thirst\" and it is recommended that she drink fluids such as water, fruit/vegetable juice.  Nutritious snacks should be available so that she can eat throughout the day to help satisfy her hunger and maintain a good milk supply. Mother will successfully establish breastfeeding by feeding in response to early feeding cues   or wake every 3h, will obtain deep latch, and will keep log of feedings/output. Taught to BF at hunger cues and or q 2-3 hrs and to offer 10-20 drops of hand expressed colostrum at any non-feeds. Breast Assessment  Left Breast: Medium  Left Nipple: Inverted, Intact  Right Breast: Medium  Right Nipple: Everted, Intact  Breast- Feeding Assessment  Attends Breast-Feeding Classes: No  Breast-Feeding Experience: Yes  Breast Trauma/Surgery: No  Type/Quality: Good(Mother states babies were sleepy last night so formula was given. Twins did breastfeed this morning and formula was given afterwards. Breastpump set up to help stimmulate breast milk supply- mother to pump if twins do not breastfeed)  Lactation Consultant Visits  Breast-Feedings: (Mother last fed twin A at 0945 for 5 minutes then gave baby 23ml formula. Twin B  at [id number] for 15 minutes then took 15 ml formula. Instructed mother to call [phone] if she breastfeeds again before D/C.)    Reviewed effects/risks of late  birth on initiation of breastfeeding including infant's sleepiness, ineffective or missed breastfeedings, infant's decreased stamina to sustain prolonged latch and effective breastfeeding, decreased energy reserves related to low birth wt and inability to stimulate milk supply. Recommended interventions include skin to skin bonding at breast, hand expression of colostrum as infant rests at breast and initiation of breastfeeding as infant is able, initiation of pumping regimen to begin within 6 hours of birth as mom is able; complement/supplement feeding as guided by neonatologist.     Chart shows numerous feedings, void, stool WNL. Discussed importance of monitoring outputs and feedings on first week of life.   Discussed ways to tell if baby is  getting enough breast milk, ie  voids and stools, change in color of stool, and return to birth wt within 2 weeks. Follow up with pediatrician visit for weight check in 1-2 days (per AAP guidelines.)  Encouraged to call Warm Line  [phone]  for any questions/problems that arise.  Mother also given breastfeeding support group dates and times for any future needs

## 2019-09-25 NOTE — PROGRESS NOTES
Pt off unit in stable condition via wheelchair with volunteers for discharge home per Dr. Ayidn Zaidi. Pt is aware to follow up in 6 weeks. Prescriptions given to pt. Pt denies any HA, dizziness, N/V, or pain at this time. Infant in car seat and discharged with mother.

## 2019-09-26 LAB
ABO + RH BLD: NORMAL
BLD PROD TYP BPU: NORMAL
BLD PROD TYP BPU: NORMAL
BLOOD GROUP ANTIBODIES SERPL: NORMAL
BLOOD GROUP ANTIBODIES SERPL: NORMAL
BPU ID: NORMAL
BPU ID: NORMAL
CROSSMATCH RESULT,%XM: NORMAL
CROSSMATCH RESULT,%XM: NORMAL
PHYSICIAN INSTRUCTIO,%PI: NORMAL
SPECIMEN EXP DATE BLD: NORMAL
STATUS OF UNIT,%ST: NORMAL
STATUS OF UNIT,%ST: NORMAL
UNIT DIVISION, %UDIV: 0
UNIT DIVISION, %UDIV: 0

## 2019-10-09 ENCOUNTER — OFFICE VISIT (OUTPATIENT)
Dept: OBGYN CLINIC | Age: 34
End: 2019-10-09

## 2019-10-09 VITALS
BODY MASS INDEX: 19.84 KG/M2 | SYSTOLIC BLOOD PRESSURE: 108 MMHG | HEIGHT: 62 IN | WEIGHT: 107.8 LBS | DIASTOLIC BLOOD PRESSURE: 63 MMHG

## 2019-10-09 DIAGNOSIS — Z87.440 HISTORY OF RECURRENT UTI (URINARY TRACT INFECTION): ICD-10-CM

## 2019-10-09 LAB
BILIRUB UR QL STRIP: NORMAL
GLUCOSE UR-MCNC: NEGATIVE MG/DL
KETONES P FAST UR STRIP-MCNC: NORMAL MG/DL
PH UR STRIP: 5 [PH] (ref 4.6–8)
PROT UR QL STRIP: NORMAL
SP GR UR STRIP: NORMAL (ref 1–1.03)
UA UROBILINOGEN AMB POC: NORMAL (ref 0.2–1)
URINALYSIS CLARITY POC: NORMAL
URINALYSIS COLOR POC: NORMAL
URINE BLOOD POC: NORMAL
URINE LEUKOCYTES POC: NORMAL
URINE NITRITES POC: POSITIVE

## 2019-10-09 RX ORDER — AMOXICILLIN AND CLAVULANATE POTASSIUM 875; 125 MG/1; MG/1
1 TABLET, FILM COATED ORAL 2 TIMES DAILY
Qty: 20 TAB | Refills: 0 | Status: SHIPPED | OUTPATIENT
Start: 2019-10-09 | End: 2019-11-04 | Stop reason: SDUPTHER

## 2019-10-09 NOTE — PROGRESS NOTES
Chief Complaint   Post OP Follow Up and Post-Partum Care      HPI  Luisana Hadley is a 30 y.o. female who presents for post-partum care. She delivered twins  on 2019, both babies are doing well. She is currently breastfeeding and pumping milk. She thinks she has a UTI, denies fever. Urine positive for nitrates and protein, urine culture sent. She denies feeling down and depressed. 6 week post partum appointment scheduled. No LMP recorded. (Menstrual status: Breastfeeding). Results for orders placed or performed in visit on 10/09/19   AMB POC URINALYSIS DIP STICK MANUAL W/O MICRO   Result Value Ref Range    Color (UA POC) Dark Yellow     Clarity (UA POC) Slightly Cloudy     Glucose (UA POC) Negative Negative    Bilirubin (UA POC)      Ketones (UA POC) Trace Negative    Specific gravity (UA POC)      Blood (UA POC) 1+ Negative    pH (UA POC) 5.0 4.6 - 8.0    Protein (UA POC) 1+ Negative    Urobilinogen (UA POC) normal 0.2 - 1    Nitrites (UA POC) Positive Negative    Leukocyte esterase (UA POC) 2+ Negative         Past Medical History:   Diagnosis Date    Arrhythmia     Arthritis     left foot and lower back    Asthma     no inhaler    Burning with urination     Complication of anesthesia     bradycardia    Disease of blood and blood forming organ     Headache     Heart abnormality     mummer    Iron deficiency anemia during pregnancy 2019    Migraine     Nerve damage     Nerve damage in left foot.    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 in pregnancy     Self-catheterizes urinary bladder     Since age 13   [facility] Unspecified breast disorder     L invert nipple     Past Surgical History:   Procedure Laterality Date    HX BACK SURGERY      x2    HX GYN      episotomy    HX ORTHOPAEDIC      spine and left foot    HX OTHER SURGICAL       Social History     Occupational History    Not on file   Tobacco Use    Smoking status: Never Smoker    Smokeless tobacco: Never Used   Substance and Sexual Activity    Alcohol use: No     Frequency: Never    Drug use: No    Sexual activity: Not Currently     Partners: Male     Birth control/protection: None     Family History   Problem Relation Age of Onset    Arthritis-osteo Mother    [de-identified] Migraines Mother     Alcohol abuse Paternal Grandfather     Bleeding Prob Maternal Grandfather     Breast Cancer Other        Allergies   Allergen Reactions    Latex Rash     Wheezing    Beef Containing Products Anaphylaxis    Keflex [Cephalexin] Anaphylaxis    Codeine Other (comments)     Hyperactivity    Doxycycline Nausea Only    Macrobid [Nitrofurantoin Monohyd/M-Cryst] Other (comments)     Pharmacy had this allergy listed and called the patient and patient could not remember her reaction to the medication. This nurse called the patient . Patient states that when it was prescribed in early pregnancy it caused spotting.  Milk Hives    Nuts [Tree Nut] Swelling     Pt stated that her mouth and throat feels funny when she eats nuts      Omnicef [Cefdinir] Other (comments)     \"shakes\"    Pepto-Bismol [Bismuth Subsalicylate] Hives    Phenergan [Promethazine] Other (comments)     Increased sedation    Reglan [Metoclopramide] Anxiety    Soy Hives     Prior to Admission medications    Medication Sig Start Date End Date Taking? Authorizing Provider   acetaminophen (TYLENOL 8 HOUR PO) Take 500 mg by mouth as needed. Yes Provider, Historical   multivitamin with iron (FLINTSTONES) chewable tablet Take 1 Tab by mouth daily. Yes Other, MD Ivis   hydrocortisone-pramoxine (EPIFOAM) 1-1 % topical foam Apply  to affected area three (3) times daily. 9/25/19   Stevie Ortiz MD   ibuprofen (MOTRIN) 800 mg tablet Take 1 Tab by mouth every six (6) hours as needed for Pain.  9/24/19   Tenisha Snooks, Ge Ness MD   nitrofurantoin (MACRODANTIN) 100 mg capsule Take 1 Cap by mouth nightly. 19   Rob Weems MD        Review of Systems: History obtained from the patient  Constitutional: negative for weight loss, fever, night sweats  HEENT: negative for hearing loss, earache, congestion, snoring, sorethroat  CV: negative for chest pain, palpitations, edema  Resp: negative for cough, shortness of breath, wheezing  Breast: negative for breast lumps, nipple discharge, galactorrhea  GI: negative for change in bowel habits, abdominal pain, black or bloody stools  : negative for frequency, dysuria, hematuria, vaginal discharge  MSK: negative for back pain, joint pain, muscle pain  Skin: negative for itching, rash, hives  Neuro: negative for dizziness, headache, confusion, weakness  Psych: negative for anxiety, depression, change in mood  Heme/lymph: negative for bleeding, bruising, pallor    Objective:  Visit Vitals  /63   Ht 5' 2\" (1.575 m)   Wt 107 lb 12.8 oz (48.9 kg)   BMI 19.72 kg/m²       Physical Exam:   PHYSICAL EXAMINATION    Constitutional  · Appearance: well-nourished, well developed, alert, in no acute distress        Skin  · General Inspection: no rash, no lesions identified    Neurologic/Psychiatric  · Mental Status:  · Orientation: grossly oriented to person, place and time  · Mood and Affect: mood normal, affect appropriate    Assessment:   Doing well s/p  twin  Hx of recurrent UTI - has one today    Plan:   Augmentin bid x 10d  Fu 4 weeks      RTO prn if symptoms persist or worsen. Instructions given to pt. Handouts given to pt.

## 2019-10-16 LAB
BACTERIA UR CULT: ABNORMAL
BACTERIA UR CULT: ABNORMAL

## 2019-10-22 ENCOUNTER — TELEPHONE (OUTPATIENT)
Dept: OBGYN CLINIC | Age: 34
End: 2019-10-22

## 2019-10-22 NOTE — TELEPHONE ENCOUNTER
Patient called back since she missed a call, no message. She was reported:  Paulino Lainez [[id number]] ([facility] [id number])   Microbiology   Date: 10/9/2019 Department: [facility] Ordering/Authorizing: Shakila Douglas MD   Result Notes for CULTURE, URINE     Notes recorded by Eboni Verma LPN on 31/79/3793 at 1:57 PM EDT  Left voice message to call back or to check her mychart message  ------    Notes recorded by Shakila Douglas MD on 10/16/2019 at 3:02 PM EDT  No additional comment          Patient Result Comments     Written by Shakila Douglas MD on 10/16/2019  3:02 PM   2 separate germs - both are treated with augmentin. Hopefully you are doing better! She has been reported to about the Ecoli and Proteus. She is taking the Augmentin but has been off schedule taking it. She will complete the antibiotics and call to follow up for UTI since so off taking med.

## 2019-11-04 ENCOUNTER — OFFICE VISIT (OUTPATIENT)
Dept: OBGYN CLINIC | Age: 34
End: 2019-11-04

## 2019-11-04 VITALS
BODY MASS INDEX: 20.06 KG/M2 | DIASTOLIC BLOOD PRESSURE: 62 MMHG | SYSTOLIC BLOOD PRESSURE: 108 MMHG | HEIGHT: 62 IN | WEIGHT: 109 LBS

## 2019-11-04 DIAGNOSIS — N39.0 RECURRENT UTI: ICD-10-CM

## 2019-11-04 LAB
BILIRUB UR QL STRIP: NEGATIVE
GLUCOSE UR-MCNC: NEGATIVE MG/DL
KETONES P FAST UR STRIP-MCNC: NORMAL MG/DL
PH UR STRIP: NORMAL [PH] (ref 4.6–8)
PROT UR QL STRIP: NEGATIVE
SP GR UR STRIP: NORMAL (ref 1–1.03)
UA UROBILINOGEN AMB POC: NORMAL (ref 0.2–1)
URINALYSIS CLARITY POC: CLEAR
URINALYSIS COLOR POC: YELLOW
URINE BLOOD POC: NORMAL
URINE LEUKOCYTES POC: NORMAL
URINE NITRITES POC: POSITIVE

## 2019-11-04 RX ORDER — AMOXICILLIN AND CLAVULANATE POTASSIUM 875; 125 MG/1; MG/1
1 TABLET, FILM COATED ORAL 2 TIMES DAILY
Qty: 20 TAB | Refills: 0 | Status: SHIPPED | OUTPATIENT
Start: 2019-11-04 | End: 2019-11-14

## 2019-11-04 NOTE — PROGRESS NOTES
Postpartum evaluation    Nnamdi Tafoya is a 30 y.o. female who presents for a postpartum exam.     She is now six weeks post normal spontaneous vaginal delivery. Twins. Her babies is doing well. She has had no menses since delivery. She has had the following significant problems since her delivery: recurrent uti's    The patient is breast and bottle feeding without difficulty. She is struggling with her milk production. The patient would like to use condoms for birth control. She is currently taking: vitamin, tylenol    She is due for her next AE in 4 months.      Visit Vitals  /62   Ht 5' 2\" (1.575 m)   Wt 109 lb (49.4 kg)   BMI 19.94 kg/m²       PHYSICAL EXAMINATION    Constitutional  · Appearance: well-nourished, well developed, alert, in no acute distress    HENT  · Head and Face: appears normal    Neck  · Inspection/Palpation: normal appearance, no masses or tenderness  · Lymph Nodes: no lymphadenopathy present  · Thyroid: gland size normal, nontender, no nodules or masses present on palpation    Breasts  · Inspection of Breasts: breasts symmetrical, no skin changes, no discharge present, nipple appearance normal, no skin retraction present  · Palpation of Breasts and Axillae: no masses present on palpation, no breast tenderness  · Axillary Lymph Nodes: no lymphadenopathy present    Gastrointestinal  · Abdominal Examination: abdomen non-tender to palpation, normal bowel sounds, no masses present  · Liver and spleen: no hepatomegaly present, spleen not palpable  · Hernias: no hernias identified    Genitourinary  · External Genitalia: normal appearance for age, no discharge present, no tenderness present, no inflammatory lesions present, no masses present, no atrophy present  · Vagina: normal vaginal vault without central or paravaginal defects, no discharge present, no inflammatory lesions present, no masses present  · Bladder: non-tender to palpation  · Urethra: appears normal  · Cervix: normal   · Uterus: normal size, shape and consistency  · Adnexa: no adnexal tenderness present, no adnexal masses present  · Perineum: perineum within normal limits, no evidence of trauma, no rashes or skin lesions present  · Anus: anus within normal limits, no hemorrhoids present  · Inguinal Lymph Nodes: no lymphadenopathy present    Skin  · General Inspection: no rash, no lesions identified    Neurologic/Psychiatric  · Mental Status:  · Orientation: grossly oriented to person, place and time  · Mood and Affect: mood normal, affect appropriate  Results for orders placed or performed in visit on 11/04/19   AMB POC URINALYSIS DIP STICK MANUAL W/O MICRO   Result Value Ref Range    Color (UA POC) Yellow     Clarity (UA POC) Clear     Glucose (UA POC) Negative Negative    Bilirubin (UA POC) Negative Negative    Ketones (UA POC) Trace Negative    Specific gravity (UA POC)      Blood (UA POC) Trace Negative    pH (UA POC)      Protein (UA POC) Negative Negative    Urobilinogen (UA POC) normal 0.2 - 1    Nitrites (UA POC) Positive Negative    Leukocyte esterase (UA POC) Trace Negative       Assessment:  Normal postpartum check  UTIPlan:    RTO for AE.   Augmentin  Send culture

## 2019-11-08 LAB — BACTERIA UR CULT: ABNORMAL

## 2019-11-25 DIAGNOSIS — D50.8 IRON DEFICIENCY ANEMIA SECONDARY TO INADEQUATE DIETARY IRON INTAKE: ICD-10-CM

## 2019-12-13 NOTE — TELEPHONE ENCOUNTER
TR Band to start decompressing at 1530   q15min 2cc of air to be released with 10cc air total per Dr Rey Escobedo Why didn't she just come to the office after the appt?!! See me tomorrow. Put feet up. More likely just related to heat - she had this one other time this pregnancy.  Unless she wants to drive back to [place] NOW

## 2020-02-27 ENCOUNTER — OFFICE VISIT (OUTPATIENT)
Dept: OBGYN CLINIC | Age: 35
End: 2020-02-27

## 2020-02-27 VITALS
SYSTOLIC BLOOD PRESSURE: 89 MMHG | WEIGHT: 102 LBS | HEIGHT: 62 IN | DIASTOLIC BLOOD PRESSURE: 45 MMHG | BODY MASS INDEX: 18.77 KG/M2

## 2020-02-27 DIAGNOSIS — Z01.419 ENCOUNTER FOR GYNECOLOGICAL EXAMINATION (GENERAL) (ROUTINE) WITHOUT ABNORMAL FINDINGS: Primary | ICD-10-CM

## 2020-02-27 DIAGNOSIS — R35.0 URINARY FREQUENCY: ICD-10-CM

## 2020-02-27 DIAGNOSIS — N39.0 CHRONIC UTI: ICD-10-CM

## 2020-02-27 LAB
BILIRUB UR QL STRIP: NEGATIVE
GLUCOSE UR-MCNC: NEGATIVE MG/DL
KETONES P FAST UR STRIP-MCNC: NORMAL MG/DL
PH UR STRIP: NORMAL [PH] (ref 4.6–8)
PROT UR QL STRIP: NORMAL
SP GR UR STRIP: NORMAL (ref 1–1.03)
UA UROBILINOGEN AMB POC: NORMAL (ref 0.2–1)
URINALYSIS CLARITY POC: CLEAR
URINALYSIS COLOR POC: YELLOW
URINE BLOOD POC: NEGATIVE
URINE LEUKOCYTES POC: NORMAL
URINE NITRITES POC: POSITIVE

## 2020-02-27 RX ORDER — AMOXICILLIN AND CLAVULANATE POTASSIUM 875; 125 MG/1; MG/1
1 TABLET, FILM COATED ORAL 2 TIMES DAILY
Qty: 14 TAB | Refills: 0 | Status: SHIPPED | OUTPATIENT
Start: 2020-02-27 | End: 2020-06-03 | Stop reason: SDUPTHER

## 2020-02-27 NOTE — PROGRESS NOTES
Sundeep Rosa is a ,  30 y.o. female [facility] [place] whose Patient's last menstrual period was 2020. was on 2020 who presents for her annual checkup. She is having urinary frequency and dark, cloudy urine. Patient states a month ago she took Augmention for UTI, prescribed by her PCP. With regard to the Gardisil vaccine, she is older than the FDA approved age to receive it. Menstrual status:    Her periods are moderate, minimal to nonexistent in flow. She is using three to five pads or tampons per day, usually regular occurring every 26-30 days. She denies dysmenorrhea. She reports no premenstrual symptoms. Contraception:    The current method of family planning is condoms. She declines contraception and counseling. Sexual history:    She  reports being sexually active and has had partner(s) who are Male. She reports using the following method of birth control/protection: Condom. Medical conditions:    Since her last annual GYN exam about 2019 ago, she has not the following changes in her health history: none. Pap and Mammogram History:    Her most recent Pap smear was normal/-HPV obtained 2019 year(s) ago. The patient has never had a mammogram.    The patient does have a family history of breast cancer. Past Medical History:   Diagnosis Date    Arrhythmia     Arthritis     left foot and lower back    Asthma     no inhaler    Burning with urination     Complication of anesthesia     bradycardia    Disease of blood and blood forming organ     Headache     Heart abnormality     mummer    Iron deficiency anemia during pregnancy 2019    Migraine     Nerve damage     Nerve damage in left foot.    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 in pregnancy     Self-catheterizes urinary bladder     Since age 13   Nolan Unspecified breast disorder     L invert nipple     Past Surgical History:   Procedure Laterality Date    HX BACK SURGERY      x2    HX GYN      episotomy    HX ORTHOPAEDIC      spine and left foot    HX OTHER SURGICAL         Current Outpatient Medications   Medication Sig Dispense Refill    amoxicillin-clavulanate (AUGMENTIN) 875-125 mg per tablet Take 1 Tab by mouth two (2) times a day for 7 days. 14 Tab 0    acetaminophen (TYLENOL 8 HOUR PO) Take 500 mg by mouth as needed.  multivitamin with iron (FLINTSTONES) chewable tablet Take 1 Tab by mouth daily. Allergies: Latex; Beef containing products; Keflex [cephalexin]; Codeine; Doxycycline; Macrobid [nitrofurantoin monohyd/m-cryst]; Milk; Nuts [tree nut]; Omnicef [cefdinir]; Pepto-bismol [bismuth subsalicylate]; Phenergan [promethazine];  Reglan [metoclopramide]; and Soy   Social History     Socioeconomic History    Marital status:      Spouse name: Not on file    Number of children: Not on file    Years of education: Not on file    Highest education level: Not on file   Occupational History    Not on file   Social Needs    Financial resource strain: Not on file    Food insecurity:     Worry: Not on file     Inability: Not on file    Transportation needs:     Medical: Not on file     Non-medical: Not on file   Tobacco Use    Smoking status: Never Smoker    Smokeless tobacco: Never Used   Substance and Sexual Activity    Alcohol use: No     Frequency: Never    Drug use: No    Sexual activity: Yes     Partners: Male     Birth control/protection: Condom   Lifestyle    Physical activity:     Days per week: Not on file     Minutes per session: Not on file    Stress: Not on file   Relationships    Social connections:     Talks on phone: Not on file     Gets together: Not on file     Attends Rastafarian service: Not on file     Active member of club or organization: Not on file     Attends meetings of clubs or organizations: Not on file     Relationship status: Not on file    Intimate partner violence:     Fear of current or ex partner: Not on file     Emotionally abused: Not on file     Physically abused: Not on file     Forced sexual activity: Not on file   Other Topics Concern     Service Not Asked    Blood Transfusions Not Asked    Caffeine Concern Not Asked    Occupational Exposure Not Asked    Hobby Hazards Not Asked    Sleep Concern Not Asked    Stress Concern Not Asked    Weight Concern Not Asked    Special Diet Not Asked    Back Care Not Asked    Exercise Not Asked    Bike Helmet Not Asked    [address] Not Asked    Self-Exams Not Asked   Social History Narrative    Not on file     Tobacco History:  reports that she has never smoked. She has never used smokeless tobacco.  Alcohol Abuse:  reports no history of alcohol use. Drug Abuse:  reports no history of drug use.     Patient Active Problem List   Diagnosis Code     contractions O47.9    Migraine G43.909    Strain [id number]    Supervision of other normal pregnancy, antepartum Z34.80    Arrhythmia I49.9    Pregnancy Z34.90    Iron deficiency anemia during pregnancy O99.019, D50.9       Review of Systems - History obtained from the patient  Constitutional: negative for weight loss, fever, night sweats  HEENT: negative for hearing loss, earache, congestion, snoring, sorethroat  CV: negative for chest pain, palpitations, edema  Resp: negative for cough, shortness of breath, wheezing  GI: negative for change in bowel habits, abdominal pain, black or bloody stools  : negative for frequency, dysuria, hematuria, vaginal discharge  MSK: negative for back pain, joint pain, muscle pain  Breast: negative for breast lumps, nipple discharge, galactorrhea  Skin :negative for itching, rash, hives  Neuro: negative for dizziness, headache, confusion, weakness  Psych: negative for anxiety, depression, change in mood  Heme/lymph: negative for bleeding, bruising, pallor    Physical Exam    Visit Vitals  BP (!) 89/45 (BP 1 Location: Right arm)   Ht 5' 2\" (1.575 m)   Wt 102 lb (46.3 kg)   LMP 02/19/2020   Breastfeeding No   BMI 18.66 kg/m²       Constitutional  · Appearance: well-nourished, well developed, alert, in no acute distress    HENT  · Head and Face: appears normal    Neck  · Inspection/Palpation: normal appearance, no masses or tenderness  · Lymph Nodes: no lymphadenopathy present  · Thyroid: gland size normal, nontender, no nodules or masses present on palpation    Chest  · Respiratory Effort: breathing normal  · Auscultation: normal breath sounds    Cardiovascular  · Heart:  · Auscultation: regular rate and rhythm without murmur    Breasts  · Inspection of Breasts: breasts symmetrical, no skin changes, no discharge present, nipple appearance normal, no skin retraction present  · Palpation of Breasts and Axillae: no masses present on palpation, no breast tenderness  · Axillary Lymph Nodes: no lymphadenopathy present    Gastrointestinal  · Abdominal Examination: abdomen non-tender to palpation, normal bowel sounds, no masses present  · Liver and spleen: no hepatomegaly present, spleen not palpable  · Hernias: no hernias identified    Genitourinary  · External Genitalia: normal appearance for age, no discharge present, no tenderness present, no inflammatory lesions present, no masses present, no atrophy present  · Vagina: normal vaginal vault without central or paravaginal defects, no discharge present, no inflammatory lesions present, no masses present  · Bladder: non-tender to palpation  · Urethra: appears normal  · Cervix: normal   · Uterus: normal size, shape and consistency  · Adnexa: no adnexal tenderness present, no adnexal masses present  · Perineum: perineum within normal limits, no evidence of trauma, no rashes or skin lesions present  · Anus: anus within normal limits, no hemorrhoids present  · Inguinal Lymph Nodes: no lymphadenopathy present    Skin  · General Inspection: no rash, no lesions identified    Neurologic/Psychiatric  · Mental Status:  · Orientation: grossly oriented to person, place and time  · Mood and Affect: mood normal, affect appropriate    Results for orders placed or performed in visit on 02/27/20   AMB POC URINALYSIS DIP STICK MANUAL W/O MICRO   Result Value Ref Range    Color (UA POC) Yellow     Clarity (UA POC) Clear     Glucose (UA POC) Negative Negative    Bilirubin (UA POC) Negative Negative    Ketones (UA POC) Trace Negative    Specific gravity (UA POC)      Blood (UA POC) Negative Negative    pH (UA POC)      Protein (UA POC) Trace Negative    Urobilinogen (UA POC) normal 0.2 - 1    Nitrites (UA POC) Positive Negative    Leukocyte esterase (UA POC) Trace Negative       Assessment:  Routine gynecologic examination  Her current medical status is satisfactory with no evidence of significant gynecologic issues.   UTI  Plan:  Counseled re: diet, exercise, healthy lifestyle  Return for yearly wellness visits  Urine cx  Augmentin x 7d  See urology

## 2020-02-27 NOTE — PATIENT INSTRUCTIONS
Well Visit, Ages 25 to 48: Care Instructions  Your Care Instructions    Physical exams can help you stay healthy. Your doctor has checked your overall health and may have suggested ways to take good care of yourself. He or she also may have recommended tests. At home, you can help prevent illness with healthy eating, regular exercise, and other steps. Follow-up care is a key part of your treatment and safety. Be sure to make and go to all appointments, and call your doctor if you are having problems. It's also a good idea to know your test results and keep a list of the medicines you take. How can you care for yourself at home? · Reach and stay at a healthy weight. This will lower your risk for many problems, such as obesity, diabetes, heart disease, and high blood pressure. · Get at least 30 minutes of physical activity on most days of the week. Walking is a good choice. You also may want to do other activities, such as running, swimming, cycling, or playing tennis or team sports. Discuss any changes in your exercise program with your doctor. · Do not smoke or allow others to smoke around you. If you need help quitting, talk to your doctor about stop-smoking programs and medicines. These can increase your chances of quitting for good. · Talk to your doctor about whether you have any risk factors for sexually transmitted infections (STIs). Having one sex partner (who does not have STIs and does not have sex with anyone else) is a good way to avoid these infections. · Use birth control if you do not want to have children at this time. Talk with your doctor about the choices available and what might be best for you. · Protect your skin from too much sun. When you're outdoors from 10 a.m. to 4 p.m., stay in the shade or cover up with clothing and a hat with a wide brim. Wear sunglasses that block UV rays. Even when it's cloudy, put broad-spectrum sunscreen (SPF 30 or higher) on any exposed skin.   · See a dentist one or two times a year for checkups and to have your teeth cleaned. · Wear a seat belt in the car. Follow your doctor's advice about when to have certain tests. These tests can spot problems early. For everyone  · Cholesterol. Have the fat (cholesterol) in your blood tested after age 21. Your doctor will tell you how often to have this done based on your age, family history, or other things that can increase your risk for heart disease. · Blood pressure. Have your blood pressure checked during a routine doctor visit. Your doctor will tell you how often to check your blood pressure based on your age, your blood pressure results, and other factors. · Vision. Talk with your doctor about how often to have a glaucoma test.  · Diabetes. Ask your doctor whether you should have tests for diabetes. · Colon cancer. Your risk for colorectal cancer gets higher as you get older. Some experts say that adults should start regular screening at age 48 and stop at age 76. Others say to start before age 48 or continue after age 76. Talk with your doctor about your risk and when to start and stop screening. For women  · Breast exam and mammogram. Talk to your doctor about when you should have a clinical breast exam and a mammogram. Medical experts differ on whether and how often women under 50 should have these tests. Your doctor can help you decide what is right for you. · Cervical cancer screening test and pelvic exam. Begin with a Pap test at age 24. The test often is part of a pelvic exam. Starting at age 27, you may choose to have a Pap test, an HPV test, or both tests at the same time (called co-testing). Talk with your doctor about how often to have testing. · Tests for sexually transmitted infections (STIs). Ask whether you should have tests for STIs. You may be at risk if you have sex with more than one person, especially if your partners do not wear condoms.   For men  · Tests for sexually transmitted infections (STIs). Ask whether you should have tests for STIs. You may be at risk if you have sex with more than one person, especially if you do not wear a condom. · Testicular cancer exam. Ask your doctor whether you should check your testicles regularly. · Prostate exam. Talk to your doctor about whether you should have a blood test (called a PSA test) for prostate cancer. Experts differ on whether and when men should have this test. Some experts suggest it if you are older than 39 and are -American or have a father or brother who got prostate cancer when he was younger than 72. When should you call for help? Watch closely for changes in your health, and be sure to contact your doctor if you have any problems or symptoms that concern you. Where can you learn more? Go to http://ailyn-fadi.info/. Enter P072 in the search box to learn more about \"Well Visit, Ages 25 to 48: Care Instructions. \"  Current as of: December 13, 2018  Content Version: 12.2  © 9526-8364 [employer]. Care instructions adapted under license by [employer] (which disclaims liability or warranty for this information). If you have questions about a medical condition or this instruction, always ask your healthcare professional. [place] [address] any warranty or liability for your use of this information.

## 2020-03-01 LAB
BACTERIA UR CULT: ABNORMAL
BACTERIA UR CULT: ABNORMAL

## 2020-03-22 NOTE — PATIENT INSTRUCTIONS
Pelvic Exam: Care Instructions  Your Care Instructions    When your doctor examines all of your pelvic organs, it's called a pelvic exam. Two good reasons to have this kind of exam are to check for sexually transmitted infections (STIs) and to get a Pap test. A Pap test is also called a Pap smear. It checks for early changes that can lead to cancer of the cervix. Sometimes a pelvic exam is part of a regular checkup. In this case, you can do some things to make your test results as accurate as possible. · Try to schedule the exam when you don't have your period. · Don't use douches, tampons, or vaginal medicines, sprays, or powders for 24 hours before your exam.  · Don't have sex for 24 hours before your exam.  Other times, women have this kind of exam at any time of the month. This is because they have pelvic pain, bleeding, or discharge. Or they may have another pelvic problem. Before your exam, it's important to share some information with your doctor. For example, if you are a survivor of rape or sexual abuse, you can talk about any concerns you may have. Your doctor will also want to know if you are pregnant or use birth control. And he or she will want to hear about any problems, surgeries, or procedures you have had in your pelvic area. You will also need to tell your doctor when your last period was. Follow-up care is a key part of your treatment and safety. Be sure to make and go to all appointments, and call your doctor if you are having problems. It's also a good idea to know your test results and keep a list of the medicines you take. How is a pelvic exam done? · During a pelvic exam, you will:  ¨ Take off your clothes below the waist. You will get a paper or cloth cover to put over the lower half of your body. Divine Blazer on your back on an exam table. Your feet will be raised above you. Stirrups will support your feet. · The doctor will:  Carmen Mole you to relax your knees.  Your knees need to lean out, toward the walls. ¨ Check the opening of your vagina for sores or swelling. ¨ Gently put a tool called a speculum into your vagina. It opens the vagina a little bit. You will feel some pressure. But if you are relaxed, it will not hurt. It lets your doctor see inside the vagina. ¨ Use a small brush, spatula, or swab to get a sample of cells, if you are having a Pap test or culture. The doctor then removes the speculum. ¨ Put on gloves and put one or two fingers of one hand into your vagina. The other hand goes on your lower belly. This lets your doctor feel your pelvic organs. You will probably feel some pressure. Try to stay relaxed. ¨ Put one gloved finger into your rectum and one into your vagina, if needed. This can also help check your pelvic organs. This exam takes about 10 minutes. At the end, you will get a washcloth or tissue to clean your vaginal area. It's normal to have some discharge after this exam. You can then get dressed. Some test results may be ready right away. But results from a culture or a Pap test may take several days or a few weeks. Why should you have a pelvic exam?  · You want to have recommended screening tests. This includes a Pap test.  · You think you have a vaginal infection. Signs include itching, burning, or unusual discharge. · You might have been exposed to a sexually transmitted infection (STI), such as chlamydia or herpes. · You have vaginal bleeding that is not part of your normal menstrual period. · You have pain in your belly or pelvis. · You have been sexually assaulted. A pelvic exam lets your doctor collect evidence and check for STIs. · You are pregnant. · You are having trouble getting pregnant. What are the risks of a pelvic exam?  There are no risks from a pelvic exam.  When should you call for help?   Watch closely for changes in your health, and be sure to contact your doctor if:  · You have heavy bleeding or discharge from your vagina after the exam.  Where can you learn more? Go to http://ailyn-fadi.info/. Enter F445 in the search box to learn more about \"Pelvic Exam: Care Instructions. \"  Current as of: October 13, 2016  Content Version: 11.2  © 8345-8955 [employer]. Care instructions adapted under license by [employer] (which disclaims liability or warranty for this information). If you have questions about a medical condition or this instruction, always ask your healthcare professional. [address] any warranty or liability for your use of this information. normal...

## 2020-06-03 PROBLEM — Z34.90 PREGNANCY: Status: RESOLVED | Noted: 2019-07-24 | Resolved: 2020-06-03

## 2020-06-03 PROBLEM — Z34.80 SUPERVISION OF OTHER NORMAL PREGNANCY, ANTEPARTUM: Status: RESOLVED | Noted: 2019-03-01 | Resolved: 2020-06-03

## 2020-06-03 RX ORDER — AMOXICILLIN AND CLAVULANATE POTASSIUM 875; 125 MG/1; MG/1
1 TABLET, FILM COATED ORAL 2 TIMES DAILY
Qty: 14 TAB | Refills: 0 | Status: SHIPPED | OUTPATIENT
Start: 2020-06-03 | End: 2020-09-14 | Stop reason: SDUPTHER

## 2020-09-14 RX ORDER — AMOXICILLIN AND CLAVULANATE POTASSIUM 875; 125 MG/1; MG/1
1 TABLET, FILM COATED ORAL 2 TIMES DAILY
Qty: 14 TAB | Refills: 0 | Status: SHIPPED | OUTPATIENT
Start: 2020-09-14 | End: 2020-09-21

## 2020-09-15 ENCOUNTER — OFFICE VISIT (OUTPATIENT)
Dept: OBGYN CLINIC | Age: 35
End: 2020-09-15
Payer: MEDICAID

## 2020-09-15 VITALS
SYSTOLIC BLOOD PRESSURE: 108 MMHG | DIASTOLIC BLOOD PRESSURE: 68 MMHG | BODY MASS INDEX: 18.58 KG/M2 | WEIGHT: 101 LBS | HEIGHT: 62 IN

## 2020-09-15 DIAGNOSIS — R30.0 DYSURIA: ICD-10-CM

## 2020-09-15 DIAGNOSIS — O03.9 COMPLETE ABORTION: ICD-10-CM

## 2020-09-15 DIAGNOSIS — N39.0 RECURRENT UTI: Primary | ICD-10-CM

## 2020-09-15 DIAGNOSIS — R35.0 URINARY FREQUENCY: ICD-10-CM

## 2020-09-15 DIAGNOSIS — N92.6 IRREGULAR MENSES: ICD-10-CM

## 2020-09-15 LAB
BILIRUB UR QL STRIP: NEGATIVE
GLUCOSE UR-MCNC: NEGATIVE MG/DL
HCG URINE, QL. (POC): NEGATIVE
KETONES P FAST UR STRIP-MCNC: NORMAL MG/DL
PH UR STRIP: 7 [PH] (ref 4.6–8)
PROT UR QL STRIP: NORMAL
SP GR UR STRIP: 1.01 (ref 1–1.03)
UA UROBILINOGEN AMB POC: NORMAL (ref 0.2–1)
URINALYSIS CLARITY POC: NORMAL
URINALYSIS COLOR POC: NORMAL
URINE BLOOD POC: NORMAL
URINE LEUKOCYTES POC: NORMAL
URINE NITRITES POC: POSITIVE
VALID INTERNAL CONTROL?: YES

## 2020-09-15 PROCEDURE — 81002 URINALYSIS NONAUTO W/O SCOPE: CPT | Performed by: OBSTETRICS & GYNECOLOGY

## 2020-09-15 PROCEDURE — 81025 URINE PREGNANCY TEST: CPT | Performed by: OBSTETRICS & GYNECOLOGY

## 2020-09-15 PROCEDURE — 90384 RH IG FULL-DOSE IM: CPT | Performed by: OBSTETRICS & GYNECOLOGY

## 2020-09-15 PROCEDURE — 96372 THER/PROPH/DIAG INJ SC/IM: CPT | Performed by: OBSTETRICS & GYNECOLOGY

## 2020-09-15 PROCEDURE — 99213 OFFICE O/P EST LOW 20 MIN: CPT | Performed by: OBSTETRICS & GYNECOLOGY

## 2020-09-15 NOTE — PROGRESS NOTES
UTI note    Nnamdi Tafoya is a ,  37 y.o. female [facility] who presents today with had concerns including Urinary Frequency. . Her symptoms started 8 weeks ago, gradually worsening since that time. She states she missed her period and had a positive pregnancy test at home several few weeks ago, very faint but positive. She thinks she lost it in the shower. She is now late again, LMP 20, negative upt in office today. Discomfort is in the suprapubic area and does not radiate. Symptoms are not alleviated by hydration. Symptoms are exacerbated with activity. Her symptoms are moderate. Patient denies fever and vaginal discharge. There is not any concern of sexual abuse. There is not a history of trauma to the genital area.  has hx of cheating. She has had urinary leakage    Patient does have a history of recurrent UTI. She does not have a history of pyelonephritis. She has a history of  has a past medical history of Arrhythmia, Arthritis, Asthma, Burning with urination, Complication of anesthesia, Disease of blood and blood forming organ, Headache, Heart abnormality, Iron deficiency anemia during pregnancy (2019), Migraine, Nerve damage, Other ill-defined conditions(799.89), Ovarian cyst, Phlebitis and thrombophlebitis of unspecified site, Rhesus isoimmunization unspecified as to episode of care in pregnancy, Self-catheterizes urinary bladder, and Unspecified breast disorder.  She also has no past medical history of Abnormal Papanicolaou smear of cervix, Chlamydia, Diabetes ([address] Epilepsy ([address] Essential hypertension, Genital herpes, Gestational diabetes, Gestational hypertension, Gonorrhea, Herpes gestationis, Herpes simplex virus (HSV) infection, Human immunodeficiency virus (HIV) disease ([address] Infertility, female, Kidney disease, Liver disease, Nicotine vapor product user, Non-nicotine vapor product user, Pituitary disorder ([address] Polycystic disease, ovaries, Postpartum depression, Psychiatric problem, Sickle cell disease ([facility] [address] Sickle cell trait syndrome ([facility] [address] Syphilis, Systemic lupus erythematosus ([facility] [address] Thyroid activity decreased, or Trauma. with the following surgical history  has a past surgical history that includes hx orthopaedic; hx back surgery; hx gyn; and hx other surgical..  . She has not been evaluated for her current complaints. Urine dipstick shows:      Results for orders placed or performed in visit on 02/27/20   AMB POC URINALYSIS DIP STICK MANUAL W/O MICRO     Status: None   Result Value Ref Range Status    Color (UA POC) Yellow  Final    Clarity (UA POC) Clear  Final    Glucose (UA POC) Negative Negative Final    Bilirubin (UA POC) Negative Negative Final    Ketones (UA POC) Trace Negative Final    Specific gravity (UA POC)       Blood (UA POC) Negative Negative Final    pH (UA POC)       Protein (UA POC) Trace Negative Final    Urobilinogen (UA POC) normal 0.2 - 1 Final    Nitrites (UA POC) Positive Negative Final    Leukocyte esterase (UA POC) Trace Negative Final       Past Medical History:   Diagnosis Date    Arrhythmia     Arthritis     left foot and lower back    Asthma     no inhaler    Burning with urination     Complication of anesthesia     bradycardia    Disease of blood and blood forming organ     Headache     Heart abnormality     mummer    Iron deficiency anemia during pregnancy 8/27/2019    Migraine     Nerve damage     Nerve damage in left foot.    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 in pregnancy     Self-catheterizes urinary bladder     Since age 13   Candido.Mckenzie Unspecified breast disorder     L invert nipple     Past Surgical History:   Procedure Laterality Date    HX BACK SURGERY      x2    HX GYN      episotomy    HX ORTHOPAEDIC      spine and left foot    HX OTHER SURGICAL       Social History     Occupational History    Not on file   Tobacco Use    Smoking status: Never Smoker    Smokeless tobacco: Never Used   Substance and Sexual Activity    Alcohol use: No     Frequency: Never    Drug use: No    Sexual activity: Yes     Partners: Male     Birth control/protection: Condom     Family History   Problem Relation Age of Onset    Arthritis-osteo Mother    Julieta Juan A Migraines Mother     Alcohol abuse Paternal Grandfather     Bleeding Prob Maternal Grandfather     Breast Cancer Other        Allergies   Allergen Reactions    Latex Rash     Wheezing    Beef Containing Products Anaphylaxis    Keflex [Cephalexin] Anaphylaxis    Codeine Other (comments)     Hyperactivity    Doxycycline Nausea Only    Macrobid [Nitrofurantoin Monohyd/M-Cryst] Other (comments)     Pharmacy had this allergy listed and called the patient and patient could not remember her reaction to the medication. This nurse called the patient . Patient states that when it was prescribed in early pregnancy it caused spotting.  Milk Hives    Nuts [Tree Nut] Swelling     Pt stated that her mouth and throat feels funny when she eats nuts      Omnicef [Cefdinir] Other (comments)     \"shakes\"    Pepto-Bismol [Bismuth Subsalicylate] Hives    Phenergan [Promethazine] Other (comments)     Increased sedation    Reglan [Metoclopramide] Anxiety    Soy Hives     Prior to Admission medications    Medication Sig Start Date End Date Taking? Authorizing Provider   acetaminophen (TYLENOL 8 HOUR PO) Take 500 mg by mouth as needed. Yes Provider, Historical   multivitamin with iron (FLINTSTONES) chewable tablet Take 1 Tab by mouth daily. Yes Other, MD Ivis   amoxicillin-clavulanate (AUGMENTIN) 875-125 mg per tablet Take 1 Tab by mouth two (2) times a day for 7 days.  9/14/20 9/21/20  John Solano MD        Review of Systems: History obtained from the patient  Constitutional: negative for weight loss, fever, night sweats  Breast: negative for breast lumps, nipple discharge, galactorrhea  GI: negative for change in bowel habits, abdominal pain, black or bloody stools  : see HPI, negative for vaginal discharge  MSK: negative for back pain, joint pain, muscle pain  Skin: negative for itching, rash, hives  Psych: negative for anxiety, depression, change in mood      Objective:  Visit Vitals  /68   Ht 5' 2\" (1.575 m)   Wt 101 lb (45.8 kg)   BMI 18.47 kg/m²       Physical Exam:   PHYSICAL EXAMINATION    Constitutional  · Appearance: well-nourished, well developed, alert, in no acute distress    Gastrointestinal  · Abdominal Examination: abdomen non-tender to palpation, normal bowel sounds, no masses present  · Liver and spleen: no hepatomegaly present, spleen not palpable  · Hernias: no hernias identified    Genitourinary  · External Genitalia: normal appearance for age, no discharge present, no tenderness present, no inflammatory lesions present, no masses present, no atrophy present  · Vagina: normal vaginal vault without central or paravaginal defects, no discharge present, no inflammatory lesions present, no masses present  · Bladder: tender to palpation  · Urethra: appears normal  · Cervix: normal   · Uterus: normal size, shape and consistency  · Adnexa: no adnexal tenderness present, no adnexal masses present  · Perineum: perineum within normal limits, no evidence of trauma, no rashes or skin lesions present  · Anus: anus within normal limits, no hemorrhoids present  · Inguinal Lymph Nodes: no lymphadenopathy present    Skin  · General Inspection: no rash, no lesions identified    Neurologic/Psychiatric  · Mental Status:  · Orientation: grossly oriented to person, place and time  · Mood and Affect: mood normal, affect appropriate  Results for orders placed or performed in visit on 09/15/20   AMB POC URINE PREGNANCY TEST, VISUAL COLOR COMPARISON   Result Value Ref Range    VALID INTERNAL CONTROL POC Yes     HCG urine, Ql. (POC) Negative Negative   AMB POC URINALYSIS DIP STICK MANUAL W/O MICRO   Result Value Ref Range    Color (UA POC) Dark Yellow     Clarity (UA POC) Cloudy     Glucose (UA POC) Negative Negative    Bilirubin (UA POC) Negative Negative    Ketones (UA POC) Trace Negative    Specific gravity (UA POC) 1.010 1.001 - 1.035    Blood (UA POC) Trace Negative    pH (UA POC) 7.0 4.6 - 8.0    Protein (UA POC) Trace Negative    Urobilinogen (UA POC) 0.2 mg/dL 0.2 - 1    Nitrites (UA POC) Positive Negative    Leukocyte esterase (UA POC) Trace Negative       Assessment:   UTI  Recent SAB    Plan:   Augmentin already at pharmacy  HCG  Needs ab screen and Rhogam  Urine culture  GCC off urine

## 2020-09-16 LAB — HCG INTACT+B SERPL-ACNC: <1 MIU/ML

## 2020-09-18 LAB
C TRACH RRNA SPEC QL NAA+PROBE: NEGATIVE
N GONORRHOEA RRNA SPEC QL NAA+PROBE: NEGATIVE
T VAGINALIS DNA SPEC QL NAA+PROBE: NEGATIVE

## 2020-09-19 LAB — BACTERIA UR CULT: ABNORMAL

## 2020-12-22 ENCOUNTER — HOSPITAL ENCOUNTER (EMERGENCY)
Age: 35
Discharge: HOME | End: 2020-12-22
Attending: EMERGENCY MEDICINE
Payer: MEDICAID

## 2020-12-22 ENCOUNTER — APPOINTMENT (OUTPATIENT)
Dept: GENERAL RADIOLOGY | Age: 35
End: 2020-12-22
Attending: EMERGENCY MEDICINE
Payer: MEDICAID

## 2020-12-22 ENCOUNTER — APPOINTMENT (OUTPATIENT)
Dept: CT IMAGING | Age: 35
End: 2020-12-22
Attending: EMERGENCY MEDICINE
Payer: MEDICAID

## 2020-12-22 VITALS
RESPIRATION RATE: 16 BRPM | BODY MASS INDEX: 18.83 KG/M2 | TEMPERATURE: 97.9 F | SYSTOLIC BLOOD PRESSURE: 107 MMHG | WEIGHT: 102.95 LBS | DIASTOLIC BLOOD PRESSURE: 73 MMHG | HEART RATE: 81 BPM | OXYGEN SATURATION: 97 %

## 2020-12-22 DIAGNOSIS — N30.00 ACUTE CYSTITIS WITHOUT HEMATURIA: ICD-10-CM

## 2020-12-22 DIAGNOSIS — R07.9 CHEST PAIN, UNSPECIFIED TYPE: Primary | ICD-10-CM

## 2020-12-22 DIAGNOSIS — R00.2 PALPITATIONS: ICD-10-CM

## 2020-12-22 DIAGNOSIS — R51.9 NONINTRACTABLE HEADACHE, UNSPECIFIED CHRONICITY PATTERN, UNSPECIFIED HEADACHE TYPE: ICD-10-CM

## 2020-12-22 LAB
ALBUMIN SERPL-MCNC: 4 G/DL (ref 3.5–5)
ALBUMIN/GLOB SERPL: 1.3 {RATIO} (ref 1.1–2.2)
ALP SERPL-CCNC: 43 U/L (ref 45–117)
ALT SERPL-CCNC: 25 U/L (ref 12–78)
ANION GAP SERPL CALC-SCNC: 9 MMOL/L (ref 5–15)
APPEARANCE UR: ABNORMAL
AST SERPL-CCNC: 16 U/L (ref 15–37)
BACTERIA URNS QL MICRO: ABNORMAL /HPF
BASOPHILS # BLD: 0.1 K/UL (ref 0–0.1)
BASOPHILS NFR BLD: 1 % (ref 0–1)
BILIRUB SERPL-MCNC: 0.3 MG/DL (ref 0.2–1)
BILIRUB UR QL: NEGATIVE
BUN SERPL-MCNC: 16 MG/DL (ref 6–20)
BUN/CREAT SERPL: 26 (ref 12–20)
CALCIUM SERPL-MCNC: 8.7 MG/DL (ref 8.5–10.1)
CHLORIDE SERPL-SCNC: 102 MMOL/L (ref 97–108)
CO2 SERPL-SCNC: 28 MMOL/L (ref 21–32)
COLOR UR: ABNORMAL
CREAT SERPL-MCNC: 0.62 MG/DL (ref 0.55–1.02)
DIFFERENTIAL METHOD BLD: NORMAL
EOSINOPHIL # BLD: 0.1 K/UL (ref 0–0.4)
EOSINOPHIL NFR BLD: 1 % (ref 0–7)
EPITH CASTS URNS QL MICRO: ABNORMAL /LPF
ERYTHROCYTE [DISTWIDTH] IN BLOOD BY AUTOMATED COUNT: 12.2 % (ref 11.5–14.5)
GLOBULIN SER CALC-MCNC: 3.2 G/DL (ref 2–4)
GLUCOSE SERPL-MCNC: 96 MG/DL (ref 65–100)
GLUCOSE UR STRIP.AUTO-MCNC: NEGATIVE MG/DL
HCG UR QL: NEGATIVE
HCT VFR BLD AUTO: 43.5 % (ref 35–47)
HGB BLD-MCNC: 14.3 G/DL (ref 11.5–16)
HGB UR QL STRIP: NEGATIVE
IMM GRANULOCYTES # BLD AUTO: 0 K/UL (ref 0–0.04)
IMM GRANULOCYTES NFR BLD AUTO: 0 % (ref 0–0.5)
KETONES UR QL STRIP.AUTO: NEGATIVE MG/DL
LEUKOCYTE ESTERASE UR QL STRIP.AUTO: ABNORMAL
LYMPHOCYTES # BLD: 3 K/UL (ref 0.8–3.5)
LYMPHOCYTES NFR BLD: 37 % (ref 12–49)
MAGNESIUM SERPL-MCNC: 2.1 MG/DL (ref 1.6–2.4)
MCH RBC QN AUTO: 29.7 PG (ref 26–34)
MCHC RBC AUTO-ENTMCNC: 32.9 G/DL (ref 30–36.5)
MCV RBC AUTO: 90.2 FL (ref 80–99)
MONOCYTES # BLD: 0.5 K/UL (ref 0–1)
MONOCYTES NFR BLD: 6 % (ref 5–13)
NEUTS SEG # BLD: 4.4 K/UL (ref 1.8–8)
NEUTS SEG NFR BLD: 55 % (ref 32–75)
NITRITE UR QL STRIP.AUTO: NEGATIVE
NRBC # BLD: 0 K/UL (ref 0–0.01)
NRBC BLD-RTO: 0 PER 100 WBC
PH UR STRIP: 7 [PH] (ref 5–8)
PLATELET # BLD AUTO: 303 K/UL (ref 150–400)
PMV BLD AUTO: 10.5 FL (ref 8.9–12.9)
POTASSIUM SERPL-SCNC: 3.5 MMOL/L (ref 3.5–5.1)
PROT SERPL-MCNC: 7.2 G/DL (ref 6.4–8.2)
PROT UR STRIP-MCNC: NEGATIVE MG/DL
RBC # BLD AUTO: 4.82 M/UL (ref 3.8–5.2)
RBC #/AREA URNS HPF: ABNORMAL /HPF (ref 0–5)
SODIUM SERPL-SCNC: 139 MMOL/L (ref 136–145)
SP GR UR REFRACTOMETRY: 1.01 (ref 1–1.03)
TROPONIN I SERPL-MCNC: <0.05 NG/ML
TROPONIN I SERPL-MCNC: <0.05 NG/ML
TSH SERPL DL<=0.05 MIU/L-ACNC: 8.29 UIU/ML (ref 0.36–3.74)
UR CULT HOLD, URHOLD: NORMAL
UROBILINOGEN UR QL STRIP.AUTO: 0.2 EU/DL (ref 0.2–1)
WBC # BLD AUTO: 8 K/UL (ref 3.6–11)
WBC URNS QL MICRO: ABNORMAL /HPF (ref 0–4)

## 2020-12-22 PROCEDURE — 87086 URINE CULTURE/COLONY COUNT: CPT

## 2020-12-22 PROCEDURE — 85025 COMPLETE CBC W/AUTO DIFF WBC: CPT

## 2020-12-22 PROCEDURE — 93005 ELECTROCARDIOGRAM TRACING: CPT

## 2020-12-22 PROCEDURE — 83735 ASSAY OF MAGNESIUM: CPT

## 2020-12-22 PROCEDURE — 84439 ASSAY OF FREE THYROXINE: CPT

## 2020-12-22 PROCEDURE — 87077 CULTURE AEROBIC IDENTIFY: CPT

## 2020-12-22 PROCEDURE — 81025 URINE PREGNANCY TEST: CPT

## 2020-12-22 PROCEDURE — 81001 URINALYSIS AUTO W/SCOPE: CPT

## 2020-12-22 PROCEDURE — 84484 ASSAY OF TROPONIN QUANT: CPT

## 2020-12-22 PROCEDURE — 87186 SC STD MICRODIL/AGAR DIL: CPT

## 2020-12-22 PROCEDURE — 84443 ASSAY THYROID STIM HORMONE: CPT

## 2020-12-22 PROCEDURE — 70450 CT HEAD/BRAIN W/O DYE: CPT

## 2020-12-22 PROCEDURE — 71045 X-RAY EXAM CHEST 1 VIEW: CPT

## 2020-12-22 PROCEDURE — 99285 EMERGENCY DEPT VISIT HI MDM: CPT

## 2020-12-22 PROCEDURE — 80053 COMPREHEN METABOLIC PANEL: CPT

## 2020-12-22 PROCEDURE — 36415 COLL VENOUS BLD VENIPUNCTURE: CPT

## 2020-12-22 RX ORDER — SODIUM CHLORIDE 0.9 % (FLUSH) 0.9 %
5-40 SYRINGE (ML) INJECTION EVERY 8 HOURS
Status: DISCONTINUED | OUTPATIENT
Start: 2020-12-22 | End: 2020-12-23 | Stop reason: HOSPADM

## 2020-12-22 RX ORDER — SODIUM CHLORIDE 0.9 % (FLUSH) 0.9 %
5-40 SYRINGE (ML) INJECTION AS NEEDED
Status: DISCONTINUED | OUTPATIENT
Start: 2020-12-22 | End: 2020-12-23 | Stop reason: HOSPADM

## 2020-12-22 RX ORDER — NITROFURANTOIN 25; 75 MG/1; MG/1
100 CAPSULE ORAL 2 TIMES DAILY
Qty: 10 CAP | Refills: 0 | Status: SHIPPED | OUTPATIENT
Start: 2020-12-22 | End: 2020-12-27

## 2020-12-23 LAB
ATRIAL RATE: 77 BPM
CALCULATED P AXIS, ECG09: 42 DEGREES
CALCULATED R AXIS, ECG10: 42 DEGREES
CALCULATED T AXIS, ECG11: 10 DEGREES
DIAGNOSIS, 93000: NORMAL
P-R INTERVAL, ECG05: 140 MS
Q-T INTERVAL, ECG07: 356 MS
QRS DURATION, ECG06: 66 MS
QTC CALCULATION (BEZET), ECG08: 402 MS
T4 FREE SERPL-MCNC: 1.1 NG/DL (ref 0.8–1.5)
VENTRICULAR RATE, ECG03: 77 BPM

## 2020-12-23 NOTE — ED NOTES
The patient was discharged home by Arnold Caldwell and Jabier Maynard rn in stable condition, accompanied by family. The patient is alert and oriented, is in no respiratory distress and has vital signs within normal limits . The patient's diagnosis, condition and treatment were explained to patient. The patient expressed understanding. Prescriptions given to pt. No work/school note given to pt. A discharge plan has been developed. A  was not involved in the process. Aftercare instructions were given to the patient. Pt's saline lock removed without complications. Family will transport pt home.

## 2020-12-23 NOTE — DISCHARGE INSTRUCTIONS
We hope that we have addressed all of your medical concerns. The examination and treatment you received in the Emergency Department were for an emergent problem and were not intended as complete care. It is important that you follow up with your healthcare provider(s) for ongoing care. If your symptoms worsen or do not improve as expected, and you are unable to reach your usual health care provider(s), you should return to the Emergency Department. Today's healthcare is undergoing tremendous change, and patient satisfaction surveys are one of the many tools to assess the quality of medical care. You may receive a survey from the "[employer] regarding your experience in the Emergency Department. I hope that your experience has been completely positive, particularly the medical care that I provided. As such, please participate in the survey; anything less than excellent does not meet my expectations or intentions. [facility] and [address] [place] [address] participate in nationally recognized quality of care measures. If your blood pressure is greater than 120/80, as reported below, we urge that you seek medical care to address the potential of high blood pressure, commonly known as hypertension. Hypertension can be hereditary or can be caused by certain medical conditions, pain, stress, or \"white coat syndrome. \"       Please make an appointment with your health care provider(s) for follow up of your Emergency Department visit. VITALS:   Patient Vitals for the past 8 hrs:   Temp Pulse Resp BP SpO2   12/22/20 2130 -- 75 16 112/69 98 %   12/22/20 2115 -- 77 18 110/71 99 %   12/22/20 2100 -- 78 14 106/66 98 %   12/22/20 1909 97.9 °F (36.6 °C) 76 12 123/88 100 %          Thank you for allowing us to provide you with medical care today. We realize that you have many choices for your emergency care needs.   Please choose us in the future for any continued health care needs. [employer] [address] [place] [address] [address]   Office: [phone]            Recent Results (from the past 24 hour(s))   EKG, 12 LEAD, INITIAL    Collection Time: 12/22/20  7:16 PM   Result Value Ref Range    Ventricular Rate 77 BPM    Atrial Rate 77 BPM    P-R Interval 140 ms    QRS Duration 66 ms    Q-T Interval 356 ms    QTC Calculation (Bezet) 402 ms    Calculated P Axis 42 degrees    Calculated R Axis 42 degrees    Calculated T Axis 10 degrees    Diagnosis       Normal sinus rhythm  Low voltage QRS  Borderline ECG  When compared with ECG of 08-MAR-2019 20:47,  No significant change was found     CBC WITH AUTOMATED DIFF    Collection Time: 12/22/20  7:22 PM   Result Value Ref Range    WBC 8.0 3.6 - 11.0 K/uL    RBC 4.82 3.80 - 5.20 M/uL    HGB 14.3 11.5 - 16.0 g/dL    HCT 43.5 35.0 - 47.0 %    MCV 90.2 80.0 - 99.0 FL    MCH 29.7 26.0 - 34.0 PG    MCHC 32.9 30.0 - 36.5 g/dL    RDW 12.2 11.5 - 14.5 %    PLATELET 571 799 - 617 K/uL    MPV 10.5 8.9 - 12.9 FL    NRBC 0.0 0.0  WBC    ABSOLUTE NRBC 0.00 0.00 - 0.01 K/uL    NEUTROPHILS 55 32 - 75 %    LYMPHOCYTES 37 12 - 49 %    MONOCYTES 6 5 - 13 %    EOSINOPHILS 1 0 - 7 %    BASOPHILS 1 0 - 1 %    IMMATURE GRANULOCYTES 0 0 - 0.5 %    ABS. NEUTROPHILS 4.4 1.8 - 8.0 K/UL    ABS. LYMPHOCYTES 3.0 0.8 - 3.5 K/UL    ABS. MONOCYTES 0.5 0.0 - 1.0 K/UL    ABS. EOSINOPHILS 0.1 0.0 - 0.4 K/UL    ABS. BASOPHILS 0.1 0.0 - 0.1 K/UL    ABS. IMM.  GRANS. 0.0 0.00 - 0.04 K/UL    DF AUTOMATED     METABOLIC PANEL, COMPREHENSIVE    Collection Time: 12/22/20  7:22 PM   Result Value Ref Range    Sodium 139 136 - 145 mmol/L    Potassium 3.5 3.5 - 5.1 mmol/L    Chloride 102 97 - 108 mmol/L    CO2 28 21 - 32 mmol/L    Anion gap 9 5 - 15 mmol/L    Glucose 96 65 - 100 mg/dL    BUN 16 6 - 20 MG/DL    Creatinine 0.62 0.55 - 1.02 MG/DL    BUN/Creatinine ratio 26 (H) 12 - 20      GFR est AA >60 >60 ml/min/1.73m2    GFR est non-AA >60 >60 ml/min/1.73m2    Calcium 8.7 8.5 - 10.1 MG/DL    Bilirubin, total 0.3 0.2 - 1.0 MG/DL    ALT (SGPT) 25 12 - 78 U/L    AST (SGOT) 16 15 - 37 U/L    Alk. phosphatase 43 (L) 45 - 117 U/L    Protein, total 7.2 6.4 - 8.2 g/dL    Albumin 4.0 3.5 - 5.0 g/dL    Globulin 3.2 2.0 - 4.0 g/dL    A-G Ratio 1.3 1.1 - 2.2     MAGNESIUM    Collection Time: 12/22/20  7:22 PM   Result Value Ref Range    Magnesium 2.1 1.6 - 2.4 mg/dL   TROPONIN I    Collection Time: 12/22/20  7:22 PM   Result Value Ref Range    Troponin-I, Qt. <0.05 <0.05 ng/mL   URINALYSIS W/MICROSCOPIC    Collection Time: 12/22/20  7:45 PM   Result Value Ref Range    Color YELLOW/STRAW      Appearance HAZY (A) CLEAR      Specific gravity 1.015 1.003 - 1.030      pH (UA) 7.0 5.0 - 8.0      Protein Negative NEG mg/dL    Glucose Negative NEG mg/dL    Ketone Negative NEG mg/dL    Bilirubin Negative NEG      Blood Negative NEG      Urobilinogen 0.2 0.2 - 1.0 EU/dL    Nitrites Negative NEG      Leukocyte Esterase TRACE (A) NEG      WBC 10-20 0 - 4 /hpf    RBC 0-5 0 - 5 /hpf    Epithelial cells FEW FEW /lpf    Bacteria 2+ (A) NEG /hpf   URINE CULTURE HOLD SAMPLE    Collection Time: 12/22/20  7:45 PM    Specimen: Serum; Urine   Result Value Ref Range    Urine culture hold        Urine on hold in Microbiology dept for 2 days. If unpreserved urine is submitted, it cannot be used for addtional testing after 24 hours, recollection will be required. HCG URINE, QL. - POC    Collection Time: 12/22/20  8:11 PM   Result Value Ref Range    Pregnancy test,urine (POC) Negative NEG     TROPONIN I    Collection Time: 12/22/20  9:17 PM   Result Value Ref Range    Troponin-I, Qt. <0.05 <0.05 ng/mL       Xr Chest Sngl V    Result Date: 12/22/2020  INDICATION: Palpitations, headache. Portable AP view of the chest. Direct comparison made to prior chest x-ray dated amber 2018. Cardiomediastinal silhouette is stable. Lungs are clear bilaterally.  Pleural spaces are normal. There is a dextroconvex scoliosis centered in the thoracic spine and levoconvex scoliosis centered in the upper lumbar spine. IMPRESSION: No acute cardiopulmonary disease. Ct Head Wo Cont    Result Date: 12/22/2020  EXAM: CT HEAD WO CONT INDICATION: ha COMPARISON: 8/19/2014. CONTRAST: None. TECHNIQUE: Unenhanced CT of the head was performed using 5 mm images. Brain and bone windows were generated. Coronal and sagittal reformats. CT dose reduction was achieved through use of a standardized protocol tailored for this examination and automatic exposure control for dose modulation. Adaptive statistical iterative reconstruction (ASIR) was utilized. FINDINGS: The ventricles and sulci are normal in size, shape and configuration. . There is no significant white matter disease. There is no intracranial hemorrhage, extra-axial collection, or mass effect. The basilar cisterns are open. No CT evidence of acute infarct. The bone windows demonstrate no abnormalities. The visualized portions of the paranasal sinuses and mastoid air cells are clear. IMPRESSION: No acute intracranial abnormality        Patient Education        Chest Pain: Care Instructions  Your Care Instructions     There are many things that can cause chest pain. Some are not serious and will get better on their own in a few days. But some kinds of chest pain need more testing and treatment. Your doctor may have recommended a follow-up visit in the next 8 to 12 hours. If you are not getting better, you may need more tests or treatment. Even though your doctor has released you, you still need to watch for any problems. The doctor carefully checked you, but sometimes problems can develop later. If you have new symptoms or if your symptoms do not get better, get medical care right away.   If you have worse or different chest pain or pressure that lasts more than 5 minutes or you passed out (lost consciousness), call 911 or seek other emergency help right away.   A medical visit is only one step in your treatment. Even if you feel better, you still need to do what your doctor recommends, such as going to all suggested follow-up appointments and taking medicines exactly as directed. This will help you recover and help prevent future problems. How can you care for yourself at home? · Rest until you feel better. · Take your medicine exactly as prescribed. Call your doctor if you think you are having a problem with your medicine. · Do not drive after taking a prescription pain medicine. When should you call for help? Call 911 if:     · You passed out (lost consciousness).     · You have severe difficulty breathing.     · You have symptoms of a heart attack. These may include:  ? Chest pain or pressure, or a strange feeling in your chest.  ? Sweating. ? Shortness of breath. ? Nausea or vomiting. ? Pain, pressure, or a strange feeling in your back, neck, jaw, or upper belly or in one or both shoulders or arms. ? Lightheadedness or sudden weakness. ? A fast or irregular heartbeat. After you call 911, the  may tell you to chew 1 adult-strength or 2 to 4 low-dose aspirin. Wait for an ambulance. Do not try to drive yourself. Call your doctor today if:     · You have any trouble breathing.     · Your chest pain gets worse.     · You are dizzy or lightheaded, or you feel like you may faint.     · You are not getting better as expected.     · You are having new or different chest pain. Where can you learn more? Go to http://www.[employer].com/  Enter A120 in the search box to learn more about \"Chest Pain: Care Instructions. \"  Current as of: June 26, 2019               Content Version: 12.6  © 4178-4733 [employer]. Care instructions adapted under license by [employer] (which disclaims liability or warranty for this information).  If you have questions about a medical condition or this instruction, always ask your healthcare professional. [place] [address] any warranty or liability for your use of this information. Patient Education        Palpitations: Care Instructions  Your Care Instructions     Heart palpitations are the uncomfortable sensation that your heart is beating fast or irregularly. You might feel pounding or fluttering in your chest. It might feel like your heart is skipping a beat. Although palpitations may be caused by a heart problem, they also occur because of stress, fatigue, or use of alcohol, caffeine, or nicotine. Many medicines, including diet pills, antihistamines, decongestants, and some herbal products, can cause heart palpitations. Nearly everyone has palpitations from time to time. Depending on your symptoms, your doctor may need to do more tests to try to find the cause of your palpitations. Follow-up care is a key part of your treatment and safety. Be sure to make and go to all appointments, and call your doctor if you are having problems. It's also a good idea to know your test results and keep a list of the medicines you take. How can you care for yourself at home? · Avoid caffeine, nicotine, and excess alcohol. · Do not take illegal drugs, such as methamphetamines and cocaine. · Do not take weight loss or diet medicines unless you talk with your doctor first.  · Get plenty of sleep. · Do not overeat. · If you have palpitations again, take deep breaths and try to relax. This may slow a racing heart. · If you start to feel lightheaded, lie down to avoid injuries that might result if you pass out and fall down. · Keep a record of your palpitations and bring it to your next doctor's appointment. Write down:  ? The date and time. ? Your pulse. (If your heart is beating fast, it may be hard to count your pulse.)  ? What you were doing when the palpitations started. ? How long the palpitations lasted. ? Any other symptoms.   · If an activity causes palpitations, slow down or stop. Talk to your doctor before you do that activity again. · Take your medicines exactly as prescribed. Call your doctor if you think you are having a problem with your medicine. When should you call for help? Call 911 anytime you think you may need emergency care. For example, call if:    · You passed out (lost consciousness).     · You have symptoms of a heart attack. These may include:  ? Chest pain or pressure, or a strange feeling in the chest.  ? Sweating. ? Shortness of breath. ? Pain, pressure, or a strange feeling in the back, neck, jaw, or upper belly or in one or both shoulders or arms. ? Lightheadedness or sudden weakness. ? A fast or irregular heartbeat. After you call 911, the  may tell you to chew 1 adult-strength or 2 to 4 low-dose aspirin. Wait for an ambulance. Do not try to drive yourself.     · You have symptoms of a stroke. These may include:  ? Sudden numbness, tingling, weakness, or loss of movement in your face, arm, or leg, especially on only one side of your body. ? Sudden vision changes. ? Sudden trouble speaking. ? Sudden confusion or trouble understanding simple statements. ? Sudden problems with walking or balance. ? A sudden, severe headache that is different from past headaches. Call your doctor now or seek immediate medical care if:    · You have heart palpitations and:  ? Are dizzy or lightheaded, or you feel like you may faint. ? Have new or increased shortness of breath. Watch closely for changes in your health, and be sure to contact your doctor if:    · You continue to have heart palpitations. Where can you learn more? Go to http://www.gray.com/  Enter R508 in the search box to learn more about \"Palpitations: Care Instructions. \"  Current as of: December 16, 2019               Content Version: 12.6  © 6427-9293 [employer].    Care instructions adapted under license by [employer] (which disclaims liability or warranty for this information). If you have questions about a medical condition or this instruction, always ask your healthcare professional. [place] [address] any warranty or liability for your use of this information. Patient Education        Urinary Tract Infection in Women: Care Instructions  Your Care Instructions     A urinary tract infection, or UTI, is a general term for an infection anywhere between the kidneys and the urethra (where urine comes out). Most UTIs are bladder infections. They often cause pain or burning when you urinate. UTIs are caused by bacteria and can be cured with antibiotics. Be sure to complete your treatment so that the infection goes away. Follow-up care is a key part of your treatment and safety. Be sure to make and go to all appointments, and call your doctor if you are having problems. It's also a good idea to know your test results and keep a list of the medicines you take. How can you care for yourself at home? · Take your antibiotics as directed. Do not stop taking them just because you feel better. You need to take the full course of antibiotics. · Drink extra water and other fluids for the next day or two. This may help wash out the bacteria that are causing the infection. (If you have kidney, heart, or liver disease and have to limit fluids, talk with your doctor before you increase your fluid intake.)  · Avoid drinks that are carbonated or have caffeine. They can irritate the bladder. · Urinate often. Try to empty your bladder each time. · To relieve pain, take a hot bath or lay a heating pad set on low over your lower belly or genital area. Never go to sleep with a heating pad in place. To prevent UTIs  · Drink plenty of water each day. This helps you urinate often, which clears bacteria from your system.  (If you have kidney, heart, or liver disease and have to limit fluids, talk with your doctor before you increase your fluid intake.)  · Urinate when you need to. · Urinate right after you have sex. · Change sanitary pads often. · Avoid douches, bubble baths, feminine hygiene sprays, and other feminine hygiene products that have deodorants. · After going to the bathroom, wipe from front to back. When should you call for help? Call your doctor now or seek immediate medical care if:    · Symptoms such as fever, chills, nausea, or vomiting get worse or appear for the first time.     · You have new pain in your back just below your rib cage. This is called flank pain.     · There is new blood or pus in your urine.     · You have any problems with your antibiotic medicine. Watch closely for changes in your health, and be sure to contact your doctor if:    · You are not getting better after taking an antibiotic for 2 days.     · Your symptoms go away but then come back. Where can you learn more? Go to http://www.[employer]akely.com/  Enter H523 in the search box to learn more about \"Urinary Tract Infection in Women: Care Instructions. \"  Current as of: June 29, 2020               Content Version: 12.6  © 0707-6655 [employer]. Care instructions adapted under license by [employer] (which disclaims liability or warranty for this information). If you have questions about a medical condition or this instruction, always ask your healthcare professional. [address] any warranty or liability for your use of this information.

## 2020-12-23 NOTE — ED PROVIDER NOTES
This is a 30-year-old female comes emergency room with chief complaint of palpitations, chest pain, numbness, lightheadedness, and headache. Patient states that over the past couple weeks she has had increasing frequency of irregular heartbeats. Patient states that about 5:40 PM she had palpitations which was accompanied by left-sided chest pain. Patient states the pain was sharp. Patient states that she has not had chest pain associated with her palpitations before. Patient states that she also had some numbness and tingling of her left arm and face. Patient states that that has improved since it onset. Patient states that she also has a headache on the right side of her head. Patient denies any abdominal pain. Patient denies any diarrhea. 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 The history is provided by the patient and the spouse. No  was used. Palpitations   This is a recurrent problem. The current episode started more than 1 week ago. The problem has been gradually worsening. The problem occurs daily. The problem is associated with stress. Associated symptoms include numbness, chest pain, irregular heartbeat, headaches, dizziness and shortness of breath. Pertinent negatives include no diaphoresis, no fever, no malaise/fatigue, no chest pressure, no claudication, no exertional chest pressure, no abdominal pain, no vomiting, no back pain, no lower extremity edema, no weakness, no cough and no sputum production. Past Medical History:   Diagnosis Date    Arrhythmia     Arthritis     left foot and lower back    Asthma     no inhaler    Burning with urination     Complication of anesthesia     bradycardia    Disease of blood and blood forming organ     Headache     Heart abnormality     mummer    Iron deficiency anemia during pregnancy 8/27/2019    Migraine     Nerve damage     Nerve damage in left foot.    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 in pregnancy     Self-catheterizes urinary bladder     Since age 13   [facility] Unspecified breast disorder     L invert nipple       Past Surgical History:   Procedure Laterality Date    HX BACK SURGERY      x2    HX GYN      episotomy    HX ORTHOPAEDIC      spine and left foot    HX OTHER SURGICAL           Family History:   Problem Relation Age of Onset    Arthritis-osteo Mother     Migraines Mother     Alcohol abuse Paternal Grandfather     Bleeding Prob Maternal Grandfather     Breast Cancer Other        Social History     Socioeconomic History    Marital status:      Spouse name: Not on file    Number of children: Not on file    Years of education: Not on file    Highest education level: Not on file   Occupational History    Not on file   Social Needs    Financial resource strain: Not on file    Food insecurity     Worry: Not on file     Inability: Not on file    Transportation needs     Medical: Not on file     Non-medical: Not on file   Tobacco Use    Smoking status: Never Smoker    Smokeless tobacco: Never Used   Substance and Sexual Activity    Alcohol use: No     Frequency: Never    Drug use: No    Sexual activity: Yes     Partners: Male     Birth control/protection: Condom   Lifestyle    Physical activity     Days per week: Not on file     Minutes per session: Not on file    Stress: Not on file   Relationships    Social connections     Talks on phone: Not on file     Gets together: Not on file     Attends Hindu service: Not on file     Active member of club or organization: Not on file     Attends meetings of clubs or organizations: Not on file     Relationship status: Not on file    Intimate partner violence     Fear of current or ex partner: Not on file     Emotionally abused: Not on file Physically abused: Not on file     Forced sexual activity: Not on file   Other Topics Concern     Service Not Asked    Blood Transfusions Not Asked    Caffeine Concern Not Asked    Occupational Exposure Not Asked    Hobby Hazards Not Asked    Sleep Concern Not Asked    Stress Concern Not Asked    Weight Concern Not Asked    Special Diet Not Asked    Back Care Not Asked    Exercise Not Asked    Bike Helmet Not Asked   [address] Not Asked    Self-Exams Not Asked   Social History Narrative    Not on file         ALLERGIES: Latex, Beef containing products, Keflex [cephalexin], Codeine, Doxycycline, Macrobid [nitrofurantoin monohyd/m-cryst], Milk, Nuts [tree nut], Omnicef [cefdinir], Pepto-bismol [bismuth subsalicylate], Phenergan [promethazine], Reglan [metoclopramide], and Soy    Review of Systems   Constitutional: Negative for appetite change, chills, diaphoresis, fever, malaise/fatigue and unexpected weight change. HENT: Negative for ear pain, hearing loss, rhinorrhea and trouble swallowing. Eyes: Negative for pain and visual disturbance. Respiratory: Positive for shortness of breath. Negative for cough, sputum production and chest tightness. Cardiovascular: Positive for chest pain and palpitations. Negative for claudication. Gastrointestinal: Negative for abdominal distention, abdominal pain, blood in stool and vomiting. Genitourinary: Negative for dysuria, hematuria and urgency. Musculoskeletal: Negative for back pain and myalgias. Skin: Negative for rash. Neurological: Positive for dizziness, numbness and headaches. Negative for syncope and weakness. Psychiatric/Behavioral: Negative for confusion and suicidal ideas. All other systems reviewed and are negative.       Vitals:    12/22/20 2100 12/22/20 2115 12/22/20 2130 12/22/20 2200   BP: 106/66 110/71 112/69 107/73   Pulse: 78 77 75 81   Resp: 14 18 16 16   Temp:       SpO2: 98% 99% 98% 97%   Weight: Physical Exam  Vitals signs and nursing note reviewed. Constitutional:       General: She is not in acute distress. Appearance: Normal appearance. She is well-developed. She is not diaphoretic. HENT:      Head: Normocephalic and atraumatic. Right Ear: External ear normal.      Left Ear: External ear normal.      Nose: Nose normal.      Mouth/Throat:      Mouth: Mucous membranes are moist.      Pharynx: No oropharyngeal exudate or posterior oropharyngeal erythema. Eyes:      General: No scleral icterus. Right eye: No discharge. Left eye: No discharge. Extraocular Movements: Extraocular movements intact. Conjunctiva/sclera: Conjunctivae normal.      Pupils: Pupils are equal, round, and reactive to light. Neck:      Musculoskeletal: Normal range of motion and neck supple. Vascular: No JVD. Trachea: No tracheal deviation. Cardiovascular:      Rate and Rhythm: Normal rate and regular rhythm. Heart sounds: Normal heart sounds. No murmur. No friction rub. No gallop. Pulmonary:      Effort: Pulmonary effort is normal. No respiratory distress. Breath sounds: Normal breath sounds. No stridor. No decreased breath sounds, wheezing, rhonchi or rales. Chest:      Chest wall: No tenderness. Abdominal:      General: Bowel sounds are normal. There is no distension. Palpations: Abdomen is soft. Tenderness: There is no abdominal tenderness. There is no guarding or rebound. Musculoskeletal: Normal range of motion. General: No tenderness. Right lower leg: No edema. Left lower leg: No edema. Skin:     General: Skin is warm and dry. Coloration: Skin is not pale. Findings: No erythema or rash. Neurological:      General: No focal deficit present. Mental Status: She is alert and oriented to person, place, and time. GCS: GCS eye subscore is 4. GCS verbal subscore is 5. GCS motor subscore is 6.       Cranial Nerves: No cranial nerve deficit. Sensory: No sensory deficit. Motor: Weakness present. No abnormal muscle tone. Coordination: Coordination normal.      Deep Tendon Reflexes: Reflexes are normal and symmetric. Reflexes normal.      Comments: Decrease with dorsiflexion of the left lower extremity. This is chronic and not a new finding. Otherwise patient has no other motor or sensory deficits. Psychiatric:         Mood and Affect: Mood normal.         Behavior: Behavior normal.         Thought Content: Thought content normal.         Judgment: Judgment normal.        MDM  Number of Diagnoses or Management Options     Amount and/or Complexity of Data Reviewed  Clinical lab tests: ordered and reviewed  Tests in the radiology section of CPT®: ordered and reviewed  Tests in the medicine section of CPT®: ordered and reviewed  Independent visualization of images, tracings, or specimens: yes (EKG)    Risk of Complications, Morbidity, and/or Mortality  Presenting problems: moderate  Diagnostic procedures: moderate  Management options: moderate    Patient Progress  Patient progress: stable       Procedures    Chief Complaint   Patient presents with    Palpitations    Headache       The patient's presenting problems have been discussed, and they are in agreement with the care plan formulated and outlined with them. I have encouraged them to ask questions as they arise throughout their visit.     MEDICATIONS GIVEN:  Medications   sodium chloride (NS) flush 5-40 mL (has no administration in time range)   sodium chloride (NS) flush 5-40 mL (has no administration in time range)       LABS REVIEWED:  Recent Results (from the past 24 hour(s))   EKG, 12 LEAD, INITIAL    Collection Time: 12/22/20  7:16 PM   Result Value Ref Range    Ventricular Rate 77 BPM    Atrial Rate 77 BPM    P-R Interval 140 ms    QRS Duration 66 ms    Q-T Interval 356 ms    QTC Calculation (Bezet) 402 ms    Calculated P Axis 42 degrees    Calculated R Axis 42 degrees    Calculated T Axis 10 degrees    Diagnosis       Normal sinus rhythm  Low voltage QRS  Borderline ECG  When compared with ECG of 08-MAR-2019 20:47,  No significant change was found     CBC WITH AUTOMATED DIFF    Collection Time: 12/22/20  7:22 PM   Result Value Ref Range    WBC 8.0 3.6 - 11.0 K/uL    RBC 4.82 3.80 - 5.20 M/uL    HGB 14.3 11.5 - 16.0 g/dL    HCT 43.5 35.0 - 47.0 %    MCV 90.2 80.0 - 99.0 FL    MCH 29.7 26.0 - 34.0 PG    MCHC 32.9 30.0 - 36.5 g/dL    RDW 12.2 11.5 - 14.5 %    PLATELET 989 739 - 410 K/uL    MPV 10.5 8.9 - 12.9 FL    NRBC 0.0 0.0  WBC    ABSOLUTE NRBC 0.00 0.00 - 0.01 K/uL    NEUTROPHILS 55 32 - 75 %    LYMPHOCYTES 37 12 - 49 %    MONOCYTES 6 5 - 13 %    EOSINOPHILS 1 0 - 7 %    BASOPHILS 1 0 - 1 %    IMMATURE GRANULOCYTES 0 0 - 0.5 %    ABS. NEUTROPHILS 4.4 1.8 - 8.0 K/UL    ABS. LYMPHOCYTES 3.0 0.8 - 3.5 K/UL    ABS. MONOCYTES 0.5 0.0 - 1.0 K/UL    ABS. EOSINOPHILS 0.1 0.0 - 0.4 K/UL    ABS. BASOPHILS 0.1 0.0 - 0.1 K/UL    ABS. IMM. GRANS. 0.0 0.00 - 0.04 K/UL    DF AUTOMATED     METABOLIC PANEL, COMPREHENSIVE    Collection Time: 12/22/20  7:22 PM   Result Value Ref Range    Sodium 139 136 - 145 mmol/L    Potassium 3.5 3.5 - 5.1 mmol/L    Chloride 102 97 - 108 mmol/L    CO2 28 21 - 32 mmol/L    Anion gap 9 5 - 15 mmol/L    Glucose 96 65 - 100 mg/dL    BUN 16 6 - 20 MG/DL    Creatinine 0.62 0.55 - 1.02 MG/DL    BUN/Creatinine ratio 26 (H) 12 - 20      GFR est AA >60 >60 ml/min/1.73m2    GFR est non-AA >60 >60 ml/min/1.73m2    Calcium 8.7 8.5 - 10.1 MG/DL    Bilirubin, total 0.3 0.2 - 1.0 MG/DL    ALT (SGPT) 25 12 - 78 U/L    AST (SGOT) 16 15 - 37 U/L    Alk.  phosphatase 43 (L) 45 - 117 U/L    Protein, total 7.2 6.4 - 8.2 g/dL    Albumin 4.0 3.5 - 5.0 g/dL    Globulin 3.2 2.0 - 4.0 g/dL    A-G Ratio 1.3 1.1 - 2.2     MAGNESIUM    Collection Time: 12/22/20  7:22 PM   Result Value Ref Range    Magnesium 2.1 1.6 - 2.4 mg/dL   TROPONIN I    Collection Time: 12/22/20  7:22 PM   Result Value Ref Range    Troponin-I, Qt. <0.05 <0.05 ng/mL   URINALYSIS W/MICROSCOPIC    Collection Time: 12/22/20  7:45 PM   Result Value Ref Range    Color YELLOW/STRAW      Appearance HAZY (A) CLEAR      Specific gravity 1.015 1.003 - 1.030      pH (UA) 7.0 5.0 - 8.0      Protein Negative NEG mg/dL    Glucose Negative NEG mg/dL    Ketone Negative NEG mg/dL    Bilirubin Negative NEG      Blood Negative NEG      Urobilinogen 0.2 0.2 - 1.0 EU/dL    Nitrites Negative NEG      Leukocyte Esterase TRACE (A) NEG      WBC 10-20 0 - 4 /hpf    RBC 0-5 0 - 5 /hpf    Epithelial cells FEW FEW /lpf    Bacteria 2+ (A) NEG /hpf   URINE CULTURE HOLD SAMPLE    Collection Time: 12/22/20  7:45 PM    Specimen: Serum; Urine   Result Value Ref Range    Urine culture hold        Urine on hold in Microbiology dept for 2 days. If unpreserved urine is submitted, it cannot be used for addtional testing after 24 hours, recollection will be required. HCG URINE, QL. - POC    Collection Time: 12/22/20  8:11 PM   Result Value Ref Range    Pregnancy test,urine (POC) Negative NEG     TROPONIN I    Collection Time: 12/22/20  9:17 PM   Result Value Ref Range    Troponin-I, Qt. <0.05 <0.05 ng/mL   TSH 3RD GENERATION    Collection Time: 12/22/20  9:19 PM   Result Value Ref Range    TSH 8.29 (H) 0.36 - 3.74 uIU/mL       VITAL SIGNS:  Patient Vitals for the past 24 hrs:   Temp Pulse Resp BP SpO2   12/22/20 2200  81 16 107/73 97 %   12/22/20 2130  75 16 112/69 98 %   12/22/20 2115  77 18 110/71 99 %   12/22/20 2100  78 14 106/66 98 %   12/22/20 1909 97.9 °F (36.6 °C) 76 12 123/88 100 %       RADIOLOGY RESULTS:  The following have been ordered and reviewed:  Xr Chest Sngl V    Result Date: 12/22/2020  INDICATION: Palpitations, headache. Portable AP view of the chest. Direct comparison made to prior chest x-ray dated amber 2018. Cardiomediastinal silhouette is stable. Lungs are clear bilaterally.  Pleural spaces are normal. There is a dextroconvex scoliosis centered in the thoracic spine and levoconvex scoliosis centered in the upper lumbar spine. IMPRESSION: No acute cardiopulmonary disease. Ct Head Wo Cont    Result Date: 12/22/2020  EXAM: CT HEAD WO CONT INDICATION: ha COMPARISON: 8/19/2014. CONTRAST: None. TECHNIQUE: Unenhanced CT of the head was performed using 5 mm images. Brain and bone windows were generated. Coronal and sagittal reformats. CT dose reduction was achieved through use of a standardized protocol tailored for this examination and automatic exposure control for dose modulation. Adaptive statistical iterative reconstruction (ASIR) was utilized. FINDINGS: The ventricles and sulci are normal in size, shape and configuration. . There is no significant white matter disease. There is no intracranial hemorrhage, extra-axial collection, or mass effect. The basilar cisterns are open. No CT evidence of acute infarct. The bone windows demonstrate no abnormalities. The visualized portions of the paranasal sinuses and mastoid air cells are clear. IMPRESSION: No acute intracranial abnormality      ED EKG interpretation:  Rhythm: normal sinus rhythm; and regular . Rate (approx.): 77; Axis: normal; P wave: normal; QRS interval: Low voltage; ST/T wave: normal; Other findings: borderline ekg. This EKG was interpreted by Bjorn Renteria DO, ED Provider. PROGRESS NOTES:  After discussing the results with the patient, the patient states that when all this happened and she and her  were talking about her 's ex-wife and upcoming court dates. I suspect that there is some stress/anxiety component to this. Discussed results and plan with patient. Patient will be discharged home with PCP follow up. Patient instructed to return to the emergency room for any worsening symptoms or any other concerns. DIAGNOSIS:    1. Chest pain, unspecified type    2. Palpitations    3.  Nonintractable headache, unspecified chronicity pattern, unspecified headache type    4. Acute cystitis without hematuria        PLAN:  Follow-up Information     Follow up With Specialties Details Why Contact Enoch Bolivar NP Nurse Practitioner Schedule an appointment as soon as possible for a visit  [address] [place] [address]  [address] [address]  [phone] [address] [facility] DEPT Emergency Medicine  If symptoms worsen [address] [place] [address]  [phone]        Discharge Medication List as of 12/22/2020 10:02 PM      START taking these medications    Details   nitrofurantoin, macrocrystal-monohydrate, (Macrobid) 100 mg capsule Take 1 Cap by mouth two (2) times a day for 5 days. , Print, Disp-10 Cap, R-0         CONTINUE these medications which have NOT CHANGED    Details   multivitamin with iron (FLINTSTONES) chewable tablet Take 1 Tab by mouth daily. , Historical Med      acetaminophen (TYLENOL 8 HOUR PO) Take 500 mg by mouth as needed., Historical Med             ED COURSE: The patient's hospital course has been uncomplicated. Please note that this dictation was completed with [employer], the computer voice recognition software. Quite often unanticipated grammatical, syntax, homophones, and other interpretive errors are inadvertently transcribed by the computer software. Please disregard these errors. Please excuse any errors that have escaped final proofreading.

## 2020-12-23 NOTE — ED TRIAGE NOTES
Pt ambulated to the treatment area with a steady gait accompanied by her . Pt states \"over the past couple of weeks I have had more irregular heart beats. This afternoon about 540pm I was standing in the kitchen and I felt the palpitations at the same time I suddenly felt a sharp pain in the left side of my head lasting a few seconds I have not had that pain before. After the pain there was more heart fluttering and I felt numbness in my left arm and left face lasting about an hour its wearing off now. Also on the car ride over I had some trouble with my speech but that's back to normal now. \" Dr Dagmar Cedeno in room speaking with pt.

## 2020-12-25 LAB
BACTERIA SPEC CULT: ABNORMAL
CC UR VC: ABNORMAL
SERVICE CMNT-IMP: ABNORMAL

## 2021-01-25 ENCOUNTER — OFFICE VISIT (OUTPATIENT)
Dept: OBGYN CLINIC | Age: 36
End: 2021-01-25

## 2021-01-25 ENCOUNTER — LAB ONLY (OUTPATIENT)
Dept: OBGYN CLINIC | Age: 36
End: 2021-01-25

## 2021-01-25 ENCOUNTER — TELEPHONE (OUTPATIENT)
Dept: OBGYN CLINIC | Age: 36
End: 2021-01-25

## 2021-01-25 VITALS — WEIGHT: 102 LBS | HEIGHT: 62 IN | BODY MASS INDEX: 18.77 KG/M2

## 2021-01-25 DIAGNOSIS — R10.2 PELVIC PAIN: Primary | ICD-10-CM

## 2021-01-25 NOTE — TELEPHONE ENCOUNTER
Call received at 800am    37year old patient last seen in the office on 9/15/2020      HIPPA verified to speak to  regarding his wife.  calling to report his wife having LMP on 12/16/2020 ( 5w 5d pregnant)  report a upt test 2 days ago , wife was not able to confirm positive or negative.  reports she has been having abdominal pain that started a week ago and the middle of last week has become a searing pain that patient rates at 8-9 on the pain scale of 1-10.  reports she started bleeding yesterday and has changed her pad 4 times since yesterday. [facility] and was advised the labs were inconclusive for  Pregnancy and to check with ob to rule at ectopic pregnancy       Patient was placed on the schedule to be seen today at 3:00PM for ultrasound and then worked in to see MD at 3:30PM     verbalized understanding.     GS advise of add on appointment    Patient is A negative blood typer      sunita

## 2021-01-29 ENCOUNTER — HOSPITAL ENCOUNTER (EMERGENCY)
Age: 36
Discharge: STILL ADMITTED OR ADMINISTRATIVE | End: 2021-01-29

## 2021-01-29 LAB
A VAGINAE DNA VAG QL NAA+PROBE: NORMAL SCORE
BVAB2 DNA VAG QL NAA+PROBE: NORMAL SCORE
C ALBICANS DNA VAG QL NAA+PROBE: NEGATIVE
C GLABRATA DNA VAG QL NAA+PROBE: NEGATIVE
C TRACH DNA VAG QL NAA+PROBE: NEGATIVE
MEGA1 DNA VAG QL NAA+PROBE: NORMAL SCORE
N GONORRHOEA DNA VAG QL NAA+PROBE: NEGATIVE
SPECIMEN STATUS REPORT, ROLRST: NORMAL
T VAGINALIS DNA VAG QL NAA+PROBE: NEGATIVE

## 2021-05-21 ENCOUNTER — OFFICE VISIT (OUTPATIENT)
Dept: OBGYN CLINIC | Age: 36
End: 2021-05-21
Payer: MEDICAID

## 2021-05-21 VITALS
WEIGHT: 104 LBS | DIASTOLIC BLOOD PRESSURE: 47 MMHG | HEIGHT: 62 IN | SYSTOLIC BLOOD PRESSURE: 95 MMHG | BODY MASS INDEX: 19.14 KG/M2

## 2021-05-21 DIAGNOSIS — N91.2 AMENORRHEA: Primary | ICD-10-CM

## 2021-05-21 DIAGNOSIS — Z87.440 HISTORY OF UTI: ICD-10-CM

## 2021-05-21 DIAGNOSIS — Z86.39 HISTORY OF THYROID DISEASE: ICD-10-CM

## 2021-05-21 LAB
BILIRUB UR QL STRIP: NEGATIVE
GLUCOSE UR-MCNC: NEGATIVE MG/DL
HCG URINE, QL. (POC): POSITIVE
KETONES P FAST UR STRIP-MCNC: NEGATIVE MG/DL
PH UR STRIP: 5 [PH] (ref 4.6–8)
PROT UR QL STRIP: NEGATIVE
SP GR UR STRIP: 1.02 (ref 1–1.03)
UA UROBILINOGEN AMB POC: NORMAL (ref 0.2–1)
URINALYSIS CLARITY POC: CLEAR
URINALYSIS COLOR POC: YELLOW
URINE BLOOD POC: NEGATIVE
URINE LEUKOCYTES POC: NEGATIVE
URINE NITRITES POC: NEGATIVE
VALID INTERNAL CONTROL?: YES

## 2021-05-21 PROCEDURE — 81025 URINE PREGNANCY TEST: CPT | Performed by: OBSTETRICS & GYNECOLOGY

## 2021-05-21 PROCEDURE — 99213 OFFICE O/P EST LOW 20 MIN: CPT | Performed by: OBSTETRICS & GYNECOLOGY

## 2021-05-21 PROCEDURE — 81001 URINALYSIS AUTO W/SCOPE: CPT | Performed by: OBSTETRICS & GYNECOLOGY

## 2021-05-21 NOTE — PATIENT INSTRUCTIONS
Pelvic Pain: Care Instructions  Your Care Instructions     Pelvic pain, or pain in the lower belly, can have many causes. Often pelvic pain is not serious and gets better in a few days. If your pain continues or gets worse, you may need tests and treatment. Tell your doctor about any new symptoms. These may be signs of a serious problem. Follow-up care is a key part of your treatment and safety. Be sure to make and go to all appointments, and call your doctor if you are having problems. It's also a good idea to know your test results and keep a list of the medicines you take. How can you care for yourself at home? · Rest until you feel better. Lie down, and raise your legs by placing a pillow under your knees. · Drink plenty of fluids. You may find that small, frequent sips are easier on your stomach than if you drink a lot at once. Avoid drinks with carbonation or caffeine, such as soda pop, tea, or coffee. · Try eating several small meals instead of 2 or 3 large ones. Eat mild foods, such as rice, dry toast or crackers, bananas, and applesauce. Avoid fatty and spicy foods, other fruits, and alcohol until 48 hours after your symptoms have gone away. · Take an over-the-counter pain medicine, such as acetaminophen (Tylenol), ibuprofen (Advil, Motrin), or naproxen (Aleve). Read and follow all instructions on the label. · Do not take two or more pain medicines at the same time unless the doctor told you to. Many pain medicines have acetaminophen, which is Tylenol. Too much acetaminophen (Tylenol) can be harmful. · You can put a heating pad, a warm cloth, or moist heat on your belly to relieve pain. When should you call for help? Call your doctor now or seek immediate medical care if:    · You have a new or higher fever.     · You have unusual vaginal bleeding.     · You have new or worse belly or pelvic pain.     · You have vaginal discharge that has increased in amount or smells bad.    Watch closely for changes in your health, and be sure to contact your doctor if:    · You do not get better as expected. Where can you learn more? Go to http://www.gray.com/  Enter B514 in the search box to learn more about \"Pelvic Pain: Care Instructions. \"  Current as of: July 17, 2020               Content Version: 12.8  © 7794-4422 [employer]. Care instructions adapted under license by [employer] (which disclaims liability or warranty for this information). If you have questions about a medical condition or this instruction, always ask your healthcare professional. [place] [address] any warranty or liability for your use of this information.

## 2021-05-21 NOTE — PROGRESS NOTES
Pelvic Pain evaluation    Gladys Carty is a 31 y.o. female who complains of pelvic pain. LMP was 5 weeks ago. The pain is described as sharp, and is 8/10 in intensity. Pain is located in the RLQ with radiation down her leg. .     The pain started 1 week ago when she felt a \"burst\". Her symptoms have been gradually worsening since. Aggravating factors: movement and activity. Alleviating factors: none. Associated symptoms: bloating, night sweats. The patient denies constipation, dysuria and fever. Past Medical History:   Diagnosis Date    Arrhythmia     Arthritis     left foot and lower back    Asthma     no inhaler    Burning with urination     Complication of anesthesia     bradycardia    Disease of blood and blood forming organ     Headache     Heart abnormality     mummer    Iron deficiency anemia during pregnancy 8/27/2019    Migraine     Nerve damage     Nerve damage in left foot.    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 in pregnancy     Self-catheterizes urinary bladder     Since age 13   Sonia [place] Unspecified breast disorder     L invert nipple     Past Surgical History:   Procedure Laterality Date    HX BACK SURGERY      x2    HX GYN      episotomy    HX ORTHOPAEDIC      spine and left foot    HX OTHER SURGICAL       Social History     Occupational History    Not on file   Tobacco Use    Smoking status: Never Smoker    Smokeless tobacco: Never Used   Substance and Sexual Activity    Alcohol use: No    Drug use: No    Sexual activity: Yes     Partners: Male     Birth control/protection: Condom     Family History   Problem Relation Age of Onset    Arthritis-osteo Mother    Sonia Cushing Migraines Mother     Alcohol abuse Paternal Grandfather     Bleeding Prob Maternal Grandfather     Breast Cancer Other        Allergies Allergen Reactions    Latex Rash     Wheezing    Beef Containing Products Anaphylaxis    Keflex [Cephalexin] Anaphylaxis    Codeine Other (comments)     Hyperactivity    Doxycycline Nausea Only    Macrobid [Nitrofurantoin Monohyd/M-Cryst] Other (comments)     Pharmacy had this allergy listed and called the patient and patient could not remember her reaction to the medication. This nurse called the patient . Patient states that when it was prescribed in early pregnancy it caused spotting.  Milk Hives    Nuts [Tree Nut] Swelling     Pt stated that her mouth and throat feels funny when she eats nuts      Omnicef [Cefdinir] Other (comments)     \"shakes\"    Pepto-Bismol [Bismuth Subsalicylate] Hives    Phenergan [Promethazine] Other (comments)     Increased sedation    Reglan [Metoclopramide] Anxiety    Soy Hives     Prior to Admission medications    Medication Sig Start Date End Date Taking? Authorizing Provider   acetaminophen (TYLENOL 8 HOUR PO) Take 500 mg by mouth as needed. Provider, Historical   multivitamin with iron (FLINTSTONES) chewable tablet Take 1 Tab by mouth daily.     Other, Phys, MD        Review of Systems: History obtained from the patient  Constitutional: negative for weight loss, fever, night sweats  Breast: negative for breast lumps, nipple discharge, galactorrhea  GI: negative for change in bowel habits, abdominal pain, black or bloody stools  : negative for frequency, dysuria, hematuria, vaginal discharge  MSK: negative for back pain, joint pain, muscle pain  Skin: negative for itching, rash, hives  Psych: negative for anxiety, depression, change in mood      Objective:    Visit Vitals  BP (!) 95/47   Ht 5' 2\" (1.575 m)   Wt 104 lb (47.2 kg)   LMP  (LMP Unknown)   Breastfeeding No Comment: 5 weeks ago   BMI 19.02 kg/m²       Physical Exam:     Constitutional  · Appearance: well-nourished, well developed, alert, in no acute distress    Gastrointestinal  · Abdominal Examination: abdomen non-tender to palpation, normal bowel sounds, no masses present  · Liver and spleen: no hepatomegaly present, spleen not palpable  · Hernias: no hernias identified    Genitourinary  · External Genitalia: normal appearance for age, no discharge present, no tenderness present, no inflammatory lesions present, no masses present, no atrophy present  · Vagina: normal vaginal vault without central or paravaginal defects, no discharge present, no inflammatory lesions present, no masses present  · Bladder: non-tender to palpation  · Urethra: appears normal  · Cervix: normal   · Uterus: normal size, shape and consistency  · Adnexa: no adnexal tenderness present, no adnexal masses present  · Perineum: perineum within normal limits, no evidence of trauma, no rashes or skin lesions present  · Anus: anus within normal limits, no hemorrhoids present  · Inguinal Lymph Nodes: no lymphadenopathy present    Skin  · General Inspection: no rash, no lesions identified    Neurologic/Psychiatric  · Mental Status:  · Orientation: grossly oriented to person, place and time  · Mood and Affect: mood normal, affect appropriate  Results for orders placed or performed in visit on 05/21/21   AMB POC URINE PREGNANCY TEST, VISUAL COLOR COMPARISON   Result Value Ref Range    VALID INTERNAL CONTROL POC Yes     HCG urine, Ql. (POC) Positive Negative   AMB POC URINALYSIS DIP STICK AUTO W/ MICRO   Result Value Ref Range    Color (UA POC) Yellow     Clarity (UA POC) Clear     Glucose (UA POC) Negative Negative    Bilirubin (UA POC) Negative Negative    Ketones (UA POC) Negative Negative    Specific gravity (UA POC) 1.020 1.001 - 1.035    Blood (UA POC) Negative Negative    pH (UA POC) 5.0 4.6 - 8.0    Protein (UA POC) Negative Negative    Urobilinogen (UA POC) normal 0.2 - 1    Nitrites (UA POC) Negative Negative    Leukocyte esterase (UA POC) Negative Negative       Assessment:  Missed menses with pos UPT  Hx of chronic UTI - urine good today    Plan:   HCG today - she is going to [place] on vacay but anxious. If she can find a [facility] will check HCG in 4 days - she will let us know      RTO prn if symptoms persist or worsen. Instructions given to pt. Handouts given to pt.

## 2021-05-22 LAB
HCG INTACT+B SERPL-ACNC: 150 MIU/ML
TSH SERPL DL<=0.005 MIU/L-ACNC: 5.18 UIU/ML (ref 0.45–4.5)

## 2021-05-26 DIAGNOSIS — N91.2 AMENORRHEA: Primary | ICD-10-CM

## 2021-05-27 LAB
BACTERIA UR CULT: ABNORMAL
HCG INTACT+B SERPL-ACNC: 1722 MIU/ML
SPECIMEN STATUS REPORT, ROLRST: NORMAL

## 2021-05-27 RX ORDER — AMOXICILLIN AND CLAVULANATE POTASSIUM 875; 125 MG/1; MG/1
1 TABLET, FILM COATED ORAL 2 TIMES DAILY
Qty: 20 TABLET | Refills: 0 | Status: SHIPPED | OUTPATIENT
Start: 2021-05-27 | End: 2021-06-06

## 2021-05-28 ENCOUNTER — PATIENT MESSAGE (OUTPATIENT)
Dept: OBGYN CLINIC | Age: 36
End: 2021-05-28

## 2021-06-09 DIAGNOSIS — Z87.440 HISTORY OF UTI: Primary | ICD-10-CM

## 2021-06-09 NOTE — PROGRESS NOTES
Current pregnancy history:    Ky Cedeno is a 31 y.o. female who presents for the evaluation of pregnancy. Patient's last menstrual period was 04/13/2021 (approximate). LMP history:  The date of her LMP is  certain. Her menstrual cycles are regular and occur approximately every 28 days and range from 3 to 5 days. The last menses did  last the usual number of days. A urine pregnancy test was positive 4 weeks ago. She was not on the pill at conception. Based on her LMP her EGA is 8 weeks and 6 days giving an EDC of 1/18/22. Ultrasound data:  She had an ultrasound done by the ultrasound tech today which revealed a viable lowe pregnancy with a gestational age of 9 weeks and 1 days giving an EDC of 1/30/22    Ultrasound details:    TA ULTRASOUND PERFORMED  A SINGLE VIABLE 7W1D IUP IS SEEN WITH NORMAL CARDIAC RHYTHM. GESTATIONAL AGE BASED ON TODAYS ULTRASOUND. A NORMAL YOLK SAC IS SEEN. A LEFT FUNDAL SUBCHORIONIC HEMORRHAGE IS SEEN THAT MEASURES 3.2 X 0.7 X 1.3CM. RIGHT OVARY APPEARS WITHIN NORMAL LIMITS. LEFT OVARY APPEARS WITHIN NORMAL LIMITS. NO FREE FLUID SEEN IN THE CDS. Pregnancy symptoms:    Since her LMP she has experienced  urinary frequency, breast tenderness, fatigue and nausea. She has not been vomiting over the last few weeks. Associated signs and symptoms which she denies: dysuria, discharge, vaginal bleeding. She states she has gained weight:  Approximately 1-2 pounds over the last few weeks. Relevant past pregnancy history:  She has the following pregnancy history:   Hx of tethered cord, multiple spine surgeries - not candidate for regional anesthesia  Self caths  Hx of Irregular heart beat   Horizon- Normal 3/29/19  Rh neg   Declined TDAP last pregnancy    Relevant past medical history:(relevant to this pregnancy): noncontributory. Pap/Occupational history:  Last pap smear: 2/26/2019  Results: negative/-HPV     Her occupation is: Homemaker.      Substance history:  Negative for alcohol, tobacco and street drugs. Positive for nothing. Exposure history: There are no indoor cat/s in the home. The patient was instructed to not change the cat litter. She admits close contact with children on a regular basis. She has had chicken pox or the vaccine in the past.   Patient denies issues with domestic violence. Genetic Screening/Teratology Counseling: (Includes patient, baby's father, or anyone in either family with:)  3.  Patient's age >/= 28 [facility]?-- no  .   2. [facility]semia ([facility] [place] [address], or  background): MCV<80?--no.     3.  Neural tube defect (meningomyelocele, spina bifida, anencephaly)?--no.   4.  Congenital heart defect?--no.  5.  Down syndrome?--no.   6.  Paulino-Sachs ([place]ism, [place])?--no.   7.  Canavan's Disease?--no.   8.  Familial Dysautonomia?--no.   9.  Sickle cell disease or trait ()? --no   The patient has not been tested for sickle trait  10. Hemophilia or other blood disorders?--no. 11.  Muscular dystrophy?--no. 12.  Cystic fibrosis?--no. 13.  Price's Chorea?--no. 14.  Mental retardation/autism (if yes was person tested for Fragile X)?--no. 15.  Other inherited genetic or chromosomal disorder?--no. 12.  Maternal metabolic disorder (DM, PKU, etc)?--no. 17.  Patient or FOB with a child with a birth defect not listed above?--no.  17a. Patient or FOB with a birth defect themselves?--no. 18.  Recurrent pregnancy loss, or stillbirth?--no. 19.  Any medications since LMP other than prenatal vitamins (include vitamins,  supplements, OTC meds, drugs, alcohol)?--no. 20.  Any other genetic/environmental exposure to discuss?--no. Infection History:  1. Lives with someone with TB or TB exposed?--no.   2.  Patient or partner has history of genital herpes?--no.  3.  Rash or viral illness since LMP?--no.    4.  History of STD (GC, CT, HPV, syphilis, HIV)? --no   5.  Other: OTHER?      OB History   Obi Para Term  AB Living   7 3 3 0 3 4   SAB TAB Ectopic Molar Multiple Live Births   1 0 0 0 1 4      # Outcome Date GA Lbr Venancio/2nd Weight Sex Delivery Anes PTL Lv   7 Current            6A Term 19 37w0d  5 lb 12.2 oz (2.615 kg) F Vag-Spont None N TED      Name: Tesha Mcwilliams      Apgar1: 9  Apgar5: 9   6B Term 19 37w0d  6 lb 5.8 oz (2.885 kg) F Vag-Spont None N TED      Name: Mila Mike: 8  Apgar5: 9   5 Term 14 40w3d 21:02 / 02:21 8 lb 15 oz (4.054 kg) [facility] N TED      Name: Joseph Mendez: 8  Apgar5: 9   4 Term 09 40w0d 26:00 7 lb 1.6 oz (3.221 kg) F VAGINAL DELI None N TED   3 AB            2 AB            1 SAB               Obstetric Comments   Menarche:  *18**. LMP: 3/15/14. # of Children:  2. Age at Delivery of First Child:  27.   Hysterectomy/oophorectomy:  NO/NO. Breast Bx:  no.  Hx of Breast Feeding:  yes. BCP:  yes. Hormone therapy:  no.         Past Medical History:   Diagnosis Date    Arrhythmia     Arthritis     left foot and lower back    Asthma     no inhaler    Burning with urination     Complication of anesthesia     bradycardia    Disease of blood and blood forming organ     Headache     Heart abnormality     mummer    Iron deficiency anemia during pregnancy 2019    Migraine     Nerve damage     Nerve damage in left foot.    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 in pregnancy     Self-catheterizes urinary bladder     Since age 13   [facility] Unspecified breast disorder     L invert nipple     Past Surgical History:   Procedure Laterality Date    HX BACK SURGERY      x2    HX GYN      episotomy    HX ORTHOPAEDIC      spine and left foot    HX OTHER SURGICAL       Social History     Occupational History    Not on file   Tobacco Use    Smoking status: Never Smoker    Smokeless tobacco: Never Used   Substance and Sexual Activity    Alcohol use: No    Drug use: No    Sexual activity: Yes     Partners: Male     Birth control/protection: Condom     Family History   Problem Relation Age of Onset    Arthritis-osteo Mother    [facility] Migraines Mother     Alcohol abuse Paternal Grandfather     Bleeding Prob Maternal Grandfather     Breast Cancer Other        Allergies   Allergen Reactions    Latex Rash     Wheezing    Beef Containing Products Anaphylaxis    Keflex [Cephalexin] Anaphylaxis    Codeine Other (comments)     Hyperactivity    Doxycycline Nausea Only    Macrobid [Nitrofurantoin Monohyd/M-Cryst] Other (comments)     Pharmacy had this allergy listed and called the patient and patient could not remember her reaction to the medication. This nurse called the patient . Patient states that when it was prescribed in early pregnancy it caused spotting.  Milk Hives    Nuts [Tree Nut] Swelling     Pt stated that her mouth and throat feels funny when she eats nuts      Omnicef [Cefdinir] Other (comments)     \"shakes\"    Pepto-Bismol [Bismuth Subsalicylate] Hives    Phenergan [Promethazine] Other (comments)     Increased sedation    Reglan [Metoclopramide] Anxiety    Soy Hives     Prior to Admission medications    Medication Sig Start Date End Date Taking? Authorizing Provider   multivitamin with iron (FLINTSTONES) chewable tablet Take 1 Tab by mouth daily. Yes Other, MD Ivis   acetaminophen (TYLENOL 8 HOUR PO) Take 500 mg by mouth as needed.   Patient not taking: Reported on 6/14/2021    Provider, Historical        Review of Systems: History obtained from the patient  Constitutional: negative for weight loss, fever, night sweats  HEENT: negative for hearing loss, earache, congestion, snoring, sorethroat  CV: negative for chest pain, palpitations, edema  Resp: negative for cough, shortness of breath, wheezing  Breast: negative for breast lumps, nipple discharge, galactorrhea  GI: negative for change in bowel habits, abdominal pain, black or bloody stools  : negative for frequency, dysuria, hematuria, vaginal discharge  MSK: negative for back pain, joint pain, muscle pain  Skin: negative for itching, rash, hives  Neuro: negative for dizziness, headache, confusion, weakness  Psych: negative for anxiety, depression, change in mood  Heme/lymph: negative for bleeding, bruising, pallor    Objective:  Visit Vitals  BP (!) 99/56   Ht 5' 2\" (1.575 m)   Wt 103 lb (46.7 kg)   LMP 04/13/2021 (Approximate)   BMI 18.84 kg/m²       Physical Exam:   PHYSICAL EXAMINATION    Constitutional  · Appearance: well-nourished, well developed, alert, in no acute distress    HENT  · Head  · Face: appears normal  · Eyes: appear normal  · Ears: normal  · Mouth: normal  · Lips: no lesions    Neck  · Inspection/Palpation: normal appearance, no masses or tenderness  · Lymph Nodes: no lymphadenopathy present  · Thyroid: gland size normal, nontender, no nodules or masses present on palpation    Chest  · Respiratory Effort: breathing unlabored  · Auscultation: normal breath sounds    Cardiovascular  · Heart:  · Auscultation: regular rate and rhythm without murmur    Breasts  · Inspection of Breasts: breasts symmetrical, no skin changes, no discharge present, nipple appearance normal, no skin retraction present  · Palpation of Breasts and Axillae: no masses present on palpation, no breast tenderness  · Axillary Lymph Nodes: no lymphadenopathy present    Gastrointestinal  · Abdominal Examination: abdomen non-tender to palpation, normal bowel sounds, no masses present  · Liver and spleen: no hepatomegaly present, spleen not palpable  · Hernias: no hernias identified    Genitourinary  · External Genitalia: normal appearance for age, no discharge present, no tenderness present, no inflammatory lesions present, no masses present, no atrophy present  · Vagina: normal vaginal vault without central or paravaginal defects, no discharge present, no inflammatory lesions present, no masses present  · Bladder: non-tender to palpation  · Urethra: appears normal  · Cervix: normal   · Uterus: enlarged, normal shape, soft  · Adnexa: no adnexal tenderness present, no adnexal masses present  · Perineum: perineum within normal limits, no evidence of trauma, no rashes or skin lesions present  · Anus: anus within normal limits, no hemorrhoids present  · Inguinal Lymph Nodes: no lymphadenopathy present    Skin  · General Inspection: no rash, no lesions identified    Neurologic/Psychiatric  · Mental Status:  · Orientation: grossly oriented to person, place and time  · Mood and Affect: mood normal, affect appropriate    Assessment:   Intrauterine pregnancy with the following problems identified:   EDC 1/30/2022 by US  Hx of tethered cord, multiple spine surgeries - not candidate for regional anesthesia  Self caths  Rh neg  Chronic UTI - Proteus 5/24 urology  Horizon negative  Hypothyroid          Plan:     Offered CF testing, CVS, Nuchal Translucency, MSAFP, amnio, and discussed NIPT  Course of pregnancy discussed including visit schedule, routine U/S, glucola testing, etc.  Avoid alcoholic beverages and illicit/recreational drugs use  Take prenatal vitamins or folic acid daily. Hospital and practice style discussed with coverage system. Discussed nutrition, toxoplasmosis precautions, sexual activity, exercise, need for influenza vaccine, environmental and work hazards, travel advice, screen for domestic violence, need for seat belts. Discussed seafood, unpasteurized dairy products, deli meat, artificial sweeteners, and caffeine. Information on prenatal classes/breastfeeding given. Information on circumcision given  Patient encouraged not to smoke. Discussed current prescription drug use. Given medication list.  Discussed the use of over the counter medications and chemicals.   Route of delivery discussed, including risks, benefits, and alternatives of  versus repeat LTCS. Pt understands risk of hemorrhage during pregnancy and post delivery and would accept blood products if necessary in life-threatening emergencies      Handouts given to pt.

## 2021-06-14 ENCOUNTER — ROUTINE PRENATAL (OUTPATIENT)
Dept: OBGYN CLINIC | Age: 36
End: 2021-06-14

## 2021-06-14 VITALS
DIASTOLIC BLOOD PRESSURE: 56 MMHG | HEIGHT: 62 IN | WEIGHT: 103 LBS | SYSTOLIC BLOOD PRESSURE: 99 MMHG | BODY MASS INDEX: 18.95 KG/M2

## 2021-06-14 DIAGNOSIS — O09.90 SUPERVISION OF HIGH RISK PREGNANCY, ANTEPARTUM: Primary | ICD-10-CM

## 2021-06-14 DIAGNOSIS — Z34.80 SUPERVISION OF OTHER NORMAL PREGNANCY, ANTEPARTUM: ICD-10-CM

## 2021-06-14 PROCEDURE — 0500F INITIAL PRENATAL CARE VISIT: CPT | Performed by: OBSTETRICS & GYNECOLOGY

## 2021-06-14 RX ORDER — ONDANSETRON 8 MG/1
8 TABLET, ORALLY DISINTEGRATING ORAL
Qty: 30 TABLET | Refills: 4 | Status: SHIPPED | OUTPATIENT
Start: 2021-06-14 | End: 2021-08-07

## 2021-06-14 RX ORDER — DOXYLAMINE SUCCINATE AND PYRIDOXINE HYDROCHLORIDE 20; 20 MG/1; MG/1
1 TABLET, EXTENDED RELEASE ORAL 2 TIMES DAILY
Qty: 60 TABLET | Refills: 6 | Status: SHIPPED | OUTPATIENT
Start: 2021-06-14 | End: 2022-02-01

## 2021-06-16 LAB
ABO GROUP BLD: NORMAL
BLD GP AB SCN SERPL QL: NEGATIVE
ERYTHROCYTE [DISTWIDTH] IN BLOOD BY AUTOMATED COUNT: 12.2 % (ref 11.7–15.4)
HBV SURFACE AG SERPL QL IA: NEGATIVE
HCT VFR BLD AUTO: 40.9 % (ref 34–46.6)
HGB BLD-MCNC: 14.2 G/DL (ref 11.1–15.9)
HIV 1+2 AB+HIV1 P24 AG SERPL QL IA: NON REACTIVE
MCH RBC QN AUTO: 30.7 PG (ref 26.6–33)
MCHC RBC AUTO-ENTMCNC: 34.7 G/DL (ref 31.5–35.7)
MCV RBC AUTO: 88 FL (ref 79–97)
PLATELET # BLD AUTO: 271 X10E3/UL (ref 150–450)
RBC # BLD AUTO: 4.63 X10E6/UL (ref 3.77–5.28)
RH BLD: NEGATIVE
RUBV IGG SERPL IA-ACNC: 7.15 INDEX
SPECIMEN STATUS REPORT, ROLRST: NORMAL
TREPONEMA PALLIDUM IGG+IGM AB [PRESENCE] IN SERUM OR PLASMA BY IMMUNOASSAY: NON REACTIVE
TSH SERPL DL<=0.005 MIU/L-ACNC: 2.85 UIU/ML (ref 0.45–4.5)
WBC # BLD AUTO: 10.1 X10E3/UL (ref 3.4–10.8)

## 2021-06-17 PROBLEM — O09.90 SUPERVISION OF HIGH RISK PREGNANCY, ANTEPARTUM: Status: ACTIVE | Noted: 2021-06-17

## 2021-06-17 LAB
BACTERIA UR CULT: ABNORMAL
SPECIMEN STATUS REPORT, ROLRST: NORMAL

## 2021-06-18 LAB
C TRACH RRNA SPEC QL NAA+PROBE: NEGATIVE
N GONORRHOEA RRNA SPEC QL NAA+PROBE: NEGATIVE
SPECIMEN STATUS REPORT, ROLRST: NORMAL
T VAGINALIS DNA SPEC QL NAA+PROBE: NEGATIVE

## 2021-06-18 RX ORDER — AMOXICILLIN 500 MG/1
500 CAPSULE ORAL 3 TIMES DAILY
Qty: 21 CAPSULE | Refills: 0 | Status: SHIPPED | OUTPATIENT
Start: 2021-06-18 | End: 2021-06-25

## 2021-07-02 ENCOUNTER — ROUTINE PRENATAL (OUTPATIENT)
Dept: OBGYN CLINIC | Age: 36
End: 2021-07-02
Payer: MEDICAID

## 2021-07-02 VITALS — DIASTOLIC BLOOD PRESSURE: 61 MMHG | BODY MASS INDEX: 18.69 KG/M2 | WEIGHT: 102.2 LBS | SYSTOLIC BLOOD PRESSURE: 100 MMHG

## 2021-07-02 DIAGNOSIS — Z34.80 SUPERVISION OF OTHER NORMAL PREGNANCY, ANTEPARTUM: Primary | ICD-10-CM

## 2021-07-02 PROCEDURE — 0502F SUBSEQUENT PRENATAL CARE: CPT | Performed by: OBSTETRICS & GYNECOLOGY

## 2021-07-02 NOTE — PROGRESS NOTES
Pt presents because she is very upset and stressed over social issues at home  Declines medication  Sees a counselor - does not want to hurt self or baby    Saw urology - placed on daily abx    Advised to take the bonjesta for her nausea so she can eat

## 2021-07-02 NOTE — PROGRESS NOTES
Problem List  Date Reviewed: 6/14/2021        Codes Class Noted    Supervision of high risk pregnancy, antepartum ICD-10-CM: O09.90  ICD-9-CM: V23.9  6/17/2021    Overview Addendum 6/18/2021  9:38 AM by [employer] Ana Cristina     Intrauterine pregnancy with the following problems identified:   EDC 1/30/2022 by US  Hx of tethered cord, multiple spine surgeries - not candidate for regional anesthesia  Self caths  Rh neg  Chronic UTI - Proteus 5/24 urology  Horizon negative  Hypothyroid  RH negative  Check TSH next visit 7/12  Urine cx- E coli              Iron deficiency anemia during pregnancy ICD-10-CM: O99.019, D50.9  ICD-9-CM: 648.20, 280.9  8/27/2019        Arrhythmia ICD-10-CM: I49.9  ICD-9-CM: 427.9  Unknown    Overview Signed 3/19/2019  1:29 PM by Catrachito Bush MD     Afib at times             Migraine ICD-10-CM: F15.146  ICD-9-CM: 346.90  Unknown        Strain ICD-10-CM: George Diehl  ICD-9-CM: George Diehl  8/13/2014

## 2021-07-12 ENCOUNTER — ROUTINE PRENATAL (OUTPATIENT)
Dept: OBGYN CLINIC | Age: 36
End: 2021-07-12
Payer: MEDICAID

## 2021-07-12 VITALS — WEIGHT: 102.2 LBS | BODY MASS INDEX: 18.69 KG/M2 | SYSTOLIC BLOOD PRESSURE: 102 MMHG | DIASTOLIC BLOOD PRESSURE: 54 MMHG

## 2021-07-12 DIAGNOSIS — Z34.80 SUPERVISION OF OTHER NORMAL PREGNANCY, ANTEPARTUM: Primary | ICD-10-CM

## 2021-07-12 PROCEDURE — 0502F SUBSEQUENT PRENATAL CARE: CPT | Performed by: OBSTETRICS & GYNECOLOGY

## 2021-07-12 RX ORDER — LEVOTHYROXINE SODIUM 50 UG/1
50 TABLET ORAL
Qty: 90 TABLET | Refills: 2 | Status: SHIPPED | OUTPATIENT
Start: 2021-07-12

## 2021-07-12 RX ORDER — AMOXICILLIN 500 MG/1
500 CAPSULE ORAL DAILY
Qty: 90 CAPSULE | Refills: 3 | Status: SHIPPED | OUTPATIENT
Start: 2021-07-12 | End: 2021-07-22

## 2021-07-12 NOTE — PROGRESS NOTES
Problem List  Date Reviewed: 7/2/2021        Codes Class Noted    Supervision of high risk pregnancy, antepartum ICD-10-CM: O09.90  ICD-9-CM: V23.9  6/17/2021    Overview Addendum 6/18/2021  9:38 AM by Yunior Mckinnon     Intrauterine pregnancy with the following problems identified:   EDC 1/30/2022 by US  Hx of tethered cord, multiple spine surgeries - not candidate for regional anesthesia  Self caths  Rh neg  Chronic UTI - Proteus 5/24 urology  Horizon negative  Hypothyroid  RH negative  Check TSH next visit 7/12  Urine cx- E coli              Iron deficiency anemia during pregnancy ICD-10-CM: O99.019, D50.9  ICD-9-CM: 648.20, 280.9  8/27/2019        Arrhythmia ICD-10-CM: I49.9  ICD-9-CM: 427.9  Unknown    Overview Signed 3/19/2019  1:29 PM by Cameron Miranda MD     Afib at times             Migraine ICD-10-CM: W36.354  ICD-9-CM: 346.90  Unknown        Strain ICD-10-CM: Hermenia Bitters  ICD-9-CM: Hermenia Bitters  8/13/2014

## 2021-07-12 NOTE — PROGRESS NOTES
NIPTS today  Urine culture  Refill Synthroid, start Amox suppression per urology  Sent urine culture today

## 2021-07-15 LAB
BACTERIA UR CULT: ABNORMAL
BACTERIA UR CULT: ABNORMAL

## 2021-07-15 RX ORDER — AMOXICILLIN AND CLAVULANATE POTASSIUM 875; 125 MG/1; MG/1
1 TABLET, FILM COATED ORAL 2 TIMES DAILY
Qty: 20 TABLET | Refills: 0 | Status: SHIPPED | OUTPATIENT
Start: 2021-07-15 | End: 2021-07-25

## 2021-08-07 ENCOUNTER — HOSPITAL ENCOUNTER (EMERGENCY)
Age: 36
Discharge: HOME | End: 2021-08-07
Attending: EMERGENCY MEDICINE
Payer: MEDICAID

## 2021-08-07 VITALS
HEART RATE: 75 BPM | HEIGHT: 62 IN | BODY MASS INDEX: 18.78 KG/M2 | DIASTOLIC BLOOD PRESSURE: 58 MMHG | TEMPERATURE: 98 F | SYSTOLIC BLOOD PRESSURE: 110 MMHG | RESPIRATION RATE: 17 BRPM | OXYGEN SATURATION: 97 % | WEIGHT: 102.07 LBS

## 2021-08-07 DIAGNOSIS — Z3A.16 16 WEEKS GESTATION OF PREGNANCY: ICD-10-CM

## 2021-08-07 DIAGNOSIS — R55 VASOVAGAL EPISODE: Primary | ICD-10-CM

## 2021-08-07 DIAGNOSIS — F43.9 STRESS AT HOME: ICD-10-CM

## 2021-08-07 LAB
ALBUMIN SERPL-MCNC: 3.1 G/DL (ref 3.5–5)
ALBUMIN/GLOB SERPL: 1 {RATIO} (ref 1.1–2.2)
ALP SERPL-CCNC: 32 U/L (ref 45–117)
ALT SERPL-CCNC: 31 U/L (ref 12–78)
ANION GAP SERPL CALC-SCNC: 9 MMOL/L (ref 5–15)
APPEARANCE UR: ABNORMAL
AST SERPL-CCNC: 18 U/L (ref 15–37)
BACTERIA URNS QL MICRO: ABNORMAL /HPF
BASOPHILS # BLD: 0 K/UL (ref 0–0.1)
BASOPHILS NFR BLD: 1 % (ref 0–1)
BILIRUB SERPL-MCNC: 0.4 MG/DL (ref 0.2–1)
BILIRUB UR QL: NEGATIVE
BUN SERPL-MCNC: 9 MG/DL (ref 6–20)
BUN/CREAT SERPL: 19 (ref 12–20)
CALCIUM SERPL-MCNC: 8.6 MG/DL (ref 8.5–10.1)
CAOX CRY URNS QL MICRO: ABNORMAL
CHLORIDE SERPL-SCNC: 106 MMOL/L (ref 97–108)
CO2 SERPL-SCNC: 26 MMOL/L (ref 21–32)
COLOR UR: ABNORMAL
CREAT SERPL-MCNC: 0.47 MG/DL (ref 0.55–1.02)
DIFFERENTIAL METHOD BLD: NORMAL
EOSINOPHIL # BLD: 0 K/UL (ref 0–0.4)
EOSINOPHIL NFR BLD: 0 % (ref 0–7)
EPITH CASTS URNS QL MICRO: ABNORMAL /LPF
ERYTHROCYTE [DISTWIDTH] IN BLOOD BY AUTOMATED COUNT: 12.6 % (ref 11.5–14.5)
GLOBULIN SER CALC-MCNC: 3 G/DL (ref 2–4)
GLUCOSE SERPL-MCNC: 78 MG/DL (ref 65–100)
GLUCOSE UR STRIP.AUTO-MCNC: NEGATIVE MG/DL
HCT VFR BLD AUTO: 35.7 % (ref 35–47)
HGB BLD-MCNC: 12.8 G/DL (ref 11.5–16)
HGB UR QL STRIP: NEGATIVE
IMM GRANULOCYTES # BLD AUTO: 0 K/UL (ref 0–0.04)
IMM GRANULOCYTES NFR BLD AUTO: 0 % (ref 0–0.5)
KETONES UR QL STRIP.AUTO: 15 MG/DL
LEUKOCYTE ESTERASE UR QL STRIP.AUTO: ABNORMAL
LYMPHOCYTES # BLD: 1.3 K/UL (ref 0.8–3.5)
LYMPHOCYTES NFR BLD: 19 % (ref 12–49)
MCH RBC QN AUTO: 31.1 PG (ref 26–34)
MCHC RBC AUTO-ENTMCNC: 35.9 G/DL (ref 30–36.5)
MCV RBC AUTO: 86.9 FL (ref 80–99)
MONOCYTES # BLD: 0.5 K/UL (ref 0–1)
MONOCYTES NFR BLD: 7 % (ref 5–13)
MUCOUS THREADS URNS QL MICRO: ABNORMAL /LPF
NEUTS SEG # BLD: 5 K/UL (ref 1.8–8)
NEUTS SEG NFR BLD: 74 % (ref 32–75)
NITRITE UR QL STRIP.AUTO: POSITIVE
NRBC # BLD: 0 K/UL (ref 0–0.01)
NRBC BLD-RTO: 0 PER 100 WBC
PH UR STRIP: 7 [PH] (ref 5–8)
PLATELET # BLD AUTO: 227 K/UL (ref 150–400)
PMV BLD AUTO: 11.1 FL (ref 8.9–12.9)
POTASSIUM SERPL-SCNC: 3.5 MMOL/L (ref 3.5–5.1)
PROT SERPL-MCNC: 6.1 G/DL (ref 6.4–8.2)
PROT UR STRIP-MCNC: NEGATIVE MG/DL
RBC # BLD AUTO: 4.11 M/UL (ref 3.8–5.2)
RBC #/AREA URNS HPF: ABNORMAL /HPF (ref 0–5)
SODIUM SERPL-SCNC: 141 MMOL/L (ref 136–145)
SP GR UR REFRACTOMETRY: 1.02 (ref 1–1.03)
TROPONIN I SERPL-MCNC: <0.05 NG/ML
TROPONIN I SERPL-MCNC: <0.05 NG/ML
UROBILINOGEN UR QL STRIP.AUTO: 0.2 EU/DL (ref 0.2–1)
WBC # BLD AUTO: 6.7 K/UL (ref 3.6–11)
WBC URNS QL MICRO: ABNORMAL /HPF (ref 0–4)

## 2021-08-07 PROCEDURE — 81001 URINALYSIS AUTO W/SCOPE: CPT

## 2021-08-07 PROCEDURE — 84484 ASSAY OF TROPONIN QUANT: CPT

## 2021-08-07 PROCEDURE — 87086 URINE CULTURE/COLONY COUNT: CPT

## 2021-08-07 PROCEDURE — 85025 COMPLETE CBC W/AUTO DIFF WBC: CPT

## 2021-08-07 PROCEDURE — 87186 SC STD MICRODIL/AGAR DIL: CPT

## 2021-08-07 PROCEDURE — 87077 CULTURE AEROBIC IDENTIFY: CPT

## 2021-08-07 PROCEDURE — 93005 ELECTROCARDIOGRAM TRACING: CPT

## 2021-08-07 PROCEDURE — 99285 EMERGENCY DEPT VISIT HI MDM: CPT

## 2021-08-07 PROCEDURE — 36415 COLL VENOUS BLD VENIPUNCTURE: CPT

## 2021-08-07 PROCEDURE — 80053 COMPREHEN METABOLIC PANEL: CPT

## 2021-08-07 RX ORDER — AMOXICILLIN AND CLAVULANATE POTASSIUM 875; 125 MG/1; MG/1
1 TABLET, FILM COATED ORAL 2 TIMES DAILY
COMMUNITY
End: 2021-09-27 | Stop reason: DRUGHIGH

## 2021-08-07 NOTE — DISCHARGE INSTRUCTIONS
Thank you for allowing us to provide you with medical care today. We realize that you have many choices for your emergency care needs. We thank you for choosing [facility]. Please choose us in the future for any continued health care needs. We hope we addressed all of your medical concerns. We strive to provide excellent quality care in the Emergency Department. Anything less than excellent does not meet our expectations. The exam and treatment you received in the Emergency Department were for an emergent problem and are not intended as complete care. It is important that you follow up with a doctor, nurse practitioner, or [address] [place] [address] assistant for ongoing care. If your symptoms worsen or you do not improve as expected and you are unable to reach your usual health care provider, you should return to the Emergency Department. We are available 24 hours a day. Take this sheet with you when you go to your follow-up visit. If you have any problem arranging the follow-up visit, contact the Emergency Department immediately. Make an appointment your family doctor for follow up of this visit. Return to the ER if you are unable to be seen in a timely manner.

## 2021-08-07 NOTE — ED TRIAGE NOTES
Pt ambulates to treatment area with steady gait she states that earlier today about 1300 she began to have light headedness and felt like she was going to pass out. She called her ex and spoke with him about 1400 asked him to bring her in for the near syncope and she was also having pain and pressure along the middle to right side of her pelvis. She denies any bleeding with the pressure. She states that she is under a lot of stress and concerned.   She has had some Zofran today for her nausea

## 2021-08-07 NOTE — ED PROVIDER NOTES
Please note that this dictation was completed with [email or web address], the computer voice recognition software.  Quite often unanticipated grammatical, syntax, homophones, and other interpretive errors are inadvertently transcribed by the computer software.  Please disregard these errors.  Please excuse any errors that have escaped final proofreading. 42-year-old female past medical history remarkable for arrhythmia, arthritis of the lower back, asthma with no inhaler, self caths since age 13, disease of the blood and blood forming organs, headache, heart murmur, iron deficiency anemia during pregnancy, migraines, ovarian cyst, and the left inverted nipple presents the ER complaining of \" of been under a lot of stress this week have lost a lot of weight. Having diarrhea secondary to the stress. I am having up to 2 bouts of diarrhea per day (no blood noted). Going through divorce and I am 16 weeks pregnant and I see Dr. Jennifer Mejia. He also recently was diagnosed with a UTI, I have been self cathing for years, was recently treated with Augmentin for urinary tract infection and just today started on amoxicillin as a prophylactic. The symptoms have resolved. Has still felt the baby move. It was having some discussions earlier today became very upset began to have some chest pressure bit of abdominal pressure felt weak got sweaty got nauseated felt like it was going to pass out. Of intake and likely just my OB/GYN gave me nightly but I have not taken it last night because I did not think I would need it today. The sweaty unsteady dizzy feeling lasted approximately 4 minutes I was able to walk from the living room go to my bedroom and lie down. It took me 3 hours to get here because I had to call my other ask who came to  the kids and then I got a ride here. Patient denies any current burning with urination diarrhea after eating post vasovagal  episode.   She denies any overt vision changes numbness or tingling, weakness on one side or the other, facial droop or difficulty speaking. Patient adds that she has had these vasovagal episodes in the past, \"especially when I am pregnant. \"  Patient adds she is a G 10 P5 005 ( Per  Dr Lamine Mcarthur note is a [id number]); she denies any burning with urination currently vaginal discharge gushes of fluid. Discussed the patient that we will check an EKG basic lab work fetal heart tones urine urine culture. She agrees with this plan also discussed repeating the troponin at 6 PM this evening and if it was normal we would let the patient be discharged home, she agrees with this plan. pt denies HA, vison changes, diff swallowing, CP, SOB,  F/Ch, N/V, D/Cons or other current systemic complaints    Social/ PSH reviewed in EMR    EMR Chart Reviewed           Past Medical History:   Diagnosis Date    Arrhythmia     Arthritis     left foot and lower back    Asthma     no inhaler    Burning with urination     Complication of anesthesia     bradycardia    Disease of blood and blood forming organ     Headache     Heart abnormality     mummer    Iron deficiency anemia during pregnancy 8/27/2019    Migraine     Nerve damage     Nerve damage in left foot.    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 in pregnancy     Self-catheterizes urinary bladder     Since age 13   Saldana Saliva Unspecified breast disorder     L invert nipple       Past Surgical History:   Procedure Laterality Date    HX BACK SURGERY      x2    HX GYN      episotomy    HX ORTHOPAEDIC      spine and left foot    HX OTHER SURGICAL           Family History:   Problem Relation Age of Onset    Arthritis-osteo Mother     Migraines Mother     Alcohol abuse Paternal Grandfather     Bleeding Prob Maternal Grandfather     Breast Cancer Other        Social History Socioeconomic History    Marital status:      Spouse name: Not on file    Number of children: Not on file    Years of education: Not on file    Highest education level: Not on file   Occupational History    Not on file   Tobacco Use    Smoking status: Never Smoker    Smokeless tobacco: Never Used   Substance and Sexual Activity    Alcohol use: No    Drug use: No    Sexual activity: Yes     Partners: Male     Birth control/protection: Condom   Other Topics Concern     Service Not Asked    Blood Transfusions Not Asked    Caffeine Concern Not Asked    Occupational Exposure Not Asked    Hobby Hazards Not Asked    Sleep Concern Not Asked    Stress Concern Not Asked    Weight Concern Not Asked    Special Diet Not Asked    Back Care Not Asked    Exercise Not Asked    Bike Helmet Not Asked   [address] Not Asked    Self-Exams Not Asked   Social History Narrative    Not on file     Social Determinants of Health     Financial Resource Strain:     Difficulty of Paying Living Expenses:    Food Insecurity:     Worried About Running Out of Food in the Last Year:     Ran Out of Food in the Last Year:    Transportation Needs:     Lack of Transportation (Medical):  Lack of Transportation (Non-Medical):    Physical Activity:     Days of Exercise per Week:     Minutes of Exercise per Session:    Stress:     Feeling of Stress :    Social Connections:     Frequency of Communication with Friends and Family:     Frequency of Social Gatherings with Friends and Family:     Attends Hinduism Services:     Active Member of Clubs or Organizations:     Attends Club or Organization Meetings:     Marital Status:    Intimate Partner Violence:     Fear of Current or Ex-Partner:     Emotionally Abused:     Physically Abused:     Sexually Abused:           ALLERGIES: Latex, Beef containing products, Keflex [cephalexin], Codeine, Doxycycline, Macrobid [nitrofurantoin monohyd/m-cryst], Milk, Nuts [tree nut], Omnicef [cefdinir], Pepto-bismol [bismuth subsalicylate], Phenergan [promethazine], Reglan [metoclopramide], and Soy    Review of Systems   Constitutional: Positive for chills and diaphoresis. Negative for appetite change and fever. HENT: Negative for drooling, trouble swallowing and voice change. Eyes: Negative for visual disturbance. Respiratory: Positive for chest tightness. Cardiovascular: Positive for chest pain. Gastrointestinal: Positive for abdominal pain, diarrhea and nausea. Negative for constipation and vomiting. Genitourinary: Negative for dysuria, vaginal bleeding and vaginal discharge. Musculoskeletal: Negative for back pain. Skin: Negative for rash. Neurological: Positive for light-headedness. Negative for seizures and speech difficulty. Psychiatric/Behavioral: Negative for confusion. All other systems reviewed and are negative. Vitals:    08/07/21 1548 08/07/21 1747 08/07/21 1800 08/07/21 1830   BP: (!) 100/59 106/62 (!) 106/54 (!) 110/58   Pulse: 68 75 73 75   Resp: 16 14 14 17   Temp: 98.1 °F (36.7 °C)   98 °F (36.7 °C)   SpO2: 100% 97% 97% 97%   Weight: 46.3 kg (102 lb 1.2 oz)      Height: 5' 2\" (1.575 m)               Physical Exam  Vitals and nursing note reviewed. Constitutional:       General: She is not in acute distress. Appearance: Normal appearance. She is well-developed. She is not ill-appearing, toxic-appearing or diaphoretic. Comments: NAD, AxOx4, speaking in complete sentences     HENT:      Head: Normocephalic and atraumatic. Right Ear: External ear normal.      Left Ear: External ear normal.   Eyes:      General: No scleral icterus. Right eye: No discharge. Left eye: No discharge. Extraocular Movements: Extraocular movements intact. Conjunctiva/sclera: Conjunctivae normal.      Pupils: Pupils are equal, round, and reactive to light. Neck:      Vascular: No JVD.       Trachea: No tracheal deviation. Cardiovascular:      Rate and Rhythm: Normal rate and regular rhythm. Pulses: Normal pulses. Heart sounds: Normal heart sounds. No murmur heard. No friction rub. No gallop. Pulmonary:      Effort: Pulmonary effort is normal. No respiratory distress. Breath sounds: Normal breath sounds. No wheezing or rales. Chest:      Chest wall: No tenderness. Abdominal:      General: Bowel sounds are normal.      Palpations: Abdomen is soft. Tenderness: There is no abdominal tenderness. There is no guarding or rebound. Comments: Exam consistent w/ dates; nttp       Genitourinary:     Vagina: No vaginal discharge. Musculoskeletal:         General: No tenderness. Normal range of motion. Cervical back: Normal range of motion and neck supple. No rigidity or tenderness. Skin:     General: Skin is warm and dry. Coloration: Skin is not pale. Findings: No erythema or rash. Neurological:      Mental Status: She is alert and oriented to person, place, and time. Cranial Nerves: No cranial nerve deficit. Motor: No abnormal muscle tone. Coordination: Coordination normal.   Psychiatric:         Behavior: Behavior normal.         Thought Content: Thought content normal.          MDM       Procedures    Chief Complaint   Patient presents with    Pregnancy Problem       4:05 PM  The patients presenting problems have been discussed, and they are in agreement with the care plan formulated and outlined with them. I have encouraged them to ask questions as they arise throughout their visit.     MEDICATIONS GIVEN:  Medications - No data to display    LABS REVIEWED:  Labs Reviewed   URINALYSIS W/MICROSCOPIC - Abnormal; Notable for the following components:       Result Value    Appearance CLOUDY (*)     Ketone 15 (*)     Nitrites Positive (*)     Leukocyte Esterase SMALL (*)     All other components within normal limits   CULTURE, URINE   TROPONIN I   SAMPLES BEING HELD METABOLIC PANEL, COMPREHENSIVE   CBC WITH AUTOMATED DIFF       RADIOLOGY RESULTS:  The following have been ordered and reviewed:  _____________________________________________________________________  _____________________________________________________________________    EKG interpretation:   Rhythm: normal sinus rhythm; and regular . Rate (approx.): 64; Axis: normal; P wave: normal; QRS interval: normal ; ST/T wave: normal; Negative acute significant segmental elevations/ unchanged compared to study dated 12/22/2020    PROCEDURES:        CONSULTATIONS:       PROGRESS NOTES:      DIAGNOSIS:    1. Vasovagal episode    2. Stress at home    3. 16 weeks gestation of pregnancy              ED COURSE: The patients hospital course has been uncomplicated. 6:35 PM  Mayra Hdz's  results have been reviewed with her. She has been counseled regarding her diagnosis. She verbally conveys understanding and agreement of the signs, symptoms, diagnosis, treatment and prognosis and additionally agrees to Call/ Arrange follow up as recommended with Dr. Mendel Nottingham, [facility] in 24 - 48 hours. She also agrees with the care-plan and conveys that all of her questions have been answered. I have also put together some discharge instructions for her that include: 1) educational information regarding their diagnosis, 2) how to care for their diagnosis at home, as well a 3) list of reasons why they would want to return to the ED prior to their follow-up appointment, should their condition change or for concerns.

## 2021-08-09 LAB
ATRIAL RATE: 65 BPM
CALCULATED P AXIS, ECG09: 52 DEGREES
CALCULATED R AXIS, ECG10: 47 DEGREES
CALCULATED T AXIS, ECG11: 26 DEGREES
DIAGNOSIS, 93000: NORMAL
P-R INTERVAL, ECG05: 130 MS
Q-T INTERVAL, ECG07: 374 MS
QRS DURATION, ECG06: 66 MS
QTC CALCULATION (BEZET), ECG08: 388 MS
VENTRICULAR RATE, ECG03: 65 BPM

## 2021-08-10 LAB
BACTERIA SPEC CULT: ABNORMAL
CC UR VC: ABNORMAL
SERVICE CMNT-IMP: ABNORMAL

## 2021-08-20 ENCOUNTER — OFFICE VISIT (OUTPATIENT)
Dept: CARDIOLOGY CLINIC | Age: 36
End: 2021-08-20
Payer: MEDICAID

## 2021-08-20 ENCOUNTER — CLINICAL SUPPORT (OUTPATIENT)
Dept: CARDIOLOGY CLINIC | Age: 36
End: 2021-08-20

## 2021-08-20 VITALS
SYSTOLIC BLOOD PRESSURE: 98 MMHG | HEART RATE: 90 BPM | OXYGEN SATURATION: 98 % | BODY MASS INDEX: 19.29 KG/M2 | WEIGHT: 104.8 LBS | HEIGHT: 62 IN | DIASTOLIC BLOOD PRESSURE: 58 MMHG

## 2021-08-20 DIAGNOSIS — Z3A.16 16 WEEKS GESTATION OF PREGNANCY: ICD-10-CM

## 2021-08-20 DIAGNOSIS — I95.1 ORTHOSTATIC HYPOTENSION: ICD-10-CM

## 2021-08-20 DIAGNOSIS — R00.2 PALPITATION: Primary | ICD-10-CM

## 2021-08-20 DIAGNOSIS — R00.2 PALPITATIONS: Primary | ICD-10-CM

## 2021-08-20 DIAGNOSIS — R55 VASOVAGAL EPISODE: ICD-10-CM

## 2021-08-20 PROCEDURE — 99214 OFFICE O/P EST MOD 30 MIN: CPT | Performed by: INTERNAL MEDICINE

## 2021-08-20 PROCEDURE — 93227 XTRNL ECG REC<48 HR R&I: CPT | Performed by: INTERNAL MEDICINE

## 2021-08-20 PROCEDURE — 93225 XTRNL ECG REC<48 HRS REC: CPT | Performed by: INTERNAL MEDICINE

## 2021-08-20 NOTE — LETTER
8/20/2021    Patient: Juhi Rueda   YOB: 1985   Date of Visit: 8/20/2021     Sofy eLntz NP  [place] [address]  [address] [facility] [address]  Via Fax: [phone]    Dear Sofy Lentz NP,      Thank you for referring Ms. Aisha Elkins to [facility] for evaluation. My notes for this consultation are attached. If you have questions, please do not hesitate to call me. I look forward to following your patient along with you.       Sincerely,    Bandar Gonzalez MD

## 2021-08-20 NOTE — PROGRESS NOTES
Pura Ulloa MD    [id number] [facility] [place], [address] [facility]    Office [phone],KBK [phone]  Pager [phone] Elham Jade is a 31 y.o. female is here for f/u visit. Primary care physician:  Yoan Patton NP    Patient Active Problem List   Diagnosis Code    Migraine G43.909    Strain UAE1978    Arrhythmia I49.9    Iron deficiency anemia during pregnancy O99.019, D50.9    Supervision of high risk pregnancy, antepartum O56.80       Dear Dr Jeff Cerda,     I had the pleasure of seeing Ms. Talib Wade in the office today.        Assessment:     ED visit 8/7/2021-for vasovagal episode  Palpitations -  Pregnancy - G8-  4 Living  ( 10/2019 - twins) -multiple miscarriages previously. Current pregnancy-16 weeks gestation. History of tethered spinal cord -(multiple surgeries from age 13 to age 26) some residual deficit with walking and weakness in her feet. Patient has been told  \"Her heart rate to be restarted\" during 1 of the surgeries when she was age 8 . Records not available. ( Self cath's herself - has UTI's)  Hx of Orthostatic Hypotension        Plan:      Echocardiogram  48-hour Holter monitor. Advised to keep hydrated. Compression stockings. Follow-up in 4 weeks/earlier as needed      Patient understands the plan. All questions were answered to the patient's satisfaction.     Medication Side Effects and Warnings were discussed with patient: yes  Patient Labs were reviewed and or requested:  yes  Patient Past Records were reviewed and or requested: yes     I appreciate the opportunity to be involved in [address] [place] [address]. Please see note below for details. Please do not hesitate to contact us with questions or concerns.     Pura Ulloa MD     Cardiac Testing/ Procedures:     A. Cardiac Cath/PCI:     B. ECHO/IRA: 10/5/19 - EF 55-60%; Redundant non prolapsing ant leaflet chords     C. StressNuclear/Stress ECHO/Stress test:     D. Vascular:     E. EP: E cardio event monitor 4/4/2019 to 5/3/2019-sinus rhythm, 0 critical, 0 serious, for stable events. Sinus rhythm/sinus tachycardia. /PAC associated with flutter or skipped beats/fatigue        9/27/17-event monitor-sinus tachycardia     F. Miscellaneous:     History of present illness:     ED visit 8/7/2021-for vasovagal episode. No further episodes of syncope since ED visit. Complains of palpitations-intermittent. On occasions it is very transient but on other occasions it is forceful. Continues to have dizziness. Has history of orthostatic hypotension. Also undergoing significant social stressors including separation. Currently 16 weeks pregnant. No chest pain. Complains of fatigue. No dyspnea. No syncope. ( Initial visit - 3/2019- 30 yr old CF who is G6 A2 (twins first pregnancy, L2 1 (boy and girl) who recently found out that she is pregnant 2/2019 and has twins. Since her diagnosis of pregnancy, she has been having palpitations. Also has been suffering from allergy/sinus infection. No dizziness, syncope, chest pain, dyspnea, swelling lower extremities. Palpitations can occur at any time.       History of tethered spinal cord. Has had multiple surgeries since age 13 to age 26. Apparently her heart had stopped during 1 of the surgeries when she was 10 or 11 and it had to be restarted.)        ROS:  (bold if positive, if negative)             Medications before admission:    Current Outpatient Medications   Medication Sig Dispense    amoxicillin-clavulanate (Augmentin) 875-125 mg per tablet Take 1 Tablet by mouth two (2) times a day.  levothyroxine (SYNTHROID) 50 mcg tablet Take 1 Tablet by mouth Daily (before breakfast). 90 Tablet    doxylamine-pyridoxine, vit B6, (Bonjesta) 20-20 mg TbID Take 1 Tablet by mouth two (2) times a day. 60 Tablet    multivitamin with iron (FLINTSTONES) chewable tablet Take 1 Tablet by mouth daily.  Indications: treatment to prevent mineral deficiency      No current facility-administered medications for this visit. Family History of CAD:    No    Social History:  Current  Smoker  No    Physical Exam:  Visit Vitals  BP (!) 98/58 (BP 1 Location: Left upper arm, BP Patient Position: Sitting)   Pulse 90   Ht 5' 2\" (1.575 m)   Wt 104 lb 12.8 oz (47.5 kg)   LMP 04/13/2021 (Approximate)   SpO2 98%   BMI 19.17 kg/m²          Gen: Well-developed,in no acute distress  Neck: Supple,No JVD, No Carotid Bruit,   Resp: No accessory muscle use, Clear breath sounds, No rales or rhonchi  Card: Regular Rate,Rythm,Normal S1, S2, No murmurs, rubs or gallop. No thrills.    Abd:  Gravid uterus  MSK: No cyanosis  Skin: No rashes    Neuro: moving all four extremities , follows commands appropriately  Psych:  Good insight, oriented to person, place , alert, anxious  LE: No edema    EKG:  Reviewed  Results for orders placed or performed during the hospital encounter of 08/07/21   EKG, 12 LEAD, INITIAL   Result Value Ref Range    Ventricular Rate 65 BPM    Atrial Rate 65 BPM    P-R Interval 130 ms    QRS Duration 66 ms    Q-T Interval 374 ms    QTC Calculation (Bezet) 388 ms    Calculated P Axis 52 degrees    Calculated R Axis 47 degrees    Calculated T Axis 26 degrees    Diagnosis       Normal sinus rhythm  Normal ECG  When compared with ECG of 22-DEC-2020 19:16,  No significant change was found  Confirmed by Pauline Cuba MD, [place] ([id number]) on 8/9/2021 9:46:16 AM           LABS:        Lab Results   Component Value Date/Time    WBC 6.7 08/07/2021 04:11 PM    HGB 12.8 08/07/2021 04:11 PM    HCT 35.7 08/07/2021 04:11 PM    PLATELET 032 08/50/6381 04:11 PM    Hgb, External 8.9 07/25/2019 12:00 AM    Hct, External 27.3 07/25/2019 12:00 AM    Platelet cnt., External 179 07/25/2019 12:00 AM     Lab Results   Component Value Date/Time    Sodium 141 08/07/2021 04:11 PM    Potassium 3.5 08/07/2021 04:11 PM    Chloride 106 08/07/2021 04:11 PM    CO2 26 08/07/2021 04:11 PM    Anion gap 9 08/07/2021 04:11 PM Glucose 78 08/07/2021 04:11 PM    BUN 9 08/07/2021 04:11 PM    Creatinine 0.47 (L) 08/07/2021 04:11 PM    BUN/Creatinine ratio 19 08/07/2021 04:11 PM    GFR est AA >60 08/07/2021 04:11 PM    GFR est non-AA >60 08/07/2021 04:11 PM    Calcium 8.6 08/07/2021 04:11 PM       No results found for: APTT  No results found for: INR, PTMR, PTP, PT1, PT2, INREXT, INREXT  No components found for: Tracie Ortiz MD

## 2021-08-20 NOTE — PROGRESS NOTES
Chief Complaint   Patient presents with    Follow-up    Palpitations       Visit Vitals  BP (!) 98/58 (BP 1 Location: Left upper arm, BP Patient Position: Sitting)   Pulse 90   Ht 5' 2\" (1.575 m)   Wt 104 lb 12.8 oz (47.5 kg)   SpO2 98%   BMI 19.17 kg/m²       Chest pain denied  SOB - denied  Dizziness denied  Swelling/Edema - denied

## 2021-08-25 ENCOUNTER — ROUTINE PRENATAL (OUTPATIENT)
Dept: OBGYN CLINIC | Age: 36
End: 2021-08-25
Payer: MEDICAID

## 2021-08-25 VITALS — DIASTOLIC BLOOD PRESSURE: 64 MMHG | BODY MASS INDEX: 20.3 KG/M2 | WEIGHT: 111 LBS | SYSTOLIC BLOOD PRESSURE: 105 MMHG

## 2021-08-25 DIAGNOSIS — Z34.80 SUPERVISION OF OTHER NORMAL PREGNANCY, ANTEPARTUM: Primary | ICD-10-CM

## 2021-08-25 DIAGNOSIS — O09.90 SUPERVISION OF HIGH RISK PREGNANCY, ANTEPARTUM: ICD-10-CM

## 2021-08-25 PROCEDURE — 0502F SUBSEQUENT PRENATAL CARE: CPT | Performed by: OBSTETRICS & GYNECOLOGY

## 2021-08-25 NOTE — PROGRESS NOTES
Baby moving  UTI dx 8/7 - still has not picked up meds, so has not been taking prophylaxis  AFP and Hep C today  US in 4 weeks

## 2021-08-25 NOTE — PROGRESS NOTES
Prenatal labs have been reviewed    Problem List  Date Reviewed: 8/4/2021        Codes Class Noted    Supervision of high risk pregnancy, antepartum ICD-10-CM: O09.90  ICD-9-CM: V23.9  6/17/2021    Overview Addendum 8/23/2021  8:14 AM by Varinder Llamas LPN     Intrauterine pregnancy with the following problems identified:   EDC 1/30/2022 by [address]  Hx of tethered cord, multiple spine surgeries - not candidate for regional anesthesia  Self caths  Rh neg  Chronic UTI - Proteus 5/24 urology  Horizon negative  Hypothyroid  RH negative  Check TSH next visit 7/12  Urine cx- E coli   Proteus and E.  Coli - send to urology   NIPTS- normal male *patient does not want know*  Prenatal labs checked 8/3  Cardiology 8/20;8/26               Iron deficiency anemia during pregnancy ICD-10-CM: O99.019, D50.9  ICD-9-CM: 648.20, 280.9  8/27/2019        Arrhythmia ICD-10-CM: I49.9  ICD-9-CM: 427.9  Unknown    Overview Signed 3/19/2019  1:29 PM by Yousuf Sloan MD     Afib at times             Migraine ICD-10-CM: I29.346  ICD-9-CM: 346.90  Unknown        Strain ICD-10-CM: Karen Hooks  ICD-9-CM: Karen Hooks  8/13/2014

## 2021-08-26 ENCOUNTER — ANCILLARY PROCEDURE (OUTPATIENT)
Dept: CARDIOLOGY CLINIC | Age: 36
End: 2021-08-26
Payer: MEDICAID

## 2021-08-26 VITALS
BODY MASS INDEX: 19.88 KG/M2 | WEIGHT: 108 LBS | SYSTOLIC BLOOD PRESSURE: 106 MMHG | DIASTOLIC BLOOD PRESSURE: 64 MMHG | HEIGHT: 62 IN

## 2021-08-26 DIAGNOSIS — R00.2 PALPITATIONS: ICD-10-CM

## 2021-08-26 PROCEDURE — 93306 TTE W/DOPPLER COMPLETE: CPT | Performed by: INTERNAL MEDICINE

## 2021-08-29 LAB
AFP ADJ MOM SERPL: 1.02
AFP INTERP SERPL-IMP: NORMAL
AFP INTERP SERPL-IMP: NORMAL
AFP SERPL-MCNC: 49.4 NG/ML
AGE AT DELIVERY: 34.9 YR
COMMENT, 018013: NORMAL
GA METHOD: NORMAL
GA: 17 WEEKS
HCV AB S/CO SERPL IA: <0.1 S/CO RATIO (ref 0–0.9)
IDDM PATIENT QL: NO
MULTIPLE PREGNANCY: NO
NEURAL TUBE DEFECT RISK FETUS: NORMAL %
RESULTS, 017004: NORMAL

## 2021-09-02 LAB
ECHO AO ROOT DIAM: 2.62 CM
ECHO AV AREA PEAK VELOCITY: 2.61 CM2
ECHO AV AREA VTI: 2.78 CM2
ECHO AV AREA/BSA PEAK VELOCITY: 1.8 CM2/M2
ECHO AV AREA/BSA VTI: 1.9 CM2/M2
ECHO AV MEAN GRADIENT: 3.03 MMHG
ECHO AV PEAK GRADIENT: 6.06 MMHG
ECHO AV PEAK VELOCITY: 123.1 CM/S
ECHO AV VTI: 23.06 CM
ECHO EST RA PRESSURE: 8 MMHG
ECHO LA AREA 4C: 12.82 CM2
ECHO LA MAJOR AXIS: 2.57 CM
ECHO LA MINOR AXIS: 1.75 CM
ECHO LA VOL 2C: 37.92 ML (ref 22–52)
ECHO LA VOL 4C: 27.81 ML (ref 22–52)
ECHO LA VOL BP: 37.66 ML (ref 22–52)
ECHO LA VOL/BSA BIPLANE: 25.62 ML/M2 (ref 16–28)
ECHO LA VOLUME INDEX A2C: 25.8 ML/M2 (ref 16–28)
ECHO LA VOLUME INDEX A4C: 18.92 ML/M2 (ref 16–28)
ECHO LV E' LATERAL VELOCITY: 15.04 CM/S
ECHO LV E' SEPTAL VELOCITY: 13.06 CM/S
ECHO LV EDV A2C: 73.95 ML
ECHO LV EDV A4C: 61.6 ML
ECHO LV EDV BP: 68.91 ML (ref 56–104)
ECHO LV EDV INDEX A4C: 41.9 ML/M2
ECHO LV EDV INDEX BP: 46.9 ML/M2
ECHO LV EDV NDEX A2C: 50.3 ML/M2
ECHO LV EJECTION FRACTION A2C: 60 PERCENT
ECHO LV EJECTION FRACTION A4C: 57 PERCENT
ECHO LV EJECTION FRACTION BIPLANE: 58.8 PERCENT (ref 55–100)
ECHO LV ESV A2C: 29.54 ML
ECHO LV ESV A4C: 26.53 ML
ECHO LV ESV BP: 28.41 ML (ref 19–49)
ECHO LV ESV INDEX A2C: 20.1 ML/M2
ECHO LV ESV INDEX A4C: 18 ML/M2
ECHO LV ESV INDEX BP: 19.3 ML/M2
ECHO LV INTERNAL DIMENSION DIASTOLIC: 4.11 CM (ref 3.9–5.3)
ECHO LV INTERNAL DIMENSION SYSTOLIC: 2.75 CM
ECHO LV IVSD: 0.77 CM (ref 0.6–0.9)
ECHO LV MASS 2D: 98.5 G (ref 67–162)
ECHO LV MASS INDEX 2D: 67 G/M2 (ref 43–95)
ECHO LV POSTERIOR WALL DIASTOLIC: 0.84 CM (ref 0.6–0.9)
ECHO LVOT DIAM: 1.98 CM
ECHO LVOT PEAK GRADIENT: 4.32 MMHG
ECHO LVOT PEAK VELOCITY: 103.88 CM/S
ECHO LVOT SV: 64.1 ML
ECHO LVOT VTI: 20.76 CM
ECHO MV A VELOCITY: 76.93 CM/S
ECHO MV E DECELERATION TIME (DT): 188.22 MS
ECHO MV E VELOCITY: 89.02 CM/S
ECHO MV E/A RATIO: 1.16
ECHO MV E/E' LATERAL: 5.92
ECHO MV E/E' RATIO (AVERAGED): 6.37
ECHO MV E/E' SEPTAL: 6.82
ECHO MV PRESSURE HALF TIME (PHT): 54.58 MS
ECHO RA AREA 4C: 11.06 CM2
ECHO RIGHT VENTRICULAR SYSTOLIC PRESSURE (RVSP): 25.02 MMHG
ECHO RV INTERNAL DIMENSION: 2.52 CM
ECHO RV TAPSE: 3.05 CM (ref 1.5–2)
ECHO TV REGURGITANT MAX VELOCITY: 206.3 CM/S
ECHO TV REGURGITANT PEAK GRADIENT: 17.02 MMHG
LA VOL DISK BP: 33.27 ML (ref 22–52)

## 2021-09-05 ENCOUNTER — DOCUMENTATION ONLY (OUTPATIENT)
Dept: CARDIOLOGY CLINIC | Age: 36
End: 2021-09-05

## 2021-09-05 NOTE — PROGRESS NOTES
48-hour Holter monitor  8/20/2021  Minimum heart rate 50 bpm, maximum heart rate 143 bpm,  5 PACs  No significant arrhythmias. Symptoms associated with sinus rhythm. Next appointment 9/27/21    Sola Lilly MD, [facility]

## 2021-09-08 ENCOUNTER — TELEPHONE (OUTPATIENT)
Dept: CARDIOLOGY CLINIC | Age: 36
End: 2021-09-08

## 2021-09-08 NOTE — TELEPHONE ENCOUNTER
----- Message from Kel Rock MD sent at 9/4/2021  1:24 PM EDT -----  Echo-normal pump function/normal EF

## 2021-09-08 NOTE — TELEPHONE ENCOUNTER
Verified patient with two patient identifiers. Called patient and informed her of normal Echo test results. Patient verbalized understanding.

## 2021-09-15 ENCOUNTER — ROUTINE PRENATAL (OUTPATIENT)
Dept: OBGYN CLINIC | Age: 36
End: 2021-09-15

## 2021-09-15 VITALS — WEIGHT: 111.4 LBS | DIASTOLIC BLOOD PRESSURE: 56 MMHG | SYSTOLIC BLOOD PRESSURE: 106 MMHG | BODY MASS INDEX: 20.38 KG/M2

## 2021-09-15 DIAGNOSIS — O09.90 SUPERVISION OF HIGH RISK PREGNANCY, ANTEPARTUM: ICD-10-CM

## 2021-09-15 DIAGNOSIS — Z34.80 SUPERVISION OF OTHER NORMAL PREGNANCY, ANTEPARTUM: Primary | ICD-10-CM

## 2021-09-15 PROCEDURE — 0502F SUBSEQUENT PRENATAL CARE: CPT | Performed by: OBSTETRICS & GYNECOLOGY

## 2021-09-15 NOTE — PROGRESS NOTES
FETAL SURVEY  A SINGLE VIABLE IUP AT 20W4D GA BY LMP IS SEEN. FETAL CARDIAC MOTION OBSERVED. FETAL ANATOMY WELL VISUALIZED AND APPEARS WITHIN NORMAL LIMITS. NO ABNORMALITIES ARE SEEN ON TODAY'S EXAM.  FACE, NOSE/LIPS, PROFILE, CSP, LAT VENT, CER/CM, CP, SPINE, KIDNEYS, STOMACH/DIAPHRAGM, BLADDER, 3VC,  CORD INSERTION, RVOT, LVOT, 4CH, AO, DA, 3VV, ARMS, LEGS, HANDS, FEET. APPROPRIATE FETAL GROWTH IS SEEN. SIZE=DATES. SHANTANU, CERVIX AND PLACENTA APPEAR WITHIN NORMAL LIMITS.   GENDER: XY, PATIENT DOES NOT KNOW

## 2021-09-18 LAB — BACTERIA UR CULT: NO GROWTH

## 2021-09-27 ENCOUNTER — OFFICE VISIT (OUTPATIENT)
Dept: CARDIOLOGY CLINIC | Age: 36
End: 2021-09-27
Payer: MEDICAID

## 2021-09-27 VITALS
SYSTOLIC BLOOD PRESSURE: 98 MMHG | OXYGEN SATURATION: 99 % | BODY MASS INDEX: 20.61 KG/M2 | HEART RATE: 78 BPM | WEIGHT: 112 LBS | HEIGHT: 62 IN | DIASTOLIC BLOOD PRESSURE: 60 MMHG

## 2021-09-27 DIAGNOSIS — R00.2 PALPITATIONS: Primary | ICD-10-CM

## 2021-09-27 DIAGNOSIS — I95.1 ORTHOSTATIC HYPOTENSION: ICD-10-CM

## 2021-09-27 DIAGNOSIS — R55 VASOVAGAL EPISODE: ICD-10-CM

## 2021-09-27 PROCEDURE — 99213 OFFICE O/P EST LOW 20 MIN: CPT | Performed by: INTERNAL MEDICINE

## 2021-09-27 RX ORDER — AMOXICILLIN 250 MG/1
250 CAPSULE ORAL DAILY
COMMUNITY
Start: 2021-09-15 | End: 2022-08-29 | Stop reason: ALTCHOICE

## 2021-09-27 NOTE — PROGRESS NOTES
Identified pt with two pt identifiers(name and ). Reviewed record in preparation for visit and have obtained necessary documentation. Chief Complaint   Patient presents with    Follow-up     echo results    Palpitations    Hypotension        Vitals:    21 1449   BP: 98/60   Pulse: 78   SpO2: 99%   Weight: 112 lb (50.8 kg)   Height: 5' 2\" (1.575 m)   PainSc:   0 - No pain   LMP: 2021       Health Maintenance Due   Topic    COVID-19 Vaccine (1)    DTaP/Tdap/Td series (1 - Tdap)    Flu Vaccine (1)       Coordination of Care Questionnaire:  :   1) Have you been to an emergency room, urgent care, or hospitalized since your last visit? If yes, where when, and reason for visit? No      2. Have seen or consulted any other health care provider since your last visit? If yes, where when, and reason for visit?   No      .

## 2021-09-27 NOTE — LETTER
9/27/2021    Patient: Maeve Gutierrez   YOB: 1985   Date of Visit: 9/27/2021     X[facility]IER Recinos  [address] [place] [address],[address]  [address] [facility] [place] [address]  Via Fax: [phone]    Dear Martha Wilde NP,      Thank you for referring Ms. Arden Hyde to [facility] for evaluation. My notes for this consultation are attached. If you have questions, please do not hesitate to call me. I look forward to following your patient along with you.       Sincerely,    Papi Maguire MD

## 2021-09-27 NOTE — PROGRESS NOTES
Sonya Neal MD    [address] [facility], [address] [facility]    Office [phone],NOP [phone]      Keshav Kang is a 31 y.o. female is here for f/u visit. Primary care physician:  Migdalia Li NP    Dear Dr Rachel Carpio,     I had the pleasure of seeing Ms. Jessie Garzon in the office today.        Assessment:     ED visit 8/7/2021-for vasovagal episode  Palpitations -  Pregnancy - G8-  4 Living  ( 10/2019 - twins) -multiple miscarriages previously. Celio pregnant - 2nd trimester  History of tethered spinal cord -(multiple surgeries from age 13 to age 26) some residual deficit with walking and weakness in her feet. Patient has been told  \"Her heart rate to be restarted\" during 1 of the surgeries when she was age 8 . Records not available. ( Self cath's herself - has UTI's)  Hx of Orthostatic Hypotension        Plan:      Echocardiogram-8/21-normal LVEF  48-hour Holter monitor.-8/2021-no significant arrhythmias  Blood pressure running low. Advised to monitor heart rate/blood pressure/get a blood pressure machine. Advised to keep hydrated. Compression stockings. Follow-up post pregnancy unless has worsening symptoms /earlier as needed      Patient understands the plan. All questions were answered to the patient's satisfaction.     Medication Side Effects and Warnings were discussed with patient: yes  Patient Labs were reviewed and or requested:  yes  Patient Past Records were reviewed and or requested: yes     I appreciate the opportunity to be involved in [address] [place] [address]. Please see note below for details. Please do not hesitate to contact us with questions or concerns.     Sonya Neal MD     Cardiac Testing/ Procedures:     A. Cardiac Cath/PCI:    B.ECHO/IRA: 8/26/21 - LV: Calculated LVEF is 59%. Biplane method used to measure ejection fraction. Normal cavity size, wall thickness, systolic function (ejection fraction normal) and diastolic function.  Wall motion: normal.  TV: Right Ventricular Arterial Pressure (RVSP) is 25 mmHg. Pulmonary hypertension not suggested by Doppler findings. 10/5/19 - EF 55-60%; Redundant non prolapsing ant leaflet chords     C. StressNuclear/Stress ECHO/Stress test:     D. Vascular:     E. EP: 48-hour Holter monitor  8/20/2021  Minimum heart rate 50 bpm, maximum heart rate 143 bpm,  5 PACs  No significant arrhythmias. Symptoms associated with sinus rhythm. E cardio event monitor 4/4/2019 to 5/3/2019-sinus rhythm, 0 critical, 0 serious, for stable events. Sinus rhythm/sinus tachycardia. /PAC associated with flutter or skipped beats/fatigue        9/27/17-event monitor-sinus tachycardia     F. Miscellaneous:     History of present illness:       ED visit 8/7/2021-for vasovagal episode. No further episodes of syncope since ED visit. Echo-normal EF. 48-hour Holter monitor-no significant arrhythmias. .  Continues to have occasional palpitations/dizziness. Has history of orthostatic hypotension. Also undergoing significant social stressors including separation. Currently 20+ weeks pregnant. No chest pain. Complains of fatigue. No dyspnea. No syncope. ( Initial visit - 3/2019- 30 yr old CF who is G6 A2 (twins first pregnancy, L2 1 (boy and girl) who recently found out that she is pregnant 2/2019 and has twins. Since her diagnosis of pregnancy, she has been having palpitations. Also has been suffering from allergy/sinus infection. No dizziness, syncope, chest pain, dyspnea, swelling lower extremities. Palpitations can occur at any time.       History of tethered spinal cord. Has had multiple surgeries since age 13 to age 26.   Apparently her heart had stopped during 1 of the surgeries when she was 10 or 11 and it had to be restarted.)        ROS:  (bold if positive, if negative)             Medications before admission:    Current Outpatient Medications   Medication Sig Dispense    amoxicillin (AMOXIL) 250 mg capsule Take 250 mg by mouth daily.  levothyroxine (SYNTHROID) 50 mcg tablet Take 1 Tablet by mouth Daily (before breakfast). 90 Tablet    doxylamine-pyridoxine, vit B6, (Bonjesta) 20-20 mg TbID Take 1 Tablet by mouth two (2) times a day. 60 Tablet    multivitamin with iron (FLINTSTONES) chewable tablet Take 1 Tablet by mouth daily. Indications: treatment to prevent mineral deficiency      No current facility-administered medications for this visit. Family History of CAD:    No    Social History:  Current  Smoker  No    Physical Exam:  Visit Vitals  BP 98/60 (BP 1 Location: Left upper arm, BP Patient Position: Sitting, BP Cuff Size: Adult)   Pulse 78   Ht 5' 2\" (1.575 m)   Wt 112 lb (50.8 kg)   LMP 04/13/2021 (Approximate)   SpO2 99%   BMI 20.49 kg/m²          Gen: Well-developed,in no acute distress  Neck: Supple,No JVD, No Carotid Bruit,   Resp: No accessory muscle use, Clear breath sounds, No rales or rhonchi  Card: Regular Rate,Rythm,Normal S1, S2, No murmurs, rubs or gallop. No thrills.    Abd:  Gravid uterus  MSK: No cyanosis  Skin: No rashes    Neuro: moving all four extremities , follows commands appropriately  Psych:  Good insight, oriented to person, place , alert, anxious  LE: No edema    EKG    LABS:        Lab Results   Component Value Date/Time    WBC 6.7 08/07/2021 04:11 PM    HGB 12.8 08/07/2021 04:11 PM    HCT 35.7 08/07/2021 04:11 PM    PLATELET 504 58/87/1697 04:11 PM    Hgb, External 8.9 07/25/2019 12:00 AM    Hct, External 27.3 07/25/2019 12:00 AM    Platelet cnt., External 179 07/25/2019 12:00 AM     Lab Results   Component Value Date/Time    Sodium 141 08/07/2021 04:11 PM    Potassium 3.5 08/07/2021 04:11 PM    Chloride 106 08/07/2021 04:11 PM    CO2 26 08/07/2021 04:11 PM    Anion gap 9 08/07/2021 04:11 PM    Glucose 78 08/07/2021 04:11 PM    BUN 9 08/07/2021 04:11 PM    Creatinine 0.47 (L) 08/07/2021 04:11 PM    BUN/Creatinine ratio 19 08/07/2021 04:11 PM    GFR est AA >60 08/07/2021 04:11 PM    GFR est non-AA >60 08/07/2021 04:11 PM    Calcium 8.6 08/07/2021 04:11 PM       No results found for: APTT  No results found for: INR, PTMR, PTP, PT1, PT2, INREXT, INREXT  No components found for: Shanda Phipps MD

## 2021-10-07 ENCOUNTER — HOSPITAL ENCOUNTER (OUTPATIENT)
Age: 36
Setting detail: OBSERVATION
Discharge: HOME | End: 2021-10-08
Attending: OBSTETRICS & GYNECOLOGY | Admitting: OBSTETRICS & GYNECOLOGY
Payer: MEDICAID

## 2021-10-07 PROBLEM — Q06.8 TETHERED SPINAL CORD (HCC): Status: ACTIVE | Noted: 2021-10-07

## 2021-10-07 PROBLEM — I49.1 PAC (PREMATURE ATRIAL CONTRACTION): Status: ACTIVE | Noted: 2021-10-07

## 2021-10-07 PROBLEM — Z78.9 SELF-CATHETERIZES URINARY BLADDER: Status: ACTIVE | Noted: 2021-10-07

## 2021-10-07 PROBLEM — N39.0 CHRONIC UTI: Status: ACTIVE | Noted: 2021-10-07

## 2021-10-07 PROBLEM — R00.0 SINUS TACHYCARDIA: Status: ACTIVE | Noted: 2021-10-07

## 2021-10-07 LAB
ALBUMIN SERPL-MCNC: 2.9 G/DL (ref 3.5–5)
ALBUMIN/GLOB SERPL: 0.9 {RATIO} (ref 1.1–2.2)
ALP SERPL-CCNC: 49 U/L (ref 45–117)
ALT SERPL-CCNC: 20 U/L (ref 12–78)
ANION GAP SERPL CALC-SCNC: 7 MMOL/L (ref 5–15)
APPEARANCE UR: ABNORMAL
AST SERPL-CCNC: 12 U/L (ref 15–37)
BACTERIA URNS QL MICRO: NEGATIVE /HPF
BASOPHILS # BLD: 0.1 K/UL (ref 0–0.1)
BASOPHILS NFR BLD: 0 % (ref 0–1)
BILIRUB SERPL-MCNC: 0.3 MG/DL (ref 0.2–1)
BILIRUB UR QL: NEGATIVE
BUN SERPL-MCNC: 5 MG/DL (ref 6–20)
BUN/CREAT SERPL: 13 (ref 12–20)
CALCIUM SERPL-MCNC: 8.5 MG/DL (ref 8.5–10.1)
CHLORIDE SERPL-SCNC: 108 MMOL/L (ref 97–108)
CO2 SERPL-SCNC: 26 MMOL/L (ref 21–32)
COLOR UR: ABNORMAL
COMMENT, HOLDF: NORMAL
CREAT SERPL-MCNC: 0.38 MG/DL (ref 0.55–1.02)
DIFFERENTIAL METHOD BLD: ABNORMAL
EOSINOPHIL # BLD: 0 K/UL (ref 0–0.4)
EOSINOPHIL NFR BLD: 0 % (ref 0–7)
EPITH CASTS URNS QL MICRO: ABNORMAL /LPF
ERYTHROCYTE [DISTWIDTH] IN BLOOD BY AUTOMATED COUNT: 12.5 % (ref 11.5–14.5)
FIBRONECTIN FETAL VAG QL: NEGATIVE
GLOBULIN SER CALC-MCNC: 3.4 G/DL (ref 2–4)
GLUCOSE SERPL-MCNC: 81 MG/DL (ref 65–100)
GLUCOSE UR STRIP.AUTO-MCNC: NEGATIVE MG/DL
HCT VFR BLD AUTO: 37.6 % (ref 35–47)
HGB BLD-MCNC: 12.6 G/DL (ref 11.5–16)
HGB UR QL STRIP: ABNORMAL
IMM GRANULOCYTES # BLD AUTO: 0.1 K/UL (ref 0–0.04)
IMM GRANULOCYTES NFR BLD AUTO: 1 % (ref 0–0.5)
KETONES UR QL STRIP.AUTO: NEGATIVE MG/DL
LEUKOCYTE ESTERASE UR QL STRIP.AUTO: ABNORMAL
LYMPHOCYTES # BLD: 1.5 K/UL (ref 0.8–3.5)
LYMPHOCYTES NFR BLD: 10 % (ref 12–49)
MCH RBC QN AUTO: 30.7 PG (ref 26–34)
MCHC RBC AUTO-ENTMCNC: 33.5 G/DL (ref 30–36.5)
MCV RBC AUTO: 91.7 FL (ref 80–99)
MONOCYTES # BLD: 0.9 K/UL (ref 0–1)
MONOCYTES NFR BLD: 6 % (ref 5–13)
NEUTS SEG # BLD: 12.2 K/UL (ref 1.8–8)
NEUTS SEG NFR BLD: 83 % (ref 32–75)
NITRITE UR QL STRIP.AUTO: NEGATIVE
NRBC # BLD: 0 K/UL (ref 0–0.01)
NRBC BLD-RTO: 0 PER 100 WBC
PH UR STRIP: 7.5 [PH] (ref 5–8)
PLATELET # BLD AUTO: 214 K/UL (ref 150–400)
PMV BLD AUTO: 11.1 FL (ref 8.9–12.9)
POTASSIUM SERPL-SCNC: 3.9 MMOL/L (ref 3.5–5.1)
PROT SERPL-MCNC: 6.3 G/DL (ref 6.4–8.2)
PROT UR STRIP-MCNC: ABNORMAL MG/DL
RBC # BLD AUTO: 4.1 M/UL (ref 3.8–5.2)
RBC #/AREA URNS HPF: ABNORMAL /HPF (ref 0–5)
SAMPLES BEING HELD,HOLD: NORMAL
SODIUM SERPL-SCNC: 141 MMOL/L (ref 136–145)
SP GR UR REFRACTOMETRY: 1.01 (ref 1–1.03)
UA: UC IF INDICATED,UAUC: ABNORMAL
UROBILINOGEN UR QL STRIP.AUTO: 0.2 EU/DL (ref 0.2–1)
WBC # BLD AUTO: 14.8 K/UL (ref 3.6–11)
WBC URNS QL MICRO: ABNORMAL /HPF (ref 0–4)

## 2021-10-07 PROCEDURE — 82731 ASSAY OF FETAL FIBRONECTIN: CPT

## 2021-10-07 PROCEDURE — 74011250636 HC RX REV CODE- 250/636: Performed by: OBSTETRICS & GYNECOLOGY

## 2021-10-07 PROCEDURE — 75810000275 HC EMERGENCY DEPT VISIT NO LEVEL OF CARE

## 2021-10-07 PROCEDURE — 74011250637 HC RX REV CODE- 250/637: Performed by: OBSTETRICS & GYNECOLOGY

## 2021-10-07 PROCEDURE — 81001 URINALYSIS AUTO W/SCOPE: CPT

## 2021-10-07 PROCEDURE — 85025 COMPLETE CBC W/AUTO DIFF WBC: CPT

## 2021-10-07 PROCEDURE — 80053 COMPREHEN METABOLIC PANEL: CPT

## 2021-10-07 PROCEDURE — 36415 COLL VENOUS BLD VENIPUNCTURE: CPT

## 2021-10-07 PROCEDURE — 87086 URINE CULTURE/COLONY COUNT: CPT

## 2021-10-07 RX ORDER — AMOXICILLIN AND CLAVULANATE POTASSIUM 875; 125 MG/1; MG/1
TABLET, FILM COATED ORAL EVERY 12 HOURS
COMMUNITY
End: 2021-11-11 | Stop reason: ALTCHOICE

## 2021-10-07 RX ORDER — AMOXICILLIN AND CLAVULANATE POTASSIUM 500; 125 MG/1; MG/1
1 TABLET, FILM COATED ORAL 2 TIMES DAILY WITH MEALS
Status: DISCONTINUED | OUTPATIENT
Start: 2021-10-07 | End: 2021-10-08 | Stop reason: HOSPADM

## 2021-10-07 RX ORDER — SODIUM CHLORIDE 9 MG/ML
500 INJECTION, SOLUTION INTRAVENOUS ONCE
Status: COMPLETED | OUTPATIENT
Start: 2021-10-07 | End: 2021-10-07

## 2021-10-07 RX ORDER — SODIUM CHLORIDE 9 MG/ML
125 INJECTION, SOLUTION INTRAVENOUS CONTINUOUS
Status: DISCONTINUED | OUTPATIENT
Start: 2021-10-07 | End: 2021-10-08 | Stop reason: HOSPADM

## 2021-10-07 RX ADMIN — AMOXICILLIN AND CLAVULANATE POTASSIUM 1 TABLET: 500; 125 TABLET, FILM COATED ORAL at 20:46

## 2021-10-07 RX ADMIN — SODIUM CHLORIDE 500 ML: 9 INJECTION, SOLUTION INTRAVENOUS at 18:25

## 2021-10-07 RX ADMIN — SODIUM CHLORIDE 125 ML/HR: 9 INJECTION, SOLUTION INTRAVENOUS at 19:00

## 2021-10-07 NOTE — PROGRESS NOTES
1750: Pt arrived from ED at 23w4d,  c/o lower R back pain that radiates to lower abd. Pt states recurrent UTIs d/t independent straight cath d/t inability to fully empty bladder, was started on Augmentin today by Urologist, pt c/o dysuria. 1800: UA/UC sent at this time. : Dr. Brant Pabon called at this time to update MD. Per MD- wait for UA results to come back before deciding on treatment plan. Per MD- insert PIV- draw CBC, CMP, start 500ml NS IV bolus for hydration. : PIV inserted, labs drawn and IV bolus started at this time, pt tolerating well. : Bedside and Verbal shift change report given to JACKI Waggoner RN (oncoming nurse) by Tremaine Maier RN (offgoing nurse). Report included the following information SBAR, Kardex, Procedure Summary, Intake/Output, MAR, Med Rec Status and Quality Measures.

## 2021-10-07 NOTE — PROGRESS NOTES
1900: Bedside and Verbal shift change report given to JACKI Dorman RN (oncoming nurse) by Ying Deutsch RN (offgoing nurse). Report included the following information SBAR, Kardex, Procedure Summary, Intake/Output, MAR and Recent Results. 1936: Dr. Fred Mahan FFN at this time. Pt tolerated procedure SVE per MD long/thick/closed. 1939: FFN negative. VORB per Dr. Kayla Caballero Pt to stay overnight and received abx for UTI. Plan to recheck CBC in AM.     2140: This RN and pt down to lobby to visit patient's children. 2210: This RN and Pt back to floor at this time. 0700: Bedside and Verbal shift change report given to [employer] (oncoming nurse) by JACKI Dorman RN (offgoing nurse). Report included the following information SBAR, Kardex, Procedure Summary, Intake/Output, MAR and Recent Results.

## 2021-10-08 VITALS
HEART RATE: 78 BPM | BODY MASS INDEX: 20.61 KG/M2 | DIASTOLIC BLOOD PRESSURE: 66 MMHG | OXYGEN SATURATION: 98 % | SYSTOLIC BLOOD PRESSURE: 109 MMHG | WEIGHT: 112 LBS | HEIGHT: 62 IN | TEMPERATURE: 98.2 F | RESPIRATION RATE: 16 BRPM

## 2021-10-08 PROBLEM — N39.0 UTI (URINARY TRACT INFECTION): Status: ACTIVE | Noted: 2021-10-08

## 2021-10-08 LAB
BACTERIA SPEC CULT: NORMAL
BASOPHILS # BLD: 0 K/UL (ref 0–0.1)
BASOPHILS NFR BLD: 0 % (ref 0–1)
BLASTS NFR BLD MANUAL: 0 %
DIFFERENTIAL METHOD BLD: ABNORMAL
EOSINOPHIL # BLD: 0.1 K/UL (ref 0–0.4)
EOSINOPHIL NFR BLD: 1 % (ref 0–7)
ERYTHROCYTE [DISTWIDTH] IN BLOOD BY AUTOMATED COUNT: 12.5 % (ref 11.5–14.5)
HCT VFR BLD AUTO: 32.9 % (ref 35–47)
HGB BLD-MCNC: 10.9 G/DL (ref 11.5–16)
IMM GRANULOCYTES # BLD AUTO: 0 K/UL
IMM GRANULOCYTES NFR BLD AUTO: 0 %
LYMPHOCYTES # BLD: 2.6 K/UL (ref 0.8–3.5)
LYMPHOCYTES NFR BLD: 23 % (ref 12–49)
MCH RBC QN AUTO: 30.4 PG (ref 26–34)
MCHC RBC AUTO-ENTMCNC: 33.1 G/DL (ref 30–36.5)
MCV RBC AUTO: 91.9 FL (ref 80–99)
METAMYELOCYTES NFR BLD MANUAL: 0 %
MONOCYTES # BLD: 0.8 K/UL (ref 0–1)
MONOCYTES NFR BLD: 7 % (ref 5–13)
MYELOCYTES NFR BLD MANUAL: 0 %
NEUTS BAND NFR BLD MANUAL: 0 % (ref 0–6)
NEUTS SEG # BLD: 7.9 K/UL (ref 1.8–8)
NEUTS SEG NFR BLD: 69 % (ref 32–75)
NRBC # BLD: 0 K/UL (ref 0–0.01)
NRBC BLD-RTO: 0 PER 100 WBC
OTHER CELLS NFR BLD MANUAL: 0 %
PLATELET # BLD AUTO: 188 K/UL (ref 150–400)
PMV BLD AUTO: 11.5 FL (ref 8.9–12.9)
PROMYELOCYTES NFR BLD MANUAL: 0 %
RBC # BLD AUTO: 3.58 M/UL (ref 3.8–5.2)
RBC MORPH BLD: ABNORMAL
SERVICE CMNT-IMP: NORMAL
WBC # BLD AUTO: 11.4 K/UL (ref 3.6–11)

## 2021-10-08 PROCEDURE — 85027 COMPLETE CBC AUTOMATED: CPT

## 2021-10-08 PROCEDURE — 36415 COLL VENOUS BLD VENIPUNCTURE: CPT

## 2021-10-08 PROCEDURE — 99217 PR OBSERVATION CARE DISCHARGE MANAGEMENT: CPT | Performed by: OBSTETRICS & GYNECOLOGY

## 2021-10-08 PROCEDURE — 99218 HC RM OBSERVATION: CPT

## 2021-10-08 PROCEDURE — 74011250637 HC RX REV CODE- 250/637: Performed by: OBSTETRICS & GYNECOLOGY

## 2021-10-08 RX ORDER — LEVOTHYROXINE SODIUM 25 UG/1
50 TABLET ORAL
Status: DISCONTINUED | OUTPATIENT
Start: 2021-10-08 | End: 2021-10-08 | Stop reason: HOSPADM

## 2021-10-08 RX ADMIN — LEVOTHYROXINE SODIUM 50 MCG: 0.03 TABLET ORAL at 03:49

## 2021-10-08 NOTE — H&P
History & Physical    Name: Tmoa Santillan MRN: [id number]  SSN: [id number]    YOB: 1985  Age: 31 y.o. Sex: female        Subjective:     Estimated Date of Delivery: 22  OB History    Para Term  AB Living   8 3 3   4 4   SAB TAB Ectopic Molar Multiple Live Births   1       1 4      # Outcome Date GA Lbr Venancio/2nd Weight Sex Delivery Anes PTL Lv   8 Current            7A Term 19 37w0d  2.615 kg F Vag-Spont None N TED   7B Term 19 37w0d  2.885 kg F Vag-Spont None N TED   6 Term 14 40w3d 21:02 / 02:21 4.054 kg M VAGINAL DELI Local N TED   5 Term 09 40w0d 26:00 3.221 kg F VAGINAL DELI None N TED   4 AB            3 AB            2 AB            1 SAB               Obstetric Comments   Menarche:  *19**. LMP: 3/15/14. # of Children:  2. Age at Delivery of First Child:  27.   Hysterectomy/oophorectomy:  NO/NO. Breast Bx:  no.  Hx of Breast Feeding:  yes. BCP:  yes. Hormone therapy:  no.       Ms. Gonzalez Rajan is a G8  with pregnancy at 23 4/7 wks that complains of intermittent pelvic pain and pressure x 4 days and constant right lower back pain x 2 days. Monday she noticed bright red blood spotting on her bath towel with no bleeding since that time. For the past hour she has had contraction type pain. Also notes that her urine is cloudy and has a foul odor. Also notes nausea earlier today that has resolved. Denies leakage of vaginal fluid, vomiting, fever, chills, diarrhea, and has good fetal movement. She started taking Augmentin today that she had left over from a previous prescription. Her prenatal care is complicated by tethered spinal cord requiring self catheterization, chronic UTIs, sinus tachycardia with PACs, and h/o twin delivery on the admitted with pregnancy. Please see prenatal records for details.     Past Medical History:   Diagnosis Date    Arrhythmia     Arthritis     left foot and lower back    Asthma     no inhaler    Burning with urination     Chronic UTI     Complication of anesthesia     bradycardia- pt stated she was \"shocked\" under general as child    Disease of blood and blood forming organ     Headache     Heart abnormality     mummer    Iron deficiency anemia during pregnancy 8/27/2019    Migraine     Nerve damage     Nerve damage in left foot. 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 in pregnancy     Self-catheterizes urinary bladder     Since age 13   Nolan Tethered spinal cord ([facility] [address]     Unspecified breast disorder     L invert nipple   Hypothyroid  GynHx: denies STIs  Past Surgical History:   Procedure Laterality Date    HX BACK SURGERY      x2    HX GYN      episotomy    HX ORTHOPAEDIC      spine and left foot    HX OTHER SURGICAL      L foot has wire from surgery   Lt. foot surgery x 5  Social History     Occupational History    Not on file   Tobacco Use    Smoking status: Never Smoker    Smokeless tobacco: Never Used   Vaping Use    Vaping Use: Never used   Substance and Sexual Activity    Alcohol use: No    Drug use: No    Sexual activity: Yes     Partners: Male     Birth control/protection: Condom   Acknowledges that she is anxious because of social issues involving the FOB. States that he has been arrested for sexual involvement with a minor and that he has been released on bond. He has violated her restraining order but she does not have any video or pictures as proof.     Family History   Problem Relation Age of Onset   Nolan Arthritis-osteo Mother    Nolan Migraines Mother     Alcohol abuse Paternal Grandfather     Bleeding Prob Maternal Grandfather     Stroke Maternal Grandfather     Hypertension Maternal Grandfather     Breast Cancer Other     Atrial Fibrillation Brother     Hypertension Maternal Grandmother        Allergies Allergen Reactions    Latex Rash     Wheezing    Beef Containing Products Anaphylaxis    Keflex [Cephalexin] Anaphylaxis    Codeine Other (comments)     Hyperactivity    Doxycycline Nausea Only    Macrobid [Nitrofurantoin Monohyd/M-Cryst] Other (comments)     Pharmacy had this allergy listed and called the patient and patient could not remember her reaction to the medication. This nurse called the patient . Patient states that when it was prescribed in early pregnancy it caused spotting.  Milk Hives    Nuts [Tree Nut] Swelling     Pt stated that her mouth and throat feels funny when she eats nuts      Omnicef [Cefdinir] Other (comments)     \"shakes\"    Pepto-Bismol [Bismuth Subsalicylate] Hives    Phenergan [Promethazine] Other (comments)     Increased sedation    Reglan [Metoclopramide] Anxiety    Soy Hives     Prior to Admission medications    Medication Sig Start Date End Date Taking? Authorizing Provider   amoxicillin-clavulanate (Augmentin) 875-125 mg per tablet Take  by mouth every twelve (12) hours. Yes Provider, Historical   levothyroxine (SYNTHROID) 50 mcg tablet Take 1 Tablet by mouth Daily (before breakfast). 7/12/21  Yes Michele Thurston MD   doxylamine-pyridoxine, vit B6, (Bonjesta) 20-20 mg TbID Take 1 Tablet by mouth two (2) times a day. 6/14/21  Yes Michele Thurston MD   multivitamin with iron (FLINTSTONES) chewable tablet Take 1 Tablet by mouth daily. Indications: treatment to prevent mineral deficiency   Yes Other, MD Ivis   amoxicillin (AMOXIL) 250 mg capsule Take 250 mg by mouth daily. Patient not taking: Reported on 10/7/2021 9/15/21   Provider, Historical        Review of Systems: A comprehensive review of systems was negative except for that written in the HPI.     Objective:     Vitals:  Vitals:    10/07/21 1814 10/07/21 1835   BP: 109/61    Pulse: 83    Temp: 98.7 °F (37.1 °C)    Weight:  50.8 kg (112 lb)   Height:  5' 2\" (1.575 m)        Physical Exam:  Patient without distress. Heart: Regular rate and rhythm or S1S2 present  Lung: clear to auscultation throughout lung fields, no wheezes, no rales, no rhonchi and normal respiratory effort  Back: mild right costovertebral angle tenderness present  Abdomen: soft, suprapubic tenderness  Fundus: soft and non tender  Perineum: blood absent, amniotic fluid absent  SSE: FFN collected  Cervical Exam: Closed/Thick/High  Lower Extremities:  - Edema No  Membranes:  Intact  Fetal Heart Rate: Baseline: 155 per minute  Uterine contractions: none    Recent Results (from the past 12 hour(s))   URINALYSIS W/ REFLEX CULTURE    Collection Time: 10/07/21  6:00 PM    Specimen: Urine   Result Value Ref Range    Color YELLOW/STRAW      Appearance CLOUDY (A) CLEAR      Specific gravity 1.011 1.003 - 1.030      pH (UA) 7.5 5.0 - 8.0      Protein TRACE (A) NEG mg/dL    Glucose Negative NEG mg/dL    Ketone Negative NEG mg/dL    Bilirubin Negative NEG      Blood SMALL (A) NEG      Urobilinogen 0.2 0.2 - 1.0 EU/dL    Nitrites Negative NEG      Leukocyte Esterase MODERATE (A) NEG      WBC 0-4 0 - 4 /hpf    RBC 0-5 0 - 5 /hpf    Epithelial cells FEW FEW /lpf    Bacteria Negative NEG /hpf    UA:UC IF INDICATED CULTURE NOT INDICATED BY UA RESULT CNI     CBC WITH AUTOMATED DIFF    Collection Time: 10/07/21  6:33 PM   Result Value Ref Range    WBC 14.8 (H) 3.6 - 11.0 K/uL    RBC 4.10 3.80 - 5.20 M/uL    HGB 12.6 11.5 - 16.0 g/dL    HCT 37.6 35.0 - 47.0 %    MCV 91.7 80.0 - 99.0 FL    MCH 30.7 26.0 - 34.0 PG    MCHC 33.5 30.0 - 36.5 g/dL    RDW 12.5 11.5 - 14.5 %    PLATELET 222 794 - 513 K/uL    MPV 11.1 8.9 - 12.9 FL    NRBC 0.0 0  WBC    ABSOLUTE NRBC 0.00 0.00 - 0.01 K/uL    NEUTROPHILS 83 (H) 32 - 75 %    LYMPHOCYTES 10 (L) 12 - 49 %    MONOCYTES 6 5 - 13 %    EOSINOPHILS 0 0 - 7 %    BASOPHILS 0 0 - 1 %    IMMATURE GRANULOCYTES 1 (H) 0.0 - 0.5 %    ABS. NEUTROPHILS 12.2 (H) 1.8 - 8.0 K/UL    ABS. LYMPHOCYTES 1.5 0.8 - 3.5 K/UL    ABS.  MONOCYTES 0.9 0.0 - 1.0 K/UL    ABS. EOSINOPHILS 0.0 0.0 - 0.4 K/UL    ABS. BASOPHILS 0.1 0.0 - 0.1 K/UL    ABS. IMM. GRANS. 0.1 (H) 0.00 - 0.04 K/UL    DF AUTOMATED     METABOLIC PANEL, COMPREHENSIVE    Collection Time: 10/07/21  6:33 PM   Result Value Ref Range    Sodium 141 136 - 145 mmol/L    Potassium 3.9 3.5 - 5.1 mmol/L    Chloride 108 97 - 108 mmol/L    CO2 26 21 - 32 mmol/L    Anion gap 7 5 - 15 mmol/L    Glucose 81 65 - 100 mg/dL    BUN 5 (L) 6 - 20 MG/DL    Creatinine 0.38 (L) 0.55 - 1.02 MG/DL    BUN/Creatinine ratio 13 12 - 20      GFR est AA >60 >60 ml/min/1.73m2    GFR est non-AA >60 >60 ml/min/1.73m2    Calcium 8.5 8.5 - 10.1 MG/DL    Bilirubin, total 0.3 0.2 - 1.0 MG/DL    ALT (SGPT) 20 12 - 78 U/L    AST (SGOT) 12 (L) 15 - 37 U/L    Alk. phosphatase 49 45 - 117 U/L    Protein, total 6.3 (L) 6.4 - 8.2 g/dL    Albumin 2.9 (L) 3.5 - 5.0 g/dL    Globulin 3.4 2.0 - 4.0 g/dL    A-G Ratio 0.9 (L) 1.1 - 2.2     SAMPLES BEING HELD    Collection Time: 10/07/21  6:33 PM   Result Value Ref Range    SAMPLES BEING HELD 1SST,1LAV     COMMENT        Add-on orders for these samples will be processed based on acceptable specimen integrity and analyte stability, which may vary by analyte.      FFN: negative    Prenatal Labs:   Lab Results   Component Value Date/Time    ABO/Rh(D) A NEGATIVE 2019 04:02 AM    Rubella, External immune 2019 12:00 AM    GrBStrep, External Negative 2019 12:00 AM    HBsAg, External neg 2019 12:00 AM    HIV, External neg 2019 12:00 AM    Gonorrhea, External neg 2019 12:00 AM    Chlamydia, External neg 2019 12:00 AM    ABO,Rh A negative 2019 12:00 AM         Assessment/Plan:     Active Problems:    Tethered spinal cord ([address] (10/7/2021)      Self-catheterizes urinary bladder (10/7/2021)      Sinus tachycardia (10/7/2021)      PAC (premature atrial contraction) (10/7/2021)      Chronic UTI (10/7/2021)        at 23 4/7 wks with pelvic pain, right flank pain, and leukocytosis most likely secondary to UTI. FFN is negative. The patient has numerous antibiotic allergies and acknowledges that she usually takes Augmentin for her UTIs with good results. FFN negative    Concerned about her estranged FOB showing up during her delivery. Augmentin 500 mg po now   IV hydration with NS  Urine culture  Repeat cbc is am  The patient was reassured that we can have her listed as a no information patient.   Also informed  that she can feel free to provide us with a photo of her FOB in case he violates his restraining order and show up at the hospital.

## 2021-10-08 NOTE — PROGRESS NOTES
Ante Partum Progress Note    Rachana Ricci  23w5d        Patient states baby is moving. Pain is better/gone. No bleeding since several days ago. Urine was clean. Has been taking suppression off and on    Vitals:  Visit Vitals  /60   Pulse 83   Temp 97.8 °F (36.6 °C)   Resp 14   Ht 5' 2\" (1.575 m)   Wt 112 lb (50.8 kg)   LMP 2021 (Approximate)   SpO2 99%   BMI 20.49 kg/m²     Temp (24hrs), Av.3 °F (36.8 °C), Min:97.8 °F (36.6 °C), Max:98.7 °F (37.1 °C)      Last 24hr Input/Output:  No intake or output data in the 24 hours ending 10/08/21 0819     FHT present, appropriate for gGA    Uterine Activity: None     Exam:  Patient without distress.      Abdomen, fundus soft non-tender     Extremities, no redness or tenderness               Additional Exam: Deferred    Labs:     Lab Results   Component Value Date/Time    WBC 11.4 (H) 10/08/2021 03:32 AM    WBC 14.8 (H) 10/07/2021 06:33 PM    WBC 6.7 2021 04:11 PM    WBC 10.1 2021 12:00 AM    WBC 8.0 2020 07:22 PM    WBC 15.1 (H) 2019 04:02 AM    WBC 9.2 2019 09:12 AM    WBC 10.0 2019 10:12 AM    WBC 9.8 2019 12:26 PM    WBC 23.7 (HH) 2019 02:11 PM    WBC 16.5 (H) 2019 04:20 AM    WBC 20.9 (H) 2019 11:15 AM    WBC 15.0 (H) 2019 09:27 PM    WBC 13.3 (H) 2019 07:27 PM    WBC 12.3 (H) 2019 01:30 PM    WBC 12.4 (H) 2019 09:57 PM    WBC 8.7 2018 06:20 PM    WBC 7.5 2018 07:56 PM    WBC 8.2 2014 04:30 PM    WBC 10.8 2014 04:00 PM    WBC 9.2 10/25/2013 12:15 PM    WBC 8.0 2013 01:00 PM    HGB 10.9 (L) 10/08/2021 03:32 AM    HGB 12.6 10/07/2021 06:33 PM    HGB 12.8 2021 04:11 PM    HGB 14.2 2021 12:00 AM    HGB 14.3 2020 07:22 PM    HGB 11.5 2019 04:02 AM    HGB 12.7 2019 09:12 AM    HGB 8.8 (L) 2019 10:12 AM    HGB 8.4 (L) 2019 12:26 PM    HGB 9.0 (L) 2019 02:11 PM    HGB 8.9 (L) 2019 04:20 AM    HGB 9.9 (L) 07/24/2019 11:15 AM    HGB 10.0 (L) 07/23/2019 09:27 PM    HGB 14.5 03/08/2019 07:27 PM    HGB 14.2 02/26/2019 01:30 PM    HGB 14.4 02/01/2019 09:57 PM    HGB 16.3 (H) 12/21/2018 06:20 PM    HGB 14.5 02/22/2018 07:56 PM    HGB 16.3 (H) 08/09/2014 04:30 PM    HGB 14.4 01/16/2014 04:00 PM    HGB 13.7 10/25/2013 12:15 PM    HGB 14.1 07/25/2013 01:00 PM    HCT 32.9 (L) 10/08/2021 03:32 AM    HCT 37.6 10/07/2021 06:33 PM    HCT 35.7 08/07/2021 04:11 PM    HCT 40.9 06/14/2021 12:00 AM    HCT 43.5 12/22/2020 07:22 PM    HCT 37.1 09/24/2019 04:02 AM    HCT 42.1 09/23/2019 09:12 AM    HCT 30.2 (L) 09/03/2019 10:12 AM    HCT 27.0 (L) 08/23/2019 12:26 PM    HCT 28.9 (L) 08/12/2019 02:11 PM    HCT 27.3 (L) 07/25/2019 04:20 AM    HCT 30.2 (L) 07/24/2019 11:15 AM    HCT 31.7 (L) 07/23/2019 09:27 PM    HCT 42.0 03/08/2019 07:27 PM    HCT 41.0 02/26/2019 01:30 PM    HCT 42.2 02/01/2019 09:57 PM    HCT 45.8 12/21/2018 06:20 PM    HCT 41.7 02/22/2018 07:56 PM    HCT 45.4 08/09/2014 04:30 PM    HCT 41.7 01/16/2014 04:00 PM    HCT 38.5 10/25/2013 12:15 PM    HCT 38.9 07/25/2013 01:00 PM    PLATELET 249 99/51/7345 03:32 AM    PLATELET 727 45/84/7569 06:33 PM    PLATELET 257 65/09/3165 04:11 PM    PLATELET 813 58/91/8075 12:00 AM    PLATELET 476 72/17/7977 07:22 PM    PLATELET 147 59/29/8253 04:02 AM    PLATELET 208 73/83/4064 09:12 AM    PLATELET 384 52/75/2815 10:12 AM    PLATELET 291 36/92/3508 12:26 PM    PLATELET 752 70/23/0461 02:11 PM    PLATELET 859 66/28/0589 04:20 AM    PLATELET 499 21/99/7887 11:15 AM    PLATELET 289 49/07/3309 09:27 PM    PLATELET 527 81/48/9911 07:27 PM    PLATELET 329 74/43/7448 01:30 PM    PLATELET 592 56/10/0215 09:57 PM    PLATELET 449 70/59/7976 06:20 PM    PLATELET 306 84/42/7798 07:56 PM    PLATELET 269 37/43/7260 04:30 PM    PLATELET 706 56/20/9129 04:00 PM    PLATELET 196 86/21/2649 12:15 PM    PLATELET 331 19/99/6360 01:00 PM    Hgb, External 8.9 07/25/2019 12:00 AM    Hgb, External 14.2 02/26/2019 12:00 AM    Hgb, External 13.3 10/11/2013 12:00 AM    Hgb, External 15.2 06/12/2013 12:00 AM    Hct, External 27.3 07/25/2019 12:00 AM    Hct, External 41.0 02/26/2019 12:00 AM    Hct, External 38.5 10/11/2013 12:00 AM    Hct, External 44.6 06/12/2013 12:00 AM    Platelet cnt., External 179 07/25/2019 12:00 AM    Platelet cnt., External 313 02/26/2019 12:00 AM    Platelet cnt., External 211 10/11/2013 12:00 AM    Platelet cnt., External 302 06/12/2013 12:00 AM       Recent Results (from the past 24 hour(s))   URINALYSIS W/ REFLEX CULTURE    Collection Time: 10/07/21  6:00 PM    Specimen: Urine   Result Value Ref Range    Color YELLOW/STRAW      Appearance CLOUDY (A) CLEAR      Specific gravity 1.011 1.003 - 1.030      pH (UA) 7.5 5.0 - 8.0      Protein TRACE (A) NEG mg/dL    Glucose Negative NEG mg/dL    Ketone Negative NEG mg/dL    Bilirubin Negative NEG      Blood SMALL (A) NEG      Urobilinogen 0.2 0.2 - 1.0 EU/dL    Nitrites Negative NEG      Leukocyte Esterase MODERATE (A) NEG      WBC 0-4 0 - 4 /hpf    RBC 0-5 0 - 5 /hpf    Epithelial cells FEW FEW /lpf    Bacteria Negative NEG /hpf    UA:UC IF INDICATED CULTURE NOT INDICATED BY UA RESULT CNI     CBC WITH AUTOMATED DIFF    Collection Time: 10/07/21  6:33 PM   Result Value Ref Range    WBC 14.8 (H) 3.6 - 11.0 K/uL    RBC 4.10 3.80 - 5.20 M/uL    HGB 12.6 11.5 - 16.0 g/dL    HCT 37.6 35.0 - 47.0 %    MCV 91.7 80.0 - 99.0 FL    MCH 30.7 26.0 - 34.0 PG    MCHC 33.5 30.0 - 36.5 g/dL    RDW 12.5 11.5 - 14.5 %    PLATELET 306 419 - 076 K/uL    MPV 11.1 8.9 - 12.9 FL    NRBC 0.0 0  WBC    ABSOLUTE NRBC 0.00 0.00 - 0.01 K/uL    NEUTROPHILS 83 (H) 32 - 75 %    LYMPHOCYTES 10 (L) 12 - 49 %    MONOCYTES 6 5 - 13 %    EOSINOPHILS 0 0 - 7 %    BASOPHILS 0 0 - 1 %    IMMATURE GRANULOCYTES 1 (H) 0.0 - 0.5 %    ABS. NEUTROPHILS 12.2 (H) 1.8 - 8.0 K/UL    ABS. LYMPHOCYTES 1.5 0.8 - 3.5 K/UL    ABS. MONOCYTES 0.9 0.0 - 1.0 K/UL    ABS.  EOSINOPHILS 0.0 0.0 - 0.4 K/UL    ABS. BASOPHILS 0.1 0.0 - 0.1 K/UL    ABS. IMM. GRANS. 0.1 (H) 0.00 - 0.04 K/UL    DF AUTOMATED     METABOLIC PANEL, COMPREHENSIVE    Collection Time: 10/07/21  6:33 PM   Result Value Ref Range    Sodium 141 136 - 145 mmol/L    Potassium 3.9 3.5 - 5.1 mmol/L    Chloride 108 97 - 108 mmol/L    CO2 26 21 - 32 mmol/L    Anion gap 7 5 - 15 mmol/L    Glucose 81 65 - 100 mg/dL    BUN 5 (L) 6 - 20 MG/DL    Creatinine 0.38 (L) 0.55 - 1.02 MG/DL    BUN/Creatinine ratio 13 12 - 20      GFR est AA >60 >60 ml/min/1.73m2    GFR est non-AA >60 >60 ml/min/1.73m2    Calcium 8.5 8.5 - 10.1 MG/DL    Bilirubin, total 0.3 0.2 - 1.0 MG/DL    ALT (SGPT) 20 12 - 78 U/L    AST (SGOT) 12 (L) 15 - 37 U/L    Alk. phosphatase 49 45 - 117 U/L    Protein, total 6.3 (L) 6.4 - 8.2 g/dL    Albumin 2.9 (L) 3.5 - 5.0 g/dL    Globulin 3.4 2.0 - 4.0 g/dL    A-G Ratio 0.9 (L) 1.1 - 2.2     SAMPLES BEING HELD    Collection Time: 10/07/21  6:33 PM   Result Value Ref Range    SAMPLES BEING HELD 1SST,1LAV     COMMENT        Add-on orders for these samples will be processed based on acceptable specimen integrity and analyte stability, which may vary by analyte.    FETAL FIBRONECTIN    Collection Time: 10/07/21  7:39 PM   Result Value Ref Range    Fetal fibronectin Negative NEG     CBC WITH MANUAL DIFF    Collection Time: 10/08/21  3:32 AM   Result Value Ref Range    WBC 11.4 (H) 3.6 - 11.0 K/uL    RBC 3.58 (L) 3.80 - 5.20 M/uL    HGB 10.9 (L) 11.5 - 16.0 g/dL    HCT 32.9 (L) 35.0 - 47.0 %    MCV 91.9 80.0 - 99.0 FL    MCH 30.4 26.0 - 34.0 PG    MCHC 33.1 30.0 - 36.5 g/dL    RDW 12.5 11.5 - 14.5 %    PLATELET 256 854 - 231 K/uL    MPV 11.5 8.9 - 12.9 FL    NRBC 0.0 0  WBC    ABSOLUTE NRBC 0.00 0.00 - 0.01 K/uL    NEUTROPHILS 69 32 - 75 %    BAND NEUTROPHILS 0 0 - 6 %    LYMPHOCYTES 23 12 - 49 %    MONOCYTES 7 5 - 13 %    EOSINOPHILS 1 0 - 7 %    BASOPHILS 0 0 - 1 %    METAMYELOCYTES 0 0 %    MYELOCYTES 0 0 %    PROMYELOCYTES 0 0 %    BLASTS 0 0 % OTHER CELL 0 0      IMMATURE GRANULOCYTES 0 %    ABS. NEUTROPHILS 7.9 1.8 - 8.0 K/UL    ABS. LYMPHOCYTES 2.6 0.8 - 3.5 K/UL    ABS. MONOCYTES 0.8 0.0 - 1.0 K/UL    ABS. EOSINOPHILS 0.1 0.0 - 0.4 K/UL    ABS. BASOPHILS 0.0 0.0 - 0.1 K/UL    ABS. IMM. GRANS. 0.0 K/UL    DF MANUAL      RBC COMMENTS NORMOCYTIC, NORMOCHROMIC         Assessment: 23w5d   Back pain resolved  Urine normal - no evidence of infection    Plan:  Discharge home  Fu next week as planned  Continue suppression for UTI      I spent 30 minutes or less with the patient to perform a final examination, discuss the hospital stay, give instructions for continuing care to all relevant caregivers and prepare discharge records, prescriptions and referral forms.

## 2021-10-08 NOTE — DISCHARGE INSTRUCTIONS
[id number] INSTRUCTIONS    Name: Heri Montelongo  YOB: 1985  Primary Diagnosis: Active Problems:    Tethered spinal cord ([address] (10/7/2021)      Self-catheterizes urinary bladder (10/7/2021)      Sinus tachycardia (10/7/2021)      PAC (premature atrial contraction) (10/7/2021)      Chronic UTI (10/7/2021)      UTI (urinary tract infection) (10/8/2021)        Introduction: You have visited the hospital because you thought you were in  labor. These guidelines are for your information at home to help prevent repeated problems. In general, you should remember:   Empty your bladder every 2-3 hours.  Avoid breast stimulation (including showers where the water stream is on your breasts)-this can cause contractions.  Rest means lying down.  Contractions and cramping happen more often in evening and nighttime.  No intercourse or sexual stimulation without asking your doctor.  Try to arrange for help with housework and . General:     ***    Diet/Diet Restrictions:      {OB DIET UWIX:64610980}    Physical Activity / Restrictions / Safety:     * Activity at home is based on how strong your  labor has been, You should follow the following activity guidelines. {OB PHYSICAL ACTIVITIES:71471956}       Discharge Instructions/ Special Treatment/ Home Care Needs:     Call your provider if:   Uterine cramping (menstrual-like cramps, intermittent or constant   Uterine contractions every 10-15 minutes or more frequently   Low abdominal pressure ( pelvic pressure)   Dull low backache (intermittent or constant)   Increase or change in vaginal discharge   Feeling that the baby is \"pushing down\"   Abdominal cramping with or without diarrhea  If any of these symptoms are experienced, stop what you are doing, lie down on your side, drink two to three glasses of water and wait one hour. If the symptoms persist or get worse, call your provider.     Pain Management: Discharge Checklist-NURSING TO COMPLETE:     Date and Time of Discharge: Date: 10/8/2021 Time: 8:55 AM    Return of:   Dental Appliance:    Vision:    Hearing Aid:    Jewelry:    Clothing:    Other Valuables:    Valuables sent to safe:      Prescription Given: {yes UM:509630}  Medication Instruction Sheet(s), including side effects, provided: {yes no:236901}    Accompanied By:Family    Mode of Transportation: car    Discharge Disposition: Home    I have had the opportunity to make my options or choices for discharge. I have received and understand these instructions.

## 2021-10-13 ENCOUNTER — ROUTINE PRENATAL (OUTPATIENT)
Dept: OBGYN CLINIC | Age: 36
End: 2021-10-13
Payer: MEDICAID

## 2021-10-13 VITALS — SYSTOLIC BLOOD PRESSURE: 103 MMHG | DIASTOLIC BLOOD PRESSURE: 53 MMHG | BODY MASS INDEX: 21.22 KG/M2 | WEIGHT: 116 LBS

## 2021-10-13 DIAGNOSIS — Z34.80 SUPERVISION OF OTHER NORMAL PREGNANCY, ANTEPARTUM: Primary | ICD-10-CM

## 2021-10-13 PROCEDURE — 0502F SUBSEQUENT PRENATAL CARE: CPT | Performed by: OBSTETRICS & GYNECOLOGY

## 2021-10-13 NOTE — PROGRESS NOTES
Problem List  Date Reviewed: 10/7/2021        Codes Class Noted    UTI (urinary tract infection) ICD-10-CM: N39.0  ICD-9-CM: 599.0  10/8/2021        Tethered spinal cord [facility]) ICD-10-CM: Q06.8  ICD-9-CM: 742.59  10/7/2021        Self-catheterizes urinary bladder ICD-10-CM: Z78.9  ICD-9-CM: V49.89  10/7/2021        Sinus tachycardia ICD-10-CM: R00.0  ICD-9-CM: 427.89  10/7/2021        PAC (premature atrial contraction) ICD-10-CM: I49.1  ICD-9-CM: 427.61  10/7/2021        Chronic UTI ICD-10-CM: N39.0  ICD-9-CM: 599.0  10/7/2021        Supervision of high risk pregnancy, antepartum ICD-10-CM: O09.90  ICD-9-CM: V23.9  6/17/2021    Overview Addendum 8/31/2021  8:29 AM by Betty Trammell     Intrauterine pregnancy with the following problems identified:   EDC 1/30/2022 by [facility]  Hx of tethered cord, multiple spine surgeries - not candidate for regional anesthesia  Self caths  Rh neg  Chronic UTI - [facility] 5/24 urology  Horizon negative  Hypothyroid  RH negative  Check TSH next visit 7/12  Urine cx- E coli   Proteus and E.  Coli - send to urology   NIPTS- normal male *patient does not want know*  Prenatal labs checked 8/3  Cardiology 8/20;8/26  AFP- neg               Iron deficiency anemia during pregnancy ICD-10-CM: O99.019, D50.9  ICD-9-CM: 648.20, 280.9  8/27/2019        Arrhythmia ICD-10-CM: I49.9  ICD-9-CM: 427.9  Unknown    Overview Signed 3/19/2019  1:29 PM by Ramirez Duncan MD     Afib at times             Migraine ICD-10-CM: L90.719  ICD-9-CM: 346.90  Unknown        Strain ICD-10-CM: Vincent Edgewood  ICD-9-CM: Vincent Edgewood  8/13/2014

## 2021-10-16 LAB
BACTERIA UR CULT: NO GROWTH
SPECIMEN STATUS REPORT, ROLRST: NORMAL

## 2021-11-04 ENCOUNTER — ROUTINE PRENATAL (OUTPATIENT)
Dept: OBGYN CLINIC | Age: 36
End: 2021-11-04
Payer: MEDICAID

## 2021-11-04 ENCOUNTER — TELEPHONE (OUTPATIENT)
Dept: OBGYN CLINIC | Age: 36
End: 2021-11-04

## 2021-11-04 VITALS — SYSTOLIC BLOOD PRESSURE: 106 MMHG | DIASTOLIC BLOOD PRESSURE: 57 MMHG | BODY MASS INDEX: 21.91 KG/M2 | WEIGHT: 119.8 LBS

## 2021-11-04 DIAGNOSIS — Z34.80 SUPERVISION OF OTHER NORMAL PREGNANCY, ANTEPARTUM: Primary | ICD-10-CM

## 2021-11-04 PROCEDURE — 0502F SUBSEQUENT PRENATAL CARE: CPT | Performed by: OBSTETRICS & GYNECOLOGY

## 2021-11-04 NOTE — PROGRESS NOTES
Baby moving  Here because thinks her skin turned yellow 2 days ago, now gone  Also terrible itching on her abdomen and felt like it was spreading  Has court next week  Check comp met and bile acids  No visible rash and she does not look yellow

## 2021-11-04 NOTE — PROGRESS NOTES
Problem List  Date Reviewed: 10/13/2021          Codes Class Noted    UTI (urinary tract infection) ICD-10-CM: N39.0  ICD-9-CM: 599.0  10/8/2021        Tethered spinal cord [facility]) ICD-10-CM: Q06.8  ICD-9-CM: 742.59  10/7/2021        Self-catheterizes urinary bladder ICD-10-CM: Z78.9  ICD-9-CM: V49.89  10/7/2021        Sinus tachycardia ICD-10-CM: R00.0  ICD-9-CM: 427.89  10/7/2021        PAC (premature atrial contraction) ICD-10-CM: I49.1  ICD-9-CM: 427.61  10/7/2021        Chronic UTI ICD-10-CM: N39.0  ICD-9-CM: 599.0  10/7/2021        Supervision of high risk pregnancy, antepartum ICD-10-CM: O09.90  ICD-9-CM: V23.9  6/17/2021    Overview Addendum 8/31/2021  8:29 AM by Danielle Garduno     Intrauterine pregnancy with the following problems identified:   EDC 1/30/2022 [address]  Hx of tethered cord, multiple spine surgeries - not candidate for regional anesthesia  Self caths  Rh neg  Chronic UTI - [facility] 5/24 urology  Horizon negative  Hypothyroid  RH negative  Check TSH next visit 7/12  Urine cx- E coli   Proteus and E.  Coli - send to urology   NIPTS- normal male *patient does not want know*  Prenatal labs checked 8/3  Cardiology 8/20;8/26  AFP- neg               Iron deficiency anemia during pregnancy ICD-10-CM: O99.019, D50.9  ICD-9-CM: 648.20, 280.9  8/27/2019        Arrhythmia ICD-10-CM: I49.9  ICD-9-CM: 427.9  Unknown    Overview Signed 3/19/2019  1:29 PM by Roseanna Whyte MD     Afib at times             Migraine ICD-10-CM: F14.202  ICD-9-CM: 346.90  Unknown        Strain ICD-10-CM: Leilani Bower  ICD-9-CM: Leilani Bower  8/13/2014

## 2021-11-06 LAB
ALBUMIN SERPL-MCNC: 3.7 G/DL (ref 3.8–4.8)
ALBUMIN/GLOB SERPL: 1.5 {RATIO} (ref 1.2–2.2)
ALP SERPL-CCNC: 65 IU/L (ref 44–121)
ALT SERPL-CCNC: 18 IU/L (ref 0–32)
AST SERPL-CCNC: 19 IU/L (ref 0–40)
BILE AC SERPL-SCNC: 2.8 UMOL/L (ref 0–10)
BILIRUB SERPL-MCNC: 0.3 MG/DL (ref 0–1.2)
BUN SERPL-MCNC: 5 MG/DL (ref 6–20)
BUN/CREAT SERPL: 12 (ref 9–23)
CALCIUM SERPL-MCNC: 8.6 MG/DL (ref 8.7–10.2)
CHLORIDE SERPL-SCNC: 103 MMOL/L (ref 96–106)
CO2 SERPL-SCNC: 21 MMOL/L (ref 20–29)
CREAT SERPL-MCNC: 0.42 MG/DL (ref 0.57–1)
GLOBULIN SER CALC-MCNC: 2.5 G/DL (ref 1.5–4.5)
GLUCOSE SERPL-MCNC: 74 MG/DL (ref 65–99)
POTASSIUM SERPL-SCNC: 3.6 MMOL/L (ref 3.5–5.2)
PROT SERPL-MCNC: 6.2 G/DL (ref 6–8.5)
SODIUM SERPL-SCNC: 139 MMOL/L (ref 134–144)

## 2021-11-10 ENCOUNTER — ROUTINE PRENATAL (OUTPATIENT)
Dept: OBGYN CLINIC | Age: 36
End: 2021-11-10
Payer: MEDICAID

## 2021-11-10 VITALS — SYSTOLIC BLOOD PRESSURE: 104 MMHG | BODY MASS INDEX: 22.31 KG/M2 | DIASTOLIC BLOOD PRESSURE: 50 MMHG | WEIGHT: 122 LBS

## 2021-11-10 DIAGNOSIS — O09.90 SUPERVISION OF HIGH RISK PREGNANCY, ANTEPARTUM: ICD-10-CM

## 2021-11-10 DIAGNOSIS — Z34.80 SUPERVISION OF OTHER NORMAL PREGNANCY, ANTEPARTUM: Primary | ICD-10-CM

## 2021-11-10 PROCEDURE — 96372 THER/PROPH/DIAG INJ SC/IM: CPT | Performed by: OBSTETRICS & GYNECOLOGY

## 2021-11-10 PROCEDURE — 0502F SUBSEQUENT PRENATAL CARE: CPT | Performed by: OBSTETRICS & GYNECOLOGY

## 2021-11-10 NOTE — PROGRESS NOTES
Problem List  Date Reviewed: 11/4/2021          Codes Class Noted    UTI (urinary tract infection) ICD-10-CM: N39.0  ICD-9-CM: 599.0  10/8/2021        Tethered spinal cord [facility]) ICD-10-CM: Q06.8  ICD-9-CM: 742.59  10/7/2021        Self-catheterizes urinary bladder ICD-10-CM: Z78.9  ICD-9-CM: V49.89  10/7/2021        Sinus tachycardia ICD-10-CM: R00.0  ICD-9-CM: 427.89  10/7/2021        PAC (premature atrial contraction) ICD-10-CM: I49.1  ICD-9-CM: 427.61  10/7/2021        Chronic UTI ICD-10-CM: N39.0  ICD-9-CM: 599.0  10/7/2021        Supervision of high risk pregnancy, antepartum ICD-10-CM: O09.90  ICD-9-CM: V23.9  6/17/2021    Overview Addendum 8/31/2021  8:29 AM by Danielle Garduno     Intrauterine pregnancy with the following problems identified:   EDC 1/30/2022 [address]  Hx of tethered cord, multiple spine surgeries - not candidate for regional anesthesia  Self caths  Rh neg  Chronic UTI - [facility] 5/24 urology  Horizon negative  Hypothyroid  RH negative  Check TSH next visit 7/12  Urine cx- E coli   Proteus and E.  Coli - send to urology   NIPTS- normal male *patient does not want know*  Prenatal labs checked 8/3  Cardiology 8/20;8/26  AFP- neg               Iron deficiency anemia during pregnancy ICD-10-CM: O99.019, D50.9  ICD-9-CM: 648.20, 280.9  8/27/2019        Arrhythmia ICD-10-CM: I49.9  ICD-9-CM: 427.9  Unknown    Overview Signed 3/19/2019  1:29 PM by Roseanna Whyte MD     Afib at times             Migraine ICD-10-CM: X37.038  ICD-9-CM: 346.90  Unknown        Strain ICD-10-CM: Leilani Bower  ICD-9-CM: Leilani Bower  8/13/2014

## 2021-11-11 LAB
BLD GP AB SCN SERPL QL: NEGATIVE
ERYTHROCYTE [DISTWIDTH] IN BLOOD BY AUTOMATED COUNT: 11.4 % (ref 11.7–15.4)
GLUCOSE 1H P 50 G GLC PO SERPL-MCNC: 95 MG/DL (ref 65–139)
HCT VFR BLD AUTO: 35.8 % (ref 34–46.6)
HGB BLD-MCNC: 12.2 G/DL (ref 11.1–15.9)
MCH RBC QN AUTO: 30.3 PG (ref 26.6–33)
MCHC RBC AUTO-ENTMCNC: 34.1 G/DL (ref 31.5–35.7)
MCV RBC AUTO: 89 FL (ref 79–97)
PLATELET # BLD AUTO: 211 X10E3/UL (ref 150–450)
RBC # BLD AUTO: 4.02 X10E6/UL (ref 3.77–5.28)
WBC # BLD AUTO: 8.5 X10E3/UL (ref 3.4–10.8)

## 2021-11-22 ENCOUNTER — ROUTINE PRENATAL (OUTPATIENT)
Dept: OBGYN CLINIC | Age: 36
End: 2021-11-22
Payer: MEDICAID

## 2021-11-22 VITALS — SYSTOLIC BLOOD PRESSURE: 116 MMHG | DIASTOLIC BLOOD PRESSURE: 75 MMHG | WEIGHT: 125 LBS | BODY MASS INDEX: 22.86 KG/M2

## 2021-11-22 DIAGNOSIS — Z34.80 SUPERVISION OF OTHER NORMAL PREGNANCY, ANTEPARTUM: Primary | ICD-10-CM

## 2021-11-22 PROCEDURE — 0502F SUBSEQUENT PRENATAL CARE: CPT | Performed by: OBSTETRICS & GYNECOLOGY

## 2021-11-22 NOTE — PROGRESS NOTES
Problem List  Date Reviewed: 11/10/2021          Codes Class Noted    UTI (urinary tract infection) ICD-10-CM: N39.0  ICD-9-CM: 599.0  10/8/2021        Tethered spinal cord [facility]) ICD-10-CM: Q06.8  ICD-9-CM: 742.59  10/7/2021        Self-catheterizes urinary bladder ICD-10-CM: Z78.9  ICD-9-CM: V49.89  10/7/2021        Sinus tachycardia ICD-10-CM: R00.0  ICD-9-CM: 427.89  10/7/2021        PAC (premature atrial contraction) ICD-10-CM: I49.1  ICD-9-CM: 427.61  10/7/2021        Chronic UTI ICD-10-CM: N39.0  ICD-9-CM: 599.0  10/7/2021        Supervision of high risk pregnancy, antepartum ICD-10-CM: O09.90  ICD-9-CM: V23.9  6/17/2021    Overview Addendum 11/12/2021  7:33 AM by Loyda Mercedes     Intrauterine pregnancy with the following problems identified:   EDC 1/30/2022 by [facility]  Hx of tethered cord, multiple spine surgeries - not candidate for regional anesthesia  Self caths  Rh neg  Chronic UTI - Proteus 5/24 urology  Horizon negative  Hypothyroid  RH negative  Check TSH next visit 7/12  Urine cx- E coli   Proteus and E.  Coli - send to urology   NIPTS- normal male *patient does not want know*  Prenatal labs checked 8/3  Cardiology 8/20;8/26  AFP- neg  Declined tdap  Declined flu vaccine  Declined covid vaccine  Antibody- neg  1hr Glucola- normal               Iron deficiency anemia during pregnancy ICD-10-CM: O99.019, D50.9  ICD-9-CM: 648.20, 280.9  8/27/2019        Arrhythmia ICD-10-CM: I49.9  ICD-9-CM: 427.9  Unknown    Overview Signed 3/19/2019  1:29 PM by Suzanne Leon MD     Afib at times             Migraine ICD-10-CM: U64.187  ICD-9-CM: 346.90  Unknown        Strain ICD-10-CM: Ferdinand Levels  ICD-9-CM: Ferdinand Levels  8/13/2014

## 2021-12-06 ENCOUNTER — ROUTINE PRENATAL (OUTPATIENT)
Dept: OBGYN CLINIC | Age: 36
End: 2021-12-06
Payer: MEDICAID

## 2021-12-06 VITALS — DIASTOLIC BLOOD PRESSURE: 55 MMHG | SYSTOLIC BLOOD PRESSURE: 105 MMHG | BODY MASS INDEX: 22.86 KG/M2 | WEIGHT: 125 LBS

## 2021-12-06 DIAGNOSIS — Z34.80 SUPERVISION OF OTHER NORMAL PREGNANCY, ANTEPARTUM: Primary | ICD-10-CM

## 2021-12-06 PROCEDURE — 0502F SUBSEQUENT PRENATAL CARE: CPT | Performed by: OBSTETRICS & GYNECOLOGY

## 2021-12-06 NOTE — PROGRESS NOTES
Baby moving  Complains of changing pad due to wetness  Sterile vag exam - no fluid, white discharge, nitrazine neg    US in 2 weeks

## 2021-12-06 NOTE — PROGRESS NOTES
Problem List  Date Reviewed: 11/22/2021          Codes Class Noted    UTI (urinary tract infection) ICD-10-CM: N39.0  ICD-9-CM: 599.0  10/8/2021        Tethered spinal cord [facility]) ICD-10-CM: Q06.8  ICD-9-CM: 742.59  10/7/2021        Self-catheterizes urinary bladder ICD-10-CM: Z78.9  ICD-9-CM: V49.89  10/7/2021        Sinus tachycardia ICD-10-CM: R00.0  ICD-9-CM: 427.89  10/7/2021        PAC (premature atrial contraction) ICD-10-CM: I49.1  ICD-9-CM: 427.61  10/7/2021        Chronic UTI ICD-10-CM: N39.0  ICD-9-CM: 599.0  10/7/2021        Supervision of high risk pregnancy, antepartum ICD-10-CM: O09.90  ICD-9-CM: V23.9  6/17/2021    Overview Addendum 11/12/2021  7:33 AM by Lorenzo Joseph     Intrauterine pregnancy with the following problems identified:   EDC 1/30/2022 by [facility]  Hx of tethered cord, multiple spine surgeries - not candidate for regional anesthesia  Self caths  Rh neg  Chronic UTI - Proteus 5/24 urology  Horizon negative  Hypothyroid  RH negative  Check TSH next visit 7/12  Urine cx- E coli   Proteus and E.  Coli - send to urology   NIPTS- normal male *patient does not want know*  Prenatal labs checked 8/3  Cardiology 8/20;8/26  AFP- neg  Declined tdap  Declined flu vaccine  Declined covid vaccine  Antibody- neg  1hr Glucola- normal               Iron deficiency anemia during pregnancy ICD-10-CM: O99.019, D50.9  ICD-9-CM: 648.20, 280.9  8/27/2019        Arrhythmia ICD-10-CM: I49.9  ICD-9-CM: 427.9  Unknown    Overview Signed 3/19/2019  1:29 PM by Elan Daniels MD     Afib at times             Migraine ICD-10-CM: K35.081  ICD-9-CM: 346.90  Unknown        Strain ICD-10-CM: Kamala Andersen  ICD-9-CM: Kamala Andersen  8/13/2014

## 2021-12-22 ENCOUNTER — ROUTINE PRENATAL (OUTPATIENT)
Dept: OBGYN CLINIC | Age: 36
End: 2021-12-22

## 2021-12-22 VITALS — SYSTOLIC BLOOD PRESSURE: 128 MMHG | DIASTOLIC BLOOD PRESSURE: 64 MMHG | WEIGHT: 127 LBS | BODY MASS INDEX: 23.23 KG/M2

## 2021-12-22 DIAGNOSIS — Z34.80 SUPERVISION OF OTHER NORMAL PREGNANCY, ANTEPARTUM: Primary | ICD-10-CM

## 2021-12-22 PROCEDURE — 0502F SUBSEQUENT PRENATAL CARE: CPT | Performed by: OBSTETRICS & GYNECOLOGY

## 2021-12-22 NOTE — PROGRESS NOTES
Problem List  Date Reviewed: 12/6/2021          Codes Class Noted    UTI (urinary tract infection) ICD-10-CM: N39.0  ICD-9-CM: 599.0  10/8/2021        Tethered spinal cord [facility]) ICD-10-CM: Q06.8  ICD-9-CM: 742.59  10/7/2021        Self-catheterizes urinary bladder ICD-10-CM: Z78.9  ICD-9-CM: V49.89  10/7/2021        Sinus tachycardia ICD-10-CM: R00.0  ICD-9-CM: 427.89  10/7/2021        PAC (premature atrial contraction) ICD-10-CM: I49.1  ICD-9-CM: 427.61  10/7/2021        Chronic UTI ICD-10-CM: N39.0  ICD-9-CM: 599.0  10/7/2021        Supervision of high risk pregnancy, antepartum ICD-10-CM: O09.90  ICD-9-CM: V23.9  6/17/2021    Overview Addendum 11/12/2021  7:33 AM by [facility]     Intrauterine pregnancy with the following problems identified:   EDC 1/30/2022 by [facility]  Hx of tethered cord, multiple spine surgeries - not candidate for regional anesthesia  Self caths  Rh neg  Chronic UTI - Proteus 5/24 urology  Horizon negative  Hypothyroid  RH negative  Check TSH next visit 7/12  Urine cx- E coli   Proteus and E.  Coli - send to urology   NIPTS- normal male *patient does not want know*  Prenatal labs checked 8/3  Cardiology 8/20;8/26  AFP- neg  Declined tdap  Declined flu vaccine  Declined covid vaccine  Antibody- neg  1hr Glucola- normal               Iron deficiency anemia during pregnancy ICD-10-CM: O99.019, D50.9  ICD-9-CM: 648.20, 280.9  8/27/2019        Arrhythmia ICD-10-CM: I49.9  ICD-9-CM: 427.9  Unknown    Overview Signed 3/19/2019  1:29 PM by Gia Ruiz MD     Afib at times             Migraine ICD-10-CM: U92.342  ICD-9-CM: 346.90  Unknown        Strain ICD-10-CM: Yumiko Pau  ICD-9-CM: Yumiko Pau  8/13/2014

## 2022-01-06 ENCOUNTER — ROUTINE PRENATAL (OUTPATIENT)
Dept: OBGYN CLINIC | Age: 37
End: 2022-01-06

## 2022-01-06 VITALS — DIASTOLIC BLOOD PRESSURE: 63 MMHG | WEIGHT: 132.2 LBS | BODY MASS INDEX: 24.18 KG/M2 | SYSTOLIC BLOOD PRESSURE: 111 MMHG

## 2022-01-06 DIAGNOSIS — Z34.80 SUPERVISION OF OTHER NORMAL PREGNANCY, ANTEPARTUM: Primary | ICD-10-CM

## 2022-01-06 PROCEDURE — 0502F SUBSEQUENT PRENATAL CARE: CPT | Performed by: OBSTETRICS & GYNECOLOGY

## 2022-01-06 NOTE — PROGRESS NOTES
Baby moving  US today 6#12oz, nl AFV, vtx, FL <5% - patient petite likely constitutional  GBS today  Disc covid testing  Labor precautions

## 2022-01-06 NOTE — PROGRESS NOTES
LIMITED OB SCAN  A SINGLE VERTEX 36W4D IUP IS SEEN. FETAL CARDIAC MOTION OBSERVED. LIMITED ANATOMY WAS VISUALIZED AND APPEARS WNL. APPROPRIATE FETAL GROWTH IS SEEN. SIZE=DATES. FL APPEARS <5%. SHANTANU AND PLACENTA APPEAR WITHIN NORMAL LIMITS    Problem List  Date Reviewed: 12/22/2021          Codes Class Noted    UTI (urinary tract infection) ICD-10-CM: N39.0  ICD-9-CM: 599.0  10/8/2021        Tethered spinal cord [facility]) ICD-10-CM: Q06.8  ICD-9-CM: 742.59  10/7/2021        Self-catheterizes urinary bladder ICD-10-CM: Z78.9  ICD-9-CM: V49.89  10/7/2021        Sinus tachycardia ICD-10-CM: R00.0  ICD-9-CM: 427.89  10/7/2021        PAC (premature atrial contraction) ICD-10-CM: I49.1  ICD-9-CM: 427.61  10/7/2021        Chronic UTI ICD-10-CM: N39.0  ICD-9-CM: 599.0  10/7/2021        Supervision of high risk pregnancy, antepartum ICD-10-CM: O09.90  ICD-9-CM: V23.9  6/17/2021    Overview Addendum 11/12/2021  7:33 AM by Cristofer Arzola     Intrauterine pregnancy with the following problems identified:   EDC 1/30/2022 by [address]  Hx of tethered cord, multiple spine surgeries - not candidate for regional anesthesia  Self caths  Rh neg  Chronic UTI - Proteus 5/24 urology  Horizon negative  Hypothyroid  RH negative  Check TSH next visit 7/12  Urine cx- E coli   Proteus and E.  Coli - send to urology   NIPTS- normal male *patient does not want know*  Prenatal labs checked 8/3  Cardiology 8/20;8/26  AFP- neg  Declined tdap  Declined flu vaccine  Declined covid vaccine  Antibody- neg  1hr Glucola- normal               Iron deficiency anemia during pregnancy ICD-10-CM: O99.019, D50.9  ICD-9-CM: 648.20, 280.9  8/27/2019        Arrhythmia ICD-10-CM: I49.9  ICD-9-CM: 427.9  Unknown    Overview Signed 3/19/2019  1:29 PM by Morenita Oliver MD     Afib at times             Migraine ICD-10-CM: H21.453  ICD-9-CM: 346.90  Unknown        Strain ICD-10-CM: Liza Arana  ICD-9-CM: Youdarnella Book  8/13/2014

## 2022-01-10 LAB
B-HEM STREP SPEC QL CULT: NEGATIVE
SPECIMEN STATUS REPORT, ROLRST: NORMAL

## 2022-01-11 ENCOUNTER — ROUTINE PRENATAL (OUTPATIENT)
Dept: OBGYN CLINIC | Age: 37
End: 2022-01-11
Payer: MEDICAID

## 2022-01-11 VITALS — WEIGHT: 134.2 LBS | BODY MASS INDEX: 24.55 KG/M2 | DIASTOLIC BLOOD PRESSURE: 62 MMHG | SYSTOLIC BLOOD PRESSURE: 119 MMHG

## 2022-01-11 DIAGNOSIS — Z34.80 SUPERVISION OF OTHER NORMAL PREGNANCY, ANTEPARTUM: Primary | ICD-10-CM

## 2022-01-11 PROCEDURE — 0502F SUBSEQUENT PRENATAL CARE: CPT | Performed by: OBSTETRICS & GYNECOLOGY

## 2022-01-11 NOTE — PROGRESS NOTES
Problem List  Date Reviewed: 1/6/2022          Codes Class Noted    UTI (urinary tract infection) ICD-10-CM: N39.0  ICD-9-CM: 599.0  10/8/2021        Tethered spinal cord [facility]) ICD-10-CM: Q06.8  ICD-9-CM: 742.59  10/7/2021        Self-catheterizes urinary bladder ICD-10-CM: Z78.9  ICD-9-CM: V49.89  10/7/2021        Sinus tachycardia ICD-10-CM: R00.0  ICD-9-CM: 427.89  10/7/2021        PAC (premature atrial contraction) ICD-10-CM: I49.1  ICD-9-CM: 427.61  10/7/2021        Chronic UTI ICD-10-CM: N39.0  ICD-9-CM: 599.0  10/7/2021        Supervision of high risk pregnancy, antepartum ICD-10-CM: O09.90  ICD-9-CM: V23.9  6/17/2021    Overview Addendum 1/11/2022  8:14 AM by Srinivasa Marks     Intrauterine pregnancy with the following problems identified:   EDC 1/30/2022 by [address]  Hx of tethered cord, multiple spine surgeries - not candidate for regional anesthesia  Self caths  Rh neg  Chronic UTI - Proteus 5/24 urology  Horizon negative  Hypothyroid  RH negative  Check TSH next visit 7/12  Urine cx- E coli   Proteus and E.  Coli - send to urology   NIPTS- normal male *patient does not want know*  Prenatal labs checked 8/3  Cardiology 8/20;8/26  AFP- neg  Declined tdap  Declined flu vaccine  Declined covid vaccine  Antibody- neg  1hr Glucola- normal  GBS- neg               Iron deficiency anemia during pregnancy ICD-10-CM: O99.019, D50.9  ICD-9-CM: 648.20, 280.9  8/27/2019        Arrhythmia ICD-10-CM: I49.9  ICD-9-CM: 427.9  Unknown    Overview Signed 3/19/2019  1:29 PM by Wellington Steele MD     Afib at times             Migraine ICD-10-CM: J07.048  ICD-9-CM: 346.90  Unknown        Strain ICD-10-CM: Jose Mendez  ICD-9-CM: Jose Mendez  8/13/2014

## 2022-01-21 ENCOUNTER — ROUTINE PRENATAL (OUTPATIENT)
Dept: OBGYN CLINIC | Age: 37
End: 2022-01-21
Payer: MEDICAID

## 2022-01-21 VITALS — DIASTOLIC BLOOD PRESSURE: 74 MMHG | BODY MASS INDEX: 25.31 KG/M2 | WEIGHT: 138.4 LBS | SYSTOLIC BLOOD PRESSURE: 118 MMHG

## 2022-01-21 DIAGNOSIS — O32.0XX0 UNSTABLE FETAL LIE, SINGLE OR UNSPECIFIED FETUS: ICD-10-CM

## 2022-01-21 DIAGNOSIS — Z3A.38 38 WEEKS GESTATION OF PREGNANCY: ICD-10-CM

## 2022-01-21 DIAGNOSIS — O09.90 SUPERVISION OF HIGH RISK PREGNANCY, ANTEPARTUM: ICD-10-CM

## 2022-01-21 DIAGNOSIS — Z34.80 SUPERVISION OF OTHER NORMAL PREGNANCY, ANTEPARTUM: Primary | ICD-10-CM

## 2022-01-21 PROCEDURE — 0502F SUBSEQUENT PRENATAL CARE: CPT | Performed by: OBSTETRICS & GYNECOLOGY

## 2022-01-21 NOTE — PROGRESS NOTES
Problem List  Date Reviewed: 1/11/2022          Codes Class Noted    UTI (urinary tract infection) ICD-10-CM: N39.0  ICD-9-CM: 599.0  10/8/2021        Tethered spinal cord [facility]) ICD-10-CM: Q06.8  ICD-9-CM: 742.59  10/7/2021        Self-catheterizes urinary bladder ICD-10-CM: Z78.9  ICD-9-CM: V49.89  10/7/2021        Sinus tachycardia ICD-10-CM: R00.0  ICD-9-CM: 427.89  10/7/2021        PAC (premature atrial contraction) ICD-10-CM: I49.1  ICD-9-CM: 427.61  10/7/2021        Chronic UTI ICD-10-CM: N39.0  ICD-9-CM: 599.0  10/7/2021        Supervision of high risk pregnancy, antepartum ICD-10-CM: O09.90  ICD-9-CM: V23.9  6/17/2021    Overview Addendum 1/11/2022  8:14 AM by [email or web address]     Intrauterine pregnancy with the following problems identified:   EDC 1/30/2022 by [address]  Hx of tethered cord, multiple spine surgeries - not candidate for regional anesthesia  Self caths  Rh neg  Chronic UTI - Proteus 5/24 urology  Horizon negative  Hypothyroid  RH negative  Check TSH next visit 7/12  Urine cx- E coli   Proteus and E.  Coli - send to urology   NIPTS- normal male *patient does not want know*  Prenatal labs checked 8/3  Cardiology 8/20;8/26  AFP- neg  Declined tdap  Declined flu vaccine  Declined covid vaccine  Antibody- neg  1hr Glucola- normal  GBS- neg               Iron deficiency anemia during pregnancy ICD-10-CM: O99.019, D50.9  ICD-9-CM: 648.20, 280.9  8/27/2019        Arrhythmia ICD-10-CM: I49.9  ICD-9-CM: 427.9  Unknown    Overview Signed 3/19/2019  1:29 PM by Lonny Ordonez MD     Afib at times             Migraine ICD-10-CM: P05.356  ICD-9-CM: 346.90  Unknown        Strain ICD-10-CM: Vane Roof  ICD-9-CM: Vane Roof  8/13/2014

## 2022-01-21 NOTE — PROGRESS NOTES
Problem List  Date Reviewed: 1/11/2022          Codes Class Noted    UTI (urinary tract infection) ICD-10-CM: N39.0  ICD-9-CM: 599.0  10/8/2021        Tethered spinal cord [facility]) ICD-10-CM: Q06.8  ICD-9-CM: 742.59  10/7/2021        Self-catheterizes urinary bladder ICD-10-CM: Z78.9  ICD-9-CM: V49.89  10/7/2021        Sinus tachycardia ICD-10-CM: R00.0  ICD-9-CM: 427.89  10/7/2021        PAC (premature atrial contraction) ICD-10-CM: I49.1  ICD-9-CM: 427.61  10/7/2021        Chronic UTI ICD-10-CM: N39.0  ICD-9-CM: 599.0  10/7/2021        Supervision of high risk pregnancy, antepartum ICD-10-CM: O09.90  ICD-9-CM: V23.9  6/17/2021    Overview Addendum 1/11/2022  8:14 AM by Scott Chisholm     Intrauterine pregnancy with the following problems identified:   EDC 1/30/2022 by [address]  Hx of tethered cord, multiple spine surgeries - not candidate for regional anesthesia  Self caths  Rh neg  Chronic UTI - Proteus 5/24 urology  Horizon negative  Hypothyroid  RH negative  Check TSH next visit 7/12  Urine cx- E coli   Proteus and E.  Coli - send to urology   NIPTS- normal male *patient does not want know*  Prenatal labs checked 8/3  Cardiology 8/20;8/26  AFP- neg  Declined tdap  Declined flu vaccine  Declined covid vaccine  Antibody- neg  1hr Glucola- normal  GBS- neg               Iron deficiency anemia during pregnancy ICD-10-CM: O99.019, D50.9  ICD-9-CM: 648.20, 280.9  8/27/2019        Arrhythmia ICD-10-CM: I49.9  ICD-9-CM: 427.9  Unknown    Overview Signed 3/19/2019  1:29 PM by Jaqueline Osullivan MD     Afib at times             Migraine ICD-10-CM: J78.289  ICD-9-CM: 346.90  Unknown        Strain ICD-10-CM: Gurdeep Mckeon  ICD-9-CM: Gurdeep Mckeon  8/13/2014

## 2022-01-21 NOTE — PROGRESS NOTES
Baby moving  Doing well    Lie unstable - vtx started in RLQ then moved to presenting  Reevaluate lie next week

## 2022-01-26 ENCOUNTER — ROUTINE PRENATAL (OUTPATIENT)
Dept: OBGYN CLINIC | Age: 37
End: 2022-01-26
Payer: MEDICAID

## 2022-01-26 ENCOUNTER — HOSPITAL ENCOUNTER (EMERGENCY)
Age: 37
Discharge: HOME | End: 2022-01-26
Attending: OBSTETRICS & GYNECOLOGY | Admitting: OBSTETRICS & GYNECOLOGY

## 2022-01-26 VITALS
HEIGHT: 62 IN | SYSTOLIC BLOOD PRESSURE: 112 MMHG | WEIGHT: 138 LBS | DIASTOLIC BLOOD PRESSURE: 62 MMHG | BODY MASS INDEX: 25.4 KG/M2

## 2022-01-26 DIAGNOSIS — O32.0XX0 UNSTABLE FETAL LIE, SINGLE OR UNSPECIFIED FETUS: ICD-10-CM

## 2022-01-26 DIAGNOSIS — Z34.80 SUPERVISION OF OTHER NORMAL PREGNANCY, ANTEPARTUM: Primary | ICD-10-CM

## 2022-01-26 DIAGNOSIS — Z3A.39 39 WEEKS GESTATION OF PREGNANCY: ICD-10-CM

## 2022-01-26 PROCEDURE — 0502F SUBSEQUENT PRENATAL CARE: CPT | Performed by: OBSTETRICS & GYNECOLOGY

## 2022-01-26 NOTE — PROGRESS NOTES
Baby tranverse again today - lie unstable  Disc options - not candidate for regional anesthsia and sig issues with general anesth in the past  Will get anesthesia consult today  Plan ECV with Miso likely in 2 days

## 2022-01-26 NOTE — PROGRESS NOTES
Problem List  Date Reviewed: 1/21/2022          Codes Class Noted    UTI (urinary tract infection) ICD-10-CM: N39.0  ICD-9-CM: 599.0  10/8/2021        Tethered spinal cord [facility]) ICD-10-CM: Q06.8  ICD-9-CM: 742.59  10/7/2021        Self-catheterizes urinary bladder ICD-10-CM: Z78.9  ICD-9-CM: V49.89  10/7/2021        Sinus tachycardia ICD-10-CM: R00.0  ICD-9-CM: 427.89  10/7/2021        PAC (premature atrial contraction) ICD-10-CM: I49.1  ICD-9-CM: 427.61  10/7/2021        Chronic UTI ICD-10-CM: N39.0  ICD-9-CM: 599.0  10/7/2021        Supervision of high risk pregnancy, antepartum ICD-10-CM: O09.90  ICD-9-CM: V23.9  6/17/2021    Overview Addendum 1/21/2022 11:48 AM by Irma Wilson MD     Intrauterine pregnancy with the following problems identified:   EDC 1/30/2022 by US  Hx of tethered cord, multiple spine surgeries - not candidate for regional anesthesia  Self caths  Rh neg  Chronic UTI - Proteus 5/24 urology  Horizon negative  Hypothyroid  RH negative  Check TSH next visit 7/12  Urine cx- E coli   Proteus and E.  Coli - send to urology  - daily amox  NIPTS- normal male   Prenatal labs checked 8/3  Cardiology 8/20;8/26  AFP- neg  Declined tdap  Declined flu vaccine  Declined covid vaccine  GBS- neg  Unstable lie at 38 weeks               Iron deficiency anemia during pregnancy ICD-10-CM: O99.019, D50.9  ICD-9-CM: 648.20, 280.9  8/27/2019        Arrhythmia ICD-10-CM: I49.9  ICD-9-CM: 427.9  Unknown    Overview Signed 3/19/2019  1:29 PM by Pablo Zee MD     Afib at times             Migraine ICD-10-CM: F77.513  ICD-9-CM: 346.90  Unknown

## 2022-01-26 NOTE — PROGRESS NOTES
Patient arrived to L&D room 203 from Dr. Fdai Crabtree office for anesthesiology consult only (considered pre-admission consult for impending delivery d/t complicated PMH).

## 2022-01-27 ENCOUNTER — PATIENT MESSAGE (OUTPATIENT)
Dept: OBGYN CLINIC | Age: 37
End: 2022-01-27

## 2022-01-28 ENCOUNTER — HOSPITAL ENCOUNTER (INPATIENT)
Age: 37
LOS: 4 days | Discharge: HOME | DRG: 540 | End: 2022-02-01
Attending: OBSTETRICS & GYNECOLOGY | Admitting: OBSTETRICS & GYNECOLOGY
Payer: MEDICAID

## 2022-01-28 DIAGNOSIS — G89.18 POST-OP PAIN: Primary | ICD-10-CM

## 2022-01-28 DIAGNOSIS — Z3A.39 39 WEEKS GESTATION OF PREGNANCY: ICD-10-CM

## 2022-01-28 PROBLEM — Z34.90 PREGNANCY: Status: ACTIVE | Noted: 2022-01-28

## 2022-01-28 LAB
APPEARANCE UR: ABNORMAL
BACTERIA URNS QL MICRO: NEGATIVE /HPF
BASOPHILS # BLD: 0.1 K/UL (ref 0–0.1)
BASOPHILS NFR BLD: 1 % (ref 0–1)
BILIRUB UR QL: NEGATIVE
COLOR UR: ABNORMAL
COVID-19 RAPID TEST, COVR: NOT DETECTED
DIFFERENTIAL METHOD BLD: ABNORMAL
EOSINOPHIL # BLD: 0 K/UL (ref 0–0.4)
EOSINOPHIL NFR BLD: 0 % (ref 0–7)
EPITH CASTS URNS QL MICRO: ABNORMAL /LPF
ERYTHROCYTE [DISTWIDTH] IN BLOOD BY AUTOMATED COUNT: 13.8 % (ref 11.5–14.5)
GLUCOSE UR STRIP.AUTO-MCNC: NEGATIVE MG/DL
HCT VFR BLD AUTO: 37.3 % (ref 35–47)
HGB BLD-MCNC: 11.9 G/DL (ref 11.5–16)
HGB UR QL STRIP: NEGATIVE
HYALINE CASTS URNS QL MICRO: ABNORMAL /LPF (ref 0–5)
IMM GRANULOCYTES # BLD AUTO: 0 K/UL (ref 0–0.04)
IMM GRANULOCYTES NFR BLD AUTO: 1 % (ref 0–0.5)
KETONES UR QL STRIP.AUTO: 80 MG/DL
LEUKOCYTE ESTERASE UR QL STRIP.AUTO: NEGATIVE
LYMPHOCYTES # BLD: 1.8 K/UL (ref 0.8–3.5)
LYMPHOCYTES NFR BLD: 21 % (ref 12–49)
MCH RBC QN AUTO: 26 PG (ref 26–34)
MCHC RBC AUTO-ENTMCNC: 31.9 G/DL (ref 30–36.5)
MCV RBC AUTO: 81.4 FL (ref 80–99)
MONOCYTES # BLD: 0.7 K/UL (ref 0–1)
MONOCYTES NFR BLD: 8 % (ref 5–13)
NEUTS SEG # BLD: 5.9 K/UL (ref 1.8–8)
NEUTS SEG NFR BLD: 69 % (ref 32–75)
NITRITE UR QL STRIP.AUTO: NEGATIVE
NRBC # BLD: 0 K/UL (ref 0–0.01)
NRBC BLD-RTO: 0 PER 100 WBC
PH UR STRIP: 6.5 [PH] (ref 5–8)
PLATELET # BLD AUTO: 208 K/UL (ref 150–400)
PMV BLD AUTO: 11.6 FL (ref 8.9–12.9)
PROT UR STRIP-MCNC: NEGATIVE MG/DL
RBC # BLD AUTO: 4.58 M/UL (ref 3.8–5.2)
RBC #/AREA URNS HPF: ABNORMAL /HPF (ref 0–5)
SOURCE, COVRS: NORMAL
SP GR UR REFRACTOMETRY: 1.02 (ref 1–1.03)
UA: UC IF INDICATED,UAUC: ABNORMAL
UROBILINOGEN UR QL STRIP.AUTO: 1 EU/DL (ref 0.2–1)
WBC # BLD AUTO: 8.4 K/UL (ref 3.6–11)
WBC URNS QL MICRO: ABNORMAL /HPF (ref 0–4)

## 2022-01-28 PROCEDURE — 86922 COMPATIBILITY TEST ANTIGLOB: CPT

## 2022-01-28 PROCEDURE — 85025 COMPLETE CBC W/AUTO DIFF WBC: CPT

## 2022-01-28 PROCEDURE — 4A1H74Z MONITORING OF PRODUCTS OF CONCEPTION, CARDIAC ELECTRICAL ACTIVITY, VIA NATURAL OR ARTIFICIAL OPENING: ICD-10-PCS | Performed by: OBSTETRICS & GYNECOLOGY

## 2022-01-28 PROCEDURE — 75410000002 HC LABOR FEE PER 1 HR: Performed by: OBSTETRICS & GYNECOLOGY

## 2022-01-28 PROCEDURE — 77010026065 HC OXYGEN MINIMUM MEDICAL AIR: Performed by: OBSTETRICS & GYNECOLOGY

## 2022-01-28 PROCEDURE — 10H07YZ INSERTION OF OTHER DEVICE INTO PRODUCTS OF CONCEPTION, VIA NATURAL OR ARTIFICIAL OPENING: ICD-10-PCS | Performed by: OBSTETRICS & GYNECOLOGY

## 2022-01-28 PROCEDURE — 87635 SARS-COV-2 COVID-19 AMP PRB: CPT

## 2022-01-28 PROCEDURE — 86870 RBC ANTIBODY IDENTIFICATION: CPT

## 2022-01-28 PROCEDURE — 77030036554

## 2022-01-28 PROCEDURE — 3E033VJ INTRODUCTION OF OTHER HORMONE INTO PERIPHERAL VEIN, PERCUTANEOUS APPROACH: ICD-10-PCS | Performed by: OBSTETRICS & GYNECOLOGY

## 2022-01-28 PROCEDURE — 10H073Z INSERTION OF MONITORING ELECTRODE INTO PRODUCTS OF CONCEPTION, VIA NATURAL OR ARTIFICIAL OPENING: ICD-10-PCS | Performed by: OBSTETRICS & GYNECOLOGY

## 2022-01-28 PROCEDURE — 36415 COLL VENOUS BLD VENIPUNCTURE: CPT

## 2022-01-28 PROCEDURE — 81001 URINALYSIS AUTO W/SCOPE: CPT

## 2022-01-28 PROCEDURE — 86920 COMPATIBILITY TEST SPIN: CPT

## 2022-01-28 PROCEDURE — 86921 COMPATIBILITY TEST INCUBATE: CPT

## 2022-01-28 PROCEDURE — 86900 BLOOD TYPING SEROLOGIC ABO: CPT

## 2022-01-28 PROCEDURE — 86644 CMV ANTIBODY: CPT

## 2022-01-28 PROCEDURE — 74011250636 HC RX REV CODE- 250/636: Performed by: OBSTETRICS & GYNECOLOGY

## 2022-01-28 PROCEDURE — 65270000029 HC RM PRIVATE

## 2022-01-28 RX ORDER — SODIUM CHLORIDE 9 MG/ML
75 INJECTION, SOLUTION INTRAVENOUS CONTINUOUS
Status: DISCONTINUED | OUTPATIENT
Start: 2022-01-28 | End: 2022-01-29

## 2022-01-28 RX ORDER — OXYTOCIN/RINGER'S LACTATE 30/500 ML
0-42 PLASTIC BAG, INJECTION (ML) INTRAVENOUS
Status: DISCONTINUED | OUTPATIENT
Start: 2022-01-28 | End: 2022-01-29

## 2022-01-28 RX ORDER — TERBUTALINE SULFATE 1 MG/ML
INJECTION SUBCUTANEOUS
Status: DISCONTINUED
Start: 2022-01-28 | End: 2022-01-28 | Stop reason: WASHOUT

## 2022-01-28 RX ORDER — ONDANSETRON 2 MG/ML
4 INJECTION INTRAMUSCULAR; INTRAVENOUS
Status: DISCONTINUED | OUTPATIENT
Start: 2022-01-28 | End: 2022-01-29 | Stop reason: HOSPADM

## 2022-01-28 RX ORDER — SODIUM CHLORIDE 9 MG/ML
500 INJECTION, SOLUTION INTRAVENOUS ONCE
Status: COMPLETED | OUTPATIENT
Start: 2022-01-28 | End: 2022-01-28

## 2022-01-28 RX ORDER — SODIUM CHLORIDE, SODIUM LACTATE, POTASSIUM CHLORIDE, CALCIUM CHLORIDE 600; 310; 30; 20 MG/100ML; MG/100ML; MG/100ML; MG/100ML
125 INJECTION, SOLUTION INTRAVENOUS CONTINUOUS
Status: DISCONTINUED | OUTPATIENT
Start: 2022-01-28 | End: 2022-01-29

## 2022-01-28 RX ORDER — LIDOCAINE HYDROCHLORIDE 10 MG/ML
INJECTION INFILTRATION; PERINEURAL
Status: DISCONTINUED
Start: 2022-01-28 | End: 2022-01-29 | Stop reason: WASHOUT

## 2022-01-28 RX ADMIN — SODIUM CHLORIDE, POTASSIUM CHLORIDE, SODIUM LACTATE AND CALCIUM CHLORIDE 999 ML/HR: 600; 310; 30; 20 INJECTION, SOLUTION INTRAVENOUS at 12:18

## 2022-01-28 RX ADMIN — SODIUM CHLORIDE 500 ML: 9 INJECTION, SOLUTION INTRAVENOUS at 14:50

## 2022-01-28 RX ADMIN — SODIUM CHLORIDE 75 ML/HR: 9 INJECTION, SOLUTION INTRAVENOUS at 15:28

## 2022-01-28 RX ADMIN — SODIUM CHLORIDE, POTASSIUM CHLORIDE, SODIUM LACTATE AND CALCIUM CHLORIDE 125 ML/HR: 600; 310; 30; 20 INJECTION, SOLUTION INTRAVENOUS at 08:54

## 2022-01-28 RX ADMIN — SODIUM CHLORIDE, POTASSIUM CHLORIDE, SODIUM LACTATE AND CALCIUM CHLORIDE 125 ML/HR: 600; 310; 30; 20 INJECTION, SOLUTION INTRAVENOUS at 13:30

## 2022-01-28 RX ADMIN — SODIUM CHLORIDE, POTASSIUM CHLORIDE, SODIUM LACTATE AND CALCIUM CHLORIDE 125 ML/HR: 600; 310; 30; 20 INJECTION, SOLUTION INTRAVENOUS at 21:39

## 2022-01-28 RX ADMIN — SODIUM CHLORIDE 75 ML/HR: 9 INJECTION, SOLUTION INTRAVENOUS at 21:40

## 2022-01-28 RX ADMIN — OXYTOCIN 2 MILLI-UNITS/MIN: 10 INJECTION, SOLUTION INTRAMUSCULAR; INTRAVENOUS at 10:00

## 2022-01-28 NOTE — PROGRESS NOTES
Labor Progress Note  Patient seen, fetal heart rate and contraction pattern evaluated, patient examined. Visit Vitals  /74   Pulse 80   Temp 98.3 °F (36.8 °C)   Resp 16   LMP 04/13/2021 (Approximate)   SpO2 99%       Physical Exam:  Cervical Exam:  2/L/-4/vtx  Membranes:  Artificial Rupture of Membranes;  Amniotic Fluid: large amount of clear fluid  Uterine Activity: Frequency: Every 2-4 minutes  Fetal Heart Rate: Reactive    Assessment/Plan:  Reassuring fetal status, Continue plan for vaginal delivery   FSE placed  Fetus remains vertex at this time

## 2022-01-28 NOTE — PROGRESS NOTES
9378 Dr Elvira Bhakta at the bedside assessing patient and getting consent for delivery. 0800 Dr Bernard Norman at the bedside discussing plan of care for anesthesia. Patient able to ask questions and is more comfortable signing consent for anesthesia. 4125 Dr Elvira Bhakta at the bedside for version. Patient able to maneuver baby on her own, MD at the bedside, baby tolerated procedure well. RN received orders to start pitocin if needed for       1018 Dr Elvira Bhakta at the bedside assessing patient. FSE placed, AROM large amount of fluid drained, patient tolerated well. SVE 2cm dilation, long cervix. 1230 IUPC placed by Dr Elvira Bhakta. 1435 Received report from Dr Elvira Bhakta. Contractions adequate. RN received orders to start amnio infusion. 500 mL bolus followed by 75 mL/hr.     1600 Dr Elvira Bhakta at the bedside assessing the patient SVE 3/50/-3 RN received orders to strip pitocin and continue to monitor, MVU.    1620 Dr Ebenezer Michael at the bedside introduced himself to the patient. NO new orders at this time. Patient in hands and knees. 1904 Bedside and Verbal shift change report given to [employer], RN (oncoming nurse) by Daniel Conroy RN (offgoing nurse). Report included the following information SBAR, Kardex and MAR.

## 2022-01-28 NOTE — PROGRESS NOTES
Labor Progress Note  Patient seen, fetal heart rate and contraction pattern evaluated, patient examined. Visit Vitals  /74   Pulse 76   Temp 98.3 °F (36.8 °C)   Resp 16   LMP 04/13/2021 (Approximate)   SpO2 99%       Physical Exam:  Cervical Exam:  3/50 %/-3/   Membranes:  Artificial Rupture of Membranes;  Amniotic Fluid: large amount of clear fluid  Uterine Activity: Frequency: Every 2-3 minutes  Fetal Heart Rate: Baseline: 150 per minute  Variability: moderate  Accelerations: yes  Decelerations: late    Assessment/Plan:  occ late variables, moderate variability and accels  Pit now off - was only at 2mu  AI in place - s/p bolus now 75cc/hr  Disc with Dr. Nolan Bella - will continue latent labor

## 2022-01-28 NOTE — PROGRESS NOTES
1/28/2022  12:07 PM     RAMON is CONFIDENTIAL    CM met with RAMON to complete initial assessment and begin discharge planning. MOB verified and confirmed demographics. RAMON lives with her children ages: 15, 6 and 2yr old twins, at the address on file. RAMON does not work and plans to be home with infant. RAMON shared that LISA Smith ( [phone]) is not to be notified of her admission here. MOB noted she has a restraining order against him. MOB noted they have been  for about 7mos and is not aware of her admission. Security has been notified and chart has been made confidential.  Staff has been updated as well. RAMON is here with her mom: Shannan Kan ( [phone]) who is a great support for her. RAMON plans to breast feed baby and has pump to use at home. Dr. Noelle Weir will provide follow up care for infant. RAMON has car seat, bassinet/crib, clothing, bottles and all necessary supplies for baby. RAMON has [employer],  and will be adding baby to this policy. CM discussed process to add baby to insurance, MOB verbalized understanding. RAMON is also enrolled in [facility] and SNAP services and understands how to add baby to program.    CM will continue to monitor for needs. Care Management Interventions  PCP Verified by CM: Yes Misty Ruano)  Mode of Transport at Discharge:  Other (see comment)  Transition of Care Consult (CM Consult): Discharge Planning  Support Systems: Other Family Member(s)  Confirm Follow Up Transport: Family  Discharge Location  Patient Expects to be Discharged to[de-identified] Home with family assistance  Makayla Crespo

## 2022-01-28 NOTE — PROGRESS NOTES
Labor Progress Note  Patient seen, fetal heart rate and contraction pattern evaluated, patient examined. Visit Vitals  /74   Pulse 80   Temp 98.3 °F (36.8 °C)   Resp 16   LMP 04/13/2021 (Approximate)   SpO2 99%       Physical Exam:  Cervical Exam:  2/30 %/-3/ vtx  Membranes:  Artificial Rupture of Membranes;  Amniotic Fluid: large amount of clear fluid  Uterine Activity: Frequency: Every 2-3 minutes  Fetal Heart Rate: Baseline: 155 per minute  Variability: moderate  Accelerations: yes  Decelerations: variable  Uterine contractions: q2-3 minutes    Assessment/Plan:  IUPC placed  Remains vertex at this time

## 2022-01-28 NOTE — H&P
History & Physical    Name: Rashad Freeman MRN: [id number]  SSN: [id number]    YOB: 1985  Age: 31 y.o. Sex: female        Subjective:     Estimated Date of Delivery: 22  OB History        8    Para   3    Term   3            AB   4    Living   4       SAB   1    IAB        Ectopic        Molar        Multiple   1    Live Births   4          Obstetric Comments   Menarche:  *18**. LMP: 3/15/14. # of Children:  2. Age at Delivery of First Child:  27.   Hysterectomy/oophorectomy:  NO/NO. Breast Bx:  no.  Hx of Breast Feeding:  yes. BCP:  yes. Hormone therapy:  no.             Ms. Mike Davey is admitted with pregnancy at 39w5d for induction of labor. Prenatal course was complicated by unstable lie, maternal tethered spinal cord. Please see prenatal records for details. Problem List  Date Reviewed: 2022          Codes Class Noted    Pregnancy ICD-10-CM: Z34.90  ICD-9-CM: V22.2  2022        UTI (urinary tract infection) ICD-10-CM: N39.0  ICD-9-CM: 599.0  10/8/2021        Tethered spinal cord [facility]) ICD-10-CM: Q06.8  ICD-9-CM: 742.59  10/7/2021        Self-catheterizes urinary bladder ICD-10-CM: Z78.9  ICD-9-CM: V49.89  10/7/2021        Sinus tachycardia ICD-10-CM: R00.0  ICD-9-CM: 427.89  10/7/2021        PAC (premature atrial contraction) ICD-10-CM: I49.1  ICD-9-CM: 427.61  10/7/2021        Chronic UTI ICD-10-CM: N39.0  ICD-9-CM: 599.0  10/7/2021        Supervision of high risk pregnancy, antepartum ICD-10-CM: O09.90  ICD-9-CM: V23.9  2021    Overview Addendum 2022  2:08 PM by Ranell Galeazzi     Intrauterine pregnancy with the following problems identified:   EDC 2022 by [address]  Hx of tethered cord, multiple spine surgeries - not candidate for regional anesthesia  Self caths  Rh neg  Chronic UTI - Proteus  urology  Horizon negative  Hypothyroid  RH negative  Check TSH next visit   Urine cx- E coli   Proteus and E.  Coli - send to urology  - daily amox  NIPTS- normal male   Prenatal labs checked 8/3  Cardiology 8/20;8/26  AFP- neg  Declined tdap  Declined flu vaccine  Declined covid vaccine  GBS- neg  Unstable lie at 38 weeks  ECV/INDUCTION 1/28/22               Iron deficiency anemia during pregnancy ICD-10-CM: O99.019, D50.9  ICD-9-CM: 648.20, 280.9  8/27/2019        Arrhythmia ICD-10-CM: I49.9  ICD-9-CM: 427.9  Unknown    Overview Signed 3/19/2019  1:29 PM by Carey Chow MD     Afib at times             Migraine ICD-10-CM: C06.893  ICD-9-CM: 346.90  Unknown                Past Medical History:   Diagnosis Date    Arrhythmia     Arthritis     left foot and lower back    Asthma     no inhaler    Burning with urination     Chronic UTI     Complication of anesthesia     bradycardia- pt stated she was \"shocked\" under general as child    Disease of blood and blood forming organ     Headache     Heart abnormality     mummer    Iron deficiency anemia during pregnancy 8/27/2019    Migraine     Nerve damage     Nerve damage in left foot.    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 in pregnancy     Self-catheterizes urinary bladder     Since age 13   [facility] T[facility]red spinal cord ([facility] [address]     Unspecified breast disorder     L invert nipple     Past Surgical History:   Procedure Laterality Date    HX BACK SURGERY      x2    HX GYN      episotomy    HX ORTHOPAEDIC      spine and left foot    HX OTHER SURGICAL      L foot has wire from surgery     Social History     Occupational History    Not on file   Tobacco Use    Smoking status: Never Smoker    Smokeless tobacco: Never Used   Vaping Use    Vaping Use: Never used   Substance and Sexual Activity    Alcohol use: No    Drug use: No    Sexual activity: Yes     Partners: Male     Birth control/protection: Condom     Family History Problem Relation Age of Onset    OSTEOARTHRITIS Mother    Daly Diehl Migraines Mother     Alcohol abuse Paternal Grandfather     Bleeding Prob Maternal Grandfather     Stroke Maternal Grandfather     Hypertension Maternal Grandfather     Breast Cancer Other     Atrial Fibrillation Brother     Hypertension Maternal Grandmother        Allergies   Allergen Reactions    Latex Rash     Wheezing    Beef Containing Products Anaphylaxis    Keflex [Cephalexin] Anaphylaxis    Codeine Other (comments)     Hyperactivity    Doxycycline Nausea Only    Macrobid [Nitrofurantoin Monohyd/M-Cryst] Other (comments)     Pharmacy had this allergy listed and called the patient and patient could not remember her reaction to the medication. This nurse called the patient . Patient states that when it was prescribed in early pregnancy it caused spotting.  Milk Hives    Nuts [Tree Nut] Swelling     Pt stated that her mouth and throat feels funny when she eats nuts      Omnicef [Cefdinir] Other (comments)     \"shakes\"    Pepto-Bismol [Bismuth Subsalicylate] Hives    Phenergan [Promethazine] Other (comments)     Increased sedation    Reglan [Metoclopramide] Anxiety    Soy Hives     Prior to Admission medications    Medication Sig Start Date End Date Taking? Authorizing Provider   amoxicillin (AMOXIL) 250 mg capsule Take 250 mg by mouth daily. Patient not taking: Reported on 10/7/2021 9/15/21   Provider, Historical   levothyroxine (SYNTHROID) 50 mcg tablet Take 1 Tablet by mouth Daily (before breakfast). 7/12/21   Gerald Gomez MD   doxylamine-pyridoxine, vit B6, (Bonjesta) 20-20 mg TbID Take 1 Tablet by mouth two (2) times a day. 6/14/21   Gerald Gomez MD   multivitamin with iron Jonathon Ayala) chewable tablet Take 1 Tablet by mouth daily.  Indications: treatment to prevent mineral deficiency    Ivis Steven MD        Review of Systems: A comprehensive review of systems was negative except for that written in the HPI.    Objective:     Vitals: There were no vitals filed for this visit. Physical Exam:  Patient without distress. Heart: Regular rate and rhythm  Lung: clear to auscultation throughout lung fields, no wheezes, no rales, no rhonchi and normal respiratory effort  Abdomen: soft, nontender  Fundus: soft and non tender  Perineum: blood absent, amniotic fluid absent  Cervical Exam: 1 cm dilated    0% effaced    -3 station    Membranes:  Intact  Fetal Heart Rate: Reactive        Assessment/Plan:     Pt seen by anesthesia - discussed general anesthesia and indication to save baby is needed and mother. Pt's own mother in room for discussion with Dr. Ena Thompson. Pt consented for GET if needed emergently    US at bedside breech - vtx in RUQ    Attempted ECV but patient uncomfortable and wanted to do it herself. With one attempt she pushed her own baby to vtx. Confirmed by US. Binder placed    Having some spontaneous contractions. Start pitocin now. GBS negative. Pt understands baby may move back to a nonvertex position. She was advised risk of cord prolapse and emergent delivery.     Signed By:  Nivia Louis MD     January 28, 2022

## 2022-01-28 NOTE — PROGRESS NOTES
0840: Blood bank notified MD would like 2 units of blood on hold. Tech in blood bank states 2 units of blood will automatically be allocated for patient and no additional order is needed.

## 2022-01-29 ENCOUNTER — ANESTHESIA EVENT (OUTPATIENT)
Dept: LABOR AND DELIVERY | Age: 37
DRG: 540 | End: 2022-01-29
Payer: MEDICAID

## 2022-01-29 ENCOUNTER — ANESTHESIA (OUTPATIENT)
Dept: LABOR AND DELIVERY | Age: 37
DRG: 540 | End: 2022-01-29
Payer: MEDICAID

## 2022-01-29 LAB
ABO + RH BLD: NORMAL
BLD PROD TYP BPU: NORMAL
BLD PROD TYP BPU: NORMAL
BLOOD BANK CMNT PATIENT-IMP: NORMAL
BLOOD GROUP ANTIBODIES SERPL: NORMAL
BLOOD GROUP ANTIBODIES SERPL: NORMAL
BPU ID: NORMAL
BPU ID: NORMAL
CROSSMATCH RESULT,%XM: NORMAL
CROSSMATCH RESULT,%XM: NORMAL
SPECIMEN EXP DATE BLD: NORMAL
STATUS OF UNIT,%ST: NORMAL
STATUS OF UNIT,%ST: NORMAL
UNIT DIVISION, %UDIV: 0
UNIT DIVISION, %UDIV: 0

## 2022-01-29 PROCEDURE — 74011000258 HC RX REV CODE- 258: Performed by: OBSTETRICS & GYNECOLOGY

## 2022-01-29 PROCEDURE — 2709999900 HC NON-CHARGEABLE SUPPLY

## 2022-01-29 PROCEDURE — 77030008684 HC TU ET CUF COVD -B: Performed by: ANESTHESIOLOGY

## 2022-01-29 PROCEDURE — 74011250636 HC RX REV CODE- 250/636: Performed by: OBSTETRICS & GYNECOLOGY

## 2022-01-29 PROCEDURE — 74011000250 HC RX REV CODE- 250

## 2022-01-29 PROCEDURE — 75410000003 HC RECOV DEL/VAG/CSECN EA 0.5 HR: Performed by: OBSTETRICS & GYNECOLOGY

## 2022-01-29 PROCEDURE — 74011250636 HC RX REV CODE- 250/636: Performed by: ANESTHESIOLOGY

## 2022-01-29 PROCEDURE — 74011000250 HC RX REV CODE- 250: Performed by: OBSTETRICS & GYNECOLOGY

## 2022-01-29 PROCEDURE — 65270000029 HC RM PRIVATE

## 2022-01-29 PROCEDURE — 76010000391 HC C SECN FIRST 1 HR: Performed by: OBSTETRICS & GYNECOLOGY

## 2022-01-29 PROCEDURE — 74011000250 HC RX REV CODE- 250: Performed by: ANESTHESIOLOGY

## 2022-01-29 PROCEDURE — 74011250637 HC RX REV CODE- 250/637: Performed by: OBSTETRICS & GYNECOLOGY

## 2022-01-29 PROCEDURE — 75410000002 HC LABOR FEE PER 1 HR: Performed by: OBSTETRICS & GYNECOLOGY

## 2022-01-29 PROCEDURE — 76210000016 HC OR PH I REC 1 TO 1.5 HR

## 2022-01-29 PROCEDURE — 76060000033 HC ANESTHESIA 1 TO 1.5 HR: Performed by: OBSTETRICS & GYNECOLOGY

## 2022-01-29 PROCEDURE — 59510 CESAREAN DELIVERY: CPT | Performed by: OBSTETRICS & GYNECOLOGY

## 2022-01-29 PROCEDURE — 74011250636 HC RX REV CODE- 250/636

## 2022-01-29 PROCEDURE — 77030026438 HC STYL ET INTUB CARD -A: Performed by: ANESTHESIOLOGY

## 2022-01-29 PROCEDURE — 76010000392 HC C SECN EA ADDL 0.5 HR: Performed by: OBSTETRICS & GYNECOLOGY

## 2022-01-29 RX ORDER — PROPOFOL 10 MG/ML
INJECTION, EMULSION INTRAVENOUS
Status: DISCONTINUED | OUTPATIENT
Start: 2022-01-29 | End: 2022-01-29 | Stop reason: HOSPADM

## 2022-01-29 RX ORDER — SODIUM CHLORIDE 0.9 % (FLUSH) 0.9 %
5-40 SYRINGE (ML) INJECTION EVERY 8 HOURS
Status: DISCONTINUED | OUTPATIENT
Start: 2022-01-29 | End: 2022-02-01 | Stop reason: HOSPADM

## 2022-01-29 RX ORDER — DIPHENHYDRAMINE HYDROCHLORIDE 50 MG/ML
12.5 INJECTION, SOLUTION INTRAMUSCULAR; INTRAVENOUS AS NEEDED
Status: DISCONTINUED | OUTPATIENT
Start: 2022-01-29 | End: 2022-01-29 | Stop reason: HOSPADM

## 2022-01-29 RX ORDER — OXYCODONE HYDROCHLORIDE 5 MG/1
5 TABLET ORAL AS NEEDED
Status: DISCONTINUED | OUTPATIENT
Start: 2022-01-29 | End: 2022-01-29 | Stop reason: HOSPADM

## 2022-01-29 RX ORDER — IBUPROFEN 800 MG/1
800 TABLET ORAL
Status: DISCONTINUED | OUTPATIENT
Start: 2022-01-29 | End: 2022-02-01 | Stop reason: HOSPADM

## 2022-01-29 RX ORDER — FENTANYL CITRATE 50 UG/ML
INJECTION, SOLUTION INTRAMUSCULAR; INTRAVENOUS AS NEEDED
Status: DISCONTINUED | OUTPATIENT
Start: 2022-01-29 | End: 2022-01-29 | Stop reason: HOSPADM

## 2022-01-29 RX ORDER — CEFAZOLIN SODIUM 1 G/3ML
INJECTION, POWDER, FOR SOLUTION INTRAMUSCULAR; INTRAVENOUS
Status: DISCONTINUED
Start: 2022-01-29 | End: 2022-01-29 | Stop reason: WASHOUT

## 2022-01-29 RX ORDER — LEVOTHYROXINE SODIUM 50 UG/1
50 TABLET ORAL
Status: DISCONTINUED | OUTPATIENT
Start: 2022-01-29 | End: 2022-02-01 | Stop reason: HOSPADM

## 2022-01-29 RX ORDER — ONDANSETRON 2 MG/ML
4 INJECTION INTRAMUSCULAR; INTRAVENOUS AS NEEDED
Status: DISCONTINUED | OUTPATIENT
Start: 2022-01-29 | End: 2022-01-29 | Stop reason: HOSPADM

## 2022-01-29 RX ORDER — OXYTOCIN/RINGER'S LACTATE 30/500 ML
10 PLASTIC BAG, INJECTION (ML) INTRAVENOUS AS NEEDED
Status: DISCONTINUED | OUTPATIENT
Start: 2022-01-29 | End: 2022-01-30 | Stop reason: ALTCHOICE

## 2022-01-29 RX ORDER — ZOLPIDEM TARTRATE 5 MG/1
5 TABLET ORAL
Status: DISCONTINUED | OUTPATIENT
Start: 2022-01-29 | End: 2022-02-01 | Stop reason: HOSPADM

## 2022-01-29 RX ORDER — DEXAMETHASONE SODIUM PHOSPHATE 4 MG/ML
INJECTION, SOLUTION INTRA-ARTICULAR; INTRALESIONAL; INTRAMUSCULAR; INTRAVENOUS; SOFT TISSUE AS NEEDED
Status: DISCONTINUED | OUTPATIENT
Start: 2022-01-29 | End: 2022-01-29 | Stop reason: HOSPADM

## 2022-01-29 RX ORDER — CLINDAMYCIN PHOSPHATE 900 MG/50ML
900 INJECTION, SOLUTION INTRAVENOUS ONCE
Status: COMPLETED | OUTPATIENT
Start: 2022-01-29 | End: 2022-01-29

## 2022-01-29 RX ORDER — ONDANSETRON 2 MG/ML
4 INJECTION INTRAMUSCULAR; INTRAVENOUS
Status: DISCONTINUED | OUTPATIENT
Start: 2022-01-29 | End: 2022-01-30 | Stop reason: ALTCHOICE

## 2022-01-29 RX ORDER — WATER FOR INJECTION,STERILE
VIAL (ML) INJECTION
Status: DISCONTINUED
Start: 2022-01-29 | End: 2022-01-29 | Stop reason: WASHOUT

## 2022-01-29 RX ORDER — OXYTOCIN 10 [USP'U]/ML
INJECTION, SOLUTION INTRAMUSCULAR; INTRAVENOUS AS NEEDED
Status: DISCONTINUED | OUTPATIENT
Start: 2022-01-29 | End: 2022-01-29 | Stop reason: HOSPADM

## 2022-01-29 RX ORDER — SIMETHICONE 80 MG
80 TABLET,CHEWABLE ORAL AS NEEDED
Status: DISCONTINUED | OUTPATIENT
Start: 2022-01-29 | End: 2022-02-01 | Stop reason: HOSPADM

## 2022-01-29 RX ORDER — KETOROLAC TROMETHAMINE 30 MG/ML
INJECTION, SOLUTION INTRAMUSCULAR; INTRAVENOUS AS NEEDED
Status: DISCONTINUED | OUTPATIENT
Start: 2022-01-29 | End: 2022-01-29 | Stop reason: HOSPADM

## 2022-01-29 RX ORDER — SODIUM CHLORIDE, SODIUM LACTATE, POTASSIUM CHLORIDE, CALCIUM CHLORIDE 600; 310; 30; 20 MG/100ML; MG/100ML; MG/100ML; MG/100ML
125 INJECTION, SOLUTION INTRAVENOUS CONTINUOUS
Status: DISCONTINUED | OUTPATIENT
Start: 2022-01-29 | End: 2022-01-30 | Stop reason: ALTCHOICE

## 2022-01-29 RX ORDER — OXYTOCIN/RINGER'S LACTATE 30/500 ML
87.3 PLASTIC BAG, INJECTION (ML) INTRAVENOUS AS NEEDED
Status: DISCONTINUED | OUTPATIENT
Start: 2022-01-29 | End: 2022-01-30 | Stop reason: ALTCHOICE

## 2022-01-29 RX ORDER — SODIUM CHLORIDE, SODIUM LACTATE, POTASSIUM CHLORIDE, CALCIUM CHLORIDE 600; 310; 30; 20 MG/100ML; MG/100ML; MG/100ML; MG/100ML
100 INJECTION, SOLUTION INTRAVENOUS CONTINUOUS
Status: DISCONTINUED | OUTPATIENT
Start: 2022-01-29 | End: 2022-01-29 | Stop reason: HOSPADM

## 2022-01-29 RX ORDER — HYDROMORPHONE HYDROCHLORIDE 1 MG/ML
.25-1 INJECTION, SOLUTION INTRAMUSCULAR; INTRAVENOUS; SUBCUTANEOUS
Status: DISCONTINUED | OUTPATIENT
Start: 2022-01-29 | End: 2022-01-29 | Stop reason: HOSPADM

## 2022-01-29 RX ORDER — HYDROMORPHONE HYDROCHLORIDE 2 MG/ML
INJECTION, SOLUTION INTRAMUSCULAR; INTRAVENOUS; SUBCUTANEOUS AS NEEDED
Status: DISCONTINUED | OUTPATIENT
Start: 2022-01-29 | End: 2022-01-29 | Stop reason: HOSPADM

## 2022-01-29 RX ORDER — DIPHENHYDRAMINE HCL 25 MG
25 CAPSULE ORAL
Status: DISCONTINUED | OUTPATIENT
Start: 2022-01-29 | End: 2022-02-01 | Stop reason: HOSPADM

## 2022-01-29 RX ORDER — ONDANSETRON 2 MG/ML
INJECTION INTRAMUSCULAR; INTRAVENOUS AS NEEDED
Status: DISCONTINUED | OUTPATIENT
Start: 2022-01-29 | End: 2022-01-29 | Stop reason: HOSPADM

## 2022-01-29 RX ORDER — MORPHINE SULFATE 2 MG/ML
2 INJECTION, SOLUTION INTRAMUSCULAR; INTRAVENOUS
Status: DISCONTINUED | OUTPATIENT
Start: 2022-01-29 | End: 2022-01-30 | Stop reason: ALTCHOICE

## 2022-01-29 RX ORDER — TRISODIUM CITRATE DIHYDRATE AND CITRIC ACID MONOHYDRATE 500; 334 MG/5ML; MG/5ML
30 SOLUTION ORAL
Status: COMPLETED | OUTPATIENT
Start: 2022-01-29 | End: 2022-01-29

## 2022-01-29 RX ORDER — AMOXICILLIN 250 MG/1
250 CAPSULE ORAL DAILY
Status: DISCONTINUED | OUTPATIENT
Start: 2022-01-29 | End: 2022-02-01 | Stop reason: HOSPADM

## 2022-01-29 RX ORDER — ROCURONIUM BROMIDE 10 MG/ML
INJECTION, SOLUTION INTRAVENOUS AS NEEDED
Status: DISCONTINUED | OUTPATIENT
Start: 2022-01-29 | End: 2022-01-29 | Stop reason: HOSPADM

## 2022-01-29 RX ORDER — SODIUM CHLORIDE 0.9 % (FLUSH) 0.9 %
5-40 SYRINGE (ML) INJECTION AS NEEDED
Status: DISCONTINUED | OUTPATIENT
Start: 2022-01-29 | End: 2022-02-01 | Stop reason: HOSPADM

## 2022-01-29 RX ORDER — LIDOCAINE HYDROCHLORIDE 20 MG/ML
INJECTION, SOLUTION EPIDURAL; INFILTRATION; INTRACAUDAL; PERINEURAL AS NEEDED
Status: DISCONTINUED | OUTPATIENT
Start: 2022-01-29 | End: 2022-01-29 | Stop reason: HOSPADM

## 2022-01-29 RX ORDER — HYDROMORPHONE HYDROCHLORIDE 2 MG/1
2 TABLET ORAL
Status: DISCONTINUED | OUTPATIENT
Start: 2022-01-29 | End: 2022-02-01 | Stop reason: HOSPADM

## 2022-01-29 RX ORDER — NALOXONE HYDROCHLORIDE 0.4 MG/ML
0.4 INJECTION, SOLUTION INTRAMUSCULAR; INTRAVENOUS; SUBCUTANEOUS AS NEEDED
Status: DISCONTINUED | OUTPATIENT
Start: 2022-01-29 | End: 2022-02-01 | Stop reason: HOSPADM

## 2022-01-29 RX ORDER — SODIUM CHLORIDE 0.9 % (FLUSH) 0.9 %
5-40 SYRINGE (ML) INJECTION EVERY 8 HOURS
Status: DISCONTINUED | OUTPATIENT
Start: 2022-01-29 | End: 2022-01-29 | Stop reason: HOSPADM

## 2022-01-29 RX ORDER — MORPHINE SULFATE IN 0.9 % NACL 150MG/30ML
PATIENT CONTROLLED ANALGESIA SYRINGE INTRAVENOUS
Status: DISCONTINUED | OUTPATIENT
Start: 2022-01-29 | End: 2022-01-29

## 2022-01-29 RX ORDER — GLYCOPYRROLATE 0.2 MG/ML
INJECTION INTRAMUSCULAR; INTRAVENOUS AS NEEDED
Status: DISCONTINUED | OUTPATIENT
Start: 2022-01-29 | End: 2022-01-29 | Stop reason: HOSPADM

## 2022-01-29 RX ORDER — SODIUM CHLORIDE 0.9 % (FLUSH) 0.9 %
5-40 SYRINGE (ML) INJECTION AS NEEDED
Status: DISCONTINUED | OUTPATIENT
Start: 2022-01-29 | End: 2022-01-29 | Stop reason: HOSPADM

## 2022-01-29 RX ORDER — KETOROLAC TROMETHAMINE 30 MG/ML
30 INJECTION, SOLUTION INTRAMUSCULAR; INTRAVENOUS EVERY 6 HOURS
Status: DISCONTINUED | OUTPATIENT
Start: 2022-01-29 | End: 2022-02-01 | Stop reason: HOSPADM

## 2022-01-29 RX ORDER — DOCUSATE SODIUM 100 MG/1
100 CAPSULE, LIQUID FILLED ORAL 2 TIMES DAILY
Status: DISCONTINUED | OUTPATIENT
Start: 2022-01-29 | End: 2022-02-01 | Stop reason: HOSPADM

## 2022-01-29 RX ORDER — NEOSTIGMINE METHYLSULFATE 1 MG/ML
INJECTION, SOLUTION INTRAVENOUS AS NEEDED
Status: DISCONTINUED | OUTPATIENT
Start: 2022-01-29 | End: 2022-01-29 | Stop reason: HOSPADM

## 2022-01-29 RX ORDER — HYDROCODONE BITARTRATE AND ACETAMINOPHEN 5; 325 MG/1; MG/1
1 TABLET ORAL
Status: DISCONTINUED | OUTPATIENT
Start: 2022-01-29 | End: 2022-01-29

## 2022-01-29 RX ORDER — MIDAZOLAM HYDROCHLORIDE 1 MG/ML
INJECTION, SOLUTION INTRAMUSCULAR; INTRAVENOUS AS NEEDED
Status: DISCONTINUED | OUTPATIENT
Start: 2022-01-29 | End: 2022-01-29 | Stop reason: HOSPADM

## 2022-01-29 RX ORDER — ACETAMINOPHEN 325 MG/1
650 TABLET ORAL
Status: DISCONTINUED | OUTPATIENT
Start: 2022-01-29 | End: 2022-02-01 | Stop reason: HOSPADM

## 2022-01-29 RX ADMIN — HYDROMORPHONE HYDROCHLORIDE 0.5 MG: 1 INJECTION, SOLUTION INTRAMUSCULAR; INTRAVENOUS; SUBCUTANEOUS at 05:23

## 2022-01-29 RX ADMIN — ROCURONIUM BROMIDE 30 MG: 10 INJECTION INTRAVENOUS at 03:46

## 2022-01-29 RX ADMIN — SODIUM CHLORIDE, POTASSIUM CHLORIDE, SODIUM LACTATE AND CALCIUM CHLORIDE 100 ML/HR: 600; 310; 30; 20 INJECTION, SOLUTION INTRAVENOUS at 05:53

## 2022-01-29 RX ADMIN — Medication 30 ML: at 03:21

## 2022-01-29 RX ADMIN — HYDROMORPHONE HYDROCHLORIDE 0.4 MG: 2 INJECTION, SOLUTION INTRAMUSCULAR; INTRAVENOUS; SUBCUTANEOUS at 04:17

## 2022-01-29 RX ADMIN — PROPOFOL 150 MG: 10 INJECTION, EMULSION INTRAVENOUS at 03:46

## 2022-01-29 RX ADMIN — FENTANYL CITRATE 50 MCG: 50 INJECTION INTRAMUSCULAR; INTRAVENOUS at 03:34

## 2022-01-29 RX ADMIN — HYDROMORPHONE HYDROCHLORIDE 0.6 MG: 2 INJECTION, SOLUTION INTRAMUSCULAR; INTRAVENOUS; SUBCUTANEOUS at 04:14

## 2022-01-29 RX ADMIN — KETOROLAC TROMETHAMINE 30 MG: 30 INJECTION, SOLUTION INTRAMUSCULAR; INTRAVENOUS at 04:10

## 2022-01-29 RX ADMIN — HYDROMORPHONE HYDROCHLORIDE 2 MG: 2 TABLET ORAL at 20:08

## 2022-01-29 RX ADMIN — HYDROMORPHONE HYDROCHLORIDE 0.4 MG: 2 INJECTION, SOLUTION INTRAMUSCULAR; INTRAVENOUS; SUBCUTANEOUS at 04:13

## 2022-01-29 RX ADMIN — HYDROMORPHONE HYDROCHLORIDE 1 MG: 1 INJECTION, SOLUTION INTRAMUSCULAR; INTRAVENOUS; SUBCUTANEOUS at 05:05

## 2022-01-29 RX ADMIN — Medication 3 MG: at 04:09

## 2022-01-29 RX ADMIN — MIDAZOLAM HYDROCHLORIDE 1 MG: 2 INJECTION, SOLUTION INTRAMUSCULAR; INTRAVENOUS at 03:52

## 2022-01-29 RX ADMIN — SODIUM CHLORIDE, POTASSIUM CHLORIDE, SODIUM LACTATE AND CALCIUM CHLORIDE: 600; 310; 30; 20 INJECTION, SOLUTION INTRAVENOUS at 04:26

## 2022-01-29 RX ADMIN — KETOROLAC TROMETHAMINE 30 MG: 30 INJECTION, SOLUTION INTRAMUSCULAR at 21:58

## 2022-01-29 RX ADMIN — GLYCOPYRROLATE 0.4 MG: 0.2 INJECTION INTRAMUSCULAR; INTRAVENOUS at 04:09

## 2022-01-29 RX ADMIN — DOCUSATE SODIUM 100 MG: 100 CAPSULE ORAL at 09:15

## 2022-01-29 RX ADMIN — PROPOFOL 150 MCG/KG/MIN: 10 INJECTION, EMULSION INTRAVENOUS at 03:48

## 2022-01-29 RX ADMIN — KETOROLAC TROMETHAMINE 30 MG: 30 INJECTION, SOLUTION INTRAMUSCULAR at 15:47

## 2022-01-29 RX ADMIN — MIDAZOLAM HYDROCHLORIDE 1 MG: 2 INJECTION, SOLUTION INTRAMUSCULAR; INTRAVENOUS at 03:21

## 2022-01-29 RX ADMIN — OXYTOCIN 20 UNITS: 10 INJECTION, SOLUTION INTRAMUSCULAR; INTRAVENOUS at 04:29

## 2022-01-29 RX ADMIN — HYDROMORPHONE HYDROCHLORIDE 0.6 MG: 2 INJECTION, SOLUTION INTRAMUSCULAR; INTRAVENOUS; SUBCUTANEOUS at 04:20

## 2022-01-29 RX ADMIN — LEVOTHYROXINE SODIUM 50 MCG: 0.05 TABLET ORAL at 09:15

## 2022-01-29 RX ADMIN — FENTANYL CITRATE 50 MCG: 50 INJECTION INTRAMUSCULAR; INTRAVENOUS at 03:29

## 2022-01-29 RX ADMIN — LIDOCAINE HYDROCHLORIDE 100 MG: 20 INJECTION, SOLUTION INTRAVENOUS at 03:34

## 2022-01-29 RX ADMIN — OXYTOCIN 20 UNITS: 10 INJECTION, SOLUTION INTRAMUSCULAR; INTRAVENOUS at 03:51

## 2022-01-29 RX ADMIN — DEXAMETHASONE SODIUM PHOSPHATE 4 MG: 4 INJECTION, SOLUTION INTRAMUSCULAR; INTRAVENOUS at 03:46

## 2022-01-29 RX ADMIN — SODIUM CHLORIDE, POTASSIUM CHLORIDE, SODIUM LACTATE AND CALCIUM CHLORIDE 500 ML: 600; 310; 30; 20 INJECTION, SOLUTION INTRAVENOUS at 17:26

## 2022-01-29 RX ADMIN — CLINDAMYCIN PHOSPHATE 900 MG: 150 INJECTION, SOLUTION INTRAVENOUS at 03:55

## 2022-01-29 RX ADMIN — MORPHINE SULFATE: 50 INJECTION, SOLUTION, CONCENTRATE INTRAVENOUS at 05:53

## 2022-01-29 RX ADMIN — AMOXICILLIN 250 MG: 250 CAPSULE ORAL at 09:15

## 2022-01-29 RX ADMIN — ONDANSETRON HYDROCHLORIDE 8 MG: 2 SOLUTION INTRAMUSCULAR; INTRAVENOUS at 03:21

## 2022-01-29 RX ADMIN — GENTAMICIN SULFATE 310 MG: 40 INJECTION, SOLUTION INTRAMUSCULAR; INTRAVENOUS at 03:43

## 2022-01-29 NOTE — LACTATION NOTE
This note was copied from a baby's chart. Mothers 5th child to BF. BF basics reviewed. BF supplies given per request.  Mother states she has been taking Bonjesta, L3,  during pregnancy for nausea. Read to mother from [place], expressed concern over the sedative properties. Mother states she will not take any longer. Lots of questions answered. Discussed with mother her plan for feeding. Reviewed the benefits of exclusive breast milk feeding during the hospital stay. Informed her of the risks of using formula to supplement in the first few days of life as well as the benefits of successful breast milk feeding; referred her to the Breastfeeding booklet about this information. She acknowledges understanding of information reviewed and states that it is her plan to breastfeed her infant. Will support her choice and offer additional information as needed. Reviewed breastfeeding basics:  How milk is made and normal  breastfeeding behaviors discussed. Supply and demand,  stomach size, early feeding cues, skin to skin bonding with comfortable positioning and baby led latch-on reviewed. How to identify signs of successful breastfeeding sessions reviewed; education on assymetrical latch, signs of effective latching vs shallow, in-effective latching, normal  feeding frequency and duration and expected infant output discussed. Normal course of breastfeeding discussed including the AAP's recommendation that children receive exclusive breast milk feedings for the first six months of life with breast milk feedings to continue through the first year of life and/or beyond as complimentary table foods are added. Breastfeeding Booklet and Warm line information provided with discussion.   Discussed typical  weight loss and the importance of pediatrician appointment within 24-48 hours of discharge, at 2 weeks of life and normalcy of requesting pediatric weight checks as needed in between visits. Pt will successfully establish breastfeeding by feeding in response to early feeding cues  or wake every 3h, will obtain deep latch, and will keep log of feedings/output. Taught to BF at hunger cues and or q 2-3 hrs and to offer 10-20 drops of hand expressed colostrum at any non-feeds.       Breast Assessment  Left Breast: Medium  Left Nipple: Everted,Intact  Right Breast: Medium  Right Nipple: Everted,Intact  Breast- Feeding Assessment  Attends Breast-Feeding Classes: No  Breast-Feeding Experience: Yes  Breast Trauma/Surgery: No  Type/Quality: Good  Lactation Consultant Visits  Breast-Feedings: Good   Mother/Infant Observation  Mother Observation: Breast comfortable,Recognizes feeding cues

## 2022-01-29 NOTE — PROGRESS NOTES
1900: Bedside and Verbal shift change report given to IFTIKHAR Rodriguez (oncoming nurse) by Priya Banks (offgoing nurse). Report included the following information SBAR, Intake/Output, MAR, Recent Results and Quality Measures. 1910: This RN at bedside for assessment and to obtain vitals. Patient states she intends to have a natural delivery without pain medication. Patient's mother at bedside. Popsicle given. 1949: This RN at bedside to adjust pitocin and assist patient to changing position to left lateral side. 2007: Patient assisted into sitting position on the edge of bed. 2015: This RN discussing EFM strip with Thania Velez MD. MD reviewing strip and states FHR is acceptable as there are accelerations and moderate variability. 2100: This RN at bedside assisting patient to semi-fowlers position to aid in FHR. Patient stating her contractions are becoming more uncomfortable. Patient's mother at bedside to assist with self-cath. 2110: This RN calling Thania Velez MD to bedside. 2115: SVE by Thania Velez MD, 4/90/-2. FSE dislodged and replaced. 2215: This RN at bedside to readjust patient position. Patient sitting on bedside. 2226: This RN at bedside to stop pitocin to assist in FHR. [phone]: This RN calling Thania Velez MD to advise of FHR. Thania Velez MD states he is okay with patient's strip for the time being. 2244: Patient reporting vaginal pressure. SVE: 6.5/90/-2 by this RN. Patient repositioned to hands and knees with peanut ball under arms and chest. Patient's mother at bedside. 2322: Per Thania Velez MD: give 500 ml bolus for amnioinfusion, and then change rate to 125 ml/hr. Patient remains in hands and knees position. CUB chair is given for additional support. 2358: This RN repositioning patient into sitting on CUB chair, placed on the floor. Patient states she is more comfortable in the sitting position. Patient's mother remains at bedside. 3759: SVE: 6/90/-2    0100:  Thania Velez MD at bedside discussing plan of care, and obtaining history. 0102: 500 ml IV fluid bolus for amnioinfusion. Then change rate to 200 ml/hr. 0106:Oxygen applied. 0120: Jamie Farley MD leaving bedside. 0122: Patient placed in Right lateral position with pillows for support. Patient remains with Oxygen mask in place, and mother of patient at bedside. This RN educating patient on interventions performed. 0130: This RN at bedside providing reassurance, and repositioning patient for comfort. 2085: Jamie Escamillar calling need for  section. 0300: Jesús Harris MD at bedside discussing health history with patient and plan for general anesthesia.    0277: SVE 6 cm by Jamie Farley MD.    7068: Patient arriving in [address] [place] [address].    0349: Delivery of viable male infant via LTCS by Jamie Farley MD. Infant dried and stimulated, care turned over to NICU team.    0583: Patient leaving OR with Jesús Harris MD and Enrico Lua RN.    1747: Patient arriving to PACU Bed 3 with Jesús Harris MD and [facility]. This RN remaining at bedside to perform fundal massage. 6496: Patient recovering in PACU. This RN remains at bedside with Aidee Turner RN    4140: Patient arriving in room 206 with this RN and Chapo JOHNS. Vitals obtained and patient educated on PCA use. Patient and mother verbalizing understanding.

## 2022-01-29 NOTE — PROGRESS NOTES
0700 Received report from Obey Tobin, [address] REPORT:    Verbal report given to Candance Barrs, RN(name) on Corinne Mota  being transferred to MIU(unit) for routine progression of care       Report consisted of patients Situation, Background, Assessment and   Recommendations(SBAR). Information from the following report(s) SBAR, Kardex and MAR was reviewed with the receiving nurse. Lines:   Peripheral IV 01/28/22 Right;Superior Arm (Active)   Site Assessment Clean, dry, & intact 01/29/22 0605   Phlebitis Assessment 0 01/29/22 0605   Infiltration Assessment 0 01/29/22 0605   Dressing Status Clean, dry, & intact 01/29/22 0605   Dressing Type Tape;Transparent 01/29/22 4472   Hub Color/Line Status Green; Infusing;Patent 01/29/22 6123   Action Taken Armboard 01/29/22 0404   Alcohol Cap Used Yes 01/29/22 0605       Peripheral IV 01/29/22 Left;Posterior Hand (Active)   Site Assessment Clean, dry, & intact 01/29/22 0605   Phlebitis Assessment 0 01/29/22 0605   Infiltration Assessment 0 01/29/22 0605   Dressing Status Clean, dry, & intact 01/29/22 0605   Dressing Type Tape;Transparent 01/29/22 0605   Hub Color/Line Status Pink; Infusing;Patent 01/29/22 7076   Action Taken Open ports on tubing capped 01/29/22 0605   Alcohol Cap Used Yes 01/29/22 5295        Opportunity for questions and clarification was provided.       Patient transported with:   Registered Nurse

## 2022-01-29 NOTE — ANESTHESIA PREPROCEDURE EVALUATION
Relevant Problems   NEUROLOGY   (+) Migraine      CARDIOVASCULAR   (+) Arrhythmia   (+) PAC (premature atrial contraction)      HEMATOLOGY   (+) Iron deficiency anemia during pregnancy       Anesthetic History          Comments: Pt reports some question of cardiac arrest as a child undergoing club foot correction.  She is unclear on the details, no family hx of known MH     Review of Systems / Medical History  Patient summary reviewed and pertinent labs reviewed    Pulmonary            Asthma : well controlled       Neuro/Psych         Neuromuscular disease and headaches  Pertinent negatives: No seizures, TIA and CVA  Comments: Tethered spinal cord Cardiovascular                Pertinent negatives: No past MI, angina and CHF  Exercise tolerance: >4 METS     GI/Hepatic/Renal             Pertinent negatives: No renal disease   Endo/Other        Arthritis  Pertinent negatives: No diabetes   Other Findings              Physical Exam    Airway  Mallampati: II  TM Distance: 4 - 6 cm  Neck ROM: normal range of motion   Mouth opening: Normal     Cardiovascular      Rate: normal         Dental  No notable dental hx       Pulmonary  Breath sounds clear to auscultation               Abdominal  GI exam deferred       Other Findings            Anesthetic Plan    ASA: 3  Anesthesia type: general          Induction: Intravenous  Anesthetic plan and risks discussed with: Patient

## 2022-01-29 NOTE — ANESTHESIA POSTPROCEDURE EVALUATION
Procedure(s):   SECTION. general    Anesthesia Post Evaluation      Multimodal analgesia: multimodal analgesia used between 6 hours prior to anesthesia start to PACU discharge  Patient location during evaluation: PACU  Patient participation: complete - patient participated  Level of consciousness: sleepy but conscious  Pain management: adequate  Airway patency: patent  Anesthetic complications: no  Cardiovascular status: acceptable and stable  Respiratory status: acceptable and unassisted  Hydration status: acceptable  Comments: The patient was seen and evaluated in the post-operative period. The time of my evaluation may not match the time of this note. The patient denied uncontrolled pain or nausea, and there were no significant complications evident. Nguyễn Garrett MD      Post anesthesia nausea and vomiting:  none  Final Post Anesthesia Temperature Assessment:  Normothermia (36.0-37.5 degrees C)      INITIAL Post-op Vital signs:   Vitals Value Taken Time   /66 22 0440   Temp     Pulse 63 22 0444   Resp 18 22 0444   SpO2 98 % 22 0444   Vitals shown include unvalidated device data.

## 2022-01-29 NOTE — OP NOTES
OP Note  2022      PreOp Diagnosis:Recurrent variable FHR decelerations, Unstable lie, maternal tethered spinal cord, 39 weeks    Post OP diagnosis: Same + LMI    Anesthesia: General    Surgeon: Hanane Swanson    Assistant: None    Procedure:  with a Misgav transverse lower abdominal incision and a low transverse uterine incision    Birth:   Information for the patient's :  Madison Ramesh, Tono Ferreira [[id number]]   Delivery of a 3.25 kg male infant on 2022 at 3:49 AM. Apgars were 9  and 9 . Umbilical Cord: 3 Vessels     Umbilical Cord Events: Nuchal Cord Without Compressions     Placenta: Expressed removal with Normal appearance. Amniotic Fluid Volume: Moderate     Amniotic Fluid Description:  Clear         Specimen: None    Drain:  Puentes    EBL: 1200 mls    QBL: 4418 mls    Complications: None    Implants: None    Procedure Detail: We went to the OR. She was positioned, supine with a left lateral tilt. She was prepped and draped, in the usual sterile manner. General anesthesia was induced. I incised the skin, transversely, 3 cm below a line joining the anterior superior iliac spines. I deepened the incision in the midline, for about 2 - 3 cms, down to the rectus sheath. I extended the subcutaneous space bluntly. I made a small transverse incision into the rectus sheath, with a knife. I transected the pyramidalis muscles. The fascial incision was extended bluntly. The assistant and I  the rectus muscles by pulling them with a slight outward rotation. I opened the parietal peritoneum bluntly. I identified the lower uterine segment and incised it with a knife. I extended the incision bluntly. Amniotomy revealed clear fluid. I delivered the baby's head. The baby came out smoothly. The baby had good respiratory effort, color, and tone. I waited one minute prior to clamping the cord. We gave the baby to the nursery personnel. I expressed the placenta.   It appeared normal and was intact. I cleaned the uterine cavity. I closed the uterine incision with a running 0-Vicryl, followed by an imbricating 0-Vicryl. I placed two figure of eight sutures with 0-Vicryl, to get good hemostasis. The uterus, tubes, and ovaries appear normal.   I closed the peritoneum with a running 2-0 Vicryl. I closed the fascia with a running looped 0 PDS. I irrigated the subcutaneous space. I closed the subcutaneous layer with 2-0 Vicryl. I closed the skin with 4-0 Monocryl. Sponge, instrument, and needle counts are correct.     Traci Vora MD  1/29/2022

## 2022-01-29 NOTE — PROGRESS NOTES
Post-Operative Day Number  Progress Note    Patient doing well post-op day 0 from  delivery earlier this morning, without significant complaints. Pain controlled on current medication. CS under general (has tethered spinal cord), has PCA, drowsy. Asking about her Corean [place] - states feels run down if she doesn't take it, concerned about getting the B6.     Vitals:  Patient Vitals for the past 8 hrs:   BP Temp Pulse Resp SpO2   22 -- -- -- -- 98 %   2234 -- -- -- -- 98 %   2230 (!) 94/41 -- (!) 59 -- --   22 -- -- -- -- 98 %   22 -- -- -- -- 98 %   22 -- -- -- -- 98 %   2214 (!) 101/57 -- 66 -- 99 %   2209 -- -- -- -- 98 %   2204 -- -- -- -- 98 %   22 0900 (!) 95/42 -- 68 -- --   2259 -- -- -- -- 98 %   2254 -- -- -- -- 98 %   22 0849 -- -- -- -- 99 %   22 0845 (!) 87/42 -- 60 -- --   2244 -- -- -- -- 98 %   2239 (!) 95/52 -- 95 -- 98 %   2234 -- -- -- -- 98 %   22 -- -- -- -- 98 %   22 -- -- -- -- 98 %   22 -- -- -- -- 98 %   2215 (!) 84/43 -- 60 -- --   22 -- -- -- -- 98 %   22 -- -- -- -- 98 %   2204 -- -- -- -- 98 %   22 0800 (!) 84/43 -- (!) 56 -- --   229 -- -- -- -- 99 %   224 -- -- -- -- 98 %   2249 -- -- -- -- 98 %   2245 (!) 89/45 -- (!) 57 -- --   2244 -- -- -- -- 98 %   2239 -- -- -- -- 98 %   2234 -- -- -- -- 98 %   2231 (!) 96/44 -- (!) 56 -- --   2227 (!) 98/53 -- 61 -- 93 %   2226 -- -- -- -- 94 %   2223 (!) 98/53 97.6 °F (36.4 °C) 61 14 95 %   2222 (!) 70/50 -- (!) 59 -- --   2221 -- -- -- -- 94 %   2219 -- -- -- -- 94 %   2216 -- -- -- -- 94 %   2211 -- -- -- -- 94 %   2206 -- -- -- -- 94 %   22 0701 -- -- -- -- 93 %   22 0656 -- -- -- -- 94 %   22 0652 (!) 97/48 -- (!) 58 -- --   22 0650 -- -- -- -- 93 %   22 0605 106/61 97.6 °F (36.4 °C) 63 12 98 %   22 0556 -- -- -- -- 94 %   22 0555 -- -- 65 14 93 %   22 0554 -- -- 83 16 95 %   22 0553 -- -- 63 11 94 %   22 0552 -- -- 60 11 97 %   22 0551 -- -- 60 10 97 %   22 0550 108/64 -- 64 11 97 %   22 0549 -- -- 60 11 97 %   22 0548 -- -- 61 11 97 %   22 0547 -- -- (!) 59 12 98 %   22 0546 -- -- (!) 59 11 97 %   22 0545 112/68 -- (!) 59 11 97 %   22 0544 -- -- (!) 58 12 98 %   22 0543 -- -- 62 16 98 %   22 0542 -- -- 61 14 98 %   22 0541 -- -- 63 16 96 %   22 0540 113/61 -- 63 13 97 %   22 0539 -- -- (!) 55 10 98 %   22 0538 -- -- 71 16 99 %   22 0537 -- -- 66 14 98 %   22 0536 -- -- 61 11 97 %   22 0535 107/76 -- 61 12 97 %   22 0534 -- -- (!) 55 11 97 %   22 0533 -- -- 66 12 97 %   22 0532 -- -- (!) 56 10 98 %   22 0531 -- -- (!) 59 13 98 %   22 0530 (!) 109/58 97.8 °F (36.6 °C) 60 12 97 %   22 0523 113/65 -- -- -- 98 %   22 0520 113/65 -- 60 11 99 %   22 0510 111/65 -- (!) 57 11 98 %   22 0505 117/78 -- -- -- 95 %   22 0500 117/78 -- 82 17 99 %   22 0455 115/78 -- 66 21 97 %   22 0450 123/72 -- 79 20 100 %   22 0445 118/74 -- 64 17 97 %   22 0440 114/66 97.6 °F (36.4 °C) 84 15 97 %     Temp (24hrs), Av °F (36.7 °C), Min:97.6 °F (36.4 °C), Max:98.4 °F (36.9 °C)                     Intake/Output Summary (Last 24 hours) at 2022 0950  Last data filed at 2022 2857  Gross per 24 hour   Intake 1100 ml   Output 1495 ml   Net -395 ml         Exam:  Patient without distress               Lungs:  CTA bilaterally               CV:  Regular rate and rhythm               Abdomen soft, nondistended, hypoactive bowel sounds               Uterus:  fundus firm at level of umbilicus, nontender. Incision:  Dressing C/D/I               Lower extremities are negative for cords or tenderness; no swelling. SCDs in place    Labs:   Recent Results (from the past 24 hour(s))   URINALYSIS W/ REFLEX CULTURE    Collection Time: 01/28/22 12:47 PM    Specimen: Urine   Result Value Ref Range    Color YELLOW/STRAW      Appearance CLOUDY (A) CLEAR      Specific gravity 1.022 1.003 - 1.030      pH (UA) 6.5 5.0 - 8.0      Protein Negative NEG mg/dL    Glucose Negative NEG mg/dL    Ketone 80 (A) NEG mg/dL    Bilirubin Negative NEG      Blood Negative NEG      Urobilinogen 1.0 0.2 - 1.0 EU/dL    Nitrites Negative NEG      Leukocyte Esterase Negative NEG      WBC 0-4 0 - 4 /hpf    RBC 0-5 0 - 5 /hpf    Epithelial cells FEW FEW /lpf    Bacteria Negative NEG /hpf    UA:UC IF INDICATED CULTURE NOT INDICATED BY UA RESULT CNI      Hyaline cast 0-2 0 - 5 /lpf       Lab Results   Component Value Date/Time    Rubella, External immune 02/26/2019 12:00 AM    GrBStrep, External Negative 09/12/2019 12:00 AM    HBsAg, External neg 02/26/2019 12:00 AM    HIV, External neg 02/26/2019 12:00 AM    Gonorrhea, External neg 02/26/2019 12:00 AM    Chlamydia, External neg 02/26/2019 12:00 AM       Assessment and Plan:  Postoperative day #0S/P C/S. Doing well. - Continue routine post-op care and maternal education.      - should be able to remove dunn later today, then pt will continue to self-cath   - advance diet as tolerated

## 2022-01-29 NOTE — PROGRESS NOTES
Labor Progress Note  Patient seen, fetal heart rate and contraction pattern evaluated, patient examined. Patient is tolerating her contractions well. Visit Vitals  /76 (BP 1 Location: Right upper arm, BP Patient Position: Lying right side)   Pulse 96   Temp 98.2 °F (36.8 °C)   Resp 16   Ht 5' 2\" (1.575 m)   Wt 62.6 kg (138 lb)   SpO2 99%   BMI 25.24 kg/m²       Physical Exam:    31 y.o.  in no acute distress. Cervical Exam:   Presentation Vertex  Dilation 4 cms   Effacement 90 %   Station -2  Membranes: Prior amniotomy: Clear  Uterine Activity:   Frequency: Every 3 minutes   Duration: 60 seconds   Intensity: Strong  Fetal Heart Rate:    Baseline: 145 bpm   Variability: Moderate   Accelerations: Absent   Decelerations: Variable and Intermittent  Category: 2    Procedure: Scalp elctrode placement (it came off with exam and I replaced it.)    Recent Results (from the past 12 hour(s))   URINALYSIS W/ REFLEX CULTURE    Collection Time: 22 12:47 PM    Specimen: Urine   Result Value Ref Range    Color YELLOW/STRAW      Appearance CLOUDY (A) CLEAR      Specific gravity 1.022 1.003 - 1.030      pH (UA) 6.5 5.0 - 8.0      Protein Negative NEG mg/dL    Glucose Negative NEG mg/dL    Ketone 80 (A) NEG mg/dL    Bilirubin Negative NEG      Blood Negative NEG      Urobilinogen 1.0 0.2 - 1.0 EU/dL    Nitrites Negative NEG      Leukocyte Esterase Negative NEG      WBC 0-4 0 - 4 /hpf    RBC 0-5 0 - 5 /hpf    Epithelial cells FEW FEW /lpf    Bacteria Negative NEG /hpf    UA:UC IF INDICATED CULTURE NOT INDICATED BY UA RESULT CNI      Hyaline cast 0-2 0 - 5 /lpf     Assessment/Plan:    Labor is progressing. Will keep oxytocin at 2 mu/min.   Edson Sol MD  2022

## 2022-01-29 NOTE — PROGRESS NOTES
Labor Progress Note  Patient seen, fetal heart rate and contraction pattern evaluated, patient examined. Visit Vitals  /60 (BP 1 Location: Right upper arm, BP Patient Position: Semi fowlers)   Pulse 83   Temp 98 °F (36.7 °C)   Resp 16   Ht 5' 2\" (1.575 m)   Wt 62.6 kg (138 lb)   SpO2 98%   BMI 25.24 kg/m²       Physical Exam:    31 y.o. Terryl Rakes  in no acute distress. Cervical Exam:   Presentation Vertex  Dilation 6 cms   Effacement 100 %   Station -1  Membranes: Prior amniotomy: Clear  Uterine Activity:   Frequency: Every 3 minutes   Duration: 60 seconds   Intensity: Moderate  Fetal Heart Rate:    Baseline: 125 bpm   Variability: Moderate   Accelerations: Absent   Decelerations: Variable and Recurrent  Category: 2      No results found for this or any previous visit (from the past 12 hour(s)). Assessment/Plan:  Recurrent varible fhr decels no cervical change   I recommend a  delivery. I reviewed the benefits and risks, including infection, incsional problems, injury to adjacent organs, bleeding, and medical complications (pneumonia, thrombosis). I answered her questions. She agrees.     Maksim Garcia MD  2022

## 2022-01-29 NOTE — PROGRESS NOTES
1706-Patient requesting to have milk allergy removed from allergy list. Patient states she can have some milk products and that her allergy to milk is bloating. Spoke to Dr Binnie Litten. Per Dr Binnie Litten, ok to have milk allergy removed per patient. 1706-Patient urine output noted to be dark jl. Spoke to Dr Binnie Litten. New orders given for 500ml bolus. See md order. Per Dr Binnie Litten, leave dunn catheter until patient is able to self or until evaluated by MD during rounding. 1740-Patient states she has an allergy to norco. Per patient, norco caused her to be short of breath. norco/Hydrocodone allergy entered. Dr Binnie Litten signed out to [facility] hospitalist. Call placed to Dr Reyna Miranda, [facility] hospitalist for new narcotic pain med orders. Per Dr Reyna Miranda, will enter orders. 1800-No pain medication orders entered. Call placed to Dr Stephanie Marks. Per Dr Reyna Miranda, will enter orders. 1830-No pain medication orders entered. Call placed to Dr Stephanie Marks. Per Dr Reyna Miranda, will enter orders.

## 2022-01-30 LAB
BASOPHILS # BLD: 0.1 K/UL (ref 0–0.1)
BASOPHILS NFR BLD: 0 % (ref 0–1)
DIFFERENTIAL METHOD BLD: ABNORMAL
EOSINOPHIL # BLD: 0 K/UL (ref 0–0.4)
EOSINOPHIL NFR BLD: 0 % (ref 0–7)
ERYTHROCYTE [DISTWIDTH] IN BLOOD BY AUTOMATED COUNT: 13.9 % (ref 11.5–14.5)
HCT VFR BLD AUTO: 29.2 % (ref 35–47)
HGB BLD-MCNC: 9.2 G/DL (ref 11.5–16)
IMM GRANULOCYTES # BLD AUTO: 0.1 K/UL (ref 0–0.04)
IMM GRANULOCYTES NFR BLD AUTO: 1 % (ref 0–0.5)
LYMPHOCYTES # BLD: 2.1 K/UL (ref 0.8–3.5)
LYMPHOCYTES NFR BLD: 11 % (ref 12–49)
MCH RBC QN AUTO: 26.4 PG (ref 26–34)
MCHC RBC AUTO-ENTMCNC: 31.5 G/DL (ref 30–36.5)
MCV RBC AUTO: 83.7 FL (ref 80–99)
MONOCYTES # BLD: 1.2 K/UL (ref 0–1)
MONOCYTES NFR BLD: 7 % (ref 5–13)
NEUTS SEG # BLD: 15.3 K/UL (ref 1.8–8)
NEUTS SEG NFR BLD: 81 % (ref 32–75)
NRBC # BLD: 0 K/UL (ref 0–0.01)
NRBC BLD-RTO: 0 PER 100 WBC
PLATELET # BLD AUTO: 216 K/UL (ref 150–400)
PMV BLD AUTO: 12.4 FL (ref 8.9–12.9)
RBC # BLD AUTO: 3.49 M/UL (ref 3.8–5.2)
WBC # BLD AUTO: 18.9 K/UL (ref 3.6–11)

## 2022-01-30 PROCEDURE — 36415 COLL VENOUS BLD VENIPUNCTURE: CPT

## 2022-01-30 PROCEDURE — 74011250637 HC RX REV CODE- 250/637: Performed by: OBSTETRICS & GYNECOLOGY

## 2022-01-30 PROCEDURE — 85461 HEMOGLOBIN FETAL: CPT

## 2022-01-30 PROCEDURE — 77030036554

## 2022-01-30 PROCEDURE — 86900 BLOOD TYPING SEROLOGIC ABO: CPT

## 2022-01-30 PROCEDURE — 85025 COMPLETE CBC W/AUTO DIFF WBC: CPT

## 2022-01-30 PROCEDURE — 65270000029 HC RM PRIVATE

## 2022-01-30 RX ADMIN — HYDROMORPHONE HYDROCHLORIDE 2 MG: 2 TABLET ORAL at 00:21

## 2022-01-30 RX ADMIN — DOCUSATE SODIUM 100 MG: 100 CAPSULE ORAL at 09:04

## 2022-01-30 RX ADMIN — HYDROMORPHONE HYDROCHLORIDE 2 MG: 2 TABLET ORAL at 04:35

## 2022-01-30 RX ADMIN — SIMETHICONE 80 MG: 80 TABLET, CHEWABLE ORAL at 13:04

## 2022-01-30 RX ADMIN — HYDROMORPHONE HYDROCHLORIDE 2 MG: 2 TABLET ORAL at 09:05

## 2022-01-30 RX ADMIN — MUPIROCIN: 20 OINTMENT TOPICAL at 00:21

## 2022-01-30 RX ADMIN — IBUPROFEN 800 MG: 800 TABLET, FILM COATED ORAL at 13:04

## 2022-01-30 RX ADMIN — IBUPROFEN 800 MG: 800 TABLET, FILM COATED ORAL at 21:06

## 2022-01-30 RX ADMIN — IBUPROFEN 800 MG: 800 TABLET, FILM COATED ORAL at 04:35

## 2022-01-30 RX ADMIN — LEVOTHYROXINE SODIUM 50 MCG: 0.05 TABLET ORAL at 09:05

## 2022-01-30 RX ADMIN — AMOXICILLIN 250 MG: 250 CAPSULE ORAL at 09:04

## 2022-01-30 RX ADMIN — SIMETHICONE 80 MG: 80 TABLET, CHEWABLE ORAL at 21:06

## 2022-01-30 RX ADMIN — DOCUSATE SODIUM 100 MG: 100 CAPSULE ORAL at 17:12

## 2022-01-30 RX ADMIN — HYDROMORPHONE HYDROCHLORIDE 2 MG: 2 TABLET ORAL at 13:04

## 2022-01-30 RX ADMIN — SIMETHICONE 80 MG: 80 TABLET, CHEWABLE ORAL at 09:05

## 2022-01-30 RX ADMIN — HYDROMORPHONE HYDROCHLORIDE 2 MG: 2 TABLET ORAL at 21:06

## 2022-01-30 RX ADMIN — HYDROMORPHONE HYDROCHLORIDE 2 MG: 2 TABLET ORAL at 17:12

## 2022-01-30 NOTE — ROUTINE PROCESS
Bedside and Verbal shift change report given to zan horta rn (oncoming nurse) by jasen todd rn (offgoing nurse). Report included the following information SBAR, Kardex, OR Summary, Procedure Summary, Intake/Output, MAR, Accordion and Recent Results.

## 2022-01-30 NOTE — ROUTINE PROCESS
Bedside and Verbal shift change report given to Magy Marquez (oncoming nurse) by Divya Tamayo (offgoing nurse). Report given with SBAR, Kardex, Intake/Output and MAR.

## 2022-01-30 NOTE — PROGRESS NOTES
2330- Assisted patient with ambulation in room; tolerated well. Pt's urine light yellow and output increased to about 100 cc/ hr. Pt's IV saline locked at this time. Pt encouraged to increase po intake. 2345- Per shift change report, the dunn catheter will remain in until the morning when the patient is able to self cath. Pt acknowledges and agrees with the plan. Olga care performed at this time and pad changed. 0030- Left AC IV discontinued at this time. IV not in flowsheet. Catheter tip intact. No issues. 0045- Pt's ABD pad half peeled off. RN removed bandage at this time. No issues removing ABD pad. When removing soft cloth underneath, fingertip size spot on the far right side stuck to incision. Saline flush applied to assist with removing; however, cloth remains stuck. Scant old drainage noted to the left of the stuck area. Soft cloth left in place for now. No new drainage or bleeding noted. Pt tolerated well    0245- During rounds, patient notified RN that she was able to remove the remaining piece of the dressing stuck to incision. No new drainage was noted on dressing. RN assessed incision and no new drainage noted on incision. 2\" area of dried blood noted on the right side. Will continue to monitor incision. Sahil Diamond- Assisted with ambulation to bathroom. Olga care performed. Underwear and abdominal binder placed.  Resting in bed holding infant

## 2022-01-30 NOTE — ROUTINE PROCESS
Bedside and Verbal shift change report given to PERFECTO Ledesma RN (oncoming nurse) by Major Ny RN (offgoing nurse). Report included the following information SBAR, Kardex, Intake/Output, MAR and Accordion.

## 2022-01-30 NOTE — PROGRESS NOTES
Post-Operative         S/ Taking PO , no complaints    O/  VSS AF           Abdomen with expected tenderness         Fundus firm         Dressing dry           Hgb:9.2    A/  POD 1 LTCS, stable    P/  D/C dunn and dressing, ambulate, advance diet

## 2022-01-31 VITALS
HEART RATE: 89 BPM | RESPIRATION RATE: 16 BRPM | DIASTOLIC BLOOD PRESSURE: 53 MMHG | OXYGEN SATURATION: 97 % | BODY MASS INDEX: 25.4 KG/M2 | SYSTOLIC BLOOD PRESSURE: 105 MMHG | TEMPERATURE: 98.3 F | WEIGHT: 138 LBS | HEIGHT: 62 IN

## 2022-01-31 PROCEDURE — 74011250636 HC RX REV CODE- 250/636: Performed by: OBSTETRICS & GYNECOLOGY

## 2022-01-31 PROCEDURE — 65270000029 HC RM PRIVATE

## 2022-01-31 PROCEDURE — 74011250637 HC RX REV CODE- 250/637: Performed by: OBSTETRICS & GYNECOLOGY

## 2022-01-31 PROCEDURE — 3E0234Z INTRODUCTION OF SERUM, TOXOID AND VACCINE INTO MUSCLE, PERCUTANEOUS APPROACH: ICD-10-PCS | Performed by: OBSTETRICS & GYNECOLOGY

## 2022-01-31 RX ORDER — IBUPROFEN 800 MG/1
800 TABLET ORAL
Qty: 30 TABLET | Refills: 1 | Status: SHIPPED | OUTPATIENT
Start: 2022-01-31 | End: 2022-02-07 | Stop reason: ALTCHOICE

## 2022-01-31 RX ORDER — HYDROMORPHONE HYDROCHLORIDE 2 MG/1
2 TABLET ORAL
Qty: 20 TABLET | Refills: 0 | Status: SHIPPED | OUTPATIENT
Start: 2022-01-31 | End: 2022-02-03

## 2022-01-31 RX ORDER — IBUPROFEN 600 MG/1
TABLET ORAL
Qty: 30 TABLET | Refills: 0 | Status: SHIPPED | OUTPATIENT
Start: 2022-01-31 | End: 2022-03-09

## 2022-01-31 RX ADMIN — HYDROMORPHONE HYDROCHLORIDE 2 MG: 2 TABLET ORAL at 05:22

## 2022-01-31 RX ADMIN — HYDROMORPHONE HYDROCHLORIDE 2 MG: 2 TABLET ORAL at 21:12

## 2022-01-31 RX ADMIN — SIMETHICONE 80 MG: 80 TABLET, CHEWABLE ORAL at 13:36

## 2022-01-31 RX ADMIN — HYDROMORPHONE HYDROCHLORIDE 2 MG: 2 TABLET ORAL at 01:08

## 2022-01-31 RX ADMIN — AMOXICILLIN 250 MG: 250 CAPSULE ORAL at 09:38

## 2022-01-31 RX ADMIN — MUPIROCIN: 20 OINTMENT TOPICAL at 09:38

## 2022-01-31 RX ADMIN — IBUPROFEN 800 MG: 800 TABLET, FILM COATED ORAL at 13:36

## 2022-01-31 RX ADMIN — HUMAN RHO(D) IMMUNE GLOBULIN 0.3 MG: 300 INJECTION, SOLUTION INTRAMUSCULAR at 10:12

## 2022-01-31 RX ADMIN — HYDROMORPHONE HYDROCHLORIDE 2 MG: 2 TABLET ORAL at 13:36

## 2022-01-31 RX ADMIN — SIMETHICONE 80 MG: 80 TABLET, CHEWABLE ORAL at 17:12

## 2022-01-31 RX ADMIN — SIMETHICONE 80 MG: 80 TABLET, CHEWABLE ORAL at 09:38

## 2022-01-31 RX ADMIN — HYDROMORPHONE HYDROCHLORIDE 2 MG: 2 TABLET ORAL at 09:38

## 2022-01-31 RX ADMIN — LEVOTHYROXINE SODIUM 50 MCG: 0.05 TABLET ORAL at 07:20

## 2022-01-31 RX ADMIN — DOCUSATE SODIUM 100 MG: 100 CAPSULE ORAL at 17:12

## 2022-01-31 RX ADMIN — IBUPROFEN 800 MG: 800 TABLET, FILM COATED ORAL at 21:12

## 2022-01-31 RX ADMIN — IBUPROFEN 800 MG: 800 TABLET, FILM COATED ORAL at 05:22

## 2022-01-31 RX ADMIN — DOCUSATE SODIUM 100 MG: 100 CAPSULE ORAL at 09:38

## 2022-01-31 RX ADMIN — HYDROMORPHONE HYDROCHLORIDE 2 MG: 2 TABLET ORAL at 17:12

## 2022-01-31 RX ADMIN — SIMETHICONE 80 MG: 80 TABLET, CHEWABLE ORAL at 22:08

## 2022-01-31 NOTE — DISCHARGE INSTRUCTIONS
POST DELIVERY DISCHARGE INSTRUCTIONS    Name: Butch Castrejon  YOB: 1985  Primary Diagnosis: Active Problems:    Pregnancy (2022)        General:     Diet/Diet Restrictions:  Eight 8-ounce glasses of fluid daily (water, juices); avoid excessive caffeine intake. Meals/snacks as desired which are high in fiber and carbohydrates and low in fat and cholesterol. Medications:   {Medication reconciliation information is now added to the patient's AVS automatically when it is printed. There is no need to use this SmartLink in discharge instructions. Highlight this text and delete it to clear this message}      Physical Activity / Restrictions / Safety:     Avoid heavy lifting, no more that 8 lbs. For 2-3 weeks; No driving while taking narcotic pain medication. Post  patients should not drive until pain free. No intercourse 4-6 weeks, no douching or tampon use. May resume exercise in 6 weeks. Discharge Instructions/Special Treatment/Home Care Needs:     Continue prenatal vitamins. Continue to use squirt bottle with warm water on your episiotomy after each bathroom use until bleeding stops. If steri-strips applied to your incision, remove in 7 days. Take stool softeners daily. Call your doctor for the following:     Fever over 101 degrees by mouth. Vaginal bleeding heavier than a normal menstrual period or lost larger than a golf ball. Red streaks or increased swelling of legs, painful red streaks on your breast.  Painful urination, or increased pain, redness or discharge with your incision. Pain Management:     Pain Management:   Take Acetaminophen (Tylenol) or Ibuprofen (Advil, Motrin), as directed for pain. Use a warm Sitz bath 3 times daily to relieve episiotomy or hemorrhoidal discomfort. Heating pad to  incision as needed. For hemorrhoidal discomfort, use Tucks and Anusol cream as needed and directed.     Follow-Up Care:     Appointment with MD:   Follow-up Appointments   Procedures    FOLLOW UP VISIT Appointment in: 6 Weeks     Standing Status:   Standing     Number of Occurrences:   1     Order Specific Question:   Appointment in     Answer:   Jitendra Scherer     Telephone number: [phone]    Signed By: Roro Melo MD                                                                                                   Date: 1/31/2022 Time: 5:12 PM

## 2022-01-31 NOTE — ROUTINE PROCESS
Bedside and Verbal shift change report given to Barbara Durand RN (oncoming nurse) by Jayla Prado RN (offgoing nurse). Report included the following information SBAR, Kardex, Intake/Output, MAR and Accordion.

## 2022-01-31 NOTE — DISCHARGE SUMMARY
Obstetrical Discharge Summary     Name: Nanette Taylor MRN: [id number]  SSN: [id number]    YOB: 1985  Age: 31 y.o. Sex: female      Admit Date: 2022    Discharge Date: 2022     Admitting Physician: Vince Ruiz MD     Attending Physician:  Shoaib Vasquez MD     Admission Diagnoses: Pregnancy [Z34.90]    Discharge Diagnoses:   Information for the patient's :  Claudetta Heman, Male Chad Erm [[id number]]   Delivery of a 7 lb 2.6 oz (3.25 kg) male infant via , Low Transverse on 2022 at 3:49 AM  by Lamine Woodward. Apgars were 9  and 9 . Additional Diagnoses:   Hospital Problems  Date Reviewed: 2022          Codes Class Noted POA    Pregnancy ICD-10-CM: Z34.90  ICD-9-CM: V22.2  2022 Unknown             Lab Results   Component Value Date/Time    Rubella, External immune 2019 12:00 AM    GrBStrep, External Negative 2019 12:00 AM       Hospital Course: Normal hospital course following the delivery. Disposition: Home  Condition: Good    Patient Instructions:   Current Discharge Medication List      START taking these medications    Details   !! ibuprofen (MOTRIN) 800 mg tablet Take 1 Tablet by mouth every eight (8) hours as needed for Pain. Qty: 30 Tablet, Refills: 1      !! ibuprofen (IBU) 600 mg tablet Take 1 Tablet by mouth every eight (8) hours as needed for Pain for 7 days, THEN 1 Tablet every eight (8) hours as needed for up to 30 days. Qty: 30 Tablet, Refills: 0    Comments: Pharmacist, Please add the following text to the patient instructions, \"Take as needed for pain in addition to any other medications ordered for pain relief. \"      HYDROmorphone (DILAUDID) 2 mg tablet Take 1 Tablet by mouth every six (6) hours as needed for Pain for up to 3 days. Max Daily Amount: 8 mg. Qty: 20 Tablet, Refills: 0    Associated Diagnoses: Post-op pain       !! - Potential duplicate medications found. Please discuss with provider.       CONTINUE these medications which have NOT CHANGED    Details   amoxicillin (AMOXIL) 250 mg capsule Take 250 mg by mouth daily. levothyroxine (SYNTHROID) 50 mcg tablet Take 1 Tablet by mouth Daily (before breakfast). Qty: 90 Tablet, Refills: 2      multivitamin with iron (FLINTSTONES) chewable tablet Take 1 Tablet by mouth daily. Indications: treatment to prevent mineral deficiency         STOP taking these medications       doxylamine-pyridoxine, vit B6, (Bonjesta) 20-20 mg TbID Comments:   Reason for Stopping:               Reference my discharge instructions.     Follow-up Appointments   Procedures    FOLLOW UP VISIT Appointment in: 6 Weeks     Standing Status:   Standing     Number of Occurrences:   1     Order Specific Question:   Appointment in     Answer:   6 Weeks        Signed By:  Maria Luisa Reynolds MD     January 31, 2022

## 2022-01-31 NOTE — PROGRESS NOTES
Post-Operative Day Number 2 Progress Note    Patient doing well post-op day 2 from  delivery without significant complaints. Pain controlled on current medication. Voiding without difficulty, normal lochia. Vitals:  Patient Vitals for the past 8 hrs:   BP Temp Pulse Resp SpO2   22 1030 109/67 98 °F (36.7 °C) 94 16 98 %     Temp (24hrs), Av.1 °F (36.7 °C), Min:97.9 °F (36.6 °C), Max:98.2 °F (36.8 °C)      Vital signs stable, afebrile. Exam:  Patient without distress. Abdomen soft, fundus firm at level of umbilicus, nontender. Incision dry and clean without erythema. Lower extremities are negative for swelling, cords or tenderness. Labs: No results found for this or any previous visit (from the past 24 hour(s)). Assessment and Plan:  Patient appears to be having uncomplicated post- course. Continue routine post-op care and maternal education.

## 2022-01-31 NOTE — LACTATION NOTE
This note was copied from a baby's chart. Mother states breastfeeding is going well. She is also supplementing with formula after breast is offered if baby is fussy. Mother states baby last breast fed at 0910 for 10 minutes and then he took 15 ml of formula. Current infant weight loss is -3.5% (WNL). Reviewed breastfeeding basics:  Supply and demand,  stomach size, early  Feeding cues, skin to skin, positioning and baby led latch-on, assymetrical latch with signs of good, deep latch vs shallow, feeding frequency and duration, and log sheet for tracking infant feedings and output. Breastfeeding Booklet and Warm line information given. Discussed typical  weight loss and the importance of infant weight checks with pediatrician 1-2 post discharge. Mother chooses to do both breast and bottle. Discussed effects of early supplementation on breastfeeding success; may decrease breastmilk production and supply, increase risk for pathological engorgement, baby may develop preference for faster flow from bottles vs breast, and baby's stomach can be stretched if larger volumes of formula are given. Mother will successfully establish breastfeeding by feeding in response to early feeding cues   or wake every 3h, will obtain deep latch, and will keep log of feedings/output. Taught to BF at hunger cues and or q 2-3 hrs and to offer 10-20 drops of hand expressed colostrum at any non-feeds. Breast Assessment  Left Breast: Medium  Left Nipple: Everted,Intact  Right Breast: Medium  Right Nipple: Everted,Intact  Breast- Feeding Assessment  Attends Breast-Feeding Classes: No  Breast-Feeding Experience: Yes (Breast fed 4 other babies for 6 weeks.)  Breast Trauma/Surgery: No  Type/Quality: Good (Mother is breastfeeding and then offers baby formula.)  Lactation Consultant Visits  Breast-Feedings:  (Mother last fed baby at 1 - mother states she breast fed for 10 minutes then took 15 ml of formula. Encouraged mother to call [facility] for breastfeeding assistance.)      Mother given breastfeeding handouts/LC# and breastfeeding supplies.

## 2022-02-01 LAB
ABO + RH BLD: NORMAL
BLD PROD TYP BPU: NORMAL
BPU ID: NORMAL
FETAL SCREEN,FMHS: NORMAL
STATUS OF UNIT,%ST: NORMAL
UNIT DIVISION, %UDIV: 0

## 2022-02-01 PROCEDURE — 74011250637 HC RX REV CODE- 250/637: Performed by: OBSTETRICS & GYNECOLOGY

## 2022-02-01 RX ADMIN — AMOXICILLIN 250 MG: 250 CAPSULE ORAL at 09:00

## 2022-02-01 RX ADMIN — HYDROMORPHONE HYDROCHLORIDE 2 MG: 2 TABLET ORAL at 05:19

## 2022-02-01 RX ADMIN — HYDROMORPHONE HYDROCHLORIDE 2 MG: 2 TABLET ORAL at 09:00

## 2022-02-01 RX ADMIN — IBUPROFEN 800 MG: 800 TABLET, FILM COATED ORAL at 05:19

## 2022-02-01 RX ADMIN — DOCUSATE SODIUM 100 MG: 100 CAPSULE ORAL at 09:00

## 2022-02-01 RX ADMIN — MUPIROCIN: 20 OINTMENT TOPICAL at 08:59

## 2022-02-01 RX ADMIN — LEVOTHYROXINE SODIUM 50 MCG: 0.05 TABLET ORAL at 06:04

## 2022-02-01 RX ADMIN — HYDROMORPHONE HYDROCHLORIDE 2 MG: 2 TABLET ORAL at 01:12

## 2022-02-01 NOTE — ROUTINE PROCESS
Pt. Off unit in stable condition via wheelchair with nurse for discharge home per Dr. Ester Garcia . Pt. Is to follow up in 6 weeks and is aware. Prescriptions given to pt. Pt. Denies any headache, dizziness, nausea, vomiting, or pain at this time. Infant in car seat with mother.

## 2022-02-07 ENCOUNTER — OFFICE VISIT (OUTPATIENT)
Dept: OBGYN CLINIC | Age: 37
End: 2022-02-07
Payer: MEDICAID

## 2022-02-07 VITALS — WEIGHT: 120 LBS | BODY MASS INDEX: 21.95 KG/M2 | SYSTOLIC BLOOD PRESSURE: 119 MMHG | DIASTOLIC BLOOD PRESSURE: 72 MMHG

## 2022-02-07 PROCEDURE — 0503F POSTPARTUM CARE VISIT: CPT | Performed by: OBSTETRICS & GYNECOLOGY

## 2022-02-07 NOTE — PROGRESS NOTES
BP check    The patient is a 31 y.o., [address] Patient's last menstrual period was 04/13/2021 (approximate). .     She is here for a blood pressure check. Blood pressure today is 119/72. Patient c/o swelling in legs and feet - though much better today. 18# weight loss in one week since delivery    She is having headaches. Had them even prior to delivery related to dealing with stress/issues with ex. Court this Friday - she feels the pain in her upper and back and seems to radiate to her head. Past Medical History:   Diagnosis Date    Arrhythmia     Arthritis     left foot and lower back    Asthma     no inhaler    Burning with urination     Chronic UTI     Complication of anesthesia     bradycardia- pt stated she was \"shocked\" under general as child    Disease of blood and blood forming organ     Headache     Heart abnormality     mummer    Iron deficiency anemia during pregnancy 8/27/2019    Migraine     Nerve damage     Nerve damage in left foot.    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 in pregnancy     Self-catheterizes urinary bladder     Since age 13   Connie Calderon Tethered spinal cord ([facility] [address]     Unspecified breast disorder     L invert nipple        Past Surgical History:   Procedure Laterality Date    HX BACK SURGERY      x2    HX GYN      episotomy    HX ORTHOPAEDIC      spine and left foot    HX OTHER SURGICAL      L foot has wire from surgery     Social History     Occupational History    Not on file   Tobacco Use    Smoking status: Never Smoker    Smokeless tobacco: Never Used   Vaping Use    Vaping Use: Never used   Substance and Sexual Activity    Alcohol use: No    Drug use: No    Sexual activity: Yes     Partners: Male     Birth control/protection: Condom     Family History   Problem Relation Age of Onset    OSTEOARTHRITIS Mother    [facility] Mother     Alcohol abuse Paternal Grandfather     Bleeding Prob Maternal Grandfather     Stroke Maternal Grandfather     Hypertension Maternal Grandfather     Breast Cancer Other     Atrial Fibrillation Brother     Hypertension Maternal Grandmother        Allergies   Allergen Reactions    Latex Rash     Wheezing    Beef Containing Products Anaphylaxis    Keflex [Cephalexin] Anaphylaxis    Norco [Hydrocodone-Acetaminophen] Shortness of Breath    Codeine Other (comments)     Hyperactivity    Doxycycline Nausea Only    Macrobid [Nitrofurantoin Monohyd/M-Cryst] Other (comments)     Pharmacy had this allergy listed and called the patient and patient could not remember her reaction to the medication. This nurse called the patient . Patient states that when it was prescribed in early pregnancy it caused spotting.  Nuts [Tree Nut] Swelling     Pt stated that her mouth and throat feels funny when she eats nuts      Omnicef [Cefdinir] Other (comments)     \"shakes\"    Pepto-Bismol [Bismuth Subsalicylate] Hives    Phenergan [Promethazine] Other (comments)     Increased sedation    Reglan [Metoclopramide] Anxiety    Soy Hives     Prior to Admission medications    Medication Sig Start Date End Date Taking? Authorizing Provider   ibuprofen (MOTRIN) 800 mg tablet Take 1 Tablet by mouth every eight (8) hours as needed for Pain. 1/31/22   Lincoln Spurling, MD   ibuprofen (IBU) 600 mg tablet Take 1 Tablet by mouth every eight (8) hours as needed for Pain for 7 days, THEN 1 Tablet every eight (8) hours as needed for up to 30 days. 1/31/22 3/9/22  Lincoln Spurling, MD   amoxicillin (AMOXIL) 250 mg capsule Take 250 mg by mouth daily. 9/15/21   Provider, Historical   levothyroxine (SYNTHROID) 50 mcg tablet Take 1 Tablet by mouth Daily (before breakfast). 7/12/21   Lincoln Spurling, MD   multivitamin with iron Amber Terence) chewable tablet Take 1 Tablet by mouth daily.  Indications: treatment to prevent mineral deficiency    Ivis Steven MD      Looks well - changing baby  LE: minimal edema noted  Neuro: grossly intact    Assessment:   Headaches - likely stress - offered flexeril, she declines  Edema improved - looks normal  Plan: Fu with PCP        Instructions given to pt. Handouts given to pt.

## 2022-02-25 ENCOUNTER — OFFICE VISIT (OUTPATIENT)
Dept: CARDIOLOGY CLINIC | Age: 37
End: 2022-02-25
Payer: MEDICAID

## 2022-02-25 VITALS
SYSTOLIC BLOOD PRESSURE: 110 MMHG | RESPIRATION RATE: 18 BRPM | DIASTOLIC BLOOD PRESSURE: 72 MMHG | WEIGHT: 117.4 LBS | OXYGEN SATURATION: 99 % | HEART RATE: 85 BPM | HEIGHT: 62 IN | BODY MASS INDEX: 21.6 KG/M2

## 2022-02-25 DIAGNOSIS — I49.1 PAC (PREMATURE ATRIAL CONTRACTION): ICD-10-CM

## 2022-02-25 DIAGNOSIS — I95.1 ORTHOSTATIC HYPOTENSION: ICD-10-CM

## 2022-02-25 DIAGNOSIS — R00.2 PALPITATIONS: Primary | ICD-10-CM

## 2022-02-25 PROCEDURE — 99214 OFFICE O/P EST MOD 30 MIN: CPT | Performed by: INTERNAL MEDICINE

## 2022-02-25 NOTE — PROGRESS NOTES
Identified pt with two pt identifiers(name and ). Reviewed record in preparation for visit and have obtained necessary documentation. Chief Complaint   Patient presents with    Follow-up     5 mo, palp, ortho Hypotentsion, vasovagal episode          Vitals:    22 1403   BP: 110/72   Pulse: 85   Resp: 18   SpO2: 99%   Weight: 117 lb 6.4 oz (53.3 kg)   Height: 5' 2\" (1.575 m)   PainSc:   2   PainLoc: Head   LMP: 2021       Pain Scale: 2/10    Coordination of Care Questionnaire:  :     1. Have you been to the ER, urgent care clinic since your last visit? Hospitalized since your last visit? Yes YES, C section 22    2. Have you seen or consulted any other health care providers outside of the [address] [place] [address] since your last visit? Include any pap smears or colon screening.  No     Room 6    Chest pain: no  Shortness of breath: no  Edema: bilat legs and feet  Palpitations: no  Dizziness: no    New diagnosis/Surgeries: Yes, C section    ER/Hospitalizations: Yes, C section and delivery    Pt reports bad dizziness episode 2 wks ago    Pt reports neck pain occasionally

## 2022-02-25 NOTE — LETTER
2/26/2022    Patient: Butch Castrejon   YOB: 1985   Date of Visit: 2/25/2022     Robbie Malin NP  [address] [address]  Via Fax: [phone]    Dear Robbie Malin NP,      Thank you for referring Ms. Graciela Georges to [facility] for evaluation. My notes for this consultation are attached. If you have questions, please do not hesitate to call me. I look forward to following your patient along with you.       Sincerely,    Clement Mejia MD

## 2022-02-25 NOTE — PROGRESS NOTES
Chris Mojica MD    [id number] [facility], [address] [facility]    Office [phone],[facility] [phone]      Rafiq Barbosa is a 31 y.o. female is here for f/u visit. Primary care physician:  Thais Mai NP    Dear Dr Hetal Abreu,     I had the pleasure of seeing Ms. Anuradha Casper in the office today.        Assessment:     Palpitations/ED visit 8/7/2021-for vasovagal episode  Pregnancy - G8-  4 Living  ( 10/2019 - twins) -multiple miscarriages previously. Recent delivery -48 child DODelivery - 1/29/22 - Csec - Male baby  History of tethered spinal cord -(multiple surgeries from age 13 to age 26) some residual deficit with walking and weakness in her feet. Patient has been told  \"Her heart rate to be restarted\" during 1 of the surgeries when she was age 8 . Records not available. ( Self cath's herself - has UTI's)  Hx of Orthostatic Hypotension        Plan:      Echocardiogram-8/21-normal LVEF  48-hour Holter monitor.-8/2021-no significant arrhythmias  Repeat echocardiogram post delivery  Advised to keep hydrated. Compression stockings. Follow-up 6 months/earlier as needed      Patient understands the plan. All questions were answered to the patient's satisfaction.     Medication Side Effects and Warnings were discussed with patient: yes  Patient Labs were reviewed and or requested:  yes  Patient Past Records were reviewed and or requested: yes     I appreciate the opportunity to be involved in [address] [place] [address]. Please see note below for details. Please do not hesitate to contact us with questions or concerns.     Chris Mojica MD     Cardiac Testing/ Procedures:     A. Cardiac Cath/PCI:    B.ECHO/IRA: 8/26/21 - LV: Calculated LVEF is 59%. Biplane method used to measure ejection fraction. Normal cavity size, wall thickness, systolic function (ejection fraction normal) and diastolic function. Wall motion: normal.  TV: Right Ventricular Arterial Pressure (RVSP) is 25 mmHg.  Pulmonary hypertension not suggested by Doppler findings. 10/5/19 - EF 55-60%; Redundant non prolapsing ant leaflet chords     C. StressNuclear/Stress ECHO/Stress test:     D. Vascular:     E. EP: 48-hour Holter monitor  8/20/2021  Minimum heart rate 50 bpm, maximum heart rate 143 bpm,  5 PACs  No significant arrhythmias. Symptoms associated with sinus rhythm. E cardio event monitor 4/4/2019 to 5/3/2019-sinus rhythm, 0 critical, 0 serious, for stable events. Sinus rhythm/sinus tachycardia. /PAC associated with flutter or skipped beats/fatigue        9/27/17-event monitor-sinus tachycardia     F. Miscellaneous:     History of present illness:     Post delivery of her baby, patient had one episode of dizziness/palpitations. No syncope. Her blood pressure is staying up and stabilized. ED visit 8/7/2021-for vasovagal episode. No further episodes of syncope since ED visit. No chest pain. Complains of fatigue. No dyspnea. No syncope. ( Initial visit - 3/2019- 30 yr old CF who is G6 A2 (twins first pregnancy, L2 1 (boy and girl) who recently found out that she is pregnant 2/2019 and has twins. Since her diagnosis of pregnancy, she has been having palpitations. Also has been suffering from allergy/sinus infection. No dizziness, syncope, chest pain, dyspnea, swelling lower extremities. Palpitations can occur at any time.       History of tethered spinal cord. Has had multiple surgeries since age 13 to age 26. Apparently her heart had stopped during 1 of the surgeries when she was 10 or 11 and it had to be restarted.)        ROS:  (bold if positive, if negative)             Medications before admission:    Current Outpatient Medications   Medication Sig Dispense    ibuprofen (IBU) 600 mg tablet Take 1 Tablet by mouth every eight (8) hours as needed for Pain for 7 days, THEN 1 Tablet every eight (8) hours as needed for up to 30 days. 30 Tablet    amoxicillin (AMOXIL) 250 mg capsule Take 250 mg by mouth daily.  levothyroxine (SYNTHROID) 50 mcg tablet Take 1 Tablet by mouth Daily (before breakfast). 90 Tablet    multivitamin with iron (FLINTSTONES) chewable tablet Take 1 Tablet by mouth daily. Indications: treatment to prevent mineral deficiency      No current facility-administered medications for this visit. Family History of CAD:    No    Social History:  Current  Smoker  No    Physical Exam:  Visit Vitals  /72 (BP 1 Location: Right upper arm, BP Patient Position: Sitting, BP Cuff Size: Adult)   Pulse 85   Resp 18   Ht 5' 2\" (1.575 m)   Wt 117 lb 6.4 oz (53.3 kg)   LMP 04/13/2021 (Approximate)   SpO2 99%   BMI 21.47 kg/m²          Gen: Well-developed,in no acute distress  Neck: Supple,No JVD, No Carotid Bruit,   Resp: No accessory muscle use, Clear breath sounds, No rales or rhonchi  Card: Regular Rate,Rythm,Normal S1, S2, No murmurs, rubs or gallop. No thrills.    Abd:  Gravid uterus  MSK: No cyanosis  Skin: No rashes    Neuro: moving all four extremities , follows commands appropriately  Psych:  Good insight, oriented to person, place , alert, anxious  LE: No edema    EKG    LABS:        Lab Results   Component Value Date/Time    WBC 18.9 (H) 01/30/2022 03:13 AM    HGB 9.2 (L) 01/30/2022 03:13 AM    HCT 29.2 (L) 01/30/2022 03:13 AM    PLATELET 881 23/18/7601 03:13 AM    Hgb, External 8.9 07/25/2019 12:00 AM    Hct, External 27.3 07/25/2019 12:00 AM    Platelet cnt., External 179 07/25/2019 12:00 AM     Lab Results   Component Value Date/Time    Sodium 139 11/04/2021 12:00 AM    Potassium 3.6 11/04/2021 12:00 AM    Chloride 103 11/04/2021 12:00 AM    CO2 21 11/04/2021 12:00 AM    Anion gap 7 10/07/2021 06:33 PM    Glucose 74 11/04/2021 12:00 AM    BUN 5 (L) 11/04/2021 12:00 AM    Creatinine 0.42 (L) 11/04/2021 12:00 AM    BUN/Creatinine ratio 12 11/04/2021 12:00 AM    GFR est  11/04/2021 12:00 AM    GFR est non- 11/04/2021 12:00 AM    Calcium 8.6 (L) 11/04/2021 12:00 AM       No results found for: APTT  No results found for: INR, PTMR, PTP, PT1, PT2, INREXT, INREXT  No components found for: Brittany Ellis MD

## 2022-03-18 PROBLEM — N39.0 CHRONIC UTI: Status: ACTIVE | Noted: 2021-10-07

## 2022-03-18 PROBLEM — I49.1 PAC (PREMATURE ATRIAL CONTRACTION): Status: ACTIVE | Noted: 2021-10-07

## 2022-03-18 PROBLEM — O99.019 IRON DEFICIENCY ANEMIA DURING PREGNANCY: Status: ACTIVE | Noted: 2019-08-27

## 2022-03-18 PROBLEM — D50.9 IRON DEFICIENCY ANEMIA DURING PREGNANCY: Status: ACTIVE | Noted: 2019-08-27

## 2022-03-19 PROBLEM — Q06.8 TETHERED SPINAL CORD (HCC): Status: ACTIVE | Noted: 2021-10-07

## 2022-03-19 PROBLEM — Z78.9 SELF-CATHETERIZES URINARY BLADDER: Status: ACTIVE | Noted: 2021-10-07

## 2022-03-19 PROBLEM — O09.90 SUPERVISION OF HIGH RISK PREGNANCY, ANTEPARTUM: Status: ACTIVE | Noted: 2021-06-17

## 2022-03-19 PROBLEM — N39.0 UTI (URINARY TRACT INFECTION): Status: ACTIVE | Noted: 2021-10-08

## 2022-03-19 PROBLEM — R00.0 SINUS TACHYCARDIA: Status: ACTIVE | Noted: 2021-10-07

## 2022-03-19 PROBLEM — Z34.90 PREGNANCY: Status: ACTIVE | Noted: 2022-01-28

## 2022-04-11 ENCOUNTER — ANCILLARY PROCEDURE (OUTPATIENT)
Dept: CARDIOLOGY CLINIC | Age: 37
End: 2022-04-11
Payer: MEDICAID

## 2022-04-11 VITALS
SYSTOLIC BLOOD PRESSURE: 118 MMHG | BODY MASS INDEX: 21.53 KG/M2 | WEIGHT: 117 LBS | HEIGHT: 62 IN | DIASTOLIC BLOOD PRESSURE: 70 MMHG

## 2022-04-11 DIAGNOSIS — R00.2 PALPITATIONS: ICD-10-CM

## 2022-04-11 PROCEDURE — 93306 TTE W/DOPPLER COMPLETE: CPT | Performed by: INTERNAL MEDICINE

## 2022-04-18 LAB
ECHO AO ROOT DIAM: 2.9 CM
ECHO AO ROOT INDEX: 1.91 CM/M2
ECHO AV MEAN GRADIENT: 3 MMHG
ECHO AV MEAN VELOCITY: 0.8 M/S
ECHO AV PEAK GRADIENT: 5 MMHG
ECHO AV PEAK VELOCITY: 1.1 M/S
ECHO AV VELOCITY RATIO: 0.82
ECHO AV VTI: 19.8 CM
ECHO LA DIAMETER INDEX: 1.84 CM/M2
ECHO LA DIAMETER: 2.8 CM
ECHO LA TO AORTIC ROOT RATIO: 0.97
ECHO LV E' LATERAL VELOCITY: 12 CM/S
ECHO LV E' SEPTAL VELOCITY: 13 CM/S
ECHO LV EDV A2C: 79 ML
ECHO LV EDV A4C: 109 ML
ECHO LV EDV BP: 94 ML (ref 56–104)
ECHO LV EDV INDEX A4C: 72 ML/M2
ECHO LV EDV INDEX BP: 62 ML/M2
ECHO LV EDV NDEX A2C: 52 ML/M2
ECHO LV EJECTION FRACTION A2C: 57 %
ECHO LV EJECTION FRACTION A4C: 61 %
ECHO LV EJECTION FRACTION BIPLANE: 60 % (ref 55–100)
ECHO LV ESV A2C: 34 ML
ECHO LV ESV A4C: 42 ML
ECHO LV ESV BP: 38 ML (ref 19–49)
ECHO LV ESV INDEX A2C: 22 ML/M2
ECHO LV ESV INDEX A4C: 28 ML/M2
ECHO LV ESV INDEX BP: 25 ML/M2
ECHO LV FRACTIONAL SHORTENING: 32 % (ref 28–44)
ECHO LV INTERNAL DIMENSION DIASTOLE INDEX: 3.09 CM/M2
ECHO LV INTERNAL DIMENSION DIASTOLIC: 4.7 CM (ref 3.9–5.3)
ECHO LV INTERNAL DIMENSION SYSTOLIC INDEX: 2.11 CM/M2
ECHO LV INTERNAL DIMENSION SYSTOLIC: 3.2 CM
ECHO LV IVSD: 0.9 CM (ref 0.6–0.9)
ECHO LV MASS 2D: 132.3 G (ref 67–162)
ECHO LV MASS INDEX 2D: 87.1 G/M2 (ref 43–95)
ECHO LV POSTERIOR WALL DIASTOLIC: 0.8 CM (ref 0.6–0.9)
ECHO LV RELATIVE WALL THICKNESS RATIO: 0.34
ECHO LVOT AV VTI INDEX: 0.88
ECHO LVOT MEAN GRADIENT: 2 MMHG
ECHO LVOT PEAK GRADIENT: 3 MMHG
ECHO LVOT PEAK VELOCITY: 0.9 M/S
ECHO LVOT VTI: 17.5 CM
ECHO MV A VELOCITY: 0.73 M/S
ECHO MV AREA PHT: 3.2 CM2
ECHO MV E DECELERATION TIME (DT): 240.5 MS
ECHO MV E VELOCITY: 0.89 M/S
ECHO MV E/A RATIO: 1.22
ECHO MV E/E' LATERAL: 7.42
ECHO MV E/E' RATIO (AVERAGED): 7.13
ECHO MV E/E' SEPTAL: 6.85
ECHO MV PRESSURE HALF TIME (PHT): 69.7 MS
ECHO RV FREE WALL PEAK S': 17 CM/S
ECHO RV TAPSE: 2.3 CM (ref 1.7–?)

## 2022-07-29 ENCOUNTER — APPOINTMENT (OUTPATIENT)
Dept: GENERAL RADIOLOGY | Age: 37
End: 2022-07-29
Attending: EMERGENCY MEDICINE
Payer: MEDICAID

## 2022-07-29 ENCOUNTER — HOSPITAL ENCOUNTER (EMERGENCY)
Age: 37
Discharge: HOME | End: 2022-07-29
Attending: EMERGENCY MEDICINE
Payer: MEDICAID

## 2022-07-29 VITALS
TEMPERATURE: 98 F | HEART RATE: 83 BPM | SYSTOLIC BLOOD PRESSURE: 114 MMHG | BODY MASS INDEX: 20.8 KG/M2 | HEIGHT: 62 IN | OXYGEN SATURATION: 98 % | DIASTOLIC BLOOD PRESSURE: 66 MMHG | WEIGHT: 113 LBS | RESPIRATION RATE: 15 BRPM

## 2022-07-29 DIAGNOSIS — M54.42 ACUTE LEFT-SIDED LOW BACK PAIN WITH LEFT-SIDED SCIATICA: Primary | ICD-10-CM

## 2022-07-29 PROCEDURE — 72100 X-RAY EXAM L-S SPINE 2/3 VWS: CPT

## 2022-07-29 PROCEDURE — 99283 EMERGENCY DEPT VISIT LOW MDM: CPT

## 2022-07-29 PROCEDURE — 74011636637 HC RX REV CODE- 636/637: Performed by: EMERGENCY MEDICINE

## 2022-07-29 PROCEDURE — 74011250637 HC RX REV CODE- 250/637: Performed by: EMERGENCY MEDICINE

## 2022-07-29 PROCEDURE — 74011000250 HC RX REV CODE- 250: Performed by: EMERGENCY MEDICINE

## 2022-07-29 RX ORDER — HYDROMORPHONE HYDROCHLORIDE 1 MG/ML
2 INJECTION, SOLUTION INTRAMUSCULAR; INTRAVENOUS; SUBCUTANEOUS ONCE
Status: DISCONTINUED | OUTPATIENT
Start: 2022-07-29 | End: 2022-07-29

## 2022-07-29 RX ORDER — HYDROMORPHONE HYDROCHLORIDE 2 MG/1
2 TABLET ORAL ONCE
Status: COMPLETED | OUTPATIENT
Start: 2022-07-29 | End: 2022-07-29

## 2022-07-29 RX ORDER — METHYLPREDNISOLONE 4 MG/1
4 TABLET ORAL
Qty: 1 DOSE PACK | Refills: 0 | Status: SHIPPED | OUTPATIENT
Start: 2022-07-29 | End: 2022-08-29 | Stop reason: ALTCHOICE

## 2022-07-29 RX ORDER — HYDROMORPHONE HYDROCHLORIDE 2 MG/1
2 TABLET ORAL
Qty: 9 TABLET | Refills: 0 | Status: SHIPPED | OUTPATIENT
Start: 2022-07-29 | End: 2022-08-01

## 2022-07-29 RX ORDER — PREDNISONE 20 MG/1
40 TABLET ORAL
Status: COMPLETED | OUTPATIENT
Start: 2022-07-29 | End: 2022-07-29

## 2022-07-29 RX ORDER — LIDOCAINE 4 G/100G
1 PATCH TOPICAL
Status: DISCONTINUED | OUTPATIENT
Start: 2022-07-29 | End: 2022-07-30 | Stop reason: HOSPADM

## 2022-07-29 RX ORDER — LIDOCAINE 50 MG/G
PATCH TOPICAL
Qty: 5 EACH | Refills: 0 | Status: SHIPPED | OUTPATIENT
Start: 2022-07-29 | End: 2022-08-29 | Stop reason: ALTCHOICE

## 2022-07-29 RX ORDER — HYDROMORPHONE HYDROCHLORIDE 2 MG/1
1 TABLET ORAL
Status: DISCONTINUED | OUTPATIENT
Start: 2022-07-29 | End: 2022-07-29

## 2022-07-29 RX ADMIN — PREDNISONE 40 MG: 20 TABLET ORAL at 21:32

## 2022-07-29 RX ADMIN — HYDROMORPHONE HYDROCHLORIDE 2 MG: 2 TABLET ORAL at 21:32

## 2022-07-29 NOTE — ED TRIAGE NOTES
Pt comes in c/o back pain x 2days, left elbow pain x1 month and right wrist pain x 3 weeks. Pt went to ER for back pain 2 days ago and they did blood work but no imaging. Pt was told she had a kidney infection but doesn't think its that. Pt has been self cathing herself since 7.

## 2022-07-30 NOTE — DISCHARGE INSTRUCTIONS
Please use steroids along with the lidocaine patches and motrin for relief. Dilaudid as needed for pain. See ortho spine next week if no improvement in symptoms.

## 2022-07-30 NOTE — ED PROVIDER NOTES
Is a 51-year-old female who presents with low back pain on the left side rating down her left leg. 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 She has an upcoming neurology appointment but has not seen specialist in 3 to 4 years. Patient complains of left-sided low back pain which is rating down her left leg. Denies any urinary retention or incontinence. Patient also says she is having normal bowel movements. Denies any saddle anesthesia, lower extremity weakness. Patient says that she is taking Tylenol and NSAIDs at home with minimal relief. She has a 10month-old that she is breast-feeding, and 3year-old twins that she has to carry which exacerbates her back pain. Past Medical History:   Diagnosis Date    Arrhythmia     Arthritis     left foot and lower back    Asthma     no inhaler    Burning with urination      delivery delivered 2022    Chronic UTI     Complication of anesthesia     bradycardia- pt stated she was \"shocked\" under general as child    Disease of blood and blood forming organ     Headache     Heart abnormality     mummer    Iron deficiency anemia during pregnancy 2019    Migraine     Nerve damage     Nerve damage in left foot.     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 care in pregnancy     Self-catheterizes urinary bladder     Since age 13    Tethered spinal cord ([facility] [address]     Unspecified breast disorder     L invert nipple       Past Surgical History:   Procedure Laterality Date    HX BACK SURGERY      x2    HX GYN      episotomy    HX ORTHOPAEDIC      spine and left foot    HX OTHER SURGICAL      L foot has wire from surgery         Family History:   Problem Relation Age of Onset    OSTEOARTHRITIS Mother Migraines Mother     Alcohol abuse Paternal Grandfather     Bleeding Prob Maternal Grandfather     Stroke Maternal Grandfather     Hypertension Maternal Grandfather     Breast Cancer Other     Atrial Fibrillation Brother     Hypertension Maternal Grandmother        Social History     Socioeconomic History    Marital status: LEGALLY      Spouse name: Not on file    Number of children: Not on file    Years of education: Not on file    Highest education level: Not on file   Occupational History    Not on file   Tobacco Use    Smoking status: Never    Smokeless tobacco: Never   Vaping Use    Vaping Use: Never used   Substance and Sexual Activity    Alcohol use: No    Drug use: No    Sexual activity: Yes     Partners: Male     Birth control/protection: Condom   Other Topics Concern     Service Not Asked    Blood Transfusions Not Asked    Caffeine Concern Not Asked    Occupational Exposure Not Asked    Hobby Hazards Not Asked    Sleep Concern Not Asked    Stress Concern Not Asked    Weight Concern Not Asked    Special Diet Not Asked    Back Care Not Asked    Exercise Not Asked    Bike Helmet Not Asked    Seat Belt Not Asked    Self-Exams Not Asked   Social History Narrative    Not on file     Social Determinants of Health     Financial Resource Strain: Not on file   Food Insecurity: Not on file   Transportation Needs: Not on file   Physical Activity: Not on file   Stress: Not on file   Social Connections: Not on file   Intimate Partner Violence: Not on file   Housing Stability: Not on file         ALLERGIES: Latex, Beef containing products, Keflex [cephalexin], Norco [hydrocodone-acetaminophen], Codeine, Doxycycline, Macrobid [nitrofurantoin monohyd/m-cryst], Nuts [tree nut], Omnicef [cefdinir], Pepto-bismol [bismuth subsalicylate], Phenergan [promethazine], Reglan [metoclopramide], and Soy    Review of Systems   Constitutional:  Negative for chills and fever.    HENT:  Negative for drooling and nosebleeds. Eyes:  Negative for pain and itching. Respiratory:  Negative for choking and stridor. Cardiovascular:  Negative for leg swelling. Gastrointestinal:  Negative for abdominal distention and rectal pain. Endocrine: Negative for heat intolerance and polyphagia. Genitourinary:  Negative for enuresis and genital sores. Musculoskeletal:  Positive for back pain. Negative for arthralgias and joint swelling. Skin:  Negative for color change. Allergic/Immunologic: Negative for immunocompromised state. Neurological:  Negative for tremors and speech difficulty. Hematological:  Negative for adenopathy. Psychiatric/Behavioral:  Negative for dysphoric mood and sleep disturbance. Vitals:    07/29/22 1928   BP: 114/66   Pulse: 83   Resp: 15   Temp: 98 °F (36.7 °C)   SpO2: 98%   Weight: 51.3 kg (113 lb)   Height: 5' 2\" (1.575 m)            Physical Exam  Vitals and nursing note reviewed. Constitutional:       General: She is not in acute distress. Appearance: She is well-developed. She is not ill-appearing, toxic-appearing or diaphoretic. HENT:      Head: Normocephalic. Nose: Nose normal.   Eyes:      Conjunctiva/sclera: Conjunctivae normal.   Cardiovascular:      Rate and Rhythm: Normal rate and regular rhythm. Heart sounds: Normal heart sounds. Pulmonary:      Effort: Pulmonary effort is normal. No respiratory distress. Abdominal:      General: There is no distension. Palpations: Abdomen is soft. Comments: Back: TTP along left lumbar area. Vertical scar along midline lower lumbar spine. Musculoskeletal:         General: No deformity. Normal range of motion. Cervical back: Normal range of motion and neck supple. Skin:     General: Skin is warm and dry. Neurological:      Mental Status: She is alert and oriented to person, place, and time. Sensory: No sensory deficit. Motor: No weakness.       Coordination: Coordination normal.      Gait: Gait normal.   Psychiatric:         Behavior: Behavior normal.        MDM  Number of Diagnoses or Management Options  Acute left-sided low back pain with left-sided sciatica  Diagnosis management comments: Hx and exam consistent with lumbar radiculopathy. No concern for cauda equina syndrome today. Pt is breastfeeding and muscle relaxers - valium and flexeril not recommended per UpToDate. Pt with codeine allergy but has tolerated dilaudid in the past per OB. Dose provided with steroid and pt reports improvement in pain. Will dc home with pain meds and medrol dosepack. She will FU w/ortho spine next week if no improvement in symptoms. Ambulating with steady gait at time of dc. ED Course as of 07/29/22 2303   Fri Jul 29, 2022   2253 Pt reports improved symptoms. Will dc with a few pain pills along with medrol dose pack to bridge to appt with Ortho spine. [AL]      ED Course User Index  [AL] Magno Deluca MD       Procedures    Patient's results have been reviewed with them. Patient and/or family have verbally conveyed their understanding and agreement of the patient's signs, symptoms, diagnosis, treatment and prognosis and additionally agree to follow up as recommended or return to the Emergency Room should their condition change prior to follow-up. Discharge instructions have also been provided to the patient with some educational information regarding their diagnosis as well a list of reasons why they would want to return to the ER prior to their follow-up appointment should their condition change.

## 2022-08-26 ENCOUNTER — HOSPITAL ENCOUNTER (OUTPATIENT)
Dept: GENERAL RADIOLOGY | Age: 37
Discharge: HOME | End: 2022-08-26
Payer: MEDICAID

## 2022-08-26 ENCOUNTER — OFFICE VISIT (OUTPATIENT)
Dept: NEUROLOGY | Age: 37
End: 2022-08-26
Payer: MEDICAID

## 2022-08-26 VITALS
BODY MASS INDEX: 20.32 KG/M2 | DIASTOLIC BLOOD PRESSURE: 62 MMHG | HEART RATE: 71 BPM | OXYGEN SATURATION: 98 % | WEIGHT: 110.4 LBS | HEIGHT: 62 IN | TEMPERATURE: 97.6 F | SYSTOLIC BLOOD PRESSURE: 94 MMHG

## 2022-08-26 DIAGNOSIS — G91.9 HYDROCEPHALUS, UNSPECIFIED TYPE (HCC): Primary | ICD-10-CM

## 2022-08-26 DIAGNOSIS — G43.909 MIGRAINE WITHOUT STATUS MIGRAINOSUS, NOT INTRACTABLE, UNSPECIFIED MIGRAINE TYPE: ICD-10-CM

## 2022-08-26 DIAGNOSIS — M25.531 RIGHT WRIST PAIN: ICD-10-CM

## 2022-08-26 DIAGNOSIS — M25.522 LEFT ELBOW PAIN: ICD-10-CM

## 2022-08-26 DIAGNOSIS — G91.9 HYDROCEPHALUS, UNSPECIFIED TYPE (HCC): ICD-10-CM

## 2022-08-26 DIAGNOSIS — R20.0 ARM NUMBNESS: ICD-10-CM

## 2022-08-26 DIAGNOSIS — R26.0 ATAXIC GAIT: ICD-10-CM

## 2022-08-26 DIAGNOSIS — M48.02 CERVICAL STENOSIS OF SPINAL CANAL: ICD-10-CM

## 2022-08-26 DIAGNOSIS — Z98.890: ICD-10-CM

## 2022-08-26 DIAGNOSIS — M48.062 SPINAL STENOSIS OF LUMBAR REGION WITH NEUROGENIC CLAUDICATION: ICD-10-CM

## 2022-08-26 PROCEDURE — 74018 RADEX ABDOMEN 1 VIEW: CPT

## 2022-08-26 PROCEDURE — 99205 OFFICE O/P NEW HI 60 MIN: CPT | Performed by: NURSE PRACTITIONER

## 2022-08-26 PROCEDURE — 73110 X-RAY EXAM OF WRIST: CPT

## 2022-08-26 PROCEDURE — 73080 X-RAY EXAM OF ELBOW: CPT

## 2022-08-26 RX ORDER — RIMEGEPANT SULFATE 75 MG/75MG
TABLET, ORALLY DISINTEGRATING ORAL
Qty: 8 TABLET | Refills: 5 | Status: SHIPPED | OUTPATIENT
Start: 2022-08-26 | End: 2022-10-20 | Stop reason: ALTCHOICE

## 2022-08-29 NOTE — PROGRESS NOTES
Dejan Burnett is a 30 y.o. female who presents with the following  Chief Complaint   Patient presents with    New Patient     Numbness/ Tingling both hands/ migraines/ facial numbness/ back problems       HPI    New onset of numbness, tingling, facial numbness, stenosis  She continues to feel like things are getting worse  She is under some home stress, but not the case of symptoms. She has noticed the past few months things have progressed  She has paresthesia in the limbs   She ha a history of tethered cord and a shunt per report  She is not sure what type though   She has some metal of sort in her left leg also   She states her functioning has declined  She is having symptoms daily, chronically. She is unsure as to what helps and what makes things worse. She notices migraines also which may come from the neck   She has noticed some changes in overall strength, balance, coordination   Elbow trauma in the past  Wrist trauma. No EMG   Muscles are weak, tired. Allergies   Allergen Reactions    Latex Rash     Wheezing    Beef Containing Products Anaphylaxis    Keflex [Cephalexin] Anaphylaxis    Norco [Hydrocodone-Acetaminophen] Shortness of Breath    Codeine Other (comments)     Hyperactivity    Doxycycline Nausea Only    Macrobid [Nitrofurantoin Monohyd/M-Cryst] Other (comments)     Pharmacy had this allergy listed and called the patient and patient could not remember her reaction to the medication. This nurse called the patient . Patient states that when it was prescribed in early pregnancy it caused spotting.       Nuts [Tree Nut] Swelling     Pt stated that her mouth and throat feels funny when she eats nuts      Omnicef [Cefdinir] Other (comments)     \"shakes\"    Pepto-Bismol [Bismuth Subsalicylate] Hives    Phenergan [Promethazine] Other (comments)     Increased sedation    Reglan [Metoclopramide] Anxiety    Soy Hives       Current Outpatient Medications   Medication Sig    rimegepant (Nurtec ODT) 75 mg disintegrating tablet Take 1 tablet by mouth at HA onset PRN. Max 1 tablet in 24 hours. levothyroxine (SYNTHROID) 50 mcg tablet Take 1 Tablet by mouth Daily (before breakfast). multivitamin with iron (FLINTSTONES) chewable tablet Take 1 Tablet by mouth daily. Indications: treatment to prevent mineral deficiency    lidocaine (LIDODERM) 5 % Apply patch to the affected area for 12 hours a day and remove for 12 hours a day. (Patient not taking: Reported on 2022)    methylPREDNISolone (Medrol, Jason,) 4 mg tablet Take 1 Tablet by mouth Specific Days and Specific Times. (Patient not taking: Reported on 2022)    amoxicillin (AMOXIL) 250 mg capsule Take 250 mg by mouth daily. (Patient not taking: Reported on 2022)     No current facility-administered medications for this visit. Social History     Tobacco Use   Smoking Status Never   Smokeless Tobacco Never       Past Medical History:   Diagnosis Date    Arrhythmia     Arthritis     left foot and lower back    Asthma     no inhaler    Burning with urination      delivery delivered 2022    Chronic UTI     Complication of anesthesia     bradycardia- pt stated she was \"shocked\" under general as child    Disease of blood and blood forming organ     Headache     Heart abnormality     mummer    Iron deficiency anemia during pregnancy 2019    Migraine     Nerve damage     Nerve damage in left foot.     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 care in pregnancy     Self-catheterizes urinary bladder     Since age 13    Tethered spinal cord ([facility] [address]     Unspecified breast disorder     L invert nipple       Past Surgical History:   Procedure Laterality Date    HX BACK SURGERY      x2    HX GYN      episotomy    HX ORTHOPAEDIC      spine and left foot    HX OTHER SURGICAL      L foot has wire from surgery       Family History   Problem Relation Age of Onset    OSTEOARTHRITIS Mother     Migraines Mother     Alcohol abuse Paternal Grandfather     Bleeding Prob Maternal Grandfather     Stroke Maternal Grandfather     Hypertension Maternal Grandfather     Breast Cancer Other     Atrial Fibrillation Brother     Hypertension Maternal Grandmother        Social History     Socioeconomic History    Marital status:    Tobacco Use    Smoking status: Never    Smokeless tobacco: Never   Vaping Use    Vaping Use: Never used   Substance and Sexual Activity    Alcohol use: No    Drug use: No    Sexual activity: Yes     Partners: Male     Birth control/protection: Condom       Review of Systems   Eyes:  Positive for blurred vision and photophobia. Negative for double vision. Respiratory:  Negative for shortness of breath and wheezing. Cardiovascular:  Negative for chest pain and palpitations. Gastrointestinal:  Negative for nausea and vomiting. Musculoskeletal:  Positive for back pain, falls, joint pain and neck pain. Neurological:  Positive for dizziness, tingling, sensory change, weakness and headaches. Negative for seizures and loss of consciousness. Remainder of comprehensive review is negative. Physical Exam :    Visit Vitals  BP 94/62   Pulse 71   Temp 97.6 °F (36.4 °C)   Ht 5' 2\" (1.575 m)   Wt 50.1 kg (110 lb 6.4 oz)   SpO2 98%   BMI 20.19 kg/m²       General: Well defined, nourished, and groomed individual in no acute distress. Neck: Supple, nontender, no bruits, no pain with resistance to active range of motion. Musculoskeletal: Extremities revealed no edema and had full range of motion of joints. Psych: Good mood and bright affect    NEUROLOGICAL EXAMINATION:    Mental Status: Alert and oriented to person, place, and time    Cranial Nerves:    II, III, IV, VI: Visual acuity grossly intact.  Visual fields are normal.    Pupils are equal, round, and reactive to light and accommodation. Extra-ocular movements are full and fluid. Fundoscopic exam was benign, no ptosis or nystagmus. V-XII: Hearing is grossly intact. Facial features are symmetric, with normal sensation and strength. The palate rises symmetrically and the tongue protrudes midline. Sternocleidomastoids 5/5. Motor Examination: Normal tone, bulk, and strength, 3/5 muscle strength throughout. Coordination: mild ET     Gait and Station: Steady while walking. Left leg drag     Reflexes: DTRs 4+ throughout. Neurosensory Exam:  Stocking glove sensory loss to temperature, vibration, and pinprick to the thighs.         Results for orders placed or performed in visit on 04/11/22   ECHO ADULT COMPLETE   Result Value Ref Range    IVSd 0.9 0.6 - 0.9 cm    LVIDd 4.7 3.9 - 5.3 cm    LVIDs 3.2 cm    LVPWd 0.8 0.6 - 0.9 cm    EF BP 60 55 - 100 %    LV Ejection Fraction A2C 57 %    LV Ejection Fraction A4C 61 %    LV EDV A2C 79 mL    LV EDV A4C 109 mL    LV EDV BP 94 56 - 104 mL    LV ESV A2C 34 mL    LV ESV A4C 42 mL    LV ESV BP 38 19 - 49 mL    LVOT Peak Gradient 3 mmHg    LVOT Mean Gradient 2 mmHg    LVOT Peak Velocity 0.9 m/s    LVOT VTI 17.5 cm    RV Free Wall Peak S' 17 cm/s    LA Diameter 2.8 cm    AV Peak Gradient 5 mmHg    AV Mean Gradient 3 mmHg    AV Peak Velocity 1.1 m/s    AV Mean Velocity 0.8 m/s    AV VTI 19.8 cm    MV A Velocity 0.73 m/s    MV E Wave Deceleration Time 240.5 ms    MV E Velocity 0.89 m/s    LV E' Lateral Velocity 12 cm/s    LV E' Septal Velocity 13 cm/s    MV PHT 69.7 ms    MV Area by PHT 3.2 cm2    TAPSE 2.3 1.7 cm    Aortic Root 2.9 cm    Fractional Shortening 2D 32 28 - 44 %    LV ESV Index BP 25 mL/m2    LV EDV Index BP 62 mL/m2    LV ESV Index A4C 28 mL/m2    LV EDV Index A4C 72 mL/m2    LV ESV Index A2C 22 mL/m2    LV EDV Index A2C 52 mL/m2    LVIDd Index 3.09 cm/m2    LVIDs Index 2.11 cm/m2    LV RWT Ratio 0.34     LV Mass 2D 132.3 67 - 162 g    LV Mass 2D Index 87.1 43 - 95 g/m2    MV E/A 1.22     E/E' Ratio (Averaged) 7.13     E/E' Lateral 7.42     E/E' Septal 6.85     LA Size Index 1.84 cm/m2    LA/AO Root Ratio 0.97     Ao Root Index 1.91 cm/m2    AV Velocity Ratio 0.82     LVOT:AV VTI Index 0.88        Orders Placed This Encounter    XR SHUNT SERIES     Standing Status:   Future     Number of Occurrences:   1     Standing Expiration Date:   9/26/2023     Order Specific Question:   Is Patient Pregnant? Answer:   No    CT SPINE CERV WO CONT     Standing Status:   Future     Standing Expiration Date:   9/26/2023     Order Specific Question:   Is Patient Pregnant? Answer:   No    CT SPINE LUMB WO CONT     Standing Status:   Future     Standing Expiration Date:   9/26/2023     Order Specific Question:   Is Patient Pregnant? Answer:   No    REFERRAL TO PHYSICAL THERAPY     Referral Priority:   Routine     Referral Type:   PT/OT/ST     Referral Reason:   Specialty Services Required    EMG LIMITED     Standing Status:   Future     Standing Expiration Date:   2/26/2023     Order Specific Question:   Reason for Exam:     Answer:   arm numbness    rimegepant (Nurtec ODT) 75 mg disintegrating tablet     Sig: Take 1 tablet by mouth at HA onset PRN. Max 1 tablet in 24 hours. Dispense:  8 Tablet     Refill:  5       1. Hydrocephalus, unspecified type ([address]    2. H/O intra-abdominal venous shunt    3. Migraine without status migrainosus, not intractable, unspecified migraine type    4. Cervical stenosis of spinal canal    5. Arm numbness    6. Left elbow pain    7. Right wrist pain    8. Spinal stenosis of lumbar region with neurogenic claudication    9. Ataxic gait      Multiple concerns   Will get a shunt series to evaluate closes. XR wrist, elbow to evaluate for acute fracture. CT cervical spine and lumbar spine     EMG for both arms. Nurtec for PRN migraines. Refer to PT to help with strength, balance, coordination. Also for increase confidence.          This note will not be viewable in [address]

## 2022-09-08 DIAGNOSIS — G91.9 HYDROCEPHALUS, UNSPECIFIED TYPE (HCC): ICD-10-CM

## 2022-09-08 DIAGNOSIS — M25.522 LEFT ELBOW PAIN: Primary | ICD-10-CM

## 2022-09-08 DIAGNOSIS — R26.0 ATAXIC GAIT: ICD-10-CM

## 2022-09-15 ENCOUNTER — HOSPITAL ENCOUNTER (OUTPATIENT)
Dept: CT IMAGING | Age: 37
Discharge: HOME | End: 2022-09-15
Attending: NURSE PRACTITIONER

## 2022-09-15 ENCOUNTER — APPOINTMENT (OUTPATIENT)
Dept: CT IMAGING | Age: 37
End: 2022-09-15
Attending: NURSE PRACTITIONER

## 2022-09-15 DIAGNOSIS — R20.0 ARM NUMBNESS: ICD-10-CM

## 2022-09-15 DIAGNOSIS — R26.0 ATAXIC GAIT: ICD-10-CM

## 2022-09-15 DIAGNOSIS — M48.02 CERVICAL STENOSIS OF SPINAL CANAL: ICD-10-CM

## 2022-09-30 ENCOUNTER — OFFICE VISIT (OUTPATIENT)
Dept: NEUROLOGY | Age: 37
End: 2022-09-30
Payer: MEDICAID

## 2022-09-30 DIAGNOSIS — M79.609 PAIN IN EXTREMITY, UNSPECIFIED EXTREMITY: Primary | ICD-10-CM

## 2022-09-30 PROCEDURE — 95913 NRV CNDJ TEST 13/> STUDIES: CPT | Performed by: PSYCHIATRY & NEUROLOGY

## 2022-09-30 NOTE — PROCEDURES
EMG/ NCS Report  [facility]  - [facility]  [facility], [place]  [facility], [address] [place], [address]   Ph: [phone]   FAX: [phone]/ [phone]  Test Date:  2019      Test Date:  2022    Patient: Sally Calzada : 1985 Physician: Tommie Hager MD   Sex: Female Height: ' \" Ref Shani Nunes   ID#: [id number] Weight:  lbs. Technician: Nadia Senior     Patient History / Exam:  CC:Numbness,tingling and pain worsening the last  yr. new mom,baby resting while breast feeding on rt. arm. rt>lt. (+) pain of the base of R thumb and wrist when flexing thumb      Patient is coming for neuropathy evaluation. EMG & NCV Findings:  Evaluation of the left median motor and the right median motor nerves showed normal distal onset latency (L3.4, R3.5 ms), normal amplitude (L5.8, R5.4 mV), and normal conduction velocity (Elbow-Wrist, L56, R50 m/s). The left ulnar motor and the right ulnar motor nerves showed normal distal onset latency (L2.9, R3.1 ms), normal amplitude (L8.9, R10.2 mV), normal conduction velocity (B Elbow-Wrist, L59, R68 m/s), and normal conduction velocity (A Elbow-B Elbow, L62, R59 m/s). The left median sensory, the right median sensory, the left radial sensory, the right radial sensory, the left ulnar sensory, and the right ulnar sensory nerves showed normal distal peak latency (L3.2, R3.1, L2.1, R2.0, L3.3, R3.1 ms) and normal amplitude (L110.3, R52.8, L76.1, R78.3, L72.6, R62.2 µV). The left median/ulnar (palm) comparison and the right median/ulnar (palm) comparison nerves showed normal distal onset latency (Median Palm, L1.6, R1.4 ms), normal distal peak latency (Median Palm, L2.0, R1.8 ms), normal amplitude (Median Palm, L81.0, R28.3 µV), normal distal onset latency (Ulnar Palm, L1.1, R1.3 ms), normal distal peak latency (Ulnar Palm, L1.8, R1.8 ms), and normal amplitude (Ulnar Palm, L30.9, R20.1 µV).   All left vs. right side differences were within normal limits. All F Wave latencies were within normal limits. All F Wave left vs. right side latency differences were within normal limits. Impression:      Nerve conduction studies of the right and left upper extremities, including palmar studies,  is normal with no evidence of a peripheral neuropathy. Patient refused needle examination due to fear of needles as a child.       Francesco Garcia MD  Diplomate, American Board of Psychiatry and Neurology  Diplomate, Neuromuscular Medicine  Diplomate, American Board of Electrodiagnostic Medicine  Director, [address] [place] [address] Accredited Laboratory with Exemplary Status            Nerve Conduction Studies  Anti Sensory Summary Table     Stim Site NR Peak (ms) Norm Peak (ms) P-T Amp (µV) Norm P-T Amp Site1 Site2 Dist (cm)   Left Median Anti Sensory (2nd Digit)  32.5 °C   Wrist    3.2 <4 110.3 >13 Wrist 2nd Digit 14.0   Right Median Anti Sensory (2nd Digit)  31.6 °C   Wrist    3.1 <4 52.8 >13 Wrist 2nd Digit 14.0   Left Radial Anti Sensory (Base 1st Digit)  32.1 °C   Wrist    2.1 <2.8 76.1 >11 Wrist Base 1st Digit 10.0   Right Radial Anti Sensory (Base 1st Digit)  31.6 °C   Wrist    2.0 <2.8 78.3 >11 Wrist Base 1st Digit 10.0   Left Ulnar Anti Sensory (5th Digit)  32.3 °C   Wrist    3.3 <4.0 72.6 >9 Wrist 5th Digit 14.0   Right Ulnar Anti Sensory (5th Digit)  31.7 °C   Wrist    3.1 <4.0 62.2 >9 Wrist 5th Digit 14.0     Motor Summary Table     Stim Site NR Onset (ms) Norm Onset (ms) O-P Amp (mV) Norm O-P Amp Amp (Prev) (%) Site1 Site2 Dist (cm) Gagandeep (m/s) Norm Gagandeep (m/s)   Left Median Motor (Abd Poll Brev)  31.7 °C   Wrist    3.4 <4.5 5.8 >4.1 100.0 Wrist Abd Poll Brev 8.0     Elbow    7.0  5.7  98.3 Elbow Wrist 20.0 56 >49   Right Median Motor (Abd Poll Brev)  31.8 °C   Wrist    3.5 <4.5 5.4 >4.1 100.0 Wrist Abd Poll Brev 8.0     Elbow    7.3  5.7  105.6 Elbow Wrist 19.0 50 >49   Left Ulnar Motor (Abd Dig Minimi)  31.4 °C   Wrist    2.9 <3.1 8.9 >7.0 100.0 Wrist Abd Dig Minimi 8.0     B Elbow    6.1  8.6  96.6 B Elbow Wrist 19.0 59 >50   A Elbow    7.7  8.6  100.0 A Elbow B Elbow 10.0 62 >50   Right Ulnar Motor (Abd Dig Minimi)  31.6 °C   Wrist    3.1 <3.1 10.2 >7.0 100.0 Wrist Abd Dig Minimi 8.0     B Elbow    5.9  9.9  97.1 B Elbow Wrist 19.0 68 >50   A Elbow    7.6  9.8  99.0 A Elbow B Elbow 10.0 59 >50     Comparison Summary Table     Stim Site NR Peak (ms) P-T Amp (µV) Site1 Site2 Dist (cm) Delta-0 (ms)   Left Median/Ulnar Palm Comparison (Wrist)  31.2 °C   Median Palm    2.0 106.4 Median Palm Ulnar Palm 8.0 0.5   Ulnar Palm    1.8 24.8       Right Median/Ulnar Palm Comparison (Wrist)  31.2 °C   Median Palm    1.8 39.0 Median Palm Ulnar Palm 8.0 0.1   Ulnar Palm    1.8 35.7         F Wave Studies     NR F-Lat (ms) Lat Norm (ms) L-R F-Lat (ms) L-R Lat Norm   Left Ulnar (Mrkrs) (Abd Dig Min)  31.2 °C      24.74 <32 0.00 <2.5   Right Ulnar (Mrkrs) (Abd Dig Min)  31.5 °C      24.74 <32 0.00 <2.5               Nerve Conduction Studies  Anti Sensory Left/Right Comparison     Stim Site L Lat (ms) R Lat (ms) L-R Lat (ms) L Amp (µV) R Amp (µV) L-R Amp (%) Site1 Site2 L Gagandeep (m/s) R Gagandeep (m/s) L-R Gagandeep (m/s)   Median Anti Sensory (2nd Digit)  32.5 °C   Wrist 2.5 1.7 0.8 110.3 52.8 52.1 Wrist 2nd Digit 56 82 26   Radial Anti Sensory (Base 1st Digit)  32.1 °C   Wrist 1.5 1.4 0.1 76.1 78.3 2.8 Wrist Base 1st Digit 67 71 4   Ulnar Anti Sensory (5th Digit)  32.3 °C   Wrist 2.5 2.5 0.0 72.6 62.2 14.3 Wrist 5th Digit 56 56 0     Motor Left/Right Comparison     Stim Site L Lat (ms) R Lat (ms) L-R Lat (ms) L Amp (mV) R Amp (mV) L-R Amp (%) Site1 Site2 L Gagandeep (m/s) R Gagandeep (m/s) L-R Gagandeep (m/s)   Median Motor (Abd Poll Brev)  31.7 °C   Wrist 3.4 3.5 0.1 5.8 5.4 6.9 Wrist Abd Poll Brev      Elbow 7.0 7.3 0.3 5.7 5.7 0.0 Elbow Wrist 56 50 6   Ulnar Motor (Abd Dig Minimi)  31.4 °C   Wrist 2.9 3.1 0.2 8.9 10.2 12.7 Wrist Abd Dig Minimi      B Elbow 6.1 5.9 0.2 8.6 9.9 13.1 B Elbow Wrist 59 68 9   A Elbow 7.7 7.6 0.1 8.6 9.8 12.2 A Elbow B Elbow 62 59 3     Comparison Left/Right Comparison     Stim Site L Lat (ms) R Lat (ms) L-R Lat (ms) L Amp (µV) R Amp (µV) L-R Amp (%)   Median/Ulnar Palm Comparison (Wrist)  31.2 °C   Median Palm 1.6 1.4 0.2 81.0 28.3 65.1   Ulnar Palm 1.1 1.3 0.2 30.9 20.1 35.0         Waveforms:

## 2022-10-20 ENCOUNTER — OFFICE VISIT (OUTPATIENT)
Dept: NEUROLOGY | Age: 37
End: 2022-10-20
Payer: MEDICAID

## 2022-10-20 VITALS
WEIGHT: 110 LBS | BODY MASS INDEX: 20.24 KG/M2 | SYSTOLIC BLOOD PRESSURE: 100 MMHG | HEART RATE: 82 BPM | RESPIRATION RATE: 18 BRPM | HEIGHT: 62 IN | OXYGEN SATURATION: 98 % | DIASTOLIC BLOOD PRESSURE: 58 MMHG

## 2022-10-20 DIAGNOSIS — R29.2 ABNORMAL DTR (DEEP TENDON REFLEX): ICD-10-CM

## 2022-10-20 DIAGNOSIS — M25.531 RIGHT WRIST PAIN: ICD-10-CM

## 2022-10-20 DIAGNOSIS — G91.9 HYDROCEPHALUS IN ADULT (HCC): ICD-10-CM

## 2022-10-20 DIAGNOSIS — G43.909 MIGRAINE WITHOUT STATUS MIGRAINOSUS, NOT INTRACTABLE, UNSPECIFIED MIGRAINE TYPE: ICD-10-CM

## 2022-10-20 DIAGNOSIS — R26.0 ATAXIC GAIT: ICD-10-CM

## 2022-10-20 DIAGNOSIS — M48.062 SPINAL STENOSIS OF LUMBAR REGION WITH NEUROGENIC CLAUDICATION: ICD-10-CM

## 2022-10-20 DIAGNOSIS — M25.522 LEFT ELBOW PAIN: Primary | ICD-10-CM

## 2022-10-20 PROCEDURE — 99215 OFFICE O/P EST HI 40 MIN: CPT | Performed by: NURSE PRACTITIONER

## 2022-10-20 NOTE — PROGRESS NOTES
Rafiq Barbosa is a 30 y.o. female who presents with the following  Chief Complaint   Patient presents with    Follow-up     Patient is follow up for lumbar stenosis, and tet results. Patient hasn't started the PT. Still having pain int he back. Patient currently has a headache. HPI      Follow-up for x-rays  The CTs were denied for some reason and are needed to reevaluate hydrocephalus and her shunt  CT of the lumbar spine is needed to look at previous tethered cord surgery closer and this cannot be seen on an x-ray    Her headaches have not changed much but they are less frequent  She has not used the Nurtec and does feel like if she needs it she will take it  Stress is decreased  She is going to work as needed as a  and a  for local school's  She does still have some issues with musculoskeletal pain and balance  Physical therapy never got in contact with her because they did not accept her insurance  So we will get her next-door to [place] "[facility] physical therapy  She does notice pain in both knees  They do crack and pop a lot  To the point where she feels like there is something going on  Allergies   Allergen Reactions    Latex Rash     Wheezing    Beef Containing Products Anaphylaxis    Keflex [Cephalexin] Anaphylaxis    Norco [Hydrocodone-Acetaminophen] Shortness of Breath    Codeine Other (comments)     Hyperactivity    Doxycycline Nausea Only    Macrobid [Nitrofurantoin Monohyd/M-Cryst] Other (comments)     Pharmacy had this allergy listed and called the patient and patient could not remember her reaction to the medication. This nurse called the patient . Patient states that when it was prescribed in early pregnancy it caused spotting.       Nuts [Tree Nut] Swelling     Pt stated that her mouth and throat feels funny when she eats nuts      Omnicef [Cefdinir] Other (comments)     \"shakes\"    Pepto-Bismol [Bismuth Subsalicylate] Hives    Phenergan [Promethazine] Other (comments)     Increased sedation    Reglan [Metoclopramide] Anxiety    Soy Hives       Current Outpatient Medications   Medication Sig    levothyroxine (SYNTHROID) 50 mcg tablet Take 1 Tablet by mouth Daily (before breakfast). multivitamin with iron (FLINTSTONES) chewable tablet Take 1 Tablet by mouth daily. Indications: treatment to prevent mineral deficiency     No current facility-administered medications for this visit. Social History     Tobacco Use   Smoking Status Never   Smokeless Tobacco Never       Past Medical History:   Diagnosis Date    Arrhythmia     Arthritis     left foot and lower back    Asthma     no inhaler    Burning with urination      delivery delivered 2022    Chronic UTI     Complication of anesthesia     bradycardia- pt stated she was \"shocked\" under general as child    Disease of blood and blood forming organ     Headache     Heart abnormality     mummer    Iron deficiency anemia during pregnancy 2019    Migraine     Nerve damage     Nerve damage in left foot.     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 care in pregnancy     Self-catheterizes urinary bladder     Since age 13    Tethered spinal cord ([facility] [address]     Unspecified breast disorder     L invert nipple       Past Surgical History:   Procedure Laterality Date    HX BACK SURGERY      x2    HX GYN      episotomy    HX ORTHOPAEDIC      spine and left foot    HX OTHER SURGICAL      L foot has wire from surgery       Family History   Problem Relation Age of Onset    OSTEOARTHRITIS Mother     Migraines Mother     Alcohol abuse Paternal Grandfather     Bleeding Prob Maternal Grandfather     Stroke Maternal Grandfather     Hypertension Maternal Grandfather     Breast Cancer Other     Atrial Fibrillation Brother     Hypertension Maternal Grandmother        Social History     Socioeconomic History    Marital status:    Tobacco Use    Smoking status: Never    Smokeless tobacco: Never   Vaping Use    Vaping Use: Never used   Substance and Sexual Activity    Alcohol use: No    Drug use: No    Sexual activity: Yes     Partners: Male     Birth control/protection: Condom       Review of Systems   Constitutional:  Positive for malaise/fatigue. Eyes:  Positive for photophobia. Negative for blurred vision and double vision. Respiratory:  Negative for shortness of breath and wheezing. Musculoskeletal:  Positive for back pain, falls, joint pain and neck pain. Neurological:  Positive for tingling, sensory change, weakness and headaches. Remainder of comprehensive review is negative. Physical Exam :    Visit Vitals  BP (!) 100/58   Pulse 82   Resp 18   Ht 5' 2\" (1.575 m)   Wt 49.9 kg (110 lb)   SpO2 98%   BMI 20.12 kg/m²       General: Well defined, nourished, and groomed individual in no acute distress. Musculoskeletal: Extremities revealed no edema and had full range of motion of joints. Psych: Good mood and bright affect    NEUROLOGICAL EXAMINATION:    Mental Status: Alert and oriented to person, place, and time    Cranial Nerves:    II, III, IV, VI: Visual acuity grossly intact. Visual fields are normal.    Pupils are equal, round, and reactive to light and accommodation. Extra-ocular movements are full and fluid. Fundoscopic exam was benign, no ptosis or nystagmus. V-XII: Hearing is grossly intact. Facial features are symmetric, with normal sensation and strength. The palate rises symmetrically and the tongue protrudes midline. Sternocleidomastoids 5/5. Motor Examination: Normal tone, bulk, and strength, 3/5 muscle strength throughout. Coordination: Finger to nose was normal. No resting or intention tremor    Gait and Station: slow, stoop posture     Reflexes: DTRs 1+ throughout.           Results for orders placed or performed in visit on 04/11/22   ECHO ADULT COMPLETE   Result Value Ref Range    IVSd 0.9 0.6 - 0.9 cm    LVIDd 4.7 3.9 - 5.3 cm    LVIDs 3.2 cm    LVPWd 0.8 0.6 - 0.9 cm    EF BP 60 55 - 100 %    LV Ejection Fraction A2C 57 %    LV Ejection Fraction A4C 61 %    LV EDV A2C 79 mL    LV EDV A4C 109 mL    LV EDV BP 94 56 - 104 mL    LV ESV A2C 34 mL    LV ESV A4C 42 mL    LV ESV BP 38 19 - 49 mL    LVOT Peak Gradient 3 mmHg    LVOT Mean Gradient 2 mmHg    LVOT Peak Velocity 0.9 m/s    LVOT VTI 17.5 cm    RV Free Wall Peak S' 17 cm/s    LA Diameter 2.8 cm    AV Peak Gradient 5 mmHg    AV Mean Gradient 3 mmHg    AV Peak Velocity 1.1 m/s    AV Mean Velocity 0.8 m/s    AV VTI 19.8 cm    MV A Velocity 0.73 m/s    MV E Wave Deceleration Time 240.5 ms    MV E Velocity 0.89 m/s    LV E' Lateral Velocity 12 cm/s    LV E' Septal Velocity 13 cm/s    MV PHT 69.7 ms    MV Area by PHT 3.2 cm2    TAPSE 2.3 1.7 cm    Aortic Root 2.9 cm    Fractional Shortening 2D 32 28 - 44 %    LV ESV Index BP 25 mL/m2    LV EDV Index BP 62 mL/m2    LV ESV Index A4C 28 mL/m2    LV EDV Index A4C 72 mL/m2    LV ESV Index A2C 22 mL/m2    LV EDV Index A2C 52 mL/m2    LVIDd Index 3.09 cm/m2    LVIDs Index 2.11 cm/m2    LV RWT Ratio 0.34     LV Mass 2D 132.3 67 - 162 g    LV Mass 2D Index 87.1 43 - 95 g/m2    MV E/A 1.22     E/E' Ratio (Averaged) 7.13     E/E' Lateral 7.42     E/E' Septal 6.85     LA Size Index 1.84 cm/m2    LA/AO Root Ratio 0.97     Ao Root Index 1.91 cm/m2    AV Velocity Ratio 0.82     LVOT:AV VTI Index 0.88        Orders Placed This Encounter    CT HEAD WO CONT     Standing Status:   Future     Standing Expiration Date:   11/20/2023     Order Specific Question:   Is Patient Pregnant? Answer:   No    CT SPINE LUMB WO CONT     Standing Status:   Future     Standing Expiration Date:   11/20/2023     Order Specific Question:   Is Patient Pregnant?      Answer:   No    Viji Villalobos PT at [address]     Referral Priority:   Routine     Referral Type: PT/OT/ST     Referral Reason:   Specialty Services Required     Number of Visits Requested:   1       1. Left elbow pain    2. Migraine without status migrainosus, not intractable, unspecified migraine type    3. Right wrist pain    4. Ataxic gait    5. Hydrocephalus in adult [facility])    6. Spinal stenosis of lumbar region with neurogenic claudication    7.  Abnormal DTR (deep tendon reflex)      Pulled up and discussed x-rays  In order to see the shunt better we do need to get a CT of the head for hydrocephalus  Her x-rays were significantly abnormal and the lumbar spine and we will also get a CT of the lumbar spine to evaluate this closer as causes of ataxia, and abnormal DTR  Physical therapy out in [place] never contacted her so we will get her to the building next-door [facility]  They can help with wrist, elbow, and ataxia  Use the Nurtec as needed for headache but this has gotten a little bit better due to less stress              This note will not be viewable in [facility]

## 2022-10-20 NOTE — PROGRESS NOTES
Visit Vitals  BP (!) 100/58   Pulse 82   Resp 18   Ht 5' 2\" (1.575 m)   Wt 110 lb (49.9 kg)   SpO2 98%   BMI 20.12 kg/m²     Chief Complaint   Patient presents with    Follow-up     Patient is follow up for lumbar stenosis, and tet results. Patient hasn't started the PT. Still having pain int he back. Patient currently has a headache.

## 2022-10-30 NOTE — PROGRESS NOTES
Kayla Jaeger MD    [id number] [facility], [address] [facility]    Office [phone],ZSX [phone]      Rainer Conte is a 30 y.o. female is here for f/u visit. Primary care physician:  Wanda Diamond NP    Dear Dr Shaina Parisi,     I had the pleasure of seeing Ms. Kashmir Holliday in the office today. Assessment:     Palpitations/ED visit 8/7/2021-for vasovagal episode  ( OB hx - G8-  5 Living  ( 10/2019 - twins) -multiple miscarriages previously. Last delivery -- 1/29/22 - C[facility] - Male baby)  History of tethered spinal cord -(multiple surgeries from age 13 to age 26) some residual deficit with walking and weakness in her feet. Patient has been told  \"Her heart rate to be restarted\" during 1 of the surgeries when she was age 8 . Records not available. ( Self cath's herself - has UTI's)-followed by Dr. Imani Lynn. Also has seen Dr. Durga Esquivel previously is for EMG  Hx of Orthostatic Hypotension        Plan:      Patient symptoms consistent with orthostatic hypotension/tachycardia. Patient is currently nursing. Will not start any new medications. Advised compression stockings/hydration. Will refer to Dr. Gregor Villatoro for tilt table study. She already sees neurology for her spinal cord issues. Echocardiogram-4/22-normal LVEF  48-hour Holter monitor.-8/2021-no significant arrhythmias    Follow-up 3months/earlier as needed      Patient understands the plan. All questions were answered to the patient's satisfaction. Medication Side Effects and Warnings were discussed with patient: yes  Patient Labs were reviewed and or requested:  yes  Patient Past Records were reviewed and or requested: yes     I appreciate the opportunity to be involved in [address] [place] [address]. Please see note below for details. Please do not hesitate to contact us with questions or concerns. Kayla Jaeger MD     Cardiac Testing/ Procedures: A. Cardiac Cath/PCI:    B.ECHO/IRA: 8/26/21 - LV: Calculated LVEF is 59%. Biplane method used to measure ejection fraction. Normal cavity size, wall thickness, systolic function (ejection fraction normal) and diastolic function. Wall motion: normal.  TV: Right Ventricular Arterial Pressure (RVSP) is 25 mmHg. Pulmonary hypertension not suggested by Doppler findings. 10/5/19 - EF 55-60%; Redundant non prolapsing ant leaflet chords     C. StressNuclear/Stress ECHO/Stress test:     D.Vascular:     E. EP: 48-hour Holter monitor  8/20/2021  Minimum heart rate 50 bpm, maximum heart rate 143 bpm,  5 PACs  No significant arrhythmias. Symptoms associated with sinus rhythm. E cardio event monitor 4/4/2019 to 5/3/2019-sinus rhythm, 0 critical, 0 serious, for stable events. Sinus rhythm/sinus tachycardia. [address] [place] [address] associated with flutter or skipped beats/fatigue        9/27/17-event monitor-sinus tachycardia     F. Miscellaneous:     History of present illness:     Post delivery of her baby in January 2022, her symptoms had improved and her blood pressure had stabilized. However, over the past 1 to 2 weeks patient states that she has been having dizziness and palpitations. No history of syncope. Denies any recent infection. ED visit 8/7/2021-for vasovagal episode. No further episodes of syncope since ED visit. Currently nursing. No chest pain. Complains of fatigue. No dyspnea. No syncope. ( Initial visit - 3/2019- 30 yr old CF who is G6 A2 (twins first pregnancy, L2 1 (boy and girl) who recently found out that she is pregnant 2/2019 and has twins. Since her diagnosis of pregnancy, she has been having palpitations. Also has been suffering from allergy/sinus infection. No dizziness, syncope, chest pain, dyspnea, swelling lower extremities. Palpitations can occur at any time. History of tethered spinal cord. Has had multiple surgeries since age 13 to age 26.   Apparently her heart had stopped during 1 of the surgeries when she was 10 or 11 and it had to be restarted.)        ROS:  (bold if positive, if negative)           Medications before admission:    Current Outpatient Medications   Medication Sig Dispense    levothyroxine (SYNTHROID) 50 mcg tablet Take 1 Tablet by mouth Daily (before breakfast). 90 Tablet    multivitamin with iron (FLINTSTONES) chewable tablet Take 1 Tablet by mouth daily. Indications: treatment to prevent mineral deficiency      No current facility-administered medications for this visit. Family History of CAD:    No    Social History:  Current  Smoker  No    Physical Exam:  Visit Vitals  BP 94/60 (BP 1 Location: Left upper arm, BP Patient Position: Sitting, BP Cuff Size: Adult)   Pulse 73   Ht 5' 2\" (1.575 m)   Wt 111 lb (50.3 kg)   SpO2 98%   BMI 20.30 kg/m²            Gen: Well-developed,in no acute distress  Neck: Supple,No JVD, No Carotid Bruit,   Resp: No accessory muscle use, Clear breath sounds, No rales or rhonchi  Card: Regular Rate,Rythm,Normal S1, S2, No murmurs, rubs or gallop. No thrills.    Abd:  Gravid uterus  MSK: No cyanosis  Skin: No rashes    Neuro: moving all four extremities , follows commands appropriately  Psych:  Good insight, oriented to person, place , alert, anxious  LE: No edema    EKG    LABS:        Lab Results   Component Value Date/Time    WBC 18.9 (H) 01/30/2022 03:13 AM    HGB 9.2 (L) 01/30/2022 03:13 AM    HCT 29.2 (L) 01/30/2022 03:13 AM    PLATELET 231 07/76/3959 03:13 AM    Hgb, External 8.9 07/25/2019 12:00 AM    Hct, External 27.3 07/25/2019 12:00 AM    Platelet cnt., External 179 07/25/2019 12:00 AM     Lab Results   Component Value Date/Time    Sodium 139 11/04/2021 12:00 AM    Potassium 3.6 11/04/2021 12:00 AM    Chloride 103 11/04/2021 12:00 AM    CO2 21 11/04/2021 12:00 AM    Anion gap 7 10/07/2021 06:33 PM    Glucose 74 11/04/2021 12:00 AM    BUN 5 (L) 11/04/2021 12:00 AM    Creatinine 0.42 (L) 11/04/2021 12:00 AM    BUN/Creatinine ratio 12 11/04/2021 12:00 AM    GFR est  11/04/2021 12:00 AM    GFR est non- 11/04/2021 12:00 AM    Calcium 8.6 (L) 11/04/2021 12:00 AM       No results found for: APTT  No results found for: INR, PTMR, PTP, PT1, PT2, INREXT, INREXT  No components found for: Jared Castro MD

## 2022-11-03 ENCOUNTER — OFFICE VISIT (OUTPATIENT)
Dept: CARDIOLOGY CLINIC | Age: 37
End: 2022-11-03
Payer: MEDICAID

## 2022-11-03 VITALS
HEART RATE: 73 BPM | HEIGHT: 62 IN | OXYGEN SATURATION: 98 % | DIASTOLIC BLOOD PRESSURE: 60 MMHG | SYSTOLIC BLOOD PRESSURE: 94 MMHG | BODY MASS INDEX: 20.43 KG/M2 | WEIGHT: 111 LBS

## 2022-11-03 DIAGNOSIS — R00.2 PALPITATIONS: ICD-10-CM

## 2022-11-03 DIAGNOSIS — I95.1 ORTHOSTASIS: Primary | ICD-10-CM

## 2022-11-03 PROCEDURE — 93000 ELECTROCARDIOGRAM COMPLETE: CPT | Performed by: INTERNAL MEDICINE

## 2022-11-03 PROCEDURE — 99214 OFFICE O/P EST MOD 30 MIN: CPT | Performed by: INTERNAL MEDICINE

## 2022-11-03 NOTE — PROGRESS NOTES
Rainer Conte is a 30 y.o. female    Visit Vitals  BP 94/60 (BP 1 Location: Left upper arm, BP Patient Position: Sitting, BP Cuff Size: Adult)   Pulse 73   Ht 5' 2\" (1.575 m)   Wt 111 lb (50.3 kg)   SpO2 98%   BMI 20.30 kg/m²       Chief Complaint   Patient presents with    Palpitations    Irregular Heart Beat     PAC    Other     HYPOTENSION       Chest pain YES  SOB NO  Dizziness WHILE STANDING, LIKE A RUSH  Swelling Hands  Recent hospital visit NO  Refills NO  COVID VACCINE STATUS YES  HAD COVID?  NO    CONCERNED IF SHE HAS POTS

## 2022-11-08 ENCOUNTER — HOSPITAL ENCOUNTER (OUTPATIENT)
Dept: CT IMAGING | Age: 37
Discharge: HOME | End: 2022-11-08
Attending: NURSE PRACTITIONER
Payer: MEDICAID

## 2022-11-08 DIAGNOSIS — G91.9 HYDROCEPHALUS IN ADULT (HCC): ICD-10-CM

## 2022-11-08 DIAGNOSIS — R29.2 ABNORMAL DTR (DEEP TENDON REFLEX): ICD-10-CM

## 2022-11-08 DIAGNOSIS — M48.062 SPINAL STENOSIS OF LUMBAR REGION WITH NEUROGENIC CLAUDICATION: ICD-10-CM

## 2022-11-08 DIAGNOSIS — R26.0 ATAXIC GAIT: ICD-10-CM

## 2022-11-08 PROCEDURE — 70450 CT HEAD/BRAIN W/O DYE: CPT

## 2022-11-08 PROCEDURE — 72131 CT LUMBAR SPINE W/O DYE: CPT

## 2022-11-17 DIAGNOSIS — R20.2 PARESTHESIA: ICD-10-CM

## 2022-11-17 DIAGNOSIS — R29.2 ABNORMAL DTR (DEEP TENDON REFLEX): ICD-10-CM

## 2022-11-17 DIAGNOSIS — R93.7 ABNORMAL CT SCAN, LUMBAR SPINE: ICD-10-CM

## 2022-11-17 DIAGNOSIS — S34.22XS: ICD-10-CM

## 2022-11-17 DIAGNOSIS — S34.4XXA INJURY OF LUMBOSACRAL PLEXUS, INITIAL ENCOUNTER: Primary | ICD-10-CM

## 2022-11-19 ENCOUNTER — TELEPHONE (OUTPATIENT)
Dept: NEUROLOGY | Age: 37
End: 2022-11-19

## 2022-11-19 NOTE — TELEPHONE ENCOUNTER
[facility] based on script provider termed med on 10/20/22.     Archived key

## 2022-12-05 ENCOUNTER — TELEPHONE (OUTPATIENT)
Dept: NEUROLOGY | Age: 37
End: 2022-12-05

## 2022-12-05 NOTE — TELEPHONE ENCOUNTER
Patient stated she has been reaching out for sometime through My Chart. Patient would like to know can she go horseback riding today at 12:45 pm due to her back concerns.       Please contact

## 2022-12-30 ENCOUNTER — HOSPITAL ENCOUNTER (OUTPATIENT)
Dept: MRI IMAGING | Age: 37
Discharge: HOME | End: 2022-12-30
Attending: NURSE PRACTITIONER
Payer: MEDICAID

## 2022-12-30 ENCOUNTER — HOSPITAL ENCOUNTER (OUTPATIENT)
Dept: MRI IMAGING | Age: 37
End: 2022-12-30
Attending: NURSE PRACTITIONER
Payer: MEDICAID

## 2022-12-30 VITALS — WEIGHT: 111 LBS | BODY MASS INDEX: 20.3 KG/M2

## 2022-12-30 DIAGNOSIS — S34.22XS: ICD-10-CM

## 2022-12-30 DIAGNOSIS — R93.7 ABNORMAL CT SCAN, LUMBAR SPINE: ICD-10-CM

## 2022-12-30 DIAGNOSIS — R20.2 PARESTHESIA: ICD-10-CM

## 2022-12-30 DIAGNOSIS — S34.4XXA INJURY OF LUMBOSACRAL PLEXUS, INITIAL ENCOUNTER: ICD-10-CM

## 2022-12-30 DIAGNOSIS — R29.2 ABNORMAL DTR (DEEP TENDON REFLEX): ICD-10-CM

## 2022-12-30 PROCEDURE — 72148 MRI LUMBAR SPINE W/O DYE: CPT

## 2022-12-30 PROCEDURE — 72195 MRI PELVIS W/O DYE: CPT

## 2023-01-10 ENCOUNTER — OFFICE VISIT (OUTPATIENT)
Dept: NEUROLOGY | Age: 38
End: 2023-01-10
Payer: MEDICAID

## 2023-01-10 VITALS — WEIGHT: 109 LBS | BODY MASS INDEX: 19.94 KG/M2 | SYSTOLIC BLOOD PRESSURE: 110 MMHG | DIASTOLIC BLOOD PRESSURE: 70 MMHG

## 2023-01-10 DIAGNOSIS — G43.909 MIGRAINE WITHOUT STATUS MIGRAINOSUS, NOT INTRACTABLE, UNSPECIFIED MIGRAINE TYPE: ICD-10-CM

## 2023-01-10 DIAGNOSIS — S34.4XXA INJURY OF LUMBOSACRAL PLEXUS, INITIAL ENCOUNTER: Primary | ICD-10-CM

## 2023-01-10 DIAGNOSIS — S34.22XS: ICD-10-CM

## 2023-01-10 DIAGNOSIS — R20.2 PARESTHESIA: ICD-10-CM

## 2023-01-10 PROCEDURE — 99215 OFFICE O/P EST HI 40 MIN: CPT | Performed by: NURSE PRACTITIONER

## 2023-01-10 RX ORDER — BUTALBITAL, ACETAMINOPHEN AND CAFFEINE 50; 325; 40 MG/1; MG/1; MG/1
1 TABLET ORAL
Qty: 30 TABLET | Refills: 5 | Status: SHIPPED | OUTPATIENT
Start: 2023-01-10

## 2023-01-10 NOTE — PROGRESS NOTES
Delilah Puri is a 30 y.o. female who presents with the following  Chief Complaint   Patient presents with    Follow-up    Results       HPI    Follow-up for an MRI of the lumbar spine and an MRI of the pelvis  These are seen below and we discussed these in full    She does states she has fallen a couple times since last visit  No loss of consciousness or syncope but she does have some paresthesia  She does feel like she drags her left leg some  She has significant low back pain  She has significant shoulder pain and into the neck which causes headaches  She has Tylenol and this helps  We did give her Nurtec but she has not taken it yet due to try to finish up breast-feeding  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  Picking things up will exacerbate her arm and neck    Her headaches have not changed much  She has not used the Nurtec and does feel like if she needs it she will take it  Stress is decreased  She is going to work as needed as a  and a  for local school's  She does still have some issues with musculoskeletal pain and balance          Allergies       Allergies   Allergen Reactions    Latex Rash     Wheezing    Beef Containing Products Anaphylaxis    Keflex [Cephalexin] Anaphylaxis    Norco [Hydrocodone-Acetaminophen] Shortness of Breath    Codeine Other (comments)     Hyperactivity    Doxycycline Nausea Only    Macrobid [Nitrofurantoin Monohyd/M-Cryst] Other (comments)     Pharmacy had this allergy listed and called the patient and patient could not remember her reaction to the medication. This nurse called the patient . Patient states that when it was prescribed in early pregnancy it caused spotting.       Nuts [Tree Nut] Swelling     Pt stated that her mouth and throat feels funny when she eats nuts      Omnicef [Cefdinir] Other (comments)     \"shakes\"    Pepto-Bismol [Bismuth Subsalicylate] Hives    Phenergan [Promethazine] Other (comments)     Increased sedation Reglan [Metoclopramide] Anxiety    Soy Hives       Current Outpatient Medications   Medication Sig    butalbital-acetaminophen-caffeine (FIORICET, ESGIC) -40 mg per tablet Take 1 Tablet by mouth every six (6) hours as needed for Headache.    levothyroxine (SYNTHROID) 50 mcg tablet Take 1 Tablet by mouth Daily (before breakfast). multivitamin with iron (FLINTSTONES) chewable tablet Take 1 Tablet by mouth daily. Indications: treatment to prevent mineral deficiency     No current facility-administered medications for this visit. Social History     Tobacco Use   Smoking Status Never   Smokeless Tobacco Never       Past Medical History:   Diagnosis Date    Arrhythmia     Arthritis     left foot and lower back    Asthma     no inhaler    Burning with urination      delivery delivered 2022    Chronic UTI     Complication of anesthesia     bradycardia- pt stated she was \"shocked\" under general as child    Disease of blood and blood forming organ     Headache     Heart abnormality     mummer    Iron deficiency anemia during pregnancy 2019    Migraine     Nerve damage     Nerve damage in left foot.     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 care in pregnancy     Self-catheterizes urinary bladder     Since age 13    Tethered spinal cord ([facility] [address]     Unspecified breast disorder     L invert nipple       Past Surgical History:   Procedure Laterality Date    HX BACK SURGERY      x2    HX GYN      episotomy    HX ORTHOPAEDIC      spine and left foot    HX OTHER SURGICAL      L foot has wire from surgery       Family History   Problem Relation Age of Onset    OSTEOARTHRITIS Mother     Migraines Mother     Alcohol abuse Paternal Grandfather     Bleeding Prob Maternal Grandfather     Stroke Maternal Grandfather     Hypertension Maternal Grandfather Breast Cancer Other     Atrial Fibrillation Brother     Hypertension Maternal Grandmother        Social History     Socioeconomic History    Marital status:    Tobacco Use    Smoking status: Never    Smokeless tobacco: Never   Vaping Use    Vaping Use: Never used   Substance and Sexual Activity    Alcohol use: No    Drug use: No    Sexual activity: Yes     Partners: Male     Birth control/protection: Condom       Review of Systems   Eyes:  Positive for photophobia. Negative for blurred vision and double vision. Respiratory:  Negative for shortness of breath and wheezing. Cardiovascular:  Negative for chest pain and palpitations. Gastrointestinal:  Negative for nausea and vomiting. Musculoskeletal:  Positive for back pain, falls, joint pain and neck pain. Neurological:  Positive for tingling, sensory change, weakness and headaches. Negative for tremors, speech change, focal weakness, seizures and loss of consciousness. Remainder of comprehensive review is negative. Physical Exam :    Visit Vitals  /70 (BP 1 Location: Left upper arm, BP Patient Position: Sitting, BP Cuff Size: Adult)   Wt 49.4 kg (109 lb)   BMI 19.94 kg/m²       General: Well defined, nourished, and groomed individual in no acute distress. Musculoskeletal: Extremities revealed no edema and had full range of motion of joints. Psych: Good mood and bright affect    NEUROLOGICAL EXAMINATION:    Mental Status: Alert and oriented to person, place, and time    Cranial Nerves:    II, III, IV, VI: Visual acuity grossly intact. Visual fields are normal.    Pupils are equal, round, and reactive to light and accommodation. Extra-ocular movements are full and fluid. Fundoscopic exam was benign, no ptosis or nystagmus. V-XII: Hearing is grossly intact. Facial features are symmetric, with normal sensation and strength. The palate rises symmetrically and the tongue protrudes midline. Sternocleidomastoids 5/5.      Motor Examination: Normal tone, bulk, and strength, 3/5 muscle strength throughout bilateral LE, 5/5 bilateral UE     Coordination: Finger to nose was normal. No resting or intention tremor    Gait and Station: Steady while walking. Reflexes: DTRs 3+ throughout. Neurosensory Exam:  Stocking glove sensory loss to temperature, vibration, and pinprick to the thighs.         Results for orders placed or performed in visit on 04/11/22   ECHO ADULT COMPLETE   Result Value Ref Range    IVSd 0.9 0.6 - 0.9 cm    LVIDd 4.7 3.9 - 5.3 cm    LVIDs 3.2 cm    LVPWd 0.8 0.6 - 0.9 cm    EF BP 60 55 - 100 %    LV Ejection Fraction A2C 57 %    LV Ejection Fraction A4C 61 %    LV EDV A2C 79 mL    LV EDV A4C 109 mL    LV EDV BP 94 56 - 104 mL    LV ESV A2C 34 mL    LV ESV A4C 42 mL    LV ESV BP 38 19 - 49 mL    LVOT Peak Gradient 3 mmHg    LVOT Mean Gradient 2 mmHg    LVOT Peak Velocity 0.9 m/s    LVOT VTI 17.5 cm    RV Free Wall Peak S' 17 cm/s    LA Diameter 2.8 cm    AV Peak Gradient 5 mmHg    AV Mean Gradient 3 mmHg    AV Peak Velocity 1.1 m/s    AV Mean Velocity 0.8 m/s    AV VTI 19.8 cm    MV A Velocity 0.73 m/s    MV E Wave Deceleration Time 240.5 ms    MV E Velocity 0.89 m/s    LV E' Lateral Velocity 12 cm/s    LV E' Septal Velocity 13 cm/s    MV PHT 69.7 ms    MV Area by PHT 3.2 cm2    TAPSE 2.3 1.7 cm    Aortic Root 2.9 cm    Fractional Shortening 2D 32 28 - 44 %    LV ESV Index BP 25 mL/m2    LV EDV Index BP 62 mL/m2    LV ESV Index A4C 28 mL/m2    LV EDV Index A4C 72 mL/m2    LV ESV Index A2C 22 mL/m2    LV EDV Index A2C 52 mL/m2    LVIDd Index 3.09 cm/m2    LVIDs Index 2.11 cm/m2    LV RWT Ratio 0.34     LV Mass 2D 132.3 67 - 162 g    LV Mass 2D Index 87.1 43 - 95 g/m2    MV E/A 1.22     E/E' Ratio (Averaged) 7.13     E/E' Lateral 7.42     E/E' Septal 6.85     LA Size Index 1.84 cm/m2    LA/AO Root Ratio 0.97     Ao Root Index 1.91 cm/m2    AV Velocity Ratio 0.82     LVOT:AV VTI Index 0.88    EXAM: MRI LUMB SPINE WO CONT INDICATION: Low back pain. . Injury of lumbosacral plexus. History of tethered  cord surgeries. COMPARISON: CT 11/8/2022     TECHNIQUE: MR imaging of the lumbar spine was performed using the following  sequences: sagittal T1, T2, STIR;  axial T1, T2. The scan was performed on the  open magnet. CONTRAST:  None. FINDINGS:     There is mild levoconvex scoliosis centered at L1. There is no listhesis. Vertebral body heights are maintained. Marrow signal is normal. The sacrum ends  at S3. There is a complex cystic lesion extending from the canal at the S2 level  into the left pelvis. This is better evaluated on the pelvic MRI. Please see  that report. The conus medullaris terminates at L2. Signal and caliber of the distal spinal  cord are within normal limits. A catheter fragment is again seen extending into  the distal thecal sac from L3 to L5. 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 The paraspinal soft tissues are within normal limits. Lower thoracic spine: No herniation or stenosis. L1-L2: No herniation or stenosis. L2-L3: No herniation or stenosis. L3-L4: No herniation or stenosis. L4-L5: Status post laminectomy. No significant spinal stenosis. There is mild  left neural foraminal narrowing. L5-S1: Status post laminectomy. No significant spinal stenosis or neural  foraminal narrowing. Please see the pelvic MRI report for findings in the pelvis. Multiple small  cysts are seen in the ovaries. There is nonunion are laminectomies of the  posterior elements of the sacrum. IMPRESSION     1. Mild levoconvex scoliosis centered at L1.  2. 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 The sacrum ends at S3.  3. Retained intrathecal catheter from L3 to L5.  The distal thecal sac has a  complex appearance likely related to postsurgical change and chronic  arachnoiditis. 4. Complex cystic lesion extending from the thecal sac at the S2 level into the  adjacent left pelvis. This is better evaluated on the pelvic MRI. 5. Mild left neural foraminal narrowing at L4-5. Narrative & Impression   EXAM:  MRI PELV WO CONT     INDICATION: Dx: Injury of lumbosacral plexus, initial encounter [S34. 4XXA  (ICD-10-CM)]; Abnormal CT scan, lumbar spine [R93.7 (ICD-10-CM)]; Paresthesia  [R20.2 (ICD-10-CM)]; Injury of spinal nerve root at S3 level, sequela [S34. 22XS  (ICD-10-CM)]; Abnormal DTR (deep tendon reflex) [R29.2 (ICD-10-CM)]     COMPARISON: CT lumbar spine 11/8/2022     TECHNIQUE: Axial, coronal, and sagittal MRI of the pelvis in the T1, T2, and  inversion-recovery pulse sequences with and without fat saturation . CONTRAST: None. FINDINGS:      Bone marrow: L4-S1 laminectomies. Redemonstrated catheter tubing in the thecal  sac (9-23, 9-24). No fracture, dislocation, or marrow replacing process. No  evidence of stress reaction or periostitis. Joint fluid: Physiologic. Tendons: Intact. Muscles: Within normal limits. Neurovascular bundles and soft tissues: An approximately 3.7 cm x 3.6 cm x 2.6  cm T2 hyperintense and T1 hyperintense focus anterior/inferior to the left S3  neural foramen (6-11, 8-23) with an approximately 2.4 cm x 2.0 cm x 1.3 cm  Heterogeneously T2 hypointense and T1 hyperintense lesion within (especially 10,  8-21) correlating to lesions seen on prior CT an overall favoring a postsurgical  hematoma. . Redemonstrated longitudinal fat signal focus in the midline spinal  canal lower lumbosacral spinal canal extending to the left S2-S3 neural foramina  (series 6, images 2-5), likely postsurgical.     Articular cartilage: Not well evaluated. No osteochondral lesion. Soft tissues: No mass. IMPRESSION  L4-S1 laminectomies with redemonstrated catheter tubing within the thecal sac.   Redemonstrated complex T1 hyperintense and T2 mixed hyperintense and hypointense  focus anterior/inferior to the left S3 neural foramen, overall favoring a  postsurgical hematoma/seroma. Redemonstrated longitudinal fat signal focus in  the lower lumbosacral spine canal extending to the left S2/S3 neural foramina,  likely postsurgical in nature. Consider clinical and/or imaging follow-up to  resolution. Narrative & Impression   INDICATION:  Dx: Ataxic gait [R26.0 (ICD-10-CM)]; Spinal stenosis of lumbar  region with neurogenic claudication [M48.062 (ICD-10-CM)]; Abnormal DTR (deep  tendon reflex) [R29.2 (ICD-10-CM)]      COMPARISON: Lumbosacral spine radiograph 7/29/2022. TECHNIQUE: Helical CT imaging of the lumbar spine. Coronal and sagittal  reformats. CT dose reduction was achieved through the use of a standardized  protocol tailored for this examination and automatic exposure control for dose  modulation. CONTRAST: None     FINDINGS:  L4-S1 laminectomies. Catheter tubing spanning from the spinal canal at the L3  level superiorly to the dorsal epidural space at the L5-S1 level inferiorly. Longitudinal fat density within the spinal canal from the central L4-L5 through  the left-sided S2 level (best seen on 3-76). 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 Alignment is within normal limits. Marked thoracolumbar levoscoliosis centered  about T12-L1 There is no fracture or compression deformity. An approximately 2.2 cm x 1.5 cm x 1.9 cm ovoid soft tissue density anterior to  the left S3 neuroforamen (3-98). T10-T11: Mild facet arthropathy. No spinal stenosis. Moderate right and mild  left foraminal stenosis. T11-T12: Mild facet arthropathy. No stenosis. T12-L1: Mild facet arthropathy. No stenosis. L1-2: Mild facet arthropathy. No stenosis. L2-3: Disc bulge. Moderate facet arthropathy. No spinal stenosis. Mild bilateral  foraminal stenosis. L3-4: Disc bulge. Moderate facet arthropathy. No spinal stenosis. Moderate left  and mild right foraminal stenosis. L4-5: Disc bulge. Moderate facet arthropathy. No spinal stenosis. Moderate  bilateral foraminal stenosis. L5-S1: Disc bulge. Marked facet arthropathy. No spinal stenosis. Mild right  foraminal stenosis. IMPRESSION  1. L4-S1 laminectomies and catheter tubing within the lower spinal canal and  epidural space. Longitudinal fat density in the lower lumbosacral spinal Canal  as described above, may reflect stigmata of neural tube defect/variation. Soft  tissue density anterior to the left S3 neural foramen, indeterminate. A contrast  enhanced lumbar spine/lumbosacral plexus MRI may be of benefit for further  characterization, if not already performed at another institution. 2.  Multilevel degenerative changes and levoscoliosis. No significant spinal  canal stenosis. Multilevel mild-to-moderate foraminal stenosis as above. Orders Placed This Encounter    REFERRAL TO PHYSICAL THERAPY     Referral Priority:   Routine     Referral Type:   PT/OT/ST     Referral Reason:   Specialty Services Required     Number of Visits Requested:   1    REFERRAL TO NEUROSURGERY     Referral Priority:   Routine     Referral Type:   Consultation     Referral Reason:   Specialty Services Required     Referred to Provider:   Rene Eddy MD     Number of Visits Requested:   1    butalbital-acetaminophen-caffeine (FIORICET, ESGIC) -40 mg per tablet     Sig: Take 1 Tablet by mouth every six (6) hours as needed for Headache. Dispense:  30 Tablet     Refill:  5       1. Injury of lumbosacral plexus, initial encounter    2. Injury of spinal nerve root at S3 level, sequela    3. Paresthesia    4.  Migraine without status migrainosus, not intractable, unspecified migraine type    We discussed the complexity of her scans and history  We will send her down to [facility] to be evaluated for treatment course  Refer to progress of physical therapy in [address] [place] [address] to help with balance, strength, coordination and neck pain  She will continue to use Tylenol as needed for her headaches but can try Fioricet until she is done breast-feeding  She does have Nurtec as a second line when she is done breast-feeding  Will try to continue to monitor and call with any changes  She is okay to go back to work                This note will not be viewable in [address]

## 2023-01-10 NOTE — LETTER
1/10/2023 3:34 PM    Ms. Lindsey [phone]    To Whom It May Concern,     Ms. Brenda Pierce is currently under the care of [facility] and was seen in the office today. She is considered stable to return to work at full capacity. If you have any further questions please contact our office.            Sincerely,      Vivien Leger

## 2023-01-10 NOTE — PROGRESS NOTES
Left arm has been doing OK  Migraines have been increased due to stress, takes OC tylenol this works  Lumbar has been having nerve pains recently, numbness down left leg, fingers, and face  Has had 2 falls  Wrist is still causing issues

## 2023-02-09 NOTE — PROGRESS NOTES
Judy Moreno is a 30 y.o. female returns for an annual exam     Chief Complaint   Patient presents with    Well Woman       Patient's last menstrual period was 2023. Her periods are normal in flow and usually regular with a 26-32 day interval with 3-7 day duration. She has dysmenorrhea. Problems: Concerned she had a ruptured cyst due to pain on right lower pelvis after last menses. She used Tylenol for pain and then it resolved. MRI in December and it showed multiple cysts on her right ovary. Recently treated for a UTI and has two doses of antibiotics left. She would also like  to look at her  scar that still feels tender at times when lifting. Feels a knot that she believes may be a hernia. Birth Control: none  Last Pap: NILM, HPV negative 19  She does not have a history of ALTA 2, 3 or cervical cancer. With regard to the Gardisil vaccine, she is older than the FDA approved age to receive it. 1. Have you been to the ER, urgent care clinic, or hospitalized since your last visit? No    2. Have you seen or consulted any other health care providers outside of the [address] [place] [address] since your last visit? Yes, seeing neurologist at [facility] for scoliosis. Examination chaperoned by Marga Cortés RN. DISPLAY PLAN FREE TEXT

## 2023-02-10 ENCOUNTER — OFFICE VISIT (OUTPATIENT)
Dept: OBGYN CLINIC | Age: 38
End: 2023-02-10
Payer: MEDICAID

## 2023-02-10 VITALS
SYSTOLIC BLOOD PRESSURE: 102 MMHG | HEIGHT: 62 IN | BODY MASS INDEX: 19.98 KG/M2 | WEIGHT: 108.6 LBS | DIASTOLIC BLOOD PRESSURE: 62 MMHG

## 2023-02-10 DIAGNOSIS — Z01.419 ENCOUNTER FOR GYNECOLOGICAL EXAMINATION: Primary | ICD-10-CM

## 2023-02-10 PROCEDURE — 99395 PREV VISIT EST AGE 18-39: CPT | Performed by: OBSTETRICS & GYNECOLOGY

## 2023-02-10 NOTE — PROGRESS NOTES
Akin Tarango is a [phone],  30 y.o. female [address] [facility] whose Patient's last menstrual period was 2023. was on 2023 who presents for her annual checkup. She is having no significant problems. Is finishing antibiotics for UTI      Menstrual status:    Her periods are moderate in flow, regular    She has dysmenorrhea. Contraception:    The current method of family planning is abstinence    Sexual history:    She  reports that she is not currently sexually active and has had partner(s) who are male. Pap and Mammogram History:  Last Pap: NILM, HPV negative 19  She does not have a history of ALTA 2, 3 or cervical cancer. With regard to the Gardisil vaccine, she is older than the FDA approved age to receive       The patient does have a family history of breast cancer. Family History   Problem Relation Age of Onset    OSTEOARTHRITIS Mother     Migraines Mother     Alcohol abuse Paternal Grandfather     Bleeding Prob Maternal Grandfather     Stroke Maternal Grandfather     Hypertension Maternal Grandfather     Breast Cancer Other     Atrial Fibrillation Brother     Hypertension Maternal Grandmother        Past Medical History:   Diagnosis Date    Arrhythmia     Arthritis     left foot and lower back    Asthma     no inhaler    Burning with urination      delivery delivered 2022    Chronic UTI     Complication of anesthesia     bradycardia- pt stated she was \"shocked\" under general as child    Disease of blood and blood forming organ     Headache     Heart abnormality     mummer    Iron deficiency anemia during pregnancy 2019    Migraine     Nerve damage     Nerve damage in left foot.     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 care in pregnancy     Self-catheterizes urinary bladder     Since age 13    Tethered spinal cord ([address]     Unspecified breast disorder     L invert nipple     Past Surgical History:   Procedure Laterality Date    HX BACK SURGERY      x2    HX GYN      episotomy    HX ORTHOPAEDIC      spine and left foot    HX OTHER SURGICAL      L foot has wire from surgery       Current Outpatient Medications   Medication Sig Dispense Refill    butalbital-acetaminophen-caffeine (FIORICET, ESGIC) -40 mg per tablet Take 1 Tablet by mouth every six (6) hours as needed for Headache. 30 Tablet 5    levothyroxine (SYNTHROID) 50 mcg tablet Take 1 Tablet by mouth Daily (before breakfast). 90 Tablet 2    multivitamin with iron (FLINTSTONES) chewable tablet Take 1 Tablet by mouth daily.  Indications: treatment to prevent mineral deficiency       Allergies: Latex, Beef containing products, Keflex [cephalexin], Norco [hydrocodone-acetaminophen], Codeine, Doxycycline, Macrobid [nitrofurantoin monohyd/m-cryst], Nuts [tree nut], Omnicef [cefdinir], Pepto-bismol [bismuth subsalicylate], Phenergan [promethazine], Reglan [metoclopramide], and Soy   Social History     Socioeconomic History    Marital status:      Spouse name: Not on file    Number of children: Not on file    Years of education: Not on file    Highest education level: Not on file   Occupational History    Not on file   Tobacco Use    Smoking status: Never    Smokeless tobacco: Never   Vaping Use    Vaping Use: Never used   Substance and Sexual Activity    Alcohol use: No    Drug use: No    Sexual activity: Not Currently     Partners: Male   Other Topics Concern     Service Not Asked    Blood Transfusions Not Asked    Caffeine Concern Not Asked    Occupational Exposure Not Asked    Hobby Hazards Not Asked    Sleep Concern Not Asked    Stress Concern Not Asked    Weight Concern Not Asked    Special Diet Not Asked    Back Care Not Asked    Exercise Not Asked    Bike Helmet Not Asked    Seat Belt Not Asked    Self-Exams Not Asked   Social History Narrative    Not on file     Social Determinants of Health     Financial Resource Strain: Not on file   Food Insecurity: Not on file   Transportation Needs: Not on file   Physical Activity: Not on file   Stress: Not on file   Social Connections: Not on file   Intimate Partner Violence: Not on file   Housing Stability: Not on file     Tobacco History:  reports that she has never smoked. She has never used smokeless tobacco.  Alcohol Abuse:  reports no history of alcohol use. Drug Abuse:  reports no history of drug use.     Patient Active Problem List   Diagnosis Code    Migraine G43.909    Arrhythmia I49.9    Iron deficiency anemia during pregnancy O99.019, D50.9    Supervision of high risk pregnancy, antepartum O09.90    Tethered spinal cord (HCC) Q06.8    Self-catheterizes urinary bladder Z78.9    Sinus tachycardia R00.0    PAC (premature atrial contraction) I49.1    Chronic UTI N39.0    UTI (urinary tract infection) N39.0    Pregnancy Z34.90       Review of Systems - History obtained from the patient  Constitutional: negative for weight loss, fever, night sweats  HEENT: negative for hearing loss, earache, congestion, snoring, sorethroat  CV: negative for chest pain, palpitations, edema  Resp: negative for cough, shortness of breath, wheezing  GI: negative for change in bowel habits, abdominal pain, black or bloody stools  : negative for frequency, dysuria, hematuria, vaginal discharge  MSK: negative for back pain, joint pain, muscle pain  Breast: negative for breast lumps, nipple discharge, galactorrhea  Skin :negative for itching, rash, hives  Neuro: negative for dizziness, headache, confusion, weakness  Psych: negative for anxiety, depression, change in mood  Heme/lymph: negative for bleeding, bruising, pallor    Physical Exam    Visit Vitals  /62   Ht 5' 2\" (1.575 m)   Wt 108 lb 9.6 oz (49.3 kg)   LMP 01/27/2023   Breastfeeding No   BMI 19.86 kg/m²       Constitutional  Appearance: well-nourished, well developed, alert, in no acute distress    HENT  Head and Face: appears normal    Neck  Inspection/Palpation: normal appearance, no masses or tenderness  Lymph Nodes: no lymphadenopathy present  Thyroid: gland size normal, nontender, no nodules or masses present on palpation    Chest  Respiratory Effort: breathing normal  Auscultation: normal breath sounds    Cardiovascular  Heart: Auscultation: regular rate and rhythm without murmur    Breasts  Inspection of Breasts: breasts symmetrical, no skin changes, no discharge present, nipple appearance normal, no skin retraction present  Palpation of Breasts and Axillae: no masses present on palpation, no breast tenderness  Axillary Lymph Nodes: no lymphadenopathy present    Gastrointestinal  Abdominal Examination: abdomen non-tender to palpation, normal bowel sounds, no masses present  Liver and spleen: no hepatomegaly present, spleen not palpable  Hernias: no hernias identified    Genitourinary  External Genitalia: normal appearance for age, no discharge present, no tenderness present, no inflammatory lesions present, no masses present, no atrophy present  Vagina: normal vaginal vault without central or paravaginal defects, no discharge present, no inflammatory lesions present, no masses present  Bladder: non-tender to palpation  Urethra: appears normal  Cervix: normal   Uterus: normal size, shape and consistency  Adnexa: no adnexal tenderness present, no adnexal masses present  Perineum: perineum within normal limits, no evidence of trauma, no rashes or skin lesions present  Anus: anus within normal limits, no hemorrhoids present  Inguinal Lymph Nodes: no lymphadenopathy present    Skin  General Inspection: no rash, no lesions identified    Neurologic/Psychiatric  Mental Status:  Orientation: grossly oriented to person, place and time  Mood and Affect: mood normal, affect appropriate    .   Assessment:  Routine gynecologic examination  Her current medical status is satisfactory with no evidence of significant gynecologic issues.     Plan:  Counseled re: diet, exercise, healthy lifestyle  Return for yearly wellness visits  Pt counseled regarding co-testing for high risk HPV with pap

## 2023-02-17 ENCOUNTER — HOSPITAL ENCOUNTER (EMERGENCY)
Age: 38
Discharge: HOME | End: 2023-02-17
Attending: EMERGENCY MEDICINE
Payer: MEDICAID

## 2023-02-17 VITALS
DIASTOLIC BLOOD PRESSURE: 65 MMHG | TEMPERATURE: 98.6 F | HEART RATE: 97 BPM | SYSTOLIC BLOOD PRESSURE: 106 MMHG | WEIGHT: 110.67 LBS | OXYGEN SATURATION: 100 % | BODY MASS INDEX: 20.37 KG/M2 | HEIGHT: 62 IN | RESPIRATION RATE: 20 BRPM

## 2023-02-17 DIAGNOSIS — S51.811A LACERATION OF RIGHT FOREARM, INITIAL ENCOUNTER: Primary | ICD-10-CM

## 2023-02-17 PROCEDURE — 99282 EMERGENCY DEPT VISIT SF MDM: CPT

## 2023-02-17 PROCEDURE — 75810000293 HC SIMP/SUPERF WND  RPR

## 2023-02-18 NOTE — ED PROVIDER NOTES
29-year-old female who presents to the ER for evaluation for laceration to the right forearm sustained when the patient accidentally cut himself with glass while doing home repair this evening. The patient is right-handed. She denies any further discomfort at this time. Her tetanus discharge is up-to-date. Past Medical History:   Diagnosis Date    Arrhythmia     Arthritis     left foot and lower back    Asthma     no inhaler    Burning with urination      delivery delivered 2022    Chronic UTI     Complication of anesthesia     bradycardia- pt stated she was \"shocked\" under general as child    Disease of blood and blood forming organ     Headache     Heart abnormality     mummer    Iron deficiency anemia during pregnancy 2019    Migraine     Nerve damage     Nerve damage in left foot.     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 care in pregnancy     Routine Papanicolaou smear 02/10/2023    NILM, HPV negative    Self-catheterizes urinary bladder     Since age 13    Tethered spinal cord ([facility] [address]     Unspecified breast disorder     L invert nipple       Past Surgical History:   Procedure Laterality Date    HX BACK SURGERY      x2    HX GYN      episotomy    HX ORTHOPAEDIC      spine and left foot    HX OTHER SURGICAL      L foot has wire from surgery         Family History:   Problem Relation Age of Onset    OSTEOARTHRITIS Mother     Migraines Mother     Alcohol abuse Paternal Grandfather     Bleeding Prob Maternal Grandfather     Stroke Maternal Grandfather     Hypertension Maternal Grandfather     Breast Cancer Other     Atrial Fibrillation Brother     Hypertension Maternal Grandmother        Social History     Socioeconomic History    Marital status:      Spouse name: Not on file    Number of children: Not on file    Years of education: Not on file    Highest education level: Not on file   Occupational History    Not on file   Tobacco Use    Smoking status: Never    Smokeless tobacco: Never   Vaping Use    Vaping Use: Never used   Substance and Sexual Activity    Alcohol use: No    Drug use: No    Sexual activity: Not Currently     Partners: Male   Other Topics Concern     Service Not Asked    Blood Transfusions Not Asked    Caffeine Concern Not Asked    Occupational Exposure Not Asked    Hobby Hazards Not Asked    Sleep Concern Not Asked    Stress Concern Not Asked    Weight Concern Not Asked    Special Diet Not Asked    Back Care Not Asked    Exercise Not Asked    Bike Helmet Not Asked    Seat Belt Not Asked    Self-Exams Not Asked   Social History Narrative    Not on file     Social Determinants of Health     Financial Resource Strain: Not on file   Food Insecurity: Not on file   Transportation Needs: Not on file   Physical Activity: Not on file   Stress: Not on file   Social Connections: Not on file   Intimate Partner Violence: Not on file   Housing Stability: Not on file         ALLERGIES: Latex, Beef containing products, Keflex [cephalexin], Norco [hydrocodone-acetaminophen], Codeine, Doxycycline, Macrobid [nitrofurantoin monohyd/m-cryst], Nuts [tree nut], Omnicef [cefdinir], Pepto-bismol [bismuth subsalicylate], Phenergan [promethazine], Reglan [metoclopramide], and Soy    Review of Systems   All other systems reviewed and are negative. Vitals:    02/17/23 2220   BP: 106/65   Pulse: 97   Resp: 20   Temp: 98.6 °F (37 °C)   SpO2: 100%   Weight: 50.2 kg (110 lb 10.7 oz)   Height: 5' 2\" (1.575 m)            Physical Exam  Vitals and nursing note reviewed. Exam conducted with a chaperone present. CONSTITUTIONAL: Well-appearing; well-nourished; in no apparent distress  HEAD: Normocephalic; atraumatic  EYES: PERRL; EOM intact; conjunctiva and sclera are clear bilaterally.   ENT: No rhinorrhea; normal pharynx with no tonsillar hypertrophy; mucous membranes pink/moist, no erythema, no exudate. NECK: Supple; non-tender; no cervical lymphadenopathy  CARD: Normal S1, S2; no murmurs, rubs, or gallops. Regular rate and rhythm. RESP: Normal respiratory effort; breath sounds clear and equal bilaterally; no wheezes, rhonchi, or rales. ABD: Normal bowel sounds; non-distended; non-tender; no palpable organomegaly, no masses, no bruits. Back Exam: Normal inspection; no vertebral point tenderness, no CVA tenderness. Normal range of motion. EXT: Normal ROM in all four extremities; non-tender to palpation; no swelling or deformity; distal pulses are normal, no edema. SKIN: Warm; dry; 1.5 cm superficial laceration to the dorsal aspect of the right forearm and wrist area. [place] Other NEURO:Alert and oriented x 3, coherent, EDNA-XII grossly intact, sensory and motor are non-focal.        Medical Decision Making  Assessment: Right forearm laceration in a 57-year-old female who has a fairly benign exam with stable vital signs. Plan: Wound care and laceration repair/education, reassurance, symptomatic treatment/serial exam/ Monitor and Reevaluate.              Wound Closure by Adhesive    Date/Time: 2/17/2023 10:59 PM  Performed by: Erick Harris MD  Authorized by: Erick Harris MD     Consent:     Consent obtained:  Verbal    Consent given by:  Patient    Risks discussed:  Infection, pain and poor wound healing    Alternatives discussed:  No treatment  Universal protocol:     Procedure explained and questions answered to patient or proxy's satisfaction: yes      Relevant documents present and verified: yes      Site/side marked: yes      Immediately prior to procedure, a time out was called: yes      Patient identity confirmed:  Verbally with patient, arm band and provided demographic data  Anesthesia:     Anesthesia method:  None  Laceration details:     Location: right forearm/wrist.    Length (cm):  1.5    Laceration depth: superficial.  Exploration:     Limited defect created (wound extended): no      Hemostasis achieved with:  Direct pressure    Imaging outcome: foreign body not noted      Wound exploration: wound explored through full range of motion and entire depth of wound visualized      Contaminated: no    Treatment:     Area cleansed with:  Shur-Clens and chlorhexidine    Amount of cleaning:  Standard    Debridement:  None    Undermining:  None    Scar revision: no    Skin repair:     Repair method:  Tissue adhesive and Steri-Strips    Number of Steri-Strips:  4  Approximation:     Approximation:  Close  Repair type:     Repair type: Intermediate  Post-procedure details:     Dressing:  Bulky dressing    Procedure completion:  Tolerated      Progress Note:   Pt has been reexamined by Lily Nguyen MD. Pt is feeling much better. Symptoms have improved. All available results have been reviewed with pt and any available family. Pt understands sx, dx, and tx in ED. Care plan has been outlined and questions have been answered. Pt is ready to go home. Will send home on laceration right forearm and wound care instruction. . Outpatient referral with PCP as needed. Written by Lily Nguyen MD,10:32 PM    .   .

## 2023-02-18 NOTE — ED TRIAGE NOTES
Pt sustained laceration to the right wist from a piece of glass from a window frame at approx. 1630 today.

## 2023-03-08 ENCOUNTER — PATIENT MESSAGE (OUTPATIENT)
Dept: NEUROLOGY | Age: 38
End: 2023-03-08

## 2023-03-13 ENCOUNTER — OFFICE VISIT (OUTPATIENT)
Dept: SURGERY | Age: 38
End: 2023-03-13
Payer: MEDICAID

## 2023-03-13 VITALS
HEART RATE: 70 BPM | DIASTOLIC BLOOD PRESSURE: 61 MMHG | TEMPERATURE: 98.2 F | OXYGEN SATURATION: 98 % | RESPIRATION RATE: 20 BRPM | HEIGHT: 62 IN | SYSTOLIC BLOOD PRESSURE: 97 MMHG | WEIGHT: 113 LBS | BODY MASS INDEX: 20.8 KG/M2

## 2023-03-13 DIAGNOSIS — K62.3 RECTAL PROLAPSE: ICD-10-CM

## 2023-03-13 PROCEDURE — 99202 OFFICE O/P NEW SF 15 MIN: CPT | Performed by: SURGERY

## 2023-03-13 NOTE — PROGRESS NOTES
Horace Valderrama is a 41 y.o. female who is referred by Cheli Zuñiga NP for further evaluation of hemorrhoids. Ms. Liseth Sánchez tells me that she has been having problems with hemorrhoids for some time now. Associated rectal bleeding and \"leakag. \" Ms. Liseth Sánchez has a h/o constipation and diarrhea. No family  h/o colorectal cancer. Possible family h/o Crohn's Disease. No previous colonoscopy. Of note, Ms. Liseth Sánchez has a h/o tethered spinal cord and requires self-catheterization. She has otherwise been in her usual state of health. Past Medical History:   Diagnosis Date    Arrhythmia     Arthritis     left foot and lower back    Asthma     no inhaler    Burning with urination      delivery delivered 2022    Chronic UTI     Complication of anesthesia     bradycardia- pt stated she was \"shocked\" under general as child    Disease of blood and blood forming organ     Headache     Heart abnormality     mummer    Iron deficiency anemia during pregnancy 2019    Migraine     Nerve damage     Nerve damage in left foot.     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 in pregnancy     Routine Papanicolaou smear 02/10/2023    NILM, HPV negative    Self-catheterizes urinary bladder     Since age 13    Tethered spinal cord ([facility] [address]     Unspecified breast disorder     L invert nipple     Past Surgical History:   Procedure Laterality Date    HX BACK SURGERY      x2    HX GYN      episotomy    HX ORTHOPAEDIC      spine and left foot    HX OTHER SURGICAL      L foot has wire from surgery     Family History   Problem Relation Age of Onset    OSTEOARTHRITIS Mother     Migraines Mother     Alcohol abuse Paternal Grandfather     Bleeding Prob Maternal Grandfather     Stroke Maternal Grandfather     Hypertension Maternal Grandfather Breast Cancer Other     Atrial Fibrillation Brother     Hypertension Maternal Grandmother      Social History     Socioeconomic History    Marital status:    Tobacco Use    Smoking status: Never    Smokeless tobacco: Never   Vaping Use    Vaping Use: Never used   Substance and Sexual Activity    Alcohol use: No    Drug use: No    Sexual activity: Not Currently     Partners: Male     Review of systems negative except as noted. Review of Systems   Gastrointestinal:  Positive for blood in stool, constipation and diarrhea. Physical Exam  Exam conducted with a chaperone present. Constitutional:       General: She is not in acute distress. Appearance: Normal appearance. She is normal weight. HENT:      Head: Normocephalic and atraumatic. Cardiovascular:      Rate and Rhythm: Normal rate and regular rhythm. Pulmonary:      Effort: Pulmonary effort is normal.   Abdominal:      General: There is no distension. Palpations: Abdomen is soft. Tenderness: There is no abdominal tenderness. Genitourinary:     Comments: No perianal abscess or fistula-in-ano. There is prolapsed mucosa. No apparent external or prolapsed internal hemorrhoids. No active bleeding. Musculoskeletal:         General: Normal range of motion. Neurological:      General: No focal deficit present. Mental Status: She is alert. ASSESSMENT and PLAN  Ms. Peg Valdez is a 40 yo female with prolapsed rectal mucosa. Explained to Ms. Peg Valdez that she appears to have rectal prolapse rather than hemorrhoids. Will ask Dr. Preston Virgen to see as Ms. Peg Valdez may also have problems with her pelvic floor. Follow up with MsAugustine Ayla Amanda as scheduled. Will see as needed. Discussed plan with Ms. Peg Valdez and she is agreeable.       CC: XAVIER Pandey MD

## 2023-03-13 NOTE — PROGRESS NOTES
Identified pt with two pt identifiers (name and ). Reviewed chart in preparation for visit and have obtained necessary documentation. Anika Haines is a 41 y.o. female  Chief Complaint   Patient presents with    New Patient    Hemorrhoids     Visit Vitals  BP 97/61 (BP 1 Location: Left upper arm, BP Patient Position: Sitting, BP Cuff Size: Adult)   Pulse 70   Temp 98.2 °F (36.8 °C)   Resp 20   Ht 5' 2\" (1.575 m)   Wt 113 lb (51.3 kg)   SpO2 98%   BMI 20.67 kg/m²       1. Have you been to the ER, urgent care clinic since your last visit? Hospitalized since your last visit? new patient    2. Have you seen or consulted any other health care providers outside of the [address] [place] [address] since your last visit? Include any pap smears or colon screening.  New patient

## 2023-03-14 ENCOUNTER — DOCUMENTATION ONLY (OUTPATIENT)
Dept: SURGERY | Age: 38
End: 2023-03-14

## 2023-03-17 ENCOUNTER — TELEPHONE (OUTPATIENT)
Dept: SURGERY | Age: 38
End: 2023-03-17

## 2023-03-17 NOTE — TELEPHONE ENCOUNTER
Spoke with Selvin Hager with [facility] office informed her I was calling to follow up on fax sent 3/15/23 requesting new patient appointment to confirm receipt. Per Selvin Hager she will follow up and return call shortly to confirm if they have fax. Spoke with Dr. [facility] office states they have called and left messages for patient to return call to schedule visit X2. They will call 3 times to try and reach the patient.

## 2023-03-23 ENCOUNTER — OFFICE VISIT (OUTPATIENT)
Dept: NEUROLOGY | Age: 38
End: 2023-03-23

## 2023-03-23 VITALS — HEART RATE: 64 BPM | OXYGEN SATURATION: 99 % | DIASTOLIC BLOOD PRESSURE: 60 MMHG | SYSTOLIC BLOOD PRESSURE: 98 MMHG

## 2023-03-23 DIAGNOSIS — S34.4XXA INJURY OF LUMBOSACRAL PLEXUS, INITIAL ENCOUNTER: Primary | ICD-10-CM

## 2023-03-23 DIAGNOSIS — R20.2 PARESTHESIA: ICD-10-CM

## 2023-03-23 DIAGNOSIS — R26.9 GAIT ABNORMALITY: ICD-10-CM

## 2023-03-23 NOTE — PROGRESS NOTES
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

## 2023-03-24 NOTE — PROGRESS NOTES
Antoni Sandhu is a 41 y.o. female who presents with the following  Chief Complaint   Patient presents with    Follow-up       HPI    Patient comes in for follow-up  She did see neurosurgery Dr. Gideon Mo down to [address] [place] [address] and he did not state she needed any kind of surgical evaluation as this might have made things worse  She is okay with this and does want to get into some physical therapy to help with her overall core strength and balance and endurance  She has not had any recent falls but in the past she has  No loss of consciousness or syncope but she does have some paresthesia  She does feel like she drags her left leg some  She has significant low back pain  She has significant shoulder pain and into the neck which causes headaches  She has Tylenol and this helps  We did give her Nurtec but she has not taken it yet   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  Picking things up will exacerbate her arm and neck    Since LOV no real changes  Has had 2 PT visits, was found to have broken ankle  Said it didn't go well in general, said it wasn't challenging   This was the [place] [facility] so we will get her over here closer to us  Would like to go some where else   Having increase HA due to stress  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   Surgery is not an option at this point for her    Stress continues to be high with her home life and is still trying to figure this out  She is doing a good job so far though      Allergies   Allergen Reactions    Latex Rash     Wheezing    Beef Containing Products Anaphylaxis    Keflex [Cephalexin] Anaphylaxis    Norco [Hydrocodone-Acetaminophen] Shortness of Breath    Codeine Other (comments)     Hyperactivity    Doxycycline Nausea Only    Macrobid [Nitrofurantoin Monohyd/M-Cryst] Other (comments)     Pharmacy had this allergy listed and called the patient and patient could not remember her reaction to the medication.   This nurse called the patient . Patient states that when it was prescribed in early pregnancy it caused spotting. Nuts [Tree Nut] Swelling     Pt stated that her mouth and throat feels funny when she eats nuts      Omnicef [Cefdinir] Other (comments)     \"shakes\"    Pepto-Bismol [Bismuth Subsalicylate] Hives    Phenergan [Promethazine] Other (comments)     Increased sedation    Reglan [Metoclopramide] Anxiety    Soy Hives       Current Outpatient Medications   Medication Sig    butalbital-acetaminophen-caffeine (FIORICET, ESGIC) -40 mg per tablet Take 1 Tablet by mouth every six (6) hours as needed for Headache.    levothyroxine (SYNTHROID) 50 mcg tablet Take 1 Tablet by mouth Daily (before breakfast). (Patient taking differently: Take 75 mcg by mouth Daily (before breakfast). )    multivitamin with iron (FLINTSTONES) chewable tablet Take 1 Tablet by mouth daily. Indications: treatment to prevent mineral deficiency     No current facility-administered medications for this visit. Social History     Tobacco Use   Smoking Status Never   Smokeless Tobacco Never       Past Medical History:   Diagnosis Date    Arrhythmia     Arthritis     left foot and lower back    Asthma     no inhaler    Burning with urination      delivery delivered 2022    Chronic UTI     Complication of anesthesia     bradycardia- pt stated she was \"shocked\" under general as child    Disease of blood and blood forming organ     Headache     Heart abnormality     mummer    Iron deficiency anemia during pregnancy 2019    Migraine     Nerve damage     Nerve damage in left foot.     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 in pregnancy     Routine Papanicolaou smear 02/10/2023    NILM, HPV negative    Self-catheterizes urinary bladder     Since age 13 Tethered spinal cord (HCC)     Unspecified breast disorder     L invert nipple       Past Surgical History:   Procedure Laterality Date    HX BACK SURGERY      x2    HX GYN      episotomy    HX ORTHOPAEDIC      spine and left foot    HX OTHER SURGICAL      L foot has wire from surgery       Family History   Problem Relation Age of Onset    OSTEOARTHRITIS Mother     Migraines Mother     Alcohol abuse Paternal Grandfather     Bleeding Prob Maternal Grandfather     Stroke Maternal Grandfather     Hypertension Maternal Grandfather     Breast Cancer Other     Atrial Fibrillation Brother     Hypertension Maternal Grandmother        Social History     Socioeconomic History    Marital status:    Tobacco Use    Smoking status: Never    Smokeless tobacco: Never   Vaping Use    Vaping Use: Never used   Substance and Sexual Activity    Alcohol use: No    Drug use: No    Sexual activity: Not Currently     Partners: Male       Review of Systems   Eyes:  Positive for photophobia. Negative for blurred vision. Gastrointestinal:  Negative for nausea and vomiting. Neurological:  Positive for tingling, weakness and headaches. Negative for dizziness, speech change, focal weakness, seizures and loss of consciousness. Remainder of comprehensive review is negative. Physical Exam :    Visit Vitals  BP 98/60 (BP 1 Location: Left upper arm, BP Patient Position: Sitting, BP Cuff Size: Adult)   Pulse 64   SpO2 99%        General: Well defined, nourished, and groomed individual in no acute distress. Musculoskeletal: Extremities revealed no edema and had full range of motion of joints. Psych: Good mood and bright affect     NEUROLOGICAL EXAMINATION:    Mental Status: Alert and oriented to person, place, and time     Cranial Nerves:    II, III, IV, VI: Visual acuity grossly intact. Visual fields are normal.    Pupils are equal, round, and reactive to light and accommodation. Extra-ocular movements are full and fluid. Fundoscopic exam was benign, no ptosis or nystagmus. V-XII: Hearing is grossly intact. Facial features are symmetric, with normal sensation and strength. The palate rises symmetrically and the tongue protrudes midline. Sternocleidomastoids 5/5. Motor Examination: Normal tone, bulk, and strength, 3/5 muscle strength throughout bilateral LE, 5/5 bilateral UE      Coordination: Finger to nose was normal. No resting or intention tremor     Gait and Station: Steady while walking. Reflexes: DTRs 3+ throughout. Results for orders placed or performed in visit on 02/10/23   PAP IG, APTIMA HPV AND RFX 16/18,45 ([id number])   Result Value Ref Range    Diagnosis Comment     Specimen adequacy Comment     Clinician provided ICD10 Comment     Performed by: Comment     Cytology history: Comment     . Raymundo Aguila Note: Comment     Test methodology Comment     HPV APTIMA Negative Negative    HPV Genotype Reflex Comment          Orders Placed This Encounter    [facility] at [address]     Referral Priority:   Routine     Referral Type:   PT/OT/ST     Referral Reason:   Specialty Services Required     Number of Visits Requested:   1       1. Injury of lumbosacral plexus, initial encounter    2. Paresthesia    3.  Gait abnormality        Discussed testing in full  Discussed further evaluation  The PT in [place] did not help at all    She will get a referral to [facility] to help with overall core strength, endurance, and balance coordination  She will continue all current medications as is  We will see how the physical therapy works as her next best bet  Work on stress reduction          This note will not be viewable in [employer]

## 2023-03-28 ENCOUNTER — OFFICE VISIT (OUTPATIENT)
Dept: SURGERY | Age: 38
End: 2023-03-28
Payer: MEDICAID

## 2023-03-28 VITALS — BODY MASS INDEX: 20.8 KG/M2 | WEIGHT: 113 LBS | HEIGHT: 62 IN

## 2023-03-28 DIAGNOSIS — N63.20 MASS OF LEFT BREAST, UNSPECIFIED QUADRANT: Primary | ICD-10-CM

## 2023-03-28 PROCEDURE — 99202 OFFICE O/P NEW SF 15 MIN: CPT | Performed by: SURGERY

## 2023-03-28 PROCEDURE — 76642 ULTRASOUND BREAST LIMITED: CPT | Performed by: SURGERY

## 2023-03-28 NOTE — PROGRESS NOTES
HISTORY OF PRESENT ILLNESS  Mayra Sylvester is a 41 y.o. female. NEW patient consult referred by Dr Warner Avalos for RIGHT breast issues. She was seen in 2017 for fibroadenomas. She felt a lot of breast nodules, which now have resolved since she stopped breast feeding her 3 yr old son (5 children)    Hx of RIGHT breast fibroadenomas    Family History  Maternal great grandmothers  from breast cancer  No other breast cancer in her family     [facility] Results (most recent):  Results from [place] encounter on 17    [facility] MAMMO BI DX INCL CAD    Narrative  INDICATION: Chronically inverted left nipple    COMPARISON: None. This is a baseline study. BREAST COMPOSITION: The breast tissue is heterogeneously dense, which could  obscure detection of small masses. FINDINGS:  Bilateral digital diagnostic mammography was performed and interpreted in  conjunction with CAD over-read. No dominant mass lesion or suspicious  microcalcification is evident. Limited ultrasound examination of the central left breast, performed by the  technologist and myself, demonstrates dense breast tissue with no underlying  solid or cystic mass. Impression  IMPRESSION:  BI-RADS 2, benign. There is no mammographic or sonographic finding to account  for the patient's chronic left nipple inversion; clinical follow-up is  recommended. There is no mammographic evidence of malignancy. Followup screening  mammogram at age 38 is recommended. The patient was notified of these results.    Past Medical History:   Diagnosis Date    Arrhythmia     Arthritis     left foot and lower back    Asthma     no inhaler    Burning with urination      delivery delivered 2022    Chronic UTI     Complication of anesthesia     bradycardia- pt stated she was \"shocked\" under general as child    Disease of blood and blood forming organ     Headache     Heart abnormality     mummer    Iron deficiency anemia during pregnancy 2019 Migraine     Nerve damage     Nerve damage in left foot. 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 in pregnancy     Routine Papanicolaou smear 02/10/2023    NILM, HPV negative    Self-catheterizes urinary bladder     Since age 13    Tethered spinal cord ([address]     Unspecified breast disorder     L invert nipple     Past Surgical History:   Procedure Laterality Date    HX BACK SURGERY      x2    HX GYN      episotomy    HX ORTHOPAEDIC      spine and left foot    HX OTHER SURGICAL      L foot has wire from surgery      OB History          8    Para   4    Term   4            AB   4    Living   5         SAB   1    IAB        Ectopic        Molar        Multiple   1    Live Births   5          Obstetric Comments   Menarche:  *18**. LMP: 3/10/23. # of Children:  5. Age at Delivery of First Child:  27.   Hysterectomy/oophorectomy:  NO/NO. Breast Bx:  no.  Hx of Breast Feeding:  yes. BCP:  yes. Hormone therapy:  no.             Family History   Problem Relation Age of Onset    OSTEOARTHRITIS Mother     Migraines Mother     Alcohol abuse Paternal Grandfather     Bleeding Prob Maternal Grandfather     Stroke Maternal Grandfather     Hypertension Maternal Grandfather     Breast Cancer Other     Atrial Fibrillation Brother     Hypertension Maternal Grandmother      Social History     Tobacco Use    Smoking status: Never    Smokeless tobacco: Never   Substance Use Topics    Alcohol use: No      Prior to Admission medications    Medication Sig Start Date End Date Taking?  Authorizing Provider   butalbital-acetaminophen-caffeine (FIORICET, ESGIC) -40 mg per tablet Take 1 Tablet by mouth every six (6) hours as needed for Headache. 1/10/23  Yes Smooth Brenner NP   levothyroxine (SYNTHROID) 50 mcg tablet Take 1 Tablet by mouth Daily (before breakfast). Patient taking differently: Take 75 mcg by mouth Daily (before breakfast). 7/12/21  Yes Malik Ruano MD   multivitamin with iron (FLINTSTONES) chewable tablet Take 1 Tablet by mouth daily. Indications: treatment to prevent mineral deficiency   Yes Other, MD Ivis      Allergies   Allergen Reactions    Latex Rash     Wheezing    Beef Containing Products Anaphylaxis    Keflex [Cephalexin] Anaphylaxis    Norco [Hydrocodone-Acetaminophen] Shortness of Breath    Codeine Other (comments)     Hyperactivity    Doxycycline Nausea Only    Macrobid [Nitrofurantoin Monohyd/M-Cryst] Other (comments)     Pharmacy had this allergy listed and called the patient and patient could not remember her reaction to the medication. This nurse called the patient . Patient states that when it was prescribed in early pregnancy it caused spotting. Nuts [Tree Nut] Swelling     Pt stated that her mouth and throat feels funny when she eats nuts      Omnicef [Cefdinir] Other (comments)     \"shakes\"    Pepto-Bismol [Bismuth Subsalicylate] Hives    Phenergan [Promethazine] Other (comments)     Increased sedation    Reglan [Metoclopramide] Anxiety    Soy Hives              Review of Systems   All other systems reviewed and are negative. Physical Exam  Chest:   Breasts:     Breasts are symmetrical.      Right: No inverted nipple, mass, nipple discharge, skin change or tenderness. Left: No inverted nipple, mass, nipple discharge, skin change or tenderness. Lymphadenopathy:      Upper Body:      Right upper body: No supraclavicular or axillary adenopathy. Left upper body: No supraclavicular or axillary adenopathy. BREAST ULTRASOUND  Indication: RIGHT breast mass x 3. Last ultrasound 2017  Technique:   The RIGHT breast and axilla were scanned using a high-frequency linear-array near-field transducer  Findings: RIGHT 2:00 2/3 9mm and 6:00 1/3 5mm and 7:00 1/3 9mm oval hypoechoic masses  Impression: small, non-palpable fibroadenomas   Disposition: No intervention   ASSESSMENT and PLAN    ICD-10-CM ICD-9-CM    1. Mass of left breast, unspecified quadrant  N63.20 611.72       Total time spent on chart review and patient visit:  25 minutes    RIGHT breast masses. Ultrasound today shows 3 masses, 2 are smaller than they were 6 years ago, one is 2mm larger. They are not palpable. Pt reassured that these masses are benign. No indication for intervention. Start mammograms age 38.

## 2023-03-28 NOTE — LETTER
3/28/2023    Patient: Navdeep Vaughan   YOB: 1985   Date of Visit: 3/28/2023     Edgar Chapman NP  [address]ia [address] [address]  Via Fax: [phone]    Dear Edgar Chapman NP,      Thank you for referring Ms. Sukhwinder Iraheta to [address] [place] for evaluation. My notes for this consultation are attached. If you have questions, please do not hesitate to call me. I look forward to following your patient along with you.       Sincerely,    Alan Martinez MD

## 2023-03-28 NOTE — PROGRESS NOTES
HISTORY OF PRESENT ILLNESS  Mayra Schmitt is a 41 y.o. female.   HPI    ROS    Physical Exam    ASSESSMENT and PLAN  {ASSESSMENT/PLAN:[id number]}

## 2023-06-23 ENCOUNTER — TELEPHONE (OUTPATIENT)
Age: 38
End: 2023-06-23

## 2023-07-29 ENCOUNTER — HOSPITAL ENCOUNTER (EMERGENCY)
Facility: HOSPITAL | Age: 38
Discharge: HOME | End: 2023-07-29
Attending: EMERGENCY MEDICINE
Payer: MEDICAID

## 2023-07-29 VITALS
BODY MASS INDEX: 20.24 KG/M2 | OXYGEN SATURATION: 98 % | TEMPERATURE: 98.8 F | SYSTOLIC BLOOD PRESSURE: 107 MMHG | DIASTOLIC BLOOD PRESSURE: 63 MMHG | WEIGHT: 110 LBS | RESPIRATION RATE: 16 BRPM | HEIGHT: 62 IN | HEART RATE: 67 BPM

## 2023-07-29 DIAGNOSIS — S05.01XA ABRASION OF RIGHT CORNEA, INITIAL ENCOUNTER: Primary | ICD-10-CM

## 2023-07-29 PROCEDURE — 6370000000 HC RX 637 (ALT 250 FOR IP): Performed by: EMERGENCY MEDICINE

## 2023-07-29 PROCEDURE — 99283 EMERGENCY DEPT VISIT LOW MDM: CPT

## 2023-07-29 RX ORDER — NAPROXEN 500 MG/1
500 TABLET ORAL 2 TIMES DAILY WITH MEALS
Qty: 60 TABLET | Refills: 1 | Status: SHIPPED | OUTPATIENT
Start: 2023-07-29

## 2023-07-29 RX ORDER — CIPROFLOXACIN HYDROCHLORIDE 3.5 MG/ML
1 SOLUTION/ DROPS TOPICAL
Qty: 1 EACH | Refills: 0 | Status: SHIPPED | OUTPATIENT
Start: 2023-07-29 | End: 2023-08-08

## 2023-07-29 RX ORDER — CIPROFLOXACIN HYDROCHLORIDE 3.5 MG/ML
2 SOLUTION/ DROPS TOPICAL
Status: DISCONTINUED | OUTPATIENT
Start: 2023-07-29 | End: 2023-07-30 | Stop reason: HOSPADM

## 2023-07-29 RX ORDER — TETRACAINE HYDROCHLORIDE 5 MG/ML
2 SOLUTION OPHTHALMIC ONCE
Status: COMPLETED | OUTPATIENT
Start: 2023-07-29 | End: 2023-07-29

## 2023-07-29 RX ORDER — ERYTHROMYCIN 5 MG/G
OINTMENT OPHTHALMIC
Status: COMPLETED | OUTPATIENT
Start: 2023-07-29 | End: 2023-07-29

## 2023-07-29 RX ADMIN — TETRACAINE HYDROCHLORIDE 2 DROP: 5 SOLUTION OPHTHALMIC at 21:32

## 2023-07-29 RX ADMIN — ERYTHROMYCIN: 5 OINTMENT OPHTHALMIC at 22:37

## 2023-07-29 RX ADMIN — FLUORESCEIN SODIUM 1 MG: 1 STRIP OPHTHALMIC at 21:30

## 2023-07-29 ASSESSMENT — VISUAL ACUITY
OD: 20/40
OS: 20/50
OU: 20 FEET

## 2023-07-29 ASSESSMENT — LIFESTYLE VARIABLES
HOW OFTEN DO YOU HAVE A DRINK CONTAINING ALCOHOL: MONTHLY OR LESS
HOW MANY STANDARD DRINKS CONTAINING ALCOHOL DO YOU HAVE ON A TYPICAL DAY: 3 OR 4

## 2023-07-29 ASSESSMENT — PAIN SCALES - GENERAL: PAINLEVEL_OUTOF10: 7

## 2023-07-29 ASSESSMENT — PAIN - FUNCTIONAL ASSESSMENT: PAIN_FUNCTIONAL_ASSESSMENT: 0-10

## 2023-08-18 ENCOUNTER — OFFICE VISIT (OUTPATIENT)
Age: 38
End: 2023-08-18
Payer: MEDICAID

## 2023-08-18 ENCOUNTER — TELEPHONE (OUTPATIENT)
Age: 38
End: 2023-08-18

## 2023-08-18 ENCOUNTER — HOSPITAL ENCOUNTER (OUTPATIENT)
Facility: HOSPITAL | Age: 38
End: 2023-08-18
Payer: MEDICAID

## 2023-08-18 VITALS
DIASTOLIC BLOOD PRESSURE: 74 MMHG | OXYGEN SATURATION: 98 % | SYSTOLIC BLOOD PRESSURE: 124 MMHG | RESPIRATION RATE: 14 BRPM | HEART RATE: 78 BPM

## 2023-08-18 DIAGNOSIS — R29.2 ABNORMAL REFLEX: ICD-10-CM

## 2023-08-18 DIAGNOSIS — Q06.8 TETHERED SPINAL CORD (HCC): Primary | ICD-10-CM

## 2023-08-18 DIAGNOSIS — M25.572 CHRONIC PAIN OF LEFT ANKLE: ICD-10-CM

## 2023-08-18 DIAGNOSIS — G89.29 CHRONIC PAIN OF LEFT ANKLE: ICD-10-CM

## 2023-08-18 DIAGNOSIS — R42 DIZZINESS: ICD-10-CM

## 2023-08-18 DIAGNOSIS — S34.4XXA INJURY OF LUMBOSACRAL PLEXUS, INITIAL ENCOUNTER: ICD-10-CM

## 2023-08-18 DIAGNOSIS — S34.22XS: ICD-10-CM

## 2023-08-18 PROCEDURE — 99215 OFFICE O/P EST HI 40 MIN: CPT | Performed by: NURSE PRACTITIONER

## 2023-08-18 PROCEDURE — 73610 X-RAY EXAM OF ANKLE: CPT

## 2023-08-18 RX ORDER — LIDOCAINE AND PRILOCAINE 25; 25 MG/G; MG/G
CREAM TOPICAL
Qty: 30 G | Refills: 5 | Status: SHIPPED | OUTPATIENT
Start: 2023-08-18

## 2023-08-18 RX ORDER — CETIRIZINE HYDROCHLORIDE 5 MG/1
TABLET ORAL
COMMUNITY
Start: 2023-06-08

## 2023-08-18 RX ORDER — ASPIRIN 325 MG
1 TABLET ORAL DAILY
COMMUNITY

## 2023-08-18 RX ORDER — LIDOCAINE 50 MG/G
1 PATCH TOPICAL DAILY
Qty: 30 PATCH | Refills: 4 | Status: SHIPPED | OUTPATIENT
Start: 2023-08-18 | End: 2023-09-17

## 2023-08-18 RX ORDER — LIDOCAINE AND PRILOCAINE 25; 25 MG/G; MG/G
CREAM TOPICAL
Qty: 30 G | Refills: 5 | Status: SHIPPED | OUTPATIENT
Start: 2023-08-18 | End: 2023-08-18 | Stop reason: SDUPTHER

## 2023-08-18 RX ORDER — LEVOTHYROXINE SODIUM 0.07 MG/1
75 TABLET ORAL DAILY
COMMUNITY

## 2023-08-22 NOTE — PROGRESS NOTES
Devan Kowalski is a 41 y.o. female who presents with the following  Chief Complaint   Patient presents with    Follow-up     Patient returns today with c/o lumbar pain, she states is worse when sitting. HPI       Patient comes in for follow-up  She did see neurosurgery Dr. Lexy Gamez down to [address] and he did not state she needed any kind of surgical evaluation as this might have made things worse  She is okay with this and does want to get into some physical therapy to help with her overall core strength and balance and endurance  She has not had any recent falls but in the past she has  No loss of consciousness or syncope but she does have some paresthesia  She does feel like she drags her left leg some  She has significant low back pain  She has significant shoulder pain and into the neck which causes headaches  She has Tylenol and this helps  We did give her Nurtec but she has not taken it yet   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  Picking things up will exacerbate her arm and neck    Since LOV no real changes  Has had 2 PT visits, was found to have broken ankle  Said it didn't go well in general, said it wasn't challenging   This was the [place] [facility] so we will get her over here closer to us  Would like to go some where else   Having increase HA due to stress  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    Was told she had POTS   Allergies   Allergen Reactions    Latex Rash     Wheezing    Cephalexin Anaphylaxis    Hydrocodone-Acetaminophen Shortness Of Breath    Bismuth Subsalicylate Hives    Cefdinir Other (See Comments)     \"shakes\"    Codeine Other (See Comments)     Hyperactivity    Doxycycline Nausea Only

## 2023-09-06 ENCOUNTER — APPOINTMENT (OUTPATIENT)
Facility: HOSPITAL | Age: 38
End: 2023-09-06
Payer: MEDICAID

## 2023-09-06 ENCOUNTER — HOSPITAL ENCOUNTER (EMERGENCY)
Facility: HOSPITAL | Age: 38
Discharge: HOME | End: 2023-09-06
Attending: STUDENT IN AN ORGANIZED HEALTH CARE EDUCATION/TRAINING PROGRAM
Payer: MEDICAID

## 2023-09-06 VITALS
RESPIRATION RATE: 18 BRPM | OXYGEN SATURATION: 99 % | SYSTOLIC BLOOD PRESSURE: 108 MMHG | HEIGHT: 62 IN | WEIGHT: 109.79 LBS | HEART RATE: 87 BPM | DIASTOLIC BLOOD PRESSURE: 55 MMHG | BODY MASS INDEX: 20.2 KG/M2 | TEMPERATURE: 98.5 F

## 2023-09-06 DIAGNOSIS — S61.222A LACERATION OF RIGHT MIDDLE FINGER WITH FOREIGN BODY WITHOUT DAMAGE TO NAIL, INITIAL ENCOUNTER: Primary | ICD-10-CM

## 2023-09-06 PROCEDURE — 2500000003 HC RX 250 WO HCPCS: Performed by: STUDENT IN AN ORGANIZED HEALTH CARE EDUCATION/TRAINING PROGRAM

## 2023-09-06 PROCEDURE — 6370000000 HC RX 637 (ALT 250 FOR IP): Performed by: STUDENT IN AN ORGANIZED HEALTH CARE EDUCATION/TRAINING PROGRAM

## 2023-09-06 PROCEDURE — 6360000002 HC RX W HCPCS: Performed by: STUDENT IN AN ORGANIZED HEALTH CARE EDUCATION/TRAINING PROGRAM

## 2023-09-06 PROCEDURE — 90471 IMMUNIZATION ADMIN: CPT | Performed by: STUDENT IN AN ORGANIZED HEALTH CARE EDUCATION/TRAINING PROGRAM

## 2023-09-06 PROCEDURE — 73140 X-RAY EXAM OF FINGER(S): CPT

## 2023-09-06 PROCEDURE — 99284 EMERGENCY DEPT VISIT MOD MDM: CPT

## 2023-09-06 PROCEDURE — 12042 INTMD RPR N-HF/GENIT2.6-7.5: CPT

## 2023-09-06 PROCEDURE — 90714 TD VACC NO PRESV 7 YRS+ IM: CPT | Performed by: STUDENT IN AN ORGANIZED HEALTH CARE EDUCATION/TRAINING PROGRAM

## 2023-09-06 RX ORDER — CLINDAMYCIN HYDROCHLORIDE 300 MG/1
300 CAPSULE ORAL 2 TIMES DAILY
Qty: 14 CAPSULE | Refills: 0 | Status: SHIPPED | OUTPATIENT
Start: 2023-09-06 | End: 2023-09-13

## 2023-09-06 RX ORDER — GINSENG 100 MG
CAPSULE ORAL
Status: COMPLETED | OUTPATIENT
Start: 2023-09-06 | End: 2023-09-06

## 2023-09-06 RX ORDER — LIDOCAINE HYDROCHLORIDE 10 MG/ML
10 INJECTION, SOLUTION EPIDURAL; INFILTRATION; INTRACAUDAL; PERINEURAL
Status: COMPLETED | OUTPATIENT
Start: 2023-09-06 | End: 2023-09-06

## 2023-09-06 RX ADMIN — CLOSTRIDIUM TETANI TOXOID ANTIGEN (FORMALDEHYDE INACTIVATED) AND CORYNEBACTERIUM DIPHTHERIAE TOXOID ANTIGEN (FORMALDEHYDE INACTIVATED) 0.5 ML: 5; 2 INJECTION, SUSPENSION INTRAMUSCULAR at 15:27

## 2023-09-06 RX ADMIN — LIDOCAINE HYDROCHLORIDE 10 ML: 10 INJECTION, SOLUTION EPIDURAL; INFILTRATION; INTRACAUDAL; PERINEURAL at 15:26

## 2023-09-06 RX ADMIN — BACITRACIN: 500 OINTMENT TOPICAL at 15:27

## 2023-09-06 ASSESSMENT — PAIN - FUNCTIONAL ASSESSMENT: PAIN_FUNCTIONAL_ASSESSMENT: 0-10

## 2023-09-06 ASSESSMENT — PAIN DESCRIPTION - LOCATION: LOCATION: FINGER (COMMENT WHICH ONE)

## 2023-09-06 ASSESSMENT — PAIN SCALES - GENERAL: PAINLEVEL_OUTOF10: 5

## 2023-09-06 ASSESSMENT — PAIN DESCRIPTION - ORIENTATION: ORIENTATION: RIGHT

## 2023-09-06 NOTE — DISCHARGE INSTRUCTIONS
You presented to the ED with finger laceration of the long finger of the right hand. X-ray shows no acute fracture or tissue injury. I could see some joint capsule but no invasion of the joint. Prophylactic clindamycin prescribed. Follow-up with PCP for suture removal in 7 to 10 days. Apply bacitracin twice a day.   If you have any trouble with the injury and seem to have difficulty moving your finger follow-up with hand surgery, Dr. Song Gomez

## 2023-09-06 NOTE — ED TRIAGE NOTES
Patient with right 3rd finger laceration from a drill that occurred about 3 hours ago. Tetanus vaccine is not up to date.      Took 500mg Tylenol prior to arrival.

## 2023-09-07 NOTE — ED PROVIDER NOTES
EMERGENCY DEPARTMENT PHYSICIAN NOTE     Patient: Pari Reyes     Time of Service: 2023  1:28 PM     Chief complaint:   Chief Complaint   Patient presents with    Finger Laceration        HISTORY:  Patient is a 41 y.o. female who presents to the emergency department with complaints of laceration of the long finger of the right hand. Patient was putting a fence up when the drill kicked back and pulled her hand into the fence resulting in a laceration over the dorsal aspect of the long finger. Significant bleeding on site but bleeding controlled now. Neurovascular intact. Tetanus is not up-to-date. Vital signs stable. Patient afebrile. Past Medical History:   Diagnosis Date    Arrhythmia     Arthritis     left foot and lower back    Asthma     no inhaler    Burning with urination      delivery delivered 2022    Chronic UTI     Complication of anesthesia     bradycardia- pt stated she was \"shocked\" under general as child    Disease of blood and blood forming organ     Headache     Heart abnormality     mummer    Iron deficiency anemia during pregnancy 2019    Migraine     Nerve damage     Nerve damage in left foot.     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 in pregnancy     Routine Papanicolaou smear 02/10/2023    NILM, HPV negative    Self-catheterizes urinary bladder     Since age 13    Tethered spinal cord ([address])     Unspecified breast disorder     L invert nipple        Past Surgical History:   Procedure Laterality Date    BACK SURGERY      x2    GYN      episotomy    ORTHOPEDIC SURGERY      spine and left foot    OTHER SURGICAL HISTORY      L foot has wire from surgery        Family History   Problem Relation Age of Onset    Bleeding Prob Maternal Grandfather     Migraines Mother

## 2023-09-11 NOTE — ED NOTES
Pt placed on monitor x3. Pt and family updated on plan of care and given a snack. Call bell within reach. Type Of Destruction Used: Curettage

## 2023-09-26 ENCOUNTER — TELEPHONE (OUTPATIENT)
Age: 38
End: 2023-09-26

## 2023-09-26 NOTE — TELEPHONE ENCOUNTER
LVM for patient to call back to schedule an appointment with Dr Sandee Miranda. Dx : Heart rythm skipping too much again; BP spells; feeling weak/fatigue often    Thanks.

## 2023-10-28 ENCOUNTER — APPOINTMENT (OUTPATIENT)
Facility: HOSPITAL | Age: 38
End: 2023-10-28
Payer: MEDICAID

## 2023-10-28 ENCOUNTER — HOSPITAL ENCOUNTER (EMERGENCY)
Facility: HOSPITAL | Age: 38
Discharge: HOME | End: 2023-10-28
Attending: STUDENT IN AN ORGANIZED HEALTH CARE EDUCATION/TRAINING PROGRAM
Payer: MEDICAID

## 2023-10-28 VITALS
RESPIRATION RATE: 16 BRPM | DIASTOLIC BLOOD PRESSURE: 64 MMHG | HEART RATE: 77 BPM | WEIGHT: 108.03 LBS | TEMPERATURE: 99.2 F | SYSTOLIC BLOOD PRESSURE: 106 MMHG | OXYGEN SATURATION: 99 % | BODY MASS INDEX: 19.76 KG/M2

## 2023-10-28 DIAGNOSIS — M25.572 LEFT ANKLE PAIN, UNSPECIFIED CHRONICITY: Primary | ICD-10-CM

## 2023-10-28 PROCEDURE — 99283 EMERGENCY DEPT VISIT LOW MDM: CPT

## 2023-10-28 PROCEDURE — 73610 X-RAY EXAM OF ANKLE: CPT

## 2023-10-28 RX ORDER — LIDOCAINE 4 G/G
1 PATCH TOPICAL DAILY
Qty: 30 PATCH | Refills: 0 | Status: SHIPPED | OUTPATIENT
Start: 2023-10-28 | End: 2023-11-27

## 2023-10-28 ASSESSMENT — PAIN DESCRIPTION - PAIN TYPE: TYPE: CHRONIC PAIN

## 2023-10-28 ASSESSMENT — PAIN DESCRIPTION - LOCATION: LOCATION: ANKLE

## 2023-10-28 ASSESSMENT — PAIN SCALES - GENERAL: PAINLEVEL_OUTOF10: 5

## 2023-10-28 ASSESSMENT — PAIN - FUNCTIONAL ASSESSMENT: PAIN_FUNCTIONAL_ASSESSMENT: 0-10

## 2023-10-28 ASSESSMENT — PAIN DESCRIPTION - DESCRIPTORS: DESCRIPTORS: ACHING

## 2023-10-28 ASSESSMENT — PAIN DESCRIPTION - ORIENTATION: ORIENTATION: LEFT

## 2023-10-28 NOTE — DISCHARGE INSTRUCTIONS
For pain management, both Tylenol and ibuprofen (motrin) can be taken. They have a different chemical composition and may give more relief together than can be provided using either alone. How to alternate Ibuprofen and Tylenol:  ? With food, You will take a dose of pain medication every three hours. ? Start by taking 650 mg of Tylenol   ? 3 hours later take 600 mg of Motrin   ? 3 hours after taking the Motrin take 650 mg of Tylenol  ? 3 hours after that take 600 mg of Motrin. ? You can continue to alternate Ibuprofen and Tylenol, up to the maximum doses of each medicine, but do not exceed the maximum dose of each. ? Be sure to maintain proper dosing intervals between successive doses of the same medication (at least 4 hours)    Do not take more than 4,000 mg of Tylenol or 3,200 mg of Motrin in a 24-hour period!

## 2023-10-28 NOTE — ED TRIAGE NOTES
GCS 15. Patient ambulatory to treatment area. Patient states that she fractured her left ankle in January and 3 weeks it popped again. Patient states that pain has not gotten any better.

## 2023-11-07 ENCOUNTER — HOSPITAL ENCOUNTER (OUTPATIENT)
Facility: HOSPITAL | Age: 38
Discharge: HOME | End: 2023-11-10
Payer: MEDICAID

## 2023-11-07 VITALS — BODY MASS INDEX: 19.75 KG/M2 | WEIGHT: 108 LBS

## 2023-11-07 DIAGNOSIS — S34.22XS: ICD-10-CM

## 2023-11-07 DIAGNOSIS — R29.2 ABNORMAL REFLEX: ICD-10-CM

## 2023-11-07 DIAGNOSIS — Q06.8 TETHERED SPINAL CORD (HCC): ICD-10-CM

## 2023-11-07 DIAGNOSIS — S34.4XXA INJURY OF LUMBOSACRAL PLEXUS, INITIAL ENCOUNTER: ICD-10-CM

## 2023-11-07 PROCEDURE — 72148 MRI LUMBAR SPINE W/O DYE: CPT

## 2023-11-07 PROCEDURE — 72195 MRI PELVIS W/O DYE: CPT

## 2023-11-15 ENCOUNTER — PROCEDURE VISIT (OUTPATIENT)
Age: 38
End: 2023-11-15
Payer: MEDICAID

## 2023-11-15 DIAGNOSIS — R42 DIZZINESS: Primary | ICD-10-CM

## 2023-11-15 PROCEDURE — 95923 AUTONOMIC NRV SYST FUNJ TEST: CPT | Performed by: PSYCHIATRY & NEUROLOGY

## 2023-11-15 PROCEDURE — 95924 ANS PARASYMP & SYMP W/TILT: CPT | Performed by: PSYCHIATRY & NEUROLOGY

## 2023-11-20 NOTE — PROGRESS NOTES
[facility]  [address] [facility], [address] [facility]  Edith Birmingham    [id number]  41 y.o.  1985     REFERRED BY: Regine Mcqueen Np  PCP: OSWALD Moura NP    Date of Testin/15/2023       Indication/History:    Episodes of dizziness and irregular heart rate, (+) PTSD    Patient is coming for syncope/autonomic dysfunction evaluation. Medications taken 48 hrs before the test: MVI, Tylenol, Probiotic     Procedure: This Autonomic Nervous System (ANS) testing is performed by utilizing [address] [employer] Autonomic System, with established protocol. Multiple procedures performed: Heart rate response to deep breathing (HRDB), Valsalva ratio, Heart rate and BP response to head up tilt (HUT), and Quantitative sudomotor axon reflex testing (QSWEAT) . Result:  Heart response to deep  breathing (HRDB): 2 series of 6 cycles were performed and the mean of 6 consecutive cycles was obtained. Average HR difference was 25.95 and E:I ratio was 2.14. Normal response. Heart rate response to Valsalva maneuver:  [id number]-[id number] BP to Valsalva was measured and BP response in all 4 phases was normal. Heart response was the opposite of BP, a normal response. ( VR = 2.14 )  HUT (head-up tilt) : Adcl-cn-kyav BP and HR were measured, up to 15 minutes post tilt. No significant BP reduction or HR acceleration/deceleration. SUDOMOTOR: QSWEAT response showed relatively preserved sweat production in all 4 localities (forearm, proximal leg, distal leg, foot) of the right upper and lower limbs, comparing patient to age group. Impression:   NORMAL    Relatively preserved autonomic function for this age.          Donovan Brewster MD  Diplomate, American Board of Psychiatry and Neurology  Diplomate, Neuromuscular Medicine  Diplomate, American Board of Electrodiagnostic Medicine    Note: Raw Data will be scanned separately in Media

## 2023-11-30 ENCOUNTER — TELEPHONE (OUTPATIENT)
Age: 38
End: 2023-11-30

## 2023-11-30 NOTE — TELEPHONE ENCOUNTER
Patient is requesting a call to see if a VV would be possible or results can be discussed over the phone.     Please contact to advise.

## 2024-04-08 ENCOUNTER — TELEPHONE (OUTPATIENT)
Age: 39
End: 2024-04-08

## 2024-04-08 NOTE — TELEPHONE ENCOUNTER
Patient would like a call regarding her finishing up her MRI due to her last one she was not able to complete?    Next follow up 05/13/24    Elizabeth would like to let Abel know she is still having migraines with partial vision in the right eye and she is also stuttering when she speaks. She has to really focus to get her words out without stuttering.

## 2024-04-10 NOTE — TELEPHONE ENCOUNTER
Can you see if she has the nurtec?     We can start that preventatively if so   Otherwise I can send

## 2024-05-10 ENCOUNTER — TELEPHONE (OUTPATIENT)
Age: 39
End: 2024-05-10

## 2024-05-10 NOTE — TELEPHONE ENCOUNTER
Called patient no answer. Left message stating that appointment on 5/13/2024 needs to be rescheduled due to Abel being out. Asked patient to call back reschedule next available.

## 2024-06-18 NOTE — PROGRESS NOTES
Hpi Title: Evaluation of Skin Lesions Visit Vitals  /56 (BP 1 Location: Left arm)   Pulse 88   Resp 16   Ht 5' 2\" (1.575 m)   Wt 114 lb 3.2 oz (51.8 kg)   LMP 02/01/2019 (Within Weeks) Comment: 1/1/19   BMI 20.89 kg/m²       Patient is here for follow up - palpitations. Denies chest pain, SOB or swelling.

## 2024-07-02 ENCOUNTER — TELEPHONE (OUTPATIENT)
Age: 39
End: 2024-07-02

## 2024-07-02 NOTE — TELEPHONE ENCOUNTER
Patient is calling to inform that she is experiencing disc popping every time she sits down or stands up. States she also has random numbness on her face, legs, arms, and various areas. Would like to know if any imaging should be done prior to seeing him in October.     Please contact.

## 2024-07-19 ENCOUNTER — OFFICE VISIT (OUTPATIENT)
Age: 39
End: 2024-07-19
Payer: MEDICAID

## 2024-07-19 VITALS
HEIGHT: 62 IN | BODY MASS INDEX: 21.05 KG/M2 | HEART RATE: 80 BPM | DIASTOLIC BLOOD PRESSURE: 62 MMHG | SYSTOLIC BLOOD PRESSURE: 118 MMHG | WEIGHT: 114.4 LBS | OXYGEN SATURATION: 98 %

## 2024-07-19 DIAGNOSIS — I95.1 ORTHOSTATIC HYPOTENSION: Primary | ICD-10-CM

## 2024-07-19 DIAGNOSIS — R07.9 CHEST PAIN, UNSPECIFIED TYPE: ICD-10-CM

## 2024-07-19 DIAGNOSIS — R00.2 PALPITATIONS: ICD-10-CM

## 2024-07-19 PROCEDURE — 99213 OFFICE O/P EST LOW 20 MIN: CPT | Performed by: INTERNAL MEDICINE

## 2024-07-19 PROCEDURE — 93005 ELECTROCARDIOGRAM TRACING: CPT | Performed by: INTERNAL MEDICINE

## 2024-07-19 PROCEDURE — 93010 ELECTROCARDIOGRAM REPORT: CPT | Performed by: INTERNAL MEDICINE

## 2024-07-19 NOTE — PROGRESS NOTES
Desmond You MD      [id number],[facility],[place] [address]      Office [phone],Fax [phone]         Elizabeth Good is a 37 y.o.  female is here for f/u visit.      Primary care physician:   Bibiana Rose, NP      Dear Dr Moyer,       I had the pleasure of seeing Ms. Good in the office today.           Assessment:       Dizziness/Presyncope  Chest Pain   ( OB hx - G8-  5 Living  ( 10/2019 - twins) -multiple miscarriages previously.  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  Patient has been told  \"Her heart rate to be restarted\" during 1 of the surgeries when she was age 10 .  Records  not available.( Self cath's herself - has UTI's)-followed by Dr. Garcia.  Also has seen Dr. Ortez previously is for EMG   Hx of Orthostatic Hypotension           Plan:        Patient symptoms consistent with orthostatic hypotension/tachycardia.  Advised compression stockings/hydration.   Echocardiogram-4/22-normal LVEF  Stress test regular   Follow-up 6months/earlier as needed         Patient understands the plan. All questions were answered to the patient's satisfaction.       Medication Side Effects and Warnings were discussed with patient: yes   Patient Labs were reviewed and or requested:  yes   Patient Past Records were reviewed and or requested: yes       I appreciate the opportunity to be involved in care. Please see note below for details. Please do not hesitate to contact us with questions or concerns.       Desmond You MD       Cardiac Testing/ Procedures:       A.Cardiac Cath/PCI:      B.ECHO/RASHAWN: 8/26/21 - LV: Calculated LVEF is 59%. Biplane method used to measure ejection fraction. Normal cavity size, wall thickness, systolic function (ejection fraction normal)  and diastolic function. Wall motion:

## 2024-07-19 NOTE — PROGRESS NOTES
Chief Complaint   Patient presents with    Palpitations    Dizziness     Vitals:    07/19/24 1618   BP: 118/62   Site: Left Upper Arm   Position: Sitting   Pulse: 80   SpO2: 98%   Weight: 51.9 kg (114 lb 6.4 oz)   Height: 1.575 m (5' 2\")         Chest pain: DENIED     Recent hospital stays: DENIED     Refills: DENIED

## 2024-07-26 ENCOUNTER — OFFICE VISIT (OUTPATIENT)
Age: 39
End: 2024-07-26
Payer: MEDICAID

## 2024-07-26 ENCOUNTER — ANCILLARY PROCEDURE (OUTPATIENT)
Age: 39
End: 2024-07-26
Payer: MEDICAID

## 2024-07-26 ENCOUNTER — HOSPITAL ENCOUNTER (OUTPATIENT)
Facility: HOSPITAL | Age: 39
End: 2024-07-26
Payer: MEDICAID

## 2024-07-26 VITALS — WEIGHT: 114 LBS | BODY MASS INDEX: 20.98 KG/M2 | HEIGHT: 62 IN

## 2024-07-26 VITALS
RESPIRATION RATE: 18 BRPM | SYSTOLIC BLOOD PRESSURE: 124 MMHG | OXYGEN SATURATION: 99 % | DIASTOLIC BLOOD PRESSURE: 72 MMHG | HEART RATE: 79 BPM

## 2024-07-26 DIAGNOSIS — G43.109 MIGRAINE WITH AURA AND WITHOUT STATUS MIGRAINOSUS, NOT INTRACTABLE: ICD-10-CM

## 2024-07-26 DIAGNOSIS — G91.9 HYDROCEPHALUS, UNSPECIFIED TYPE (HCC): ICD-10-CM

## 2024-07-26 DIAGNOSIS — G91.9 HYDROCEPHALUS, UNSPECIFIED TYPE (HCC): Primary | ICD-10-CM

## 2024-07-26 DIAGNOSIS — Q06.8 TETHERED SPINAL CORD (HCC): ICD-10-CM

## 2024-07-26 PROCEDURE — 99215 OFFICE O/P EST HI 40 MIN: CPT | Performed by: NURSE PRACTITIONER

## 2024-07-26 PROCEDURE — 93017 CV STRESS TEST TRACING ONLY: CPT | Performed by: INTERNAL MEDICINE

## 2024-07-26 PROCEDURE — 73502 X-RAY EXAM HIP UNI 2-3 VIEWS: CPT

## 2024-07-26 RX ORDER — GABAPENTIN 100 MG/1
CAPSULE ORAL
Qty: 30 CAPSULE | Refills: 3 | Status: SHIPPED | OUTPATIENT
Start: 2024-07-26 | End: 2024-08-23

## 2024-07-26 NOTE — PROGRESS NOTES
Elizaebth Good is a 39 y.o. female who presents with the following  Chief Complaint   Patient presents with    Numbness     Patient c/o numbness and tingling face, arms and legs on and off for months. Patient states she had a stress today for ryann palpitations.       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  Picking things up will exacerbate her arm and neck    Having increase HA due to stress  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   Surgery is not an option at this point for her    She continues to have facial numbness and tingling  This comes and goes in her face  This is something that happens every single week  She does get migraines also which are not as bad as they normally are but are still high due to stress    Allergies   Allergen Reactions    Latex Rash     Wheezing    Beef-Derived Products Anaphylaxis    Cephalexin Anaphylaxis    Hydrocodone-Acetaminophen Shortness Of Breath    Bismuth Subsalicylate Hives    Cefdinir Other (See Comments)     \"shakes\"    Codeine Other (See Comments)     Hyperactivity    Doxycycline Nausea Only    Macadamia Nut Oil Swelling     Pt stated that her mouth and throat feels funny when she eats nuts    Nitrofurantoin Other (See Comments)     Pharmacy had this allergy listed and called the patient and patient could not remember her reaction to the medication.  This nurse

## 2024-07-29 LAB
ECHO BSA: 1.5 M2
STRESS ANGINA INDEX: 0
STRESS BASELINE DIAS BP: 68 MMHG
STRESS BASELINE HR: 84 BPM
STRESS BASELINE SYS BP: 108 MMHG
STRESS ESTIMATED WORKLOAD: 10.7 METS
STRESS EXERCISE DUR MIN: 8 MIN
STRESS EXERCISE DUR SEC: 30 SEC
STRESS O2 SAT PEAK: 98 %
STRESS O2 SAT REST: 99 %
STRESS PEAK DIAS BP: 68 MMHG
STRESS PEAK SYS BP: 114 MMHG
STRESS PERCENT HR ACHIEVED: 96 %
STRESS POST PEAK HR: 176 BPM
STRESS RATE PRESSURE PRODUCT: NORMAL BPM*MMHG
STRESS TARGET HR: 183 BPM

## 2024-07-29 PROCEDURE — 93016 CV STRESS TEST SUPVJ ONLY: CPT | Performed by: INTERNAL MEDICINE

## 2024-07-29 PROCEDURE — 93018 CV STRESS TEST I&R ONLY: CPT | Performed by: INTERNAL MEDICINE

## 2024-08-09 ENCOUNTER — HOSPITAL ENCOUNTER (OUTPATIENT)
Facility: HOSPITAL | Age: 39
End: 2024-08-09
Payer: MEDICAID

## 2024-08-09 DIAGNOSIS — G91.9 HYDROCEPHALUS, UNSPECIFIED TYPE (HCC): ICD-10-CM

## 2024-08-09 DIAGNOSIS — G43.109 MIGRAINE WITH AURA AND WITHOUT STATUS MIGRAINOSUS, NOT INTRACTABLE: ICD-10-CM

## 2024-08-09 DIAGNOSIS — Q06.8 TETHERED SPINAL CORD (HCC): ICD-10-CM

## 2024-08-09 PROCEDURE — 72146 MRI CHEST SPINE W/O DYE: CPT

## 2024-08-26 NOTE — PROGRESS NOTES
Elizabeth Good is a 39 y.o. female presents for a problem visit.    Chief Complaint   Patient presents with    Other     Patient's last menstrual period was 08/29/2024 (exact date).  Birth Control: abstinence.  Last Pap: Normal, HPV Negative obtained 5 year(s) ago 2/26/19  Pt has vaginal/perineal mass/knot    1. Have you been to the ER, urgent care clinic, or hospitalized since your last visit? No    2. Have you seen or consulted any other health care providers outside of the [facility] since your last visit? Yes,  [facility] 3 months ago    Examination chaperoned by Shani Edwards LPN.

## 2024-09-05 ENCOUNTER — OFFICE VISIT (OUTPATIENT)
Age: 39
End: 2024-09-05
Payer: MEDICAID

## 2024-09-05 VITALS
DIASTOLIC BLOOD PRESSURE: 71 MMHG | SYSTOLIC BLOOD PRESSURE: 109 MMHG | HEART RATE: 86 BPM | WEIGHT: 114.2 LBS | HEIGHT: 63 IN | BODY MASS INDEX: 20.23 KG/M2

## 2024-09-05 DIAGNOSIS — T14.8XXA MUSCLE STRAIN: Primary | ICD-10-CM

## 2024-09-05 PROBLEM — O09.90 SUPERVISION OF HIGH RISK PREGNANCY, ANTEPARTUM: Status: RESOLVED | Noted: 2021-06-17 | Resolved: 2024-09-05

## 2024-09-05 PROCEDURE — 99213 OFFICE O/P EST LOW 20 MIN: CPT | Performed by: OBSTETRICS & GYNECOLOGY

## 2024-09-05 SDOH — ECONOMIC STABILITY: TRANSPORTATION INSECURITY
IN THE PAST 12 MONTHS, HAS LACK OF TRANSPORTATION KEPT YOU FROM MEETINGS, WORK, OR FROM GETTING THINGS NEEDED FOR DAILY LIVING?: NO

## 2024-09-05 SDOH — ECONOMIC STABILITY: FOOD INSECURITY: WITHIN THE PAST 12 MONTHS, THE FOOD YOU BOUGHT JUST DIDN'T LAST AND YOU DIDN'T HAVE MONEY TO GET MORE.: PATIENT DECLINED

## 2024-09-05 SDOH — ECONOMIC STABILITY: INCOME INSECURITY: HOW HARD IS IT FOR YOU TO PAY FOR THE VERY BASICS LIKE FOOD, HOUSING, MEDICAL CARE, AND HEATING?: PATIENT DECLINED

## 2024-09-05 SDOH — ECONOMIC STABILITY: FOOD INSECURITY: WITHIN THE PAST 12 MONTHS, YOU WORRIED THAT YOUR FOOD WOULD RUN OUT BEFORE YOU GOT MONEY TO BUY MORE.: PATIENT DECLINED

## 2024-09-05 NOTE — PROGRESS NOTES
OB/GYN Problem VIsit    HPI  Elizabeth Good is a ,  39 y.o. female who presents for a problem visit. Complains of a knot on her skin on the perineum for 1.5 years. No drainage, redness, abscess per patient. Gets larger with heavy lifting and straining. Saw urology and was supposed to have MRI but has not done. Has hx of hernia after CS on right. Also has hemorrhoids. More noticeable last several months. Saw urogyn no prolapse        Past Medical History:   Diagnosis Date    Arrhythmia     Arthritis     left foot and lower back    Asthma     no inhaler    Burning with urination      delivery delivered 2022    Chronic UTI     Complication of anesthesia     bradycardia- pt stated she was \"shocked\" under general as child    Heart abnormality     mummer    Migraine     Nerve damage     Nerve damage in left foot.    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 in pregnancy     Routine Papanicolaou smear 02/10/2023    NILM, HPV negative    Self-catheterizes urinary bladder     Since age 7    Tethered spinal cord (HCC)     Unspecified breast disorder     L invert nipple     Past Surgical History:   Procedure Laterality Date    BACK SURGERY      x2    GYN      episotomy    ORTHOPEDIC SURGERY      spine and left foot    OTHER SURGICAL HISTORY      L foot has wire from surgery     Social History     Occupational History    Not on file   Tobacco Use    Smoking status: Never    Smokeless tobacco: Never   Substance and Sexual Activity    Alcohol use: No    Drug use: No    Sexual activity: Not Currently     Birth control/protection: Abstinence     Family History   Problem Relation Age of Onset    Bleeding Prob Maternal Grandfather     Migraines Mother     Osteoarthritis Mother     Hypertension Maternal Grandmother     Atrial

## 2024-09-10 ENCOUNTER — PATIENT MESSAGE (OUTPATIENT)
Age: 39
End: 2024-09-10

## 2024-09-10 DIAGNOSIS — Q06.8 TETHERED SPINAL CORD (HCC): ICD-10-CM

## 2024-09-10 DIAGNOSIS — M48.062 SPINAL STENOSIS, LUMBAR REGION WITH NEUROGENIC CLAUDICATION: ICD-10-CM

## 2024-09-10 DIAGNOSIS — S34.22XS: Primary | ICD-10-CM

## 2024-09-13 ENCOUNTER — OFFICE VISIT (OUTPATIENT)
Age: 39
End: 2024-09-13

## 2024-09-13 VITALS
HEART RATE: 83 BPM | BODY MASS INDEX: 20.23 KG/M2 | SYSTOLIC BLOOD PRESSURE: 110 MMHG | WEIGHT: 114.2 LBS | DIASTOLIC BLOOD PRESSURE: 71 MMHG

## 2024-09-13 DIAGNOSIS — R10.31 CHRONIC RLQ PAIN: Primary | ICD-10-CM

## 2024-09-13 DIAGNOSIS — Z01.419 WELL WOMAN EXAM: Primary | ICD-10-CM

## 2024-09-13 DIAGNOSIS — Z12.4 CERVICAL CANCER SCREENING: ICD-10-CM

## 2024-09-13 DIAGNOSIS — G89.29 CHRONIC RLQ PAIN: Primary | ICD-10-CM

## 2024-09-19 LAB
CYTOLOGIST CVX/VAG CYTO: NORMAL
CYTOLOGY CVX/VAG DOC CYTO: NORMAL
CYTOLOGY CVX/VAG DOC THIN PREP: NORMAL
DX ICD CODE: NORMAL
HPV GENOTYPE REFLEX: NORMAL
HPV I/H RISK 4 DNA CVX QL PROBE+SIG AMP: NEGATIVE
Lab: NORMAL
OTHER STN SPEC: NORMAL
STAT OF ADQ CVX/VAG CYTO-IMP: NORMAL

## 2024-09-27 ENCOUNTER — OFFICE VISIT (OUTPATIENT)
Age: 39
End: 2024-09-27

## 2024-09-27 VITALS
SYSTOLIC BLOOD PRESSURE: 103 MMHG | DIASTOLIC BLOOD PRESSURE: 66 MMHG | HEART RATE: 80 BPM | BODY MASS INDEX: 19.8 KG/M2 | WEIGHT: 111.8 LBS

## 2024-09-27 DIAGNOSIS — Z01.419 ENCOUNTER FOR GYNECOLOGICAL EXAMINATION (GENERAL) (ROUTINE) WITHOUT ABNORMAL FINDINGS: Primary | ICD-10-CM

## 2024-10-02 ENCOUNTER — PATIENT MESSAGE (OUTPATIENT)
Age: 39
End: 2024-10-02

## 2024-10-02 DIAGNOSIS — R42 DIZZINESS: ICD-10-CM

## 2024-10-02 DIAGNOSIS — R29.2 ABNORMAL REFLEX: Primary | ICD-10-CM

## 2024-10-02 DIAGNOSIS — G91.9 HYDROCEPHALUS, UNSPECIFIED TYPE (HCC): ICD-10-CM

## 2024-10-02 DIAGNOSIS — G43.109 MIGRAINE WITH AURA AND WITHOUT STATUS MIGRAINOSUS, NOT INTRACTABLE: ICD-10-CM

## 2024-10-07 ENCOUNTER — TELEPHONE (OUTPATIENT)
Age: 39
End: 2024-10-07

## 2024-10-08 ENCOUNTER — TELEPHONE (OUTPATIENT)
Age: 39
End: 2024-10-08

## 2024-10-08 NOTE — TELEPHONE ENCOUNTER
Patient was scheduled for appt follow up on 10/8/24 but had a flat tire on the way to appt.    HAYDEE Morse next follow up appt not until 5/5/25.    Patient requesting a call to discuss her test results

## 2024-10-18 ENCOUNTER — HOSPITAL ENCOUNTER (OUTPATIENT)
Facility: HOSPITAL | Age: 39
Discharge: HOME | End: 2024-10-21
Payer: MEDICAID

## 2024-10-18 VITALS — BODY MASS INDEX: 19.66 KG/M2 | WEIGHT: 111 LBS

## 2024-10-18 DIAGNOSIS — G91.9 HYDROCEPHALUS, UNSPECIFIED TYPE (HCC): ICD-10-CM

## 2024-10-18 DIAGNOSIS — G43.109 MIGRAINE WITH AURA AND WITHOUT STATUS MIGRAINOSUS, NOT INTRACTABLE: ICD-10-CM

## 2024-10-18 DIAGNOSIS — R42 DIZZINESS: ICD-10-CM

## 2024-10-18 DIAGNOSIS — R29.2 ABNORMAL REFLEX: ICD-10-CM

## 2024-10-18 PROCEDURE — 6360000004 HC RX CONTRAST MEDICATION: Performed by: RADIOLOGY

## 2024-10-18 PROCEDURE — 70553 MRI BRAIN STEM W/O & W/DYE: CPT

## 2024-10-18 PROCEDURE — A9579 GAD-BASE MR CONTRAST NOS,1ML: HCPCS | Performed by: RADIOLOGY

## 2024-10-18 RX ADMIN — GADOTERIDOL 10 ML: 279.3 INJECTION, SOLUTION INTRAVENOUS at 14:33

## 2024-12-27 ENCOUNTER — TELEPHONE (OUTPATIENT)
Age: 39
End: 2024-12-27

## 2024-12-27 NOTE — TELEPHONE ENCOUNTER
Called patient in regards to a referral to Dr. Capellan for a abdominal hernia. Patient being referred by Bibiana Rose NP. No answer left a voicemail for a returned call.

## 2025-02-10 ENCOUNTER — TRANSCRIBE ORDERS (OUTPATIENT)
Facility: HOSPITAL | Age: 40
End: 2025-02-10

## 2025-02-10 DIAGNOSIS — N64.4 PAINFUL BREASTS: Primary | ICD-10-CM

## 2025-02-11 NOTE — ED NOTES
Urine specimen not collected. Pt ambulatory to bathroom, but unable to urinate. Pt's family unable to locate a self-cath for pt to use. MD notified. Call bell within reach; will continue to monitor. decreased angel/decreased step length/decreased weight-shifting ability

## 2025-02-21 ENCOUNTER — OFFICE VISIT (OUTPATIENT)
Age: 40
End: 2025-02-21
Payer: MEDICAID

## 2025-02-21 VITALS
WEIGHT: 108 LBS | HEART RATE: 94 BPM | HEIGHT: 63 IN | BODY MASS INDEX: 19.14 KG/M2 | SYSTOLIC BLOOD PRESSURE: 108 MMHG | OXYGEN SATURATION: 98 % | DIASTOLIC BLOOD PRESSURE: 66 MMHG

## 2025-02-21 DIAGNOSIS — I95.1 ORTHOSTATIC HYPOTENSION: Primary | ICD-10-CM

## 2025-02-21 DIAGNOSIS — R00.2 PALPITATIONS: ICD-10-CM

## 2025-02-21 PROCEDURE — 99213 OFFICE O/P EST LOW 20 MIN: CPT | Performed by: INTERNAL MEDICINE

## 2025-02-21 RX ORDER — IBUPROFEN 800 MG/1
800 TABLET, FILM COATED ORAL EVERY 8 HOURS PRN
COMMUNITY
Start: 2023-11-21

## 2025-02-21 RX ORDER — AMOXICILLIN 875 MG/1
875 TABLET, COATED ORAL 2 TIMES DAILY
COMMUNITY
Start: 2025-02-16

## 2025-02-21 RX ORDER — LEVOTHYROXINE SODIUM 25 UG/1
25 TABLET ORAL DAILY
COMMUNITY
Start: 2025-01-21

## 2025-02-21 NOTE — PROGRESS NOTES
Chief Complaint   Patient presents with    Follow up from urgent care    PACs    Irregular Heart Beat     Vitals:    02/21/25 1330   BP: 108/66   Site: Left Upper Arm   Position: Sitting   Pulse: 94   SpO2: 98%   Weight: 49 kg (108 lb)   Height: 1.6 m (5' 3\")         Chest pain: DENIED     Recent hospital stays: DENIED     Refills: DENIED   
Desmond You MD       Cardiac Testing/ Procedures:       A.Cardiac Cath/PCI:      B.ECHO/RASHAWN: 8/26/21 - LV: Calculated LVEF is 59%. Biplane method used to measure ejection fraction. Normal cavity size, wall thickness, systolic function (ejection fraction normal)  and diastolic function. Wall motion: normal.   TV: Right Ventricular Arterial Pressure (RVSP) is 25 mmHg. Pulmonary hypertension not suggested by Doppler findings.      10/5/19 - EF 55-60%; Redundant non prolapsing ant leaflet chords       C.StressNuclear/Stress ECHO/Stress test: 7/2024 regular stress test 8 minutes 30 seconds-normal       D.Vascular:       E. EP: 11/15/23 - Dr Champion - Nml study  Autonomic Nervous System (ANS) testing is performed by utilizing [employer] Test Work Autonomic System, with established protocol.  Multiple procedures performed: Heart rate response to deep breathing (HRDB), Valsalva ratio, Heart rate and BP response to head up tilt (HUT), and Quantitative sudomotor axon reflex testing (QSWEAT)     48-hour Holter monitor   8/20/2021   Minimum heart rate 50 bpm, maximum heart rate 143 bpm,   5 PACs   No significant arrhythmias.  Symptoms associated with sinus rhythm.      E cardio event monitor 4/4/2019 to 5/3/2019-sinus rhythm, 0 critical, 0 serious, for stable events.  Sinus rhythm/sinus tachycardia./PAC associated with flutter or skipped beats/fatigue           9/27/17-event monitor-sinus tachycardia       F. Miscellaneous:       History of present illness:      C/o dizziness/pre  syncope  Her symptoms had worsened over the past 2 weeks-[facility] and [facility] with [facility]/dehydration.   Complains of fatigue.  No dyspnea.No syncope.         ( Initial visit - 3/2019- 34 yr old CF who is G6 A2 (twins first pregnancy, L2 1 (boy and girl) who recently found out that she is pregnant 2/2019 and has twins.  Since her diagnosis of pregnancy, she has been having palpitations.  Also has been  suffering from

## 2025-03-03 ENCOUNTER — TELEPHONE (OUTPATIENT)
Age: 40
End: 2025-03-03

## 2025-03-03 NOTE — TELEPHONE ENCOUNTER
Patient was advised to come late due to Timi running 30 minutes behind. Patient was unable to see provider today 3/3/25. Spoke with nurse Lyric patient is able to see Dr. Capellan on 3/4/25 in the afternoon or 3/5/25 at 9 AM as a double book. If patient is double booked please notify Cha.

## 2025-03-06 ENCOUNTER — PATIENT MESSAGE (OUTPATIENT)
Age: 40
End: 2025-03-06

## 2025-03-06 DIAGNOSIS — R20.2 PARESTHESIA OF SKIN: Primary | ICD-10-CM

## 2025-03-07 ENCOUNTER — OFFICE VISIT (OUTPATIENT)
Age: 40
End: 2025-03-07
Payer: MEDICAID

## 2025-03-07 VITALS
HEART RATE: 90 BPM | OXYGEN SATURATION: 98 % | DIASTOLIC BLOOD PRESSURE: 69 MMHG | TEMPERATURE: 97.9 F | SYSTOLIC BLOOD PRESSURE: 104 MMHG | WEIGHT: 109.4 LBS | HEIGHT: 63 IN | BODY MASS INDEX: 19.38 KG/M2 | RESPIRATION RATE: 16 BRPM

## 2025-03-07 DIAGNOSIS — K40.90 LEFT INGUINAL HERNIA: Primary | ICD-10-CM

## 2025-03-07 PROCEDURE — 99213 OFFICE O/P EST LOW 20 MIN: CPT | Performed by: SURGERY

## 2025-03-07 ASSESSMENT — ENCOUNTER SYMPTOMS
NAUSEA: 0
VOMITING: 0
ABDOMINAL PAIN: 1
CONSTIPATION: 1

## 2025-03-07 ASSESSMENT — PATIENT HEALTH QUESTIONNAIRE - PHQ9
SUM OF ALL RESPONSES TO PHQ QUESTIONS 1-9: 0
2. FEELING DOWN, DEPRESSED OR HOPELESS: NOT AT ALL
1. LITTLE INTEREST OR PLEASURE IN DOING THINGS: NOT AT ALL
SUM OF ALL RESPONSES TO PHQ QUESTIONS 1-9: 0

## 2025-03-07 NOTE — PROGRESS NOTES
Identified patient with two patient identifiers (name and ). Reviewed chart in preparation for visit and have obtained necessary documentation.    Elizabeth Good is a 39 y.o. female  Chief Complaint   Patient presents with    New Patient    Hernia     /69 (Site: Left Upper Arm, Position: Sitting, Cuff Size: Small Adult)   Pulse 90   Temp 97.9 °F (36.6 °C)   Resp 16   Ht 1.6 m (5' 3\")   Wt 49.6 kg (109 lb 6.4 oz)   SpO2 98%   BMI 19.38 kg/m²     1. Have you been to the ER, urgent care clinic since your last visit?  Hospitalized since your last visit?yes - [facility]    2. Have you seen or consulted any other health care providers outside of the [facility] since your last visit?  Include any pap smears or colon screening. no

## 2025-03-07 NOTE — PROGRESS NOTES
Elizabeth Good is a 39 y.o. female who is referred by Bibiana Rose NP for further evaluation of a left inguinal hernia.     Information obtained from patient and review of chart.     Ms. Good tells me that she had a bulge in her left groin for approximately five months now. No significant change in the size of the bulge. Associated left groin discomfort with coughing or exertion. The pain is not disabling. No h/o nausea or vomiting. Nor right groin bulge or right groin pain. Found to have a left inguinal hernia. Furthermore, Ms. Good has been experiencing perineal pain and has also noted a mass in her perineum. On review of the chart, the perineum was felt to be within normal limits on exam on 2024. According to Ms. Good an MRI of the pelvis was recommended.   She has otherwise been in her usual state of health.     Past Medical History:   Diagnosis Date    Allergic rhinitis     Arrhythmia     Arthritis     left foot and lower back    Asthma     no inhaler    Burning with urination      delivery delivered 2022    Chronic UTI     Complication of anesthesia     bradycardia- pt stated she was \"shocked\" under general as child    GERD (gastroesophageal reflux disease)     Heart abnormality     mummer    Hypothyroidism     Irritable bowel syndrome     Left inguinal hernia 2025    Migraine     Nerve damage     Nerve damage in left foot.    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 in pregnancy     Routine Papanicolaou smear 2024    NILM, HPV negative    Self-catheterizes urinary bladder     Since age 7    Tethered spinal cord (HCC)     Unspecified breast disorder     L invert nipple     Past Surgical History:   Procedure Laterality Date    BACK SURGERY      x2    GYN

## 2025-03-11 ENCOUNTER — OFFICE VISIT (OUTPATIENT)
Age: 40
End: 2025-03-11
Payer: MEDICAID

## 2025-03-11 VITALS — WEIGHT: 109 LBS | BODY MASS INDEX: 19.31 KG/M2 | HEIGHT: 63 IN

## 2025-03-11 DIAGNOSIS — N64.4 MASTODYNIA OF RIGHT BREAST: Primary | ICD-10-CM

## 2025-03-11 DIAGNOSIS — N64.4 MASTODYNIA OF LEFT BREAST: ICD-10-CM

## 2025-03-11 PROCEDURE — 76642 ULTRASOUND BREAST LIMITED: CPT | Performed by: SURGERY

## 2025-03-11 PROCEDURE — 99213 OFFICE O/P EST LOW 20 MIN: CPT | Performed by: SURGERY

## 2025-03-11 NOTE — PROGRESS NOTES
HISTORY OF PRESENT ILLNESS  Elizabeth Good is a 39 y.o. female     HPI ESTABLISHED patient here for follow up bilateral breast pain RIGHT > LEFT.  Bilateral drainage.  The breasts feel full like when she breast fed.    Stopped breast feeding 2 years.        Hx of RIGHT breast fibroadenomas     Family History  Maternal great grandmothers  from breast cancer  No other breast cancer in her family    Mammogram schedule for 27    Past Medical History:   Diagnosis Date    Allergic rhinitis     Arrhythmia     Arthritis     left foot and lower back    Asthma     no inhaler    Burning with urination      delivery delivered 2022    Chronic UTI     Complication of anesthesia     bradycardia- pt stated she was \"shocked\" under general as child    GERD (gastroesophageal reflux disease)     Heart abnormality     mummer    Hypothyroidism     Irritable bowel syndrome     Left inguinal hernia 2025    Migraine     Nerve damage     Nerve damage in left foot.    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 in pregnancy     Routine Papanicolaou smear 2024    NILM, HPV negative    Self-catheterizes urinary bladder     Since age 7    Tethered spinal cord (HCC)     Unspecified breast disorder     L invert nipple     Past Surgical History:   Procedure Laterality Date    BACK SURGERY      x2    GYN      episotomy    ORTHOPEDIC SURGERY      spine and left foot    OTHER SURGICAL HISTORY      L foot has wire from surgery      OB History          8    Para   4    Term   4            AB   4    Living   5         SAB   1    IAB        Ectopic        Molar        Multiple   1    Live Births   5          Obstetric Comments   Menarche:  9.   LMP: 3/10/23.  # of Children:  5.  Age at Delivery of First

## 2025-03-19 ENCOUNTER — TELEPHONE (OUTPATIENT)
Age: 40
End: 2025-03-19

## 2025-03-19 NOTE — TELEPHONE ENCOUNTER
Patient stated HAYDEE Campos put in a order for Skin biopsy and is requesting to schedule appt.    Please contact

## 2025-03-26 ENCOUNTER — HOSPITAL ENCOUNTER (OUTPATIENT)
Facility: HOSPITAL | Age: 40
Discharge: HOME | End: 2025-03-29
Attending: SURGERY
Payer: MEDICAID

## 2025-03-26 VITALS — BODY MASS INDEX: 19.31 KG/M2 | WEIGHT: 109 LBS

## 2025-03-26 DIAGNOSIS — K40.90 LEFT INGUINAL HERNIA: ICD-10-CM

## 2025-03-26 PROCEDURE — 6360000004 HC RX CONTRAST MEDICATION: Performed by: RADIOLOGY

## 2025-03-26 PROCEDURE — 72197 MRI PELVIS W/O & W/DYE: CPT

## 2025-03-26 PROCEDURE — A9579 GAD-BASE MR CONTRAST NOS,1ML: HCPCS | Performed by: RADIOLOGY

## 2025-03-26 RX ADMIN — GADOTERIDOL 9 ML: 279.3 INJECTION, SOLUTION INTRAVENOUS at 16:54

## 2025-04-01 ENCOUNTER — OFFICE VISIT (OUTPATIENT)
Age: 40
End: 2025-04-01
Payer: MEDICAID

## 2025-04-01 VITALS
WEIGHT: 110.4 LBS | BODY MASS INDEX: 19.56 KG/M2 | HEIGHT: 63 IN | RESPIRATION RATE: 18 BRPM | TEMPERATURE: 98.1 F | HEART RATE: 77 BPM | SYSTOLIC BLOOD PRESSURE: 104 MMHG | OXYGEN SATURATION: 98 % | DIASTOLIC BLOOD PRESSURE: 55 MMHG

## 2025-04-01 DIAGNOSIS — K64.4 EXTERNAL HEMORRHOID: Primary | ICD-10-CM

## 2025-04-01 PROCEDURE — 99212 OFFICE O/P EST SF 10 MIN: CPT | Performed by: SURGERY

## 2025-04-01 ASSESSMENT — ENCOUNTER SYMPTOMS
CONSTIPATION: 0
DIARRHEA: 1
ANAL BLEEDING: 1

## 2025-04-01 ASSESSMENT — PATIENT HEALTH QUESTIONNAIRE - PHQ9
SUM OF ALL RESPONSES TO PHQ QUESTIONS 1-9: 0
2. FEELING DOWN, DEPRESSED OR HOPELESS: NOT AT ALL
SUM OF ALL RESPONSES TO PHQ QUESTIONS 1-9: 0
1. LITTLE INTEREST OR PLEASURE IN DOING THINGS: NOT AT ALL
SUM OF ALL RESPONSES TO PHQ QUESTIONS 1-9: 0
SUM OF ALL RESPONSES TO PHQ QUESTIONS 1-9: 0

## 2025-04-01 NOTE — PROGRESS NOTES
Identified patient with two patient identifiers (name and ). Reviewed chart in preparation for visit and have obtained necessary documentation.    Elizabeth Good is a 40 y.o. female  Chief Complaint   Patient presents with    Follow-up     MRI results     BP (!) 104/55 (BP Site: Left Upper Arm, Patient Position: Sitting, BP Cuff Size: Small Adult)   Pulse 77   Temp 98.1 °F (36.7 °C) (Oral)   Resp 18   Ht 1.6 m (5' 3\")   Wt 50.1 kg (110 lb 6.4 oz)   SpO2 98%   BMI 19.56 kg/m²     1. Have you been to the ER, urgent care clinic since your last visit?  Hospitalized since your last visit?yes    2. Have you seen or consulted any other health care providers outside of the [facility] since your last visit?  Include any pap smears or colon screening. yes   Patient and provider made aware of elevated BP x2. Patient asymptomatic. Patient reminded to monitor BP, continue to take BP medications if prescribed, and follow up with PCP/Cardiologist.  Patient expressed understanding and agreement.

## 2025-04-01 NOTE — PROGRESS NOTES
Elizabeth Good is a 40 y.o. female who returns following MRI of the pelvis.    Ms. Good was last seen on 2025 for further evaluation of a left inguinal hernia. Doing well since then. The LIH is unchanged. No significant left groin discomfort. Still experiencing perianal discomfort and bleeding. Ms. Good reports colon cancer in her grandfather. No previous colonoscopy.   She has otherwise been in her usual state of health.     MRI pelvis with contrast - 3/31/2025 - Lesion at the left aspect of the anal verge as described above, correlate with anoscopy. Possible fistula versus postsurgical change from this lesion to the vagina. L4-S1 laminectomies. Stable fat signal lesion in the terminal thecal sac,  again could be chronic/congenital. Grossly stable nonenhancing mixed fluid signal and hemorrhagic/proteinaceous signal lesion inferior to the left sacral ala as described above. Could be chronic postsurgical. No inguinal hernia identified.    Past Medical History:   Diagnosis Date    Allergic rhinitis     Arrhythmia     Arthritis     left foot and lower back    Asthma     no inhaler    Burning with urination      delivery delivered 2022    Chronic UTI     Complication of anesthesia     bradycardia- pt stated she was \"shocked\" under general as child    External hemorrhoid 2025    GERD (gastroesophageal reflux disease)     Heart abnormality     mummer    Hypothyroidism     Irritable bowel syndrome     Left inguinal hernia 2025    Migraine     Nerve damage     Nerve damage in left foot.    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 in pregnancy     Routine Papanicolaou smear 2024    NILM, HPV negative    Self-catheterizes urinary bladder     Since age 7    Tethered spinal

## 2025-04-09 ENCOUNTER — CLINICAL DOCUMENTATION (OUTPATIENT)
Age: 40
End: 2025-04-09

## 2025-04-09 NOTE — PROGRESS NOTES
4/9/25 1212pm: Referral for Colonoscopy received from Kaushik Capellan MD at [facility]. SQ

## 2025-04-14 ENCOUNTER — HOSPITAL ENCOUNTER (OUTPATIENT)
Facility: HOSPITAL | Age: 40
Discharge: HOME | End: 2025-04-17
Payer: MEDICAID

## 2025-04-14 VITALS — BODY MASS INDEX: 19.49 KG/M2 | WEIGHT: 110 LBS

## 2025-04-14 DIAGNOSIS — N64.4 PAINFUL BREASTS: ICD-10-CM

## 2025-04-14 PROCEDURE — G0279 TOMOSYNTHESIS, MAMMO: HCPCS

## 2025-04-14 PROCEDURE — 76642 ULTRASOUND BREAST LIMITED: CPT

## 2025-04-22 DIAGNOSIS — Z12.11 COLON CANCER SCREENING: Primary | ICD-10-CM

## 2025-04-22 RX ORDER — POLYETHYLENE GLYCOL 3350, SODIUM SULFATE ANHYDROUS, SODIUM BICARBONATE, SODIUM CHLORIDE, POTASSIUM CHLORIDE 236; 22.74; 6.74; 5.86; 2.97 G/4L; G/4L; G/4L; G/4L; G/4L
4 POWDER, FOR SOLUTION ORAL ONCE
Qty: 4000 ML | Refills: 0 | Status: SHIPPED | OUTPATIENT
Start: 2025-04-22 | End: 2025-04-22

## 2025-04-22 RX ORDER — BISACODYL 5 MG
5 TABLET, DELAYED RELEASE (ENTERIC COATED) ORAL DAILY PRN
Qty: 4 TABLET | Refills: 0 | Status: SHIPPED | OUTPATIENT
Start: 2025-04-22

## 2025-04-22 NOTE — PROGRESS NOTES
Chart reviewed. Patient is appropriate for colonoscopy for colon cancer screening  Prep type: One day prep. Prescribed today.  Anticoagulants: none  Z12.11    Last CSY:none    Referring provider:Timi. Requesting due to abnormal anal lesion and diarrhea. Will plan to perform random biopsies.

## 2025-04-23 ENCOUNTER — TELEPHONE (OUTPATIENT)
Age: 40
End: 2025-04-23

## 2025-04-23 NOTE — TELEPHONE ENCOUNTER
Spoke to patient. Explained procedure to patient. When she comes to the appointment tomorrow, procedure details will be explained again with nurse.

## 2025-04-23 NOTE — TELEPHONE ENCOUNTER
Patient would like a call back to discuss her upcoming skin biopsy.    Patient would like to know what will happen during this procedure.

## 2025-04-23 NOTE — TELEPHONE ENCOUNTER
L/M to schedule colonoscopy with Dr. Nava.    Per Dr. Nava:  Chart reviewed. Patient is appropriate for colonoscopy for colon cancer screening  Prep type: One day prep. Prescribed today.  Anticoagulants: none  Z12.11     Last CSY:none     Referring provider:Timi. Requesting due to abnormal anal lesion and diarrhea. Will plan to perform random biopsies.

## 2025-04-24 ENCOUNTER — CLINICAL DOCUMENTATION (OUTPATIENT)
Age: 40
End: 2025-04-24

## 2025-04-24 ENCOUNTER — PROCEDURE VISIT (OUTPATIENT)
Age: 40
End: 2025-04-24
Payer: MEDICAID

## 2025-04-24 DIAGNOSIS — R20.2 PARESTHESIA OF SKIN: Primary | ICD-10-CM

## 2025-04-24 PROCEDURE — 11105 PUNCH BX SKIN EA SEP/ADDL: CPT

## 2025-04-24 PROCEDURE — 11104 PUNCH BX SKIN SINGLE LESION: CPT

## 2025-04-24 RX ORDER — LIDOCAINE HYDROCHLORIDE AND EPINEPHRINE BITARTRATE 20; .01 MG/ML; MG/ML
1 INJECTION, SOLUTION SUBCUTANEOUS ONCE
Status: COMPLETED | OUTPATIENT
Start: 2025-04-24 | End: 2025-04-24

## 2025-04-24 RX ADMIN — LIDOCAINE HYDROCHLORIDE AND EPINEPHRINE 1 ML: 20; .01 INJECTION, SOLUTION SUBCUTANEOUS at 08:40

## 2025-04-24 NOTE — PROGRESS NOTES
Procedure note    Procedure:  Punch skin biopsy  Indication: Suspected small fiber neuropathy    Under informed consent, using sterile technique and aseptic precautions punch skin biopsy was performed at the right distal leg site and right thigh area. 2% lidocaine with epinephrine was used as a local anesthetic.  Samples were obtained and sent to the lab as per direction.  Post procedure care was discussed.  Patient tolerated well.  No complications.     Dexter Govea, CAESAR FNP-BC

## 2025-04-29 ENCOUNTER — PATIENT MESSAGE (OUTPATIENT)
Age: 40
End: 2025-04-29

## 2025-04-29 ENCOUNTER — TELEPHONE (OUTPATIENT)
Age: 40
End: 2025-04-29

## 2025-04-29 NOTE — TELEPHONE ENCOUNTER
Patient called because she's been feeling she going to fainting when walking. Patient would like to discuss symptoms before making a appointment.     # [phone]

## 2025-04-29 NOTE — TELEPHONE ENCOUNTER
Patient is calling to state she has had a reaction to the skin biopsy on her thigh/hip.    Patient states it has swollen up and is red and itchy.    Please call back to advise.

## 2025-04-29 NOTE — TELEPHONE ENCOUNTER
Call placed to patient.  LVM on identified line advising per NP Jeferson to try taking an antihistamine like benadryl and to leave the area uncovered.

## 2025-04-29 NOTE — TELEPHONE ENCOUNTER
Call placed to patient.  2 identifiers received.  Patient stated reaction started 36 hrs after procedure.  Afebrile.  Red, warm to the touch.  No discharge.  Very itchy with bumps.  Please advise.  Thanks.

## 2025-04-29 NOTE — TELEPHONE ENCOUNTER
Patient would like a call back to discuss her upcoming colonoscopy. States she has questions about procedure.

## 2025-04-30 ENCOUNTER — TELEPHONE (OUTPATIENT)
Age: 40
End: 2025-04-30

## 2025-04-30 RX ORDER — MIDODRINE HYDROCHLORIDE 5 MG/1
5 TABLET ORAL 2 TIMES DAILY
Qty: 60 TABLET | Refills: 3 | Status: SHIPPED | OUTPATIENT
Start: 2025-04-30

## 2025-04-30 NOTE — TELEPHONE ENCOUNTER
Spoke with patient.  Stated episodes of dizziness and presyncope have been worsening, especially with positional changes.  Instead of lasting for a few minutes, lasting for up to an hour.  States systolic blood pressures high 90's/50's.  EMT assisted with recent episode, improved with Gatorade and Pedialyte.  Still not wearing compression hose, encouraged compliance.  Questioned if she should take a daily salt tablet.  Per your last note, 2/2025 may start midodrine if symptoms worsen.  Scheduled to see pcp today to check TSH and glucose level.  Please advise.

## 2025-05-19 ENCOUNTER — TELEPHONE (OUTPATIENT)
Age: 40
End: 2025-05-19

## 2025-06-02 ENCOUNTER — PREP FOR PROCEDURE (OUTPATIENT)
Age: 40
End: 2025-06-02

## 2025-06-02 DIAGNOSIS — Z12.11 SCREEN FOR COLON CANCER: ICD-10-CM

## 2025-06-11 ENCOUNTER — CLINICAL DOCUMENTATION (OUTPATIENT)
Age: 40
End: 2025-06-11

## 2025-06-11 ENCOUNTER — TELEPHONE (OUTPATIENT)
Age: 40
End: 2025-06-11

## 2025-06-11 NOTE — TELEPHONE ENCOUNTER
6/11/25 1137am: Attempted to call pt to remind her of scheduled colonoscopy. V/m full. Instructions/prep sent to pt via Photos to [employer]. SQ

## 2025-06-16 ENCOUNTER — TELEPHONE (OUTPATIENT)
Age: 40
End: 2025-06-16

## 2025-06-16 NOTE — TELEPHONE ENCOUNTER
6/16/25@1543 Patient called to request to cancel colonoscopy  appt on 6/18/25 and reschedule. (KF)

## 2025-06-20 DIAGNOSIS — R00.2 PALPITATIONS: ICD-10-CM

## 2025-06-20 DIAGNOSIS — I95.1 ORTHOSTATIC HYPOTENSION: Primary | ICD-10-CM

## 2025-06-20 RX ORDER — MIDODRINE HYDROCHLORIDE 5 MG/1
5 TABLET ORAL 2 TIMES DAILY
Qty: 60 TABLET | Refills: 6 | Status: SHIPPED | OUTPATIENT
Start: 2025-06-20

## 2025-07-02 PROBLEM — Z12.11 SCREEN FOR COLON CANCER: Status: RESOLVED | Noted: 2025-06-02 | Resolved: 2025-07-02

## 2025-09-01 ENCOUNTER — HOSPITAL ENCOUNTER (EMERGENCY)
Facility: HOSPITAL | Age: 40
Discharge: HOME | End: 2025-09-01
Attending: STUDENT IN AN ORGANIZED HEALTH CARE EDUCATION/TRAINING PROGRAM
Payer: MEDICAID

## 2025-09-01 VITALS
SYSTOLIC BLOOD PRESSURE: 113 MMHG | WEIGHT: 113 LBS | HEIGHT: 63 IN | DIASTOLIC BLOOD PRESSURE: 68 MMHG | HEART RATE: 65 BPM | BODY MASS INDEX: 20.02 KG/M2 | RESPIRATION RATE: 23 BRPM | TEMPERATURE: 98.7 F | OXYGEN SATURATION: 93 %

## 2025-09-01 DIAGNOSIS — R00.2 PALPITATIONS: Primary | ICD-10-CM

## 2025-09-01 LAB
ALBUMIN SERPL-MCNC: 3.8 G/DL (ref 3.5–5.2)
ALBUMIN/GLOB SERPL: 1.2 (ref 1.1–2.2)
ALP SERPL-CCNC: 44 U/L (ref 35–104)
ALT SERPL-CCNC: 22 U/L (ref 10–35)
ANION GAP SERPL CALC-SCNC: 13 MMOL/L (ref 2–14)
AST SERPL-CCNC: 21 U/L (ref 10–35)
BASOPHILS # BLD: 0.1 K/UL (ref 0–0.1)
BASOPHILS NFR BLD: 1.2 % (ref 0–1)
BILIRUB SERPL-MCNC: 0.3 MG/DL (ref 0–1.2)
BUN SERPL-MCNC: 10 MG/DL (ref 6–20)
BUN/CREAT SERPL: 16 (ref 12–20)
CALCIUM SERPL-MCNC: 9.4 MG/DL (ref 8.6–10)
CHLORIDE SERPL-SCNC: 102 MMOL/L (ref 98–107)
CO2 SERPL-SCNC: 22 MMOL/L (ref 20–29)
COMMENT:: NORMAL
CREAT SERPL-MCNC: 0.66 MG/DL (ref 0.6–1)
DIFFERENTIAL METHOD BLD: ABNORMAL
EOSINOPHIL # BLD: 0.2 K/UL (ref 0–0.4)
EOSINOPHIL NFR BLD: 2.5 % (ref 0–7)
ERYTHROCYTE [DISTWIDTH] IN BLOOD BY AUTOMATED COUNT: 12.8 % (ref 11.5–14.5)
GLOBULIN SER CALC-MCNC: 3.2 G/DL (ref 2–4)
GLUCOSE SERPL-MCNC: 126 MG/DL (ref 65–100)
HCT VFR BLD AUTO: 38.3 % (ref 35–47)
HGB BLD-MCNC: 13.3 G/DL (ref 11.5–16)
IMM GRANULOCYTES # BLD AUTO: 0.02 K/UL (ref 0–0.04)
IMM GRANULOCYTES NFR BLD AUTO: 0.2 % (ref 0–0.5)
LYMPHOCYTES # BLD: 3.06 K/UL (ref 0.8–3.5)
LYMPHOCYTES NFR BLD: 37.5 % (ref 12–49)
MCH RBC QN AUTO: 29.5 PG (ref 26–34)
MCHC RBC AUTO-ENTMCNC: 34.7 G/DL (ref 30–36.5)
MCV RBC AUTO: 84.9 FL (ref 80–99)
MONOCYTES # BLD: 0.57 K/UL (ref 0–1)
MONOCYTES NFR BLD: 7 % (ref 5–13)
NEUTS SEG # BLD: 4.21 K/UL (ref 1.8–8)
NEUTS SEG NFR BLD: 51.6 % (ref 32–75)
NRBC # BLD: 0 K/UL (ref 0–0.01)
NRBC BLD-RTO: 0 PER 100 WBC
PLATELET # BLD AUTO: 267 K/UL (ref 150–400)
PMV BLD AUTO: 11 FL (ref 8.9–12.9)
POTASSIUM SERPL-SCNC: 3.6 MMOL/L (ref 3.5–5.1)
PROT SERPL-MCNC: 7 G/DL (ref 6.4–8.3)
RBC # BLD AUTO: 4.51 M/UL (ref 3.8–5.2)
SODIUM SERPL-SCNC: 137 MMOL/L (ref 136–145)
SPECIMEN HOLD: NORMAL
TROPONIN T SERPL HS-MCNC: <6 NG/L (ref 0–14)
WBC # BLD AUTO: 8.2 K/UL (ref 3.6–11)

## 2025-09-01 PROCEDURE — 6370000000 HC RX 637 (ALT 250 FOR IP): Performed by: STUDENT IN AN ORGANIZED HEALTH CARE EDUCATION/TRAINING PROGRAM

## 2025-09-01 PROCEDURE — 84484 ASSAY OF TROPONIN QUANT: CPT

## 2025-09-01 PROCEDURE — 93005 ELECTROCARDIOGRAM TRACING: CPT | Performed by: STUDENT IN AN ORGANIZED HEALTH CARE EDUCATION/TRAINING PROGRAM

## 2025-09-01 PROCEDURE — 96360 HYDRATION IV INFUSION INIT: CPT

## 2025-09-01 PROCEDURE — 2580000003 HC RX 258: Performed by: STUDENT IN AN ORGANIZED HEALTH CARE EDUCATION/TRAINING PROGRAM

## 2025-09-01 PROCEDURE — 36415 COLL VENOUS BLD VENIPUNCTURE: CPT

## 2025-09-01 PROCEDURE — 99284 EMERGENCY DEPT VISIT MOD MDM: CPT

## 2025-09-01 PROCEDURE — 85025 COMPLETE CBC W/AUTO DIFF WBC: CPT

## 2025-09-01 PROCEDURE — 80053 COMPREHEN METABOLIC PANEL: CPT

## 2025-09-01 RX ORDER — ACETAMINOPHEN 325 MG/1
650 TABLET ORAL
Status: COMPLETED | OUTPATIENT
Start: 2025-09-01 | End: 2025-09-01

## 2025-09-01 RX ORDER — 0.9 % SODIUM CHLORIDE 0.9 %
1000 INTRAVENOUS SOLUTION INTRAVENOUS ONCE
Status: COMPLETED | OUTPATIENT
Start: 2025-09-01 | End: 2025-09-01

## 2025-09-01 RX ADMIN — SODIUM CHLORIDE 1000 ML: 0.9 INJECTION, SOLUTION INTRAVENOUS at 22:44

## 2025-09-01 RX ADMIN — ACETAMINOPHEN 650 MG: 325 TABLET ORAL at 22:46

## 2025-09-01 ASSESSMENT — LIFESTYLE VARIABLES
HOW MANY STANDARD DRINKS CONTAINING ALCOHOL DO YOU HAVE ON A TYPICAL DAY: PATIENT DOES NOT DRINK
HOW OFTEN DO YOU HAVE A DRINK CONTAINING ALCOHOL: NEVER

## 2025-09-01 ASSESSMENT — PAIN SCALES - GENERAL: PAINLEVEL_OUTOF10: 2

## 2025-09-03 LAB
EKG ATRIAL RATE: 63 BPM
EKG DIAGNOSIS: NORMAL
EKG P AXIS: 28 DEGREES
EKG P-R INTERVAL: 132 MS
EKG Q-T INTERVAL: 342 MS
EKG QRS DURATION: 70 MS
EKG QTC CALCULATION (BAZETT): 349 MS
EKG R AXIS: 49 DEGREES
EKG T AXIS: 6 DEGREES
EKG VENTRICULAR RATE: 63 BPM

## 2025-09-03 PROCEDURE — 93010 ELECTROCARDIOGRAM REPORT: CPT | Performed by: INTERNAL MEDICINE

## (undated) DEVICE — REM POLYHESIVE ADULT PATIENT RETURN ELECTRODE: Brand: VALLEYLAB

## (undated) DEVICE — TIP CLEANER: Brand: VALLEYLAB

## (undated) DEVICE — TOWEL,OR,DSP,ST,BLUE,STD,2/PK,40PK/CS: Brand: MEDLINE

## (undated) DEVICE — ROCKER SWITCH PENCIL HOLSTER: Brand: VALLEYLAB

## (undated) DEVICE — GLOVE ORANGE PI 8   MSG9080

## (undated) DEVICE — SUTURE VCRL SZ 0 L36IN ABSRB VLT L40MM CT 1/2 CIR J358H

## (undated) DEVICE — GARMENT,MEDLINE,DVT,INT,CALF,MED, GEN2: Brand: MEDLINE

## (undated) DEVICE — SPONGE: LAP 18X18 W  200/CS: Brand: MEDICAL ACTION INDUSTRIES

## (undated) DEVICE — SOLUTION IRRIG 1000ML 0.9% SOD CHL USP POUR PLAS BTL

## (undated) DEVICE — TOTAL TRAY, 16FR 10ML SIL FOLEY, URN: Brand: MEDLINE

## (undated) DEVICE — SOLIDIFIER FLUID 3000 CC ABSORB

## (undated) DEVICE — C-SECTION II-LF: Brand: MEDLINE INDUSTRIES, INC.

## (undated) DEVICE — 3000CC GUARDIAN II: Brand: GUARDIAN

## (undated) DEVICE — GOWN,AURORA,FABRIC-REINFORCED,X-LARGE: Brand: MEDLINE

## (undated) DEVICE — SUTURE MCRYL SZ 4 0 L18IN ABSRB VLT PS 1 L24MM 3 8 CIR REV Y682H

## (undated) DEVICE — DRAPE C SECT OBSTETRICS/GYNECOLOGY

## (undated) DEVICE — MEDI-VAC NON-CONDUCTIVE SUCTION TUBING: Brand: CARDINAL HEALTH

## (undated) DEVICE — STRAP,POSITIONING,KNEE/BODY,FOAM,4X60": Brand: MEDLINE

## (undated) DEVICE — SUTURE PDS II SZ 0 L60IN ABSRB VLT L48MM CTX 1/2 CIR Z990G

## (undated) DEVICE — HANDLE LT SNAP ON ULT DURABLE LENS FOR TRUMPF ALC DISPOSABLE

## (undated) DEVICE — PREP SKN CHLRAPRP APL 26ML STR --

## (undated) DEVICE — PAD,ABDOMINAL,5"X9",ST,LF,25/BX: Brand: MEDLINE INDUSTRIES, INC.

## (undated) DEVICE — GLOVE SURG SZ 6 THK91MIL LTX FREE SYN POLYISOPRENE ANTI

## (undated) DEVICE — GLOVE SURG SZ 65 THK91MIL LTX FREE SYN POLYISOPRENE

## (undated) DEVICE — SUTURE VCRL SZ 2-0 L27IN ABSRB VLT L40MM CT 1/2 CIR J351H